# Patient Record
Sex: FEMALE | Race: WHITE | NOT HISPANIC OR LATINO | Employment: OTHER | URBAN - METROPOLITAN AREA
[De-identification: names, ages, dates, MRNs, and addresses within clinical notes are randomized per-mention and may not be internally consistent; named-entity substitution may affect disease eponyms.]

---

## 2017-01-03 ENCOUNTER — HOSPITAL ENCOUNTER (OUTPATIENT)
Dept: PHYSICAL THERAPY | Facility: HOSPITAL | Age: 77
Setting detail: THERAPIES SERIES
Discharge: HOME | End: 2017-01-03

## 2017-01-03 DIAGNOSIS — M54.50 BILATERAL LOW BACK PAIN WITHOUT SCIATICA, UNSPECIFIED CHRONICITY: Primary | ICD-10-CM

## 2017-01-03 DIAGNOSIS — G72.41 INCLUSION BODY MYOSITIS: ICD-10-CM

## 2017-01-03 DIAGNOSIS — R26.89 BALANCE PROBLEM: ICD-10-CM

## 2017-01-03 PROCEDURE — 97112 NEUROMUSCULAR REEDUCATION: CPT | Performed by: PHYSICAL THERAPIST

## 2017-01-03 PROCEDURE — 97110 THERAPEUTIC EXERCISES: CPT | Performed by: PHYSICAL THERAPIST

## 2017-01-03 NOTE — PROGRESS NOTES
Outpatient Physical Therapy Ortho Treatment Note  [facility]     Patient Name: Lissa Ortiz  : 1940  MRN: [id number]  Today's Date: 1/3/2017      Visit Date: 2017    Visit Dx:    ICD-10-CM ICD-9-CM   1. Bilateral low back pain without sciatica, unspecified chronicity M54.5 724.2   2. Balance problem R26.89 781.99   3. 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       Patient Active Problem List   Diagnosis   • Benign essential hypertension   • Mixed anxiety depressive disorder   • Diabetes mellitus   • Glaucoma   • Hyperlipidemia   • Insomnia   • Restless legs syndrome   • Rheumatoid arthritis   • Vitamin D deficiency   • Routine health maintenance   • Midline low back pain without sciatica   • Encounter for immunization        Past Medical History   Diagnosis Date   • Allergic rhinitis         Past Surgical History   Procedure Laterality Date   • Hysterectomy     • Tonsillectomy                               PT Assessment/Plan       17 1350 16 1410       PT Assessment    Assessment Comments Progressed to 6 inch step ups. Worked on sit to stand from 20 inch surface where patient does not require use of hand support, (chair height is 17.5 in.).  Good reaction time with ball toss/catch and kicking ball. Work on progression to 8 inch step up and lateral step ups.  -OPHELIA doing better with balance work but noted extreme challenge with squat over 90 degrees and sit to stand can't be done unless using her hands or propped up. spent some time looking on [employer] website and gave pt advice regarding other progressive symptoms that can affect swallowing (thickener) and wrist flexor muscles (putty)  -LESLYE       User Key  (r) = Recorded By, (t) = Taken By, (c) = Cosigned By    Initials Name Provider Type    OPHELIA David, PT Physical Therapist    ZK Zorre Zeno Kimura, PT Physical Therapist                    Exercises       17 1300          Subjective Pain    Able to rate subjective pain? yes  -OPHELIA       Pre-Treatment Pain Level 0  -OPHELIA      Post-Treatment Pain Level 0  -OPHELIA      Subjective Pain Comment Sore after last treatment.  -OPHELIA        User Key  (r) = Recorded By, (t) = Taken By, (c) = Cosigned By    Initials Name Provider Type    OPHELIA David, PT Physical Therapist                                       Time Calculation:   Start Time: 1311  Stop Time: 1349  Time Calculation (min): 38 min    Therapy Charges for Today     Code Description Service Date Service Provider Modifiers Qty    01254287726  PT THER PROC EA 15 MIN 1/3/2017 Chandan David, PT GP 3                    Chandan David, PT  1/3/2017

## 2017-01-03 NOTE — PROGRESS NOTES
Outpatient Physical Therapy Ortho Treatment Note  [facility]     Patient Name: Lissa Ortiz  : 1940  MRN: [id number]  Today's Date: 1/3/2017      Visit Date: 2017    Visit Dx:    ICD-10-CM ICD-9-CM   1. Bilateral low back pain without sciatica, unspecified chronicity M54.5 724.2   2. Balance problem R26.89 781.99   3. 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       Patient Active Problem List   Diagnosis   • Benign essential hypertension   • Mixed anxiety depressive disorder   • Diabetes mellitus   • Glaucoma   • Hyperlipidemia   • Insomnia   • Restless legs syndrome   • Rheumatoid arthritis   • Vitamin D deficiency   • Routine health maintenance   • Midline low back pain without sciatica   • Encounter for immunization        Past Medical History   Diagnosis Date   • Allergic rhinitis         Past Surgical History   Procedure Laterality Date   • Hysterectomy     • Tonsillectomy                               PT Assessment/Plan       17 1350 16 1410       PT Assessment    Assessment Comments Progressed to 6 inch step ups. Worked on sit to stand from 20 inch surface where patient does not require use of hand support, (chair height is 17.5 in.).  Good reaction time with ball toss/catch and kicking ball. Work on progression to 8 inch step up and lateral step ups.  -OPHELIA doing better with balance work but noted extreme challenge with squat over 90 degrees and sit to stand can't be done unless using her hands or propped up. spent some time looking on [employer] website and gave pt advice regarding other progressive symptoms that can affect swallowing (thickener) and wrist flexor muscles (putty)  -LESLYE       User Key  (r) = Recorded By, (t) = Taken By, (c) = Cosigned By    Initials Name Provider Type    OPHELIA David, PT Physical Therapist    ZK Zorre Zeno Kimura, PT Physical Therapist                    Exercises       17 1300          Subjective Pain    Able to rate subjective pain? yes  -OPHELIA       Pre-Treatment Pain Level 0  -OPHELIA      Post-Treatment Pain Level 0  -OPHELIA      Subjective Pain Comment Sore after last treatment.  -OPHELIA        User Key  (r) = Recorded By, (t) = Taken By, (c) = Cosigned By    Initials Name Provider Type    OPHELIA David, PT Physical Therapist                                       Time Calculation:   Start Time: 1311  Stop Time: 1349  Time Calculation (min): 38 min    Therapy Charges for Today     Code Description Service Date Service Provider Modifiers Qty    81822500518  PT THER PROC EA 15 MIN 1/3/2017 Chandan David, PT GP 2    [id number]  PT NEUROMUSC RE EDUCATION EA 15 MIN 1/3/2017 Chandan David, PT GP 1                    Chandan David, PT  1/3/2017

## 2017-01-04 DIAGNOSIS — E11.9 DIABETES TYPE 2, CONTROLLED (HCC): ICD-10-CM

## 2017-01-04 RX ORDER — GLIMEPIRIDE 4 MG/1
TABLET ORAL
Qty: 180 TABLET | Refills: 0 | Status: SHIPPED | OUTPATIENT
Start: 2017-01-04 | End: 2017-04-06 | Stop reason: SDUPTHER

## 2017-01-06 ENCOUNTER — HOSPITAL ENCOUNTER (OUTPATIENT)
Dept: PHYSICAL THERAPY | Facility: HOSPITAL | Age: 77
Setting detail: THERAPIES SERIES
Discharge: HOME | End: 2017-01-06

## 2017-01-06 DIAGNOSIS — G72.41 INCLUSION BODY MYOSITIS: ICD-10-CM

## 2017-01-06 DIAGNOSIS — R26.89 BALANCE PROBLEM: ICD-10-CM

## 2017-01-06 DIAGNOSIS — M54.50 BILATERAL LOW BACK PAIN WITHOUT SCIATICA, UNSPECIFIED CHRONICITY: Primary | ICD-10-CM

## 2017-01-06 PROCEDURE — 97110 THERAPEUTIC EXERCISES: CPT | Performed by: PHYSICAL THERAPIST

## 2017-01-06 PROCEDURE — 97112 NEUROMUSCULAR REEDUCATION: CPT | Performed by: PHYSICAL THERAPIST

## 2017-01-06 NOTE — PROGRESS NOTES
Outpatient Physical Therapy Ortho Treatment Note  [facility]     Patient Name: Lissa Ortiz  : 1940  MRN: [id number]  Today's Date: 2017      Visit Date: 2017    Visit Dx:    ICD-10-CM ICD-9-CM   1. Bilateral low back pain without sciatica, unspecified chronicity M54.5 724.2   2. Balance problem R26.89 781.99   3. 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       Patient Active Problem List   Diagnosis   • Benign essential hypertension   • Mixed anxiety depressive disorder   • Diabetes mellitus   • Glaucoma   • Hyperlipidemia   • Insomnia   • Restless legs syndrome   • Rheumatoid arthritis   • Vitamin D deficiency   • Routine health maintenance   • Midline low back pain without sciatica   • Encounter for immunization        Past Medical History   Diagnosis Date   • Allergic rhinitis         Past Surgical History   Procedure Laterality Date   • Hysterectomy     • Tonsillectomy                               PT Assessment/Plan       17 1650 17 1350       PT Assessment    Assessment Comments need to add on lateral step up. overall tandem walking, back stepping, mell marches front and side, and ball catch and kick all improving. Still can lose her balance but catching herself, or making sure we are close to a wall. Will wrap up rx next week and right now pt doing her HEP daily. instructed to go to every other day if LE fatigue increases.   -ZK Progressed to 6 inch step ups. Worked on sit to stand from 20 inch surface where patient does not require use of hand support, (chair height is 17.5 in.).  Good reaction time with ball toss/catch and kicking ball. Work on progression to 8 inch step up and lateral step ups.  -OPHELIA       User Key  (r) = Recorded By, (t) = Taken By, (c) = Cosigned By    Initials Name Provider Type    OPHELIA David, PT Physical Therapist    ZK Zorre Zeno Kimura, PT Physical Therapist                    Exercises       17 1400          Subjective Comments     "Subjective Comments sore after last session. doing her HEP from her phone. will walk more after dc, keep up with her HEP and do more of her elliptical machine.   -ZK      Subjective Pain    Able to rate subjective pain? yes  -ZK      Pre-Treatment Pain Level 0  -ZK      Post-Treatment Pain Level 0  -ZK      Exercise 1    Exercise Name 1 see flow sheet: lunge walks, MIP walks, side stepping lunges, ball catch and kick with back close to wall in case of falling. hamstring and calf stretching and added quad stretching since this has been her biggest soreness issue. better with sit to stands from just one 2\" foam vs. two of them  -ZK        User Key  (r) = Recorded By, (t) = Taken By, (c) = Cosigned By    Initials Name Provider Type    ZK Zorre Zeno Kimura, PT Physical Therapist                   Balance Exercises (last 24 hours)      Balance Exercises       01/06/17 1400          Computerized Posturography    Activity LOS;Midline orientation;Weight shifting   foam and firm surfaces  -ZK      Intensity Moderate   70-80% LOS and quick 5\" changes.  -ZK        User Key  (r) = Recorded By, (t) = Taken By, (c) = Cosigned By    Initials Name Provider Type    ZK Zorre Zeno Kimura, PT Physical Therapist                             Time Calculation:   Start Time: 1400  Stop Time: 1445  Time Calculation (min): 45 min    Therapy Charges for Today     Code Description Service Date Service Provider Modifiers Qty    05810731255  PT THER PROC EA 15 MIN 1/6/2017 Zorre Zeno Kimura, PT GP 2    [id number]  PT NEUROMUSC RE EDUCATION EA 15 MIN 1/6/2017 Zorre Zeno Kimura, PT GP 1                    Zorre Zeno Kimura, PT  1/6/2017     "

## 2017-01-10 ENCOUNTER — APPOINTMENT (OUTPATIENT)
Dept: PHYSICAL THERAPY | Facility: HOSPITAL | Age: 77
End: 2017-01-10

## 2017-01-13 ENCOUNTER — OFFICE VISIT (OUTPATIENT)
Dept: INTERNAL MEDICINE | Age: 77
End: 2017-01-13

## 2017-01-13 VITALS
SYSTOLIC BLOOD PRESSURE: 126 MMHG | HEIGHT: 66 IN | DIASTOLIC BLOOD PRESSURE: 56 MMHG | TEMPERATURE: 97 F | BODY MASS INDEX: 23 KG/M2 | WEIGHT: 143.1 LBS | HEART RATE: 78 BPM | OXYGEN SATURATION: 99 %

## 2017-01-13 DIAGNOSIS — H61.21 IMPACTED CERUMEN OF RIGHT EAR: Primary | ICD-10-CM

## 2017-01-13 PROCEDURE — 99212 OFFICE O/P EST SF 10 MIN: CPT | Performed by: INTERNAL MEDICINE

## 2017-01-13 NOTE — PROGRESS NOTES
Subjective   Lissa Ortiz is a 76 y.o. female.     History of Present Illness patient has impacted cerumen noted within she purchased her hearing aid.  She would like to have this removed.    The following portions of the patient's history were reviewed and updated as appropriate: allergies, current medications and problem list.    Review of Systems   HENT: Positive for ear pain.         Right side       Objective   Physical Exam   Constitutional: She is oriented to person, place, and time. She appears well-developed and well-nourished. No distress.   HENT:   Right ear, impacted cerumen, this was removed with irrigation, following removal TM and canal were normal.   Neurological: She is alert and oriented to person, place, and time.   Skin: Skin is warm and dry.   Psychiatric: She has a normal mood and affect. Her behavior is normal. Judgment and thought content normal.   Nursing note and vitals reviewed.    Procedures      Assessment/Plan   Lissa was seen today for earache.    Diagnoses and all orders for this visit:    Impacted cerumen of right ear    Other orders  -     Ear Cerumen Removal Instrumentation       Ear wax removed,.

## 2017-01-13 NOTE — MR AVS SNAPSHOT
Lissa Ortiz   1/13/2017 3:20 PM   Office Visit    Dept Phone:  [phone]   Encounter #:  [id number]    Provider:  Marvin Ramey MD   Department:  [facility] PRIMARY CARE                Your Full Care Plan              Your Updated Medication List          This list is accurate as of: 1/13/17  3:33 PM.  Always use your most recent med list.                amLODIPine 5 MG tablet   Commonly known as:  NORVASC   TAKE ONE TABLET BY MOUTH DAILY       aspirin 81 MG tablet       dorzolamide-timolol 22.3-6.8 MG/ML ophthalmic solution   Commonly known as:  COSOPT       folic acid 1 MG tablet   Commonly known as:  FOLVITE       glimepiride 4 MG tablet   Commonly known as:  AMARYL   TAKE ONE TABLET BY MOUTH TWICE A DAY       JANUVIA 100 MG tablet   Generic drug:  SITagliptin   TAKE ONE TABLET BY MOUTH EVERY DAY       metFORMIN  MG 24 hr tablet   Commonly known as:  GLUCOPHAGE XR   Take 3 tablets by mouth Daily With Breakfast.       Methotrexate Sodium syringe       MULTI VITAMIN PO       Omega 3 1000 MG capsule       pravastatin 20 MG tablet   Commonly known as:  PRAVACHOL   TAKE ONE TABLET BY MOUTH DAILY       Probiotic capsule       sertraline 50 MG tablet   Commonly known as:  ZOLOFT   TAKE ONE TABLET BY MOUTH DAILY       TURMERIC CURCUMIN PO       vitamin B-12 100 MCG tablet   Commonly known as:  CYANOCOBALAMIN       Vitamin D-3 1000 UNITS capsule               You Were Diagnosed With        Codes Comments    Impacted cerumen of right ear    -  Primary ICD-10-CM: H61.21  ICD-9-CM: 380.4       Instructions     None    Patient Instructions History      Upcoming Appointments     Visit Type Date Time Department    OFFICE VISIT 1/13/2017  3:20 PM [facility]    OFFICE VISIT 4/12/2017  9:00 AM [facility]      MyChart Signup     [facility] allows you to send messages to your doctor, view your test results, renew your prescriptions, schedule appointments,  "and more. To sign up, go to [employer] and click on the Sign Up Now link in the New User? box. Enter your [employer] Activation Code exactly as it appears below along with the last four digits of your Social Security Number and your Date of Birth () to complete the sign-up process. If you do not sign up before the expiration date, you must request a new code.    [employer] Activation Code: [email or web address]  Expires: 2017  3:32 PM    If you have questions, you can email [email or web address] or call [phone] to talk to our [employer] staff. Remember, [employer] is NOT to be used for urgent needs. For medical emergencies, dial 911.               Other Info from Your Visit           Your Appointments     2017  9:00 AM EDT   Office Visit with Marvin Ramey MD   [facility] PRIMARY CARE (--)    [address] [place] [id number] [phone]           Arrive 15 minutes prior to appointment.              Allergies     No Known Allergies      Reason for Visit     Earache Right      Vital Signs     Blood Pressure Pulse Temperature Height Weight Oxygen Saturation    126/56 78 97 °F (36.1 °C) 66\" (167.6 cm) 143 lb 1.6 oz (64.9 kg) 99%    Body Mass Index Smoking Status                23.1 kg/m2 Never Smoker          Problems and Diagnoses Noted     Excess wax in ear    -  Primary        "

## 2017-02-06 ENCOUNTER — OFFICE VISIT (OUTPATIENT)
Dept: INTERNAL MEDICINE | Age: 77
End: 2017-02-06

## 2017-02-06 VITALS
HEIGHT: 66 IN | SYSTOLIC BLOOD PRESSURE: 122 MMHG | TEMPERATURE: 98.1 F | WEIGHT: 146 LBS | BODY MASS INDEX: 23.46 KG/M2 | DIASTOLIC BLOOD PRESSURE: 70 MMHG | OXYGEN SATURATION: 98 % | HEART RATE: 74 BPM

## 2017-02-06 DIAGNOSIS — E11.9 TYPE 2 DIABETES MELLITUS WITHOUT COMPLICATION, WITHOUT LONG-TERM CURRENT USE OF INSULIN (HCC): Primary | ICD-10-CM

## 2017-02-06 LAB — GLUCOSE BLDC GLUCOMTR-MCNC: 199 MG/DL (ref 70–130)

## 2017-02-06 PROCEDURE — 82962 GLUCOSE BLOOD TEST: CPT | Performed by: NURSE PRACTITIONER

## 2017-02-06 PROCEDURE — 99214 OFFICE O/P EST MOD 30 MIN: CPT | Performed by: NURSE PRACTITIONER

## 2017-02-06 NOTE — PROGRESS NOTES
Lissa Ortiz / 76 y.o. / female  02/06/2017      CC:  Main reason(s) for today's visit: elevated glucose (190 this morning before eating breakfast)      HPI:   Patient states that she has noticed that her morning blood glucose levels have increased recently in the am. States that the most recent medication change was about three months ago. She had increased her metformin from twice a day to three times a day. States that she has has a lot more stress in the recent months due to placing her spouse in a facility for memory care and she is getting a new puppy which makes her nervous. States denies any polyuria, polyphagia or polydipsia. She denies any changes in diet. States she exercises with an elliptical daily and walks daily. She also states that she monitors her blood sugar every morning. States that it fluctuates from 120 to 190's.        The following portions of the patient's history were reviewed and updated as appropriate: past medical history and past surgical history.    ASSESSMENT & PLAN:    Problem List Items Addressed This Visit        Endocrine    Diabetes mellitus - Primary    Relevant Medications    JANUVIA 100 MG tablet    metFORMIN XR (GLUCOPHAGE XR) 500 MG 24 hr tablet    glimepiride (AMARYL) 4 MG tablet    Other Relevant Orders    Hemoglobin A1c        Orders Placed This Encounter   Procedures   • Hemoglobin A1c       · Summary/Discussion:     1. Diabetes mellitus: Patient with C/O increasing blood glucose levels in the morning. States that she has not had any changes in diet and exercise.  She does report that she has had a recent medication change in that her metformin was increased to 3 pills a day instead of 2 pills a day.  She also reports more stress in her life recently secondary to obtaining any appendectomy, and having to place her spouse in a facility for memory care.  I reviewed last hemoglobin A1c and it was elevated on 7.6 on 06/23/2016, then 7.5 on 08/18/2016 and 7.23 on 11/23/2016.   Due to the fluctuation of the blood glucose levels over the past few months and medication changes, I will repeat Hgb A1c today to further evaluate. I also advised her to monitor her blood glucose levels in the morning, noon and night for the next 2 weeks and report the levels.  Depending on the Hgb A1c levels, medication may be adjusted. Patient does not appear to be symptomatic at this time and is not reporting any acute issues.  She was advised to continue with her dietary changes, and exercise regimen and follow-up with Dr. Ramey as scheduled in April 2017.       Follow-up: No Follow-up on file.     Future Appointments  Date Time Provider Department Center   4/12/2017 9:00 AM Marvin Ramey MD [facility] None     ____________________________________________________________________    REVIEW OF SYSTEMS    Review of Systems   Constitutional: Negative for activity change, appetite change, chills, diaphoresis, fatigue and fever.   HENT: Negative for congestion, dental problem, ear discharge, facial swelling, hearing loss, mouth sores, sore throat, trouble swallowing and voice change.    Eyes: Negative.    Respiratory: Negative for apnea, cough, choking, chest tightness, shortness of breath, wheezing and stridor.    Cardiovascular: Negative for chest pain, palpitations and leg swelling.   Gastrointestinal: Negative for abdominal distention, abdominal pain, anal bleeding, blood in stool, constipation, diarrhea, nausea, rectal pain and vomiting.   Endocrine: Negative for cold intolerance, heat intolerance, polydipsia, polyphagia and polyuria.   Genitourinary: Negative for decreased urine volume, difficulty urinating, dysuria, enuresis, flank pain, frequency, genital sores, hematuria and urgency.   Musculoskeletal: Negative for arthralgias, back pain, gait problem, joint swelling, myalgias, neck pain and neck stiffness.   Skin: Negative for color change, pallor, rash and wound.   Allergic/Immunologic: Negative for  "environmental allergies, food allergies and immunocompromised state.   Neurological: Negative for dizziness, tremors, seizures, syncope, facial asymmetry, speech difficulty, weakness, light-headedness, numbness and headaches.   Hematological: Negative for adenopathy. Does not bruise/bleed easily.   Psychiatric/Behavioral: Negative for agitation, confusion, decreased concentration, self-injury, sleep disturbance and suicidal ideas. The patient is not nervous/anxious and is not hyperactive.    All other systems reviewed and are negative.    Other: As noted per HPI      VITALS    Visit Vitals   • /70   • Pulse 74   • Temp 98.1 °F (36.7 °C)   • Ht 66\" (167.6 cm)   • Wt 146 lb (66.2 kg)   • SpO2 98%   • BMI 23.57 kg/m2     BP Readings from Last 3 Encounters:   02/06/17 122/70   01/13/17 126/56   11/30/16 98/52     Wt Readings from Last 3 Encounters:   02/06/17 146 lb (66.2 kg)   01/13/17 143 lb 1.6 oz (64.9 kg)   11/30/16 142 lb (64.4 kg)      Body mass index is 23.57 kg/(m^2).    PHYSICAL EXAMINATION    Physical Exam   Constitutional: She is oriented to person, place, and time. She appears well-developed and well-nourished.   HENT:   Head: Normocephalic.   Eyes: Conjunctivae are normal. Pupils are equal, round, and reactive to light.   Neck: Normal range of motion.   Cardiovascular: Normal rate and regular rhythm.    Pulmonary/Chest: Effort normal and breath sounds normal.   Musculoskeletal: Normal range of motion.   Neurological: She is alert and oriented to person, place, and time.   Skin: Skin is warm and dry.   Psychiatric: She has a normal mood and affect. Her behavior is normal.   Vitals reviewed.        REVIEWED DATA:    Labs:   Lab Results   Component Value Date     11/30/2016    K 4.5 11/30/2016    AST 24 11/30/2016    ALT 19 11/30/2016    BUN 15 11/30/2016    CREATININE 0.60 11/30/2016    CREATININE 0.65 04/07/2015    EGFRIFNONA 89 11/30/2016    EGFRIFAFRI 103 11/30/2016       Lab Results "   Component Value Date     (H) 11/30/2016     (H) 04/07/2015    HGBA1C 7.23 (H) 11/23/2016    HGBA1C 7.5 (H) 08/18/2016    HGBA1C 7.6 (H) 06/23/2016       Lab Results   Component Value Date     (H) 11/30/2016    LDL 90 04/07/2015    HDL 60 11/30/2016    TRIG 77 11/30/2016       No results found for: TSH, FREET4     No results found for: WBC, HGB, PLT     Imaging:        Medical Tests:        Summary of old records / correspondence / consultant report:        Request outside records:          ALLERGIES:    Review of patient's allergies indicates no known allergies.    MEDICATIONS:  Current Outpatient Prescriptions   Medication Sig Dispense Refill   • amLODIPine (NORVASC) 5 MG tablet TAKE ONE TABLET BY MOUTH DAILY 30 tablet 4   • aspirin 81 MG tablet Take 1 tablet by mouth daily.     • Cholecalciferol (VITAMIN D-3) 1000 UNITS capsule Take 1 capsule by mouth.     • dorzolamide-timolol (COSOPT) 22.3-6.8 MG/ML ophthalmic solution      • folic acid (FOLVITE) 1 MG tablet Take 1 tablet by mouth daily.     • glimepiride (AMARYL) 4 MG tablet TAKE ONE TABLET BY MOUTH TWICE A  tablet 0   • JANUVIA 100 MG tablet TAKE ONE TABLET BY MOUTH EVERY DAY 90 tablet 5   • metFORMIN XR (GLUCOPHAGE XR) 500 MG 24 hr tablet Take 3 tablets by mouth Daily With Breakfast. 360 tablet 2   • Methotrexate Sodium 50 MG/2ML injection 1 (one) time per week.     • Multiple Vitamin (MULTI VITAMIN PO) Take 1 tablet by mouth Daily.     • Omega 3 1000 MG capsule Take 1 capsule by mouth.     • pravastatin (PRAVACHOL) 20 MG tablet TAKE ONE TABLET BY MOUTH DAILY 90 tablet 2   • Probiotic capsule Take 1 capsule by mouth.     • sertraline (ZOLOFT) 50 MG tablet TAKE ONE TABLET BY MOUTH DAILY 90 tablet 1   • TURMERIC CURCUMIN PO Take 1 tablet by mouth.     • vitamin B-12 (CYANOCOBALAMIN) 100 MCG tablet Take 1 tablet by mouth.       No current facility-administered medications for this visit.        PFSH    The following portions of the  patient's history were reviewed and updated as appropriate: Allergies / Current Medications / Past Medical History / Surgical History / Social History / Family History    PROBLEM LIST:    Patient Active Problem List   Diagnosis   • Benign essential hypertension   • Mixed anxiety depressive disorder   • Diabetes mellitus   • Glaucoma   • Hyperlipidemia   • Insomnia   • Restless legs syndrome   • Rheumatoid arthritis   • Vitamin D deficiency   • Routine health maintenance   • Midline low back pain without sciatica   • Encounter for immunization       PAST MEDICAL HX:    Past Medical History   Diagnosis Date   • Allergic rhinitis    • Diabetes mellitus      type 2       PAST SURGICAL HX:    Past Surgical History   Procedure Laterality Date   • Hysterectomy     • Tonsillectomy         SOCIAL HX:    Social History     Social History   • Marital status:      Spouse name: N/A   • Number of children: N/A   • Years of education: N/A     Social History Main Topics   • Smoking status: Never Smoker   • Smokeless tobacco: None   • Alcohol use No      Comment: Occasional glass of wine one per month   • Drug use: None   • Sexual activity: Not Asked     Other Topics Concern   • None     Social History Narrative   • None       FAMILY HX:    Family History   Problem Relation Age of Onset   • Diabetes Mother    • Coronary artery disease Father      MI   • Diabetes Father    • Stroke Father

## 2017-02-07 LAB — HBA1C MFR BLD: 7.68 % (ref 4.8–5.6)

## 2017-02-10 ENCOUNTER — DOCUMENTATION (OUTPATIENT)
Dept: PHYSICAL THERAPY | Facility: HOSPITAL | Age: 77
End: 2017-02-10

## 2017-02-10 NOTE — THERAPY DISCHARGE NOTE
Outpatient Physical Therapy Discharge Summary         Patient Name: Lissa Ortiz  : 1940  MRN: [id number]    Today's Date: 2/10/2017    Visit Dx:  No diagnosis found.          PT OP Goals       02/10/17 0856          PT Short Term Goals    STG 1 Patient will demonstrate improved ability to turn over in bed.  -ZK      STG 1 Progress Met  -ZK      STG 2 Patient will report decreased pain and stiffness by 50%.  -ZK      STG 2 Progress Partially Met  -ZK      STG 2 Progress Comments 25-30%  -ZK      STG 3 Patient will improve balance with L SLS from 14 sec. to 30 sec. (equal to R LE).  -ZK      STG 3 Progress Met  -ZK      STG 4 Patient will demonstrate independence with HEP for abdominal strength and trunk ROM and hamstring flexibility .  -ZK      STG 4 Progress Met  -ZK      Long Term Goals    LTG 1 Improve balance in sharpened Rhomberg from 3.5 sec. to 8 sec. or greater.  -ZK      LTG 1 Progress Met  -ZK      LTG 2 Improve proximal strength and balance demonstrated by the 5 X sit to stand test from 16.24 sec. to 12 sec.  -ZK      LTG 2 Progress Not Met  -ZK      LTG 2 Progress Comments struggles without use of hands from lower surfaces due to targeted quad weakness with dx   -ZK      LTG 3 Improve function demonstrated by Oswestry score from 26% to 20% or less.  -ZK      LTG 3 Progress Partially Met  -ZK      LTG 3 Progress Comments 24%  -ZK      LTG 4 Patient will demonstrate independence with HEP for balance.  -ZK      LTG 4 Progress Met  -ZK      LTG 4 Progress Comments limited by memory and compliance with HEP?  -ZK      LTG 5 Improve balance in sharpened Rhomberg to 12 sec.  -ZK      LTG 5 Progress Not Met  -ZK        User Key  (r) = Recorded By, (t) = Taken By, (c) = Cosigned By    Initials Name Provider Type    ZK Zorre Zeno Kimura, PT Physical Therapist          OP PT Discharge Summary  Date of Discharge: 02/10/17  Reason for Discharge: Independent, Maximum functional potential achieved,  Patient/Caregiver request  Outcomes Achieved: Patient able to partially acheive established goals  Discharge Destination: Home with home program  Discharge Instructions: pt seen 11 times for land based balance ex and light strength work due to affect of resistance on her dx of myositis that increases pain and weakness easily. Pt canceled on last appt 1-10-17 thus not able to gather all dc info. Pt with classic proximal muscle weakness jefferson noted with sit to stands from lower surfaces which were severely affected.       Time Calculation:                    Zorre Zeno Kimura, PT  2/10/2017

## 2017-02-28 ENCOUNTER — OFFICE VISIT (OUTPATIENT)
Dept: INTERNAL MEDICINE | Age: 77
End: 2017-02-28

## 2017-02-28 VITALS
HEIGHT: 67 IN | OXYGEN SATURATION: 98 % | SYSTOLIC BLOOD PRESSURE: 120 MMHG | WEIGHT: 142.4 LBS | DIASTOLIC BLOOD PRESSURE: 56 MMHG | TEMPERATURE: 97.8 F | HEART RATE: 65 BPM | BODY MASS INDEX: 22.35 KG/M2

## 2017-02-28 DIAGNOSIS — E11.9 TYPE 2 DIABETES MELLITUS WITHOUT COMPLICATION, WITHOUT LONG-TERM CURRENT USE OF INSULIN (HCC): ICD-10-CM

## 2017-02-28 DIAGNOSIS — R04.0 EPISTAXIS, RECURRENT: Primary | ICD-10-CM

## 2017-02-28 PROCEDURE — 99213 OFFICE O/P EST LOW 20 MIN: CPT | Performed by: INTERNAL MEDICINE

## 2017-02-28 NOTE — PROGRESS NOTES
Subjective   Lissa Ortiz is a 76 y.o. female.     History of Present Illness patient is having issues with nosebleeds which go back approximately one month.  At the onset, the episodes approximately once per week, but over the past 5 days they've been occurring daily.  She notes stable was occurring on the left side, she is using aspirin for primary prophylaxis, she denied any digital manipulation of the nose on the left side.  She does not have a humidifier on the furnace unit at her home at this time.    She has also been monitoring her blood sugar at home, fasting sugar has ranged from a low of 79 to a high of 162.  Postprandial values have ranged from a low of 63 to a high of 185.  There've been no medication side effects.     The following portions of the patient's history were reviewed and updated as appropriate: allergies, current medications, past medical history and problem list.    Review of Systems   Hematological:        Patient has no prior history of bleeding diathesis       Objective   Physical Exam   Constitutional: She is oriented to person, place, and time. She appears well-developed and well-nourished. No distress.   HENT:   Small area of oozing noted nasal septum anterior plexus left side   Cardiovascular: Normal rate, regular rhythm, normal heart sounds and intact distal pulses.  Exam reveals no gallop and no friction rub.    No murmur heard.  Pulmonary/Chest: Effort normal and breath sounds normal. She has no wheezes. She has no rales.   Musculoskeletal: She exhibits no edema.   Neurological: She is alert and oriented to person, place, and time.   Skin: Skin is warm and dry. No rash noted.   Psychiatric: She has a normal mood and affect. Her behavior is normal. Judgment and thought content normal.   Nursing note and vitals reviewed.      Assessment/Plan   Lissa was seen today for nose bleed.    Diagnoses and all orders for this visit:    Epistaxis, recurrent  -     Ambulatory Referral to ENT  (Otolaryngology)    Type 2 diabetes mellitus without complication, without long-term current use of insulin      Number-one epistaxis, patient is aware first to control bleeding acutely with pressure and ice.  We'll schedule ENT referral for cauterization within next 24-48 hours.  Patient was asked to discontinue aspirin at this time.  According to current literature, there is no evidence to use aspirin in a patient over 70 for primary prophylaxis.    #2 diabetes, good control based on home glucose monitoring.  Recommend patient reduce the frequency of testing from 3 times daily to 3-4 times per week.  We will observe these at next 2 weeks or so, if these are adequate, we'll change to once a week monitoring.e will check A1c at her next visit in April

## 2017-03-25 ENCOUNTER — APPOINTMENT (OUTPATIENT)
Dept: GENERAL RADIOLOGY | Facility: HOSPITAL | Age: 77
End: 2017-03-25

## 2017-03-25 PROCEDURE — 73560 X-RAY EXAM OF KNEE 1 OR 2: CPT | Performed by: FAMILY MEDICINE

## 2017-03-27 DIAGNOSIS — E78.5 HYPERLIPIDEMIA: ICD-10-CM

## 2017-03-27 RX ORDER — PRAVASTATIN SODIUM 20 MG
TABLET ORAL
Qty: 90 TABLET | Refills: 1 | Status: SHIPPED | OUTPATIENT
Start: 2017-03-27 | End: 2017-09-28 | Stop reason: SDUPTHER

## 2017-03-28 ENCOUNTER — TELEPHONE (OUTPATIENT)
Dept: INTERNAL MEDICINE | Age: 77
End: 2017-03-28

## 2017-03-28 NOTE — TELEPHONE ENCOUNTER
Keflex alone should be okay unless she starts to develop a fever, or the redness does not improve over the next 48-72 hours.  If either of these situations develop, she will need additional antibiotics at that time.

## 2017-04-06 DIAGNOSIS — E11.9 DIABETES TYPE 2, CONTROLLED (HCC): ICD-10-CM

## 2017-04-06 RX ORDER — GLIMEPIRIDE 4 MG/1
TABLET ORAL
Qty: 180 TABLET | Refills: 0 | Status: SHIPPED | OUTPATIENT
Start: 2017-04-06 | End: 2017-07-05 | Stop reason: SDUPTHER

## 2017-04-12 ENCOUNTER — OFFICE VISIT (OUTPATIENT)
Dept: INTERNAL MEDICINE | Age: 77
End: 2017-04-12

## 2017-04-12 VITALS
BODY MASS INDEX: 22.41 KG/M2 | DIASTOLIC BLOOD PRESSURE: 60 MMHG | HEIGHT: 67 IN | TEMPERATURE: 96.7 F | WEIGHT: 142.8 LBS | SYSTOLIC BLOOD PRESSURE: 130 MMHG | HEART RATE: 76 BPM | OXYGEN SATURATION: 98 %

## 2017-04-12 DIAGNOSIS — E11.9 TYPE 2 DIABETES MELLITUS WITHOUT COMPLICATION, WITHOUT LONG-TERM CURRENT USE OF INSULIN (HCC): ICD-10-CM

## 2017-04-12 DIAGNOSIS — I10 BENIGN ESSENTIAL HYPERTENSION: Primary | ICD-10-CM

## 2017-04-12 LAB — HBA1C MFR BLD: 7.31 % (ref 4.8–5.6)

## 2017-04-12 PROCEDURE — 99213 OFFICE O/P EST LOW 20 MIN: CPT | Performed by: INTERNAL MEDICINE

## 2017-04-12 NOTE — PROGRESS NOTES
"Lissa Ortiz / 76 y.o. / female  04/12/2017    VITALS    /60  Pulse 76  Temp 96.7 °F (35.9 °C)  Ht 67\" (170.2 cm)  Wt 142 lb 12.8 oz (64.8 kg)  SpO2 98%  BMI 22.37 kg/m2  BP Readings from Last 3 Encounters:   04/12/17 130/60   03/25/17 127/63   02/28/17 120/56     Wt Readings from Last 3 Encounters:   04/12/17 142 lb 12.8 oz (64.8 kg)   03/25/17 138 lb (62.6 kg)   02/28/17 142 lb 6.4 oz (64.6 kg)      Body mass index is 22.37 kg/(m^2).    CC:  Main reason(s) for today's visit: Hypertension and Diabetes      HPI:     For four-month follow-up of several medical issues as noted above.    Hypertension she is compliant with medication, dietary salt restriction, is exercising regularly, but she does not monitor blood pressure in the outpatient setting.    Diabetes: She is compliant with medication, and home glucose monitoring fasting fasting blood sugar has ranged from a low of 90 to a high of 175, postprandial values range from .  She denied any hypoglycemic reactions.    ______________________________________________________    ASSESSMENT & PLAN:    1. Benign essential hypertension    2. Type 2 diabetes mellitus without complication, without long-term current use of insulin      Orders Placed This Encounter   Procedures   • Hemoglobin A1c       Summary/Discussion:     · #1 hypertension stable on current medical regimen.  Plan: Same meds, blood pressure check 7 months  ·   ·  diabetes good control based on home glucose monitoring values.  Check A1c today, continue present medication.    #3 routine health maintenance, schedule annual wellness visit 7 months.      Return in about 7 months (around 11/12/2017) for Blood pressure check, Annual wellness visit.    No future appointments.    ______________________________________________________    REVIEW OF SYSTEMS    Review of Systems   Respiratory: Negative for chest tightness and shortness of breath.    Cardiovascular: Negative for chest pain and " palpitations.   Neurological: Negative for dizziness, syncope, speech difficulty, weakness, light-headedness and headaches.       PHYSICAL EXAMINATION    Physical Exam   Constitutional: She is oriented to person, place, and time. She appears well-developed and well-nourished. No distress.   Cardiovascular: Normal rate, regular rhythm, normal heart sounds and intact distal pulses.  Exam reveals no gallop and no friction rub.    No murmur heard.  Pulmonary/Chest: Effort normal and breath sounds normal. She has no wheezes. She has no rales.   Musculoskeletal: She exhibits no edema.   Neurological: She is alert and oriented to person, place, and time.   Skin: Skin is warm and dry. No rash noted.   Psychiatric: She has a normal mood and affect. Her behavior is normal. Judgment and thought content normal.   Nursing note and vitals reviewed.      REVIEWED DATA:    Labs:   Lab Results   Component Value Date     11/30/2016    K 4.5 11/30/2016    AST 24 11/30/2016    ALT 19 11/30/2016    BUN 15 11/30/2016    CREATININE 0.60 11/30/2016    CREATININE 0.65 04/07/2015    EGFRIFNONA 89 11/30/2016    EGFRIFAFRI 103 11/30/2016       Lab Results   Component Value Date     (H) 11/30/2016     (H) 04/07/2015    HGBA1C 7.68 (H) 02/06/2017    HGBA1C 7.23 (H) 11/23/2016    HGBA1C 7.5 (H) 08/18/2016    MICROALBUR 7.6 06/24/2016       Lab Results   Component Value Date     (H) 11/30/2016    LDL 90 04/07/2015    HDL 60 11/30/2016    TRIG 77 11/30/2016       No results found for: TSH, FREET4     No results found for: WBC, HGB, PLT     Imaging:        Medical Tests:        Summary of old records / correspondence / consultant report:        Request outside records:          ALLERGIES:    Bactrim [sulfamethoxazole-trimethoprim]    MEDICATIONS:       Current Outpatient Prescriptions   Medication Sig Dispense Refill   • amLODIPine (NORVASC) 5 MG tablet TAKE ONE TABLET BY MOUTH DAILY 30 tablet 4   • Cholecalciferol  (VITAMIN D-3) 1000 UNITS capsule Take 1 capsule by mouth.     • dorzolamide-timolol (COSOPT) 22.3-6.8 MG/ML ophthalmic solution      • folic acid (FOLVITE) 1 MG tablet Take 1 tablet by mouth daily.     • glimepiride (AMARYL) 4 MG tablet TAKE ONE TABLET BY MOUTH TWICE A  tablet 0   • JANUVIA 100 MG tablet TAKE ONE TABLET BY MOUTH EVERY DAY 90 tablet 5   • metFORMIN XR (GLUCOPHAGE XR) 500 MG 24 hr tablet Take 3 tablets by mouth Daily With Breakfast. 360 tablet 2   • Methotrexate Sodium 50 MG/2ML injection 1 (one) time per week.     • Multiple Vitamin (MULTI VITAMIN PO) Take 1 tablet by mouth Daily.     • Omega 3 1000 MG capsule Take 1 capsule by mouth.     • pravastatin (PRAVACHOL) 20 MG tablet TAKE ONE TABLET BY MOUTH DAILY 90 tablet 1   • Probiotic capsule Take 1 capsule by mouth.     • sertraline (ZOLOFT) 50 MG tablet TAKE ONE TABLET BY MOUTH DAILY 90 tablet 0   • TURMERIC CURCUMIN PO Take 1 tablet by mouth.     • vitamin B-12 (CYANOCOBALAMIN) 100 MCG tablet Take 1 tablet by mouth.       No current facility-administered medications for this visit.        [facility]    The following portions of the patient's history were reviewed and updated as appropriate: Allergies / Current Medications / Past Medical History / Surgical History / Social History / Family History    PROBLEM LIST:    Patient Active Problem List   Diagnosis   • Benign essential hypertension   • Mixed anxiety depressive disorder   • Diabetes mellitus   • Glaucoma   • Hyperlipidemia   • Insomnia   • Restless legs syndrome   • Rheumatoid arthritis   • Vitamin D deficiency   • Routine health maintenance   • Midline low back pain without sciatica   • Encounter for immunization       PAST MEDICAL HX:    Past Medical History:   Diagnosis Date   • Allergic rhinitis    • Diabetes mellitus     type 2       PAST SURGICAL HX:    Past Surgical History:   Procedure Laterality Date   • HYSTERECTOMY     • TONSILLECTOMY         SOCIAL HX:    Social History     Social  History   • Marital status:      Spouse name: N/A   • Number of children: 3   • Years of education: N/A     Social History Main Topics   • Smoking status: Never Smoker   • Smokeless tobacco: None   • Alcohol use No      Comment: Occasional glass of wine one per month   • Drug use: None   • Sexual activity: Not Asked     Other Topics Concern   • None     Social History Narrative       FAMILY HX:    Family History   Problem Relation Age of Onset   • Diabetes Mother    • Coronary artery disease Father      MI   • Diabetes Father    • Stroke Father          **Chris Disclaimer:   Much of this encounter note is an electronic transcription/translation of spoken language to printed text. The electronic translation of spoken language may permit erroneous, or at times, nonsensical words or phrases to be inadvertently transcribed. Although I have reviewed the note for such errors, some may still exist.

## 2017-05-03 ENCOUNTER — OFFICE VISIT (OUTPATIENT)
Dept: INTERNAL MEDICINE | Age: 77
End: 2017-05-03

## 2017-05-03 ENCOUNTER — HOSPITAL ENCOUNTER (OUTPATIENT)
Dept: GENERAL RADIOLOGY | Facility: HOSPITAL | Age: 77
Discharge: HOME | End: 2017-05-03
Admitting: INTERNAL MEDICINE

## 2017-05-03 VITALS
BODY MASS INDEX: 22.07 KG/M2 | TEMPERATURE: 97.1 F | HEART RATE: 74 BPM | WEIGHT: 140.6 LBS | DIASTOLIC BLOOD PRESSURE: 50 MMHG | HEIGHT: 67 IN | SYSTOLIC BLOOD PRESSURE: 118 MMHG | OXYGEN SATURATION: 95 %

## 2017-05-03 DIAGNOSIS — M54.2 NECK PAIN ON RIGHT SIDE: Primary | ICD-10-CM

## 2017-05-03 PROCEDURE — 99214 OFFICE O/P EST MOD 30 MIN: CPT | Performed by: INTERNAL MEDICINE

## 2017-05-03 PROCEDURE — 72050 X-RAY EXAM NECK SPINE 4/5VWS: CPT

## 2017-05-03 RX ORDER — CYCLOBENZAPRINE HCL 5 MG
5 TABLET ORAL 3 TIMES DAILY
Qty: 30 TABLET | Refills: 1 | Status: SHIPPED | OUTPATIENT
Start: 2017-05-03 | End: 2017-11-13

## 2017-05-05 ENCOUNTER — TELEPHONE (OUTPATIENT)
Dept: INTERNAL MEDICINE | Age: 77
End: 2017-05-05

## 2017-05-15 DIAGNOSIS — F41.8 MIXED ANXIETY DEPRESSIVE DISORDER: Primary | ICD-10-CM

## 2017-05-31 DIAGNOSIS — I10 ESSENTIAL HYPERTENSION: ICD-10-CM

## 2017-05-31 RX ORDER — AMLODIPINE BESYLATE 5 MG/1
TABLET ORAL
Qty: 30 TABLET | Refills: 5 | Status: SHIPPED | OUTPATIENT
Start: 2017-05-31 | End: 2017-11-30 | Stop reason: SDUPTHER

## 2017-07-05 DIAGNOSIS — E11.9 DIABETES TYPE 2, CONTROLLED (HCC): ICD-10-CM

## 2017-07-05 RX ORDER — GLIMEPIRIDE 4 MG/1
TABLET ORAL
Qty: 180 TABLET | Refills: 1 | Status: SHIPPED | OUTPATIENT
Start: 2017-07-05 | End: 2018-01-04 | Stop reason: SDUPTHER

## 2017-09-28 DIAGNOSIS — E78.5 HYPERLIPIDEMIA: ICD-10-CM

## 2017-09-28 RX ORDER — SITAGLIPTIN 100 MG/1
TABLET, FILM COATED ORAL
Qty: 90 TABLET | Refills: 4 | OUTPATIENT
Start: 2017-09-28

## 2017-09-28 RX ORDER — PRAVASTATIN SODIUM 20 MG
TABLET ORAL
Qty: 90 TABLET | Refills: 0 | Status: SHIPPED | OUTPATIENT
Start: 2017-09-28 | End: 2017-12-29 | Stop reason: SDUPTHER

## 2017-09-29 DIAGNOSIS — E11.9 TYPE 2 DIABETES MELLITUS WITHOUT COMPLICATION, WITHOUT LONG-TERM CURRENT USE OF INSULIN (HCC): Primary | ICD-10-CM

## 2017-10-05 RX ORDER — METFORMIN HYDROCHLORIDE 500 MG/1
TABLET, EXTENDED RELEASE ORAL
Qty: 270 TABLET | Refills: 1 | Status: SHIPPED | OUTPATIENT
Start: 2017-10-05 | End: 2017-10-19 | Stop reason: SDUPTHER

## 2017-10-19 ENCOUNTER — OFFICE VISIT (OUTPATIENT)
Dept: INTERNAL MEDICINE | Age: 77
End: 2017-10-19

## 2017-10-19 VITALS
HEART RATE: 72 BPM | SYSTOLIC BLOOD PRESSURE: 118 MMHG | OXYGEN SATURATION: 97 % | TEMPERATURE: 97.8 F | HEIGHT: 67 IN | WEIGHT: 136.8 LBS | DIASTOLIC BLOOD PRESSURE: 60 MMHG | BODY MASS INDEX: 21.47 KG/M2

## 2017-10-19 DIAGNOSIS — E11.9 TYPE 2 DIABETES MELLITUS WITHOUT COMPLICATION, WITHOUT LONG-TERM CURRENT USE OF INSULIN (HCC): Primary | ICD-10-CM

## 2017-10-19 PROCEDURE — 99213 OFFICE O/P EST LOW 20 MIN: CPT | Performed by: INTERNAL MEDICINE

## 2017-10-19 RX ORDER — METFORMIN HYDROCHLORIDE 500 MG/1
1000 TABLET, EXTENDED RELEASE ORAL
Qty: 270 TABLET | Refills: 1 | COMMUNITY
Start: 2017-10-19 | End: 2018-02-16

## 2017-10-19 RX ORDER — METFORMIN HYDROCHLORIDE 500 MG/1
1500 TABLET, EXTENDED RELEASE ORAL
Qty: 270 TABLET | Refills: 1 | COMMUNITY
Start: 2017-10-19 | End: 2017-10-19

## 2017-10-19 RX ORDER — ROPINIROLE 0.25 MG/1
TABLET, FILM COATED ORAL
Qty: 60 TABLET | Refills: 1 | Status: SHIPPED | OUTPATIENT
Start: 2017-10-19 | End: 2018-07-10 | Stop reason: SDUPTHER

## 2017-10-19 NOTE — PROGRESS NOTES
Subjective   Lissa Ortiz is a 76 y.o. female.     History of Present Illness  disease presents with the following complaint: Low blood sugar in the morning.  Last several mornings her blood sugars been approximate 60 mg percent.  She notes no symptoms acutely with this reading.  She is on 3 medications for diabetes Januvia, glimepiride, and metformin.  She has no specific side effects and medications.  She does not eat a bedtime snack.  She notes that the remainder of the day sugars are in the fairly normal range.  He is eating 3 regular meals per day, and exercising regularly.    The following portions of the patient's history were reviewed and updated as appropriate: allergies, current medications, past family history, past medical history, past social history, past surgical history and problem list.    Review of Systems   Respiratory: Negative for chest tightness and shortness of breath.    Cardiovascular: Negative for chest pain and palpitations.   Neurological: Negative for dizziness, syncope, speech difficulty, weakness, light-headedness and headaches.       Objective   Physical Exam   Constitutional: She is oriented to person, place, and time. She appears well-developed and well-nourished. No distress.   Cardiovascular: Normal rate, regular rhythm, normal heart sounds and intact distal pulses.  Exam reveals no gallop and no friction rub.    No murmur heard.  Pulmonary/Chest: Effort normal and breath sounds normal. She has no wheezes. She has no rales.   Musculoskeletal: She exhibits no edema.   Neurological: She is alert and oriented to person, place, and time.   Skin: Skin is warm and dry. No rash noted.   Psychiatric: She has a normal mood and affect. Her behavior is normal. Judgment and thought content normal.   Nursing note and vitals reviewed.      Assessment/Plan   Lissa was seen today for hypoglycemia.    Diagnoses and all orders for this visit:    Type 2 diabetes mellitus without complication, without  long-term current use of insulin        1 diabetes with overly aggressive control of blood sugar.  Decrease metformin to 1000 mg per day, continue other medications as they are.  Patient will monitor blood sugar over the next week or so to see if this problem was rectified.  In the meantime, we will check an A1c today.  We will discuss this further when I see her for her annual wellness visit on 13 November.  Recommend patient not lose any more weight since she is at the lower limit of normal for her BMI at this time.

## 2017-10-20 LAB — HBA1C MFR BLD: 7.1 % (ref 4.8–5.6)

## 2017-11-09 DIAGNOSIS — F41.8 MIXED ANXIETY DEPRESSIVE DISORDER: ICD-10-CM

## 2017-11-13 ENCOUNTER — OFFICE VISIT (OUTPATIENT)
Dept: INTERNAL MEDICINE | Age: 77
End: 2017-11-13

## 2017-11-13 VITALS
WEIGHT: 136 LBS | OXYGEN SATURATION: 99 % | DIASTOLIC BLOOD PRESSURE: 58 MMHG | BODY MASS INDEX: 21.35 KG/M2 | HEIGHT: 67 IN | TEMPERATURE: 97.8 F | SYSTOLIC BLOOD PRESSURE: 130 MMHG | HEART RATE: 82 BPM

## 2017-11-13 DIAGNOSIS — E11.9 TYPE 2 DIABETES MELLITUS WITHOUT COMPLICATION, WITHOUT LONG-TERM CURRENT USE OF INSULIN (HCC): ICD-10-CM

## 2017-11-13 DIAGNOSIS — I10 BENIGN ESSENTIAL HYPERTENSION: ICD-10-CM

## 2017-11-13 DIAGNOSIS — Z23 ENCOUNTER FOR IMMUNIZATION: ICD-10-CM

## 2017-11-13 DIAGNOSIS — E55.9 VITAMIN D DEFICIENCY: ICD-10-CM

## 2017-11-13 DIAGNOSIS — E78.5 HYPERLIPIDEMIA, UNSPECIFIED HYPERLIPIDEMIA TYPE: ICD-10-CM

## 2017-11-13 DIAGNOSIS — Z00.00 MEDICARE ANNUAL WELLNESS VISIT, SUBSEQUENT: Primary | ICD-10-CM

## 2017-11-13 PROCEDURE — 90732 PPSV23 VACC 2 YRS+ SUBQ/IM: CPT | Performed by: INTERNAL MEDICINE

## 2017-11-13 PROCEDURE — G0439 PPPS, SUBSEQ VISIT: HCPCS | Performed by: INTERNAL MEDICINE

## 2017-11-13 PROCEDURE — G0009 ADMIN PNEUMOCOCCAL VACCINE: HCPCS | Performed by: INTERNAL MEDICINE

## 2017-11-13 PROCEDURE — 99214 OFFICE O/P EST MOD 30 MIN: CPT | Performed by: INTERNAL MEDICINE

## 2017-11-13 NOTE — PATIENT INSTRUCTIONS
Medicare Wellness  Personal Prevention Plan of Service     Date of Office Visit:  2017  Encounter Provider:  Marvin Ramey MD  Place of Service:  [facility]  Patient Name: Lissa Ortiz  :  1940    As part of the Medicare Wellness portion of your visit today, we are providing you with this personalized preventive plan of services (PPPS). This plan is based upon recommendations of the [place] Preventive Services Task Force (USPSTF) and the Advisory Committee on Immunization Practices (ACIP).    This lists the preventive care services that should be considered, and provides dates of when you are due. Items listed as completed are up-to-date and do not require any further intervention.    Health Maintenance   Topic Date Due   • TDAP/TD VACCINES (1 - Tdap) 2008   • DXA SCAN  2016   • COLONOSCOPY  2017   • PNEUMOCOCCAL VACCINES (65+ LOW/MEDIUM RISK) (2 of 2 - PPSV23) 2017   • MEDICARE ANNUAL WELLNESS  2017   • LIPID PANEL  2017   • DIABETIC EYE EXAM  2018   • HEMOGLOBIN A1C  2018   • DIABETIC FOOT EXAM  2018   • URINE MICROALBUMIN  2018   • MAMMOGRAM  2019   • PAP SMEAR  2019   • INFLUENZA VACCINE  Addressed   • ZOSTER VACCINE  Addressed       Orders Placed This Encounter   Procedures   • Pneumococcal Polysaccharide Vaccine 23-Valent Greater Than or Equal To 1yo Subcutaneous / IM   • Comprehensive Metabolic Panel   • Lipid Panel   • Hemoglobin A1c   • Vitamin D 25 Hydroxy   • Microalbumin / Creatinine Urine Ratio - Urine, Clean Catch   • CBC & Differential     Order Specific Question:   Manual Differential     Answer:   No       No Follow-up on file.

## 2017-11-13 NOTE — PROGRESS NOTES
Subjective   Lissa Ortiz is a 77 y.o. female.     History of Present Illness     Retention: Patient is compliant with medication, or salt restriction, regular physical activity, is not monitor blood pressure the outpatient setting.  There are no medication side effects noted.    Hyperlipidemia: Patient's compliant with medication, dietary fat restriction.  She is not fasting for lab work today.     Disease: Patient's compliant with medication, dietary carbohydrate restriction.  Patient does monitor blood sugar in the outpatient setting, last time it was checked was 110 mg percent.  He denied any hypoglycemic reactions.    Immunization update: Patient will receive her Pneumovax today which will complete her pneumonia series.    The following portions of the patient's history were reviewed and updated as appropriate: allergies, current medications, past family history, past medical history, past social history, past surgical history and problem list.    Review of Systems   Constitutional: Negative for appetite change, chills, diaphoresis, fatigue and fever.   HENT: Negative for hearing loss and trouble swallowing.    Eyes: Negative for visual disturbance.   Respiratory: Negative for cough, chest tightness, shortness of breath and wheezing.    Cardiovascular: Negative for chest pain, palpitations and leg swelling.   Gastrointestinal: Negative for abdominal pain, constipation, diarrhea, nausea and vomiting.   Endocrine: Negative for cold intolerance, heat intolerance, polydipsia, polyphagia and polyuria.   Genitourinary: Negative for dysuria, frequency and hematuria.   Musculoskeletal: Negative for joint swelling and myalgias.   Skin: Negative for color change and rash.   Allergic/Immunologic: Negative for immunocompromised state.   Neurological: Negative for dizziness, syncope, weakness, light-headedness, numbness and headaches.   Hematological: Negative for adenopathy. Does not bruise/bleed easily.    Psychiatric/Behavioral: Negative.        Objective   Physical Exam   Constitutional: She is oriented to person, place, and time. She appears well-developed and well-nourished. No distress.   HENT:   Head: Normocephalic and atraumatic.   Right Ear: Tympanic membrane, external ear and ear canal normal.   Left Ear: Tympanic membrane, external ear and ear canal normal.   Mouth/Throat: Uvula is midline, oropharynx is clear and moist and mucous membranes are normal.   Eyes: Conjunctivae and EOM are normal. Pupils are equal, round, and reactive to light.   Neck: Normal range of motion. Neck supple. No JVD present. Carotid bruit is not present. No thyromegaly present.   Cardiovascular: Normal rate, regular rhythm, normal heart sounds and intact distal pulses.  Exam reveals no gallop and no friction rub.    No murmur heard.  Pulmonary/Chest: Effort normal and breath sounds normal. She has no wheezes. She has no rales.   Abdominal: Soft. Bowel sounds are normal. There is no hepatosplenomegaly. There is no tenderness.   Genitourinary:   Genitourinary Comments: Defer to GYN   Musculoskeletal: Normal range of motion. She exhibits no edema or deformity.   Lymphadenopathy:     She has no cervical adenopathy.   Neurological: She is alert and oriented to person, place, and time. She has normal strength and normal reflexes. No cranial nerve deficit or sensory deficit. Coordination normal.   Skin: Skin is warm and dry. No rash noted.   Psychiatric: She has a normal mood and affect. Her speech is normal and behavior is normal. Judgment and thought content normal. Cognition and memory are normal.   Nursing note and vitals reviewed.      Assessment/Plan   Lissa was seen today for subsequent medicare wellness exam.    Diagnoses and all orders for this visit:    Medicare annual wellness visit, subsequent  -     CBC & Differential  -     Ambulatory Referral For Screening Colonoscopy    Benign essential hypertension  -     Comprehensive  Metabolic Panel    Hyperlipidemia, unspecified hyperlipidemia type  -     Lipid Panel    Type 2 diabetes mellitus without complication, without long-term current use of insulin  -     Hemoglobin A1c  -     Microalbumin / Creatinine Urine Ratio - Urine, Clean Catch    Encounter for immunization  -     Pneumococcal Polysaccharide Vaccine 23-Valent Greater Than or Equal To 1yo Subcutaneous / IM    Vitamin D deficiency  -     Vitamin D 25 Hydroxy      #1 subsequent annual Medicare wellness evaluation, clinically stable, see comments above and below, recommend repeat exam in one year.  Patient will be asked to sign an ABN for CBC since is a noncovered benefit.  Patient knows that she is overdue for scheduling colonoscopy.  She will schedule this at her convenience.    #2 hypertension on current medical regimen.  Plan: Same meds, blood pressure check 4 months, check CMP as above follow-up results by mail.    #3 hyperlipidemia on medication, status to be determined.  Plan: Check fasting lipids, follow-up results as above and adjust dosage of medication if indicated by results.    #4 diabetes status to be determined.  Plan: Check A1c and microalbumin today as above and adjust treatment if indicated.    #5 immunization update, Pneumovax today.    #6 vitamin D deficiency schedule vitamin D level when fasting, adjust dosage of supplement as indicated by results.

## 2017-11-13 NOTE — PROGRESS NOTES
QUICK REFERENCE INFORMATION:  The ABCs of the Annual Wellness Visit    Subsequent Medicare Wellness Visit    HEALTH RISK ASSESSMENT    1940    Recent Hospitalizations:  No hospitalization(s) within the last year..        Current Medical Providers:  Patient Care Team:  Marvin Ramey MD as PCP - General  Marvin Ramey MD as PCP - Family Medicine  Marvin Ramey MD as PCP - Claims Attributed        Smoking Status:  History   Smoking Status   • Never Smoker   Smokeless Tobacco   • Never Used       Alcohol Consumption:  History   Alcohol Use No     Comment: Occasional glass of wine one per month       Depression Screen:   PHQ-2/PHQ-9 Depression Screening 11/13/2017   Little interest or pleasure in doing things 0   Feeling down, depressed, or hopeless 0   Trouble falling or staying asleep, or sleeping too much -   Feeling tired or having little energy -   Poor appetite or overeating -   Feeling bad about yourself - or that you are a failure or have let yourself or your family down -   Trouble concentrating on things, such as reading the newspaper or watching television -   Moving or speaking so slowly that other people could have noticed. Or the opposite - being so fidgety or restless that you have been moving around a lot more than usual -   Thoughts that you would be better off dead, or of hurting yourself in some way -   Total Score 0   If you checked off any problems, how difficult have these problems made it for you to do your work, take care of things at home, or get along with other people? -       Health Habits and Functional and Cognitive Screening:  Functional & Cognitive Status 11/13/2017   Do you have difficulty preparing food and eating? No   Do you have difficulty bathing yourself, getting dressed or grooming yourself? No   Do you have difficulty using the toilet? No   Do you have difficulty moving around from place to place? (No Data)   Do you have trouble with steps or getting out of a bed or a  chair? Yes   In the past year have you fallen or experienced a near fall? Yes   Current Diet Well Balanced Diet   Dental Exam Up to date   Eye Exam Up to date   Exercise (times per week) 10 times per week   Current Exercise Activities Include Walking   Do you need help using the phone?  No   Are you deaf or do you have serious difficulty hearing?  No   Do you need help with transportation? No   Do you need help shopping? No   Do you need help preparing meals?  No   Do you need help with housework?  No   Do you need help with laundry? No   Do you need help taking your medications? No   Do you need help managing money? No   Have you felt unusual stress, anger or loneliness in the last month? No   Who do you live with? Alone   If you need help, do you have trouble finding someone available to you? No   Have you been bothered in the last four weeks by sexual problems? No   Do you have difficulty concentrating, remembering or making decisions? Yes           Does the patient have evidence of cognitive impairment? No    Aspirin use counseling: Contraindicated from taking ASA      Recent Lab Results:  CMP:  Lab Results   Component Value Date     (H) 11/30/2016    BUN 15 11/30/2016    CREATININE 0.60 11/30/2016    EGFRIFNONA 89 11/30/2016    EGFRIFAFRI 103 11/30/2016    BCR 25 11/30/2016     11/30/2016    K 4.5 11/30/2016    CO2 25 11/30/2016    CALCIUM 9.1 11/30/2016    PROTENTOTREF 6.6 11/30/2016    ALBUMIN 4.1 11/30/2016    LABGLOBREF 2.5 11/30/2016    LABIL2 1.6 11/30/2016    BILITOT 0.4 11/30/2016    ALKPHOS 82 11/30/2016    AST 24 11/30/2016    ALT 19 11/30/2016     Lipid Panel:  Lab Results   Component Value Date    TRIG 77 11/30/2016    HDL 60 11/30/2016    VLDL 15 11/30/2016     HbA1c:  Lab Results   Component Value Date    HGBA1C 7.10 (H) 10/19/2017       Visual Acuity:  No exam data present    Age-appropriate Screening Schedule:  Refer to the list below for future screening recommendations based on  patient's age, sex and/or medical conditions. Orders for these recommended tests are listed in the plan section. The patient has been provided with a written plan.    Health Maintenance   Topic Date Due   • TDAP/TD VACCINES (1 - Tdap) 04/07/2008   • DXA SCAN  11/30/2016   • COLONOSCOPY  05/02/2017   • PNEUMOCOCCAL VACCINES (65+ LOW/MEDIUM RISK) (2 of 2 - PPSV23) 06/23/2017   • LIPID PANEL  11/30/2017   • DIABETIC EYE EXAM  02/01/2018   • HEMOGLOBIN A1C  04/19/2018   • DIABETIC FOOT EXAM  11/13/2018   • URINE MICROALBUMIN  11/13/2018   • MAMMOGRAM  05/23/2019   • PAP SMEAR  05/23/2019   • INFLUENZA VACCINE  Addressed   • ZOSTER VACCINE  Addressed        Subjective   History of Present Illness    Lissa Ortiz is a 77 y.o. female who presents for an Subsequent Wellness Visit.    The following portions of the patient's history were reviewed and updated as appropriate: allergies, current medications, past family history, past medical history, past social history, past surgical history and problem list.    Outpatient Medications Prior to Visit   Medication Sig Dispense Refill   • amLODIPine (NORVASC) 5 MG tablet TAKE ONE TABLET BY MOUTH DAILY 30 tablet 5   • Cholecalciferol (VITAMIN D-3) 1000 UNITS capsule Take 1 capsule by mouth.     • dorzolamide-timolol (COSOPT) 22.3-6.8 MG/ML ophthalmic solution      • folic acid (FOLVITE) 1 MG tablet Take 1 tablet by mouth daily.     • glimepiride (AMARYL) 4 MG tablet TAKE ONE TABLET BY MOUTH TWICE A  tablet 1   • metFORMIN ER (GLUCOPHAGE-XR) 500 MG 24 hr tablet Take 2 tablets by mouth Daily With Breakfast. 270 tablet 1   • Methotrexate Sodium 50 MG/2ML injection 1 (one) time per week.     • Multiple Vitamin (MULTI VITAMIN PO) Take 1 tablet by mouth Daily.     • Omega 3 1000 MG capsule Take 1 capsule by mouth.     • pravastatin (PRAVACHOL) 20 MG tablet TAKE ONE TABLET BY MOUTH DAILY 90 tablet 0   • Probiotic capsule Take 1 capsule by mouth.     • rOPINIRole (REQUIP) 0.25 MG  "tablet TAKE ONE TABLET BY MOUTH EVERY NIGHT AT BEDTIME FOR 7 DAYS, THEN INCREASE TO TAKE TWO TABLETS BY MOUTH EVERY NIGHT AT BEDTIME AS NEEDED 60 tablet 1   • sertraline (ZOLOFT) 50 MG tablet TAKE ONE TABLET BY MOUTH DAILY 90 tablet 0   • SITagliptin (JANUVIA) 100 MG tablet Take 1 tablet by mouth Daily. 90 tablet 1   • TURMERIC CURCUMIN PO Take 1 tablet by mouth.     • vitamin B-12 (CYANOCOBALAMIN) 100 MCG tablet Take 1 tablet by mouth.     • diclofenac (VOLTAREN) 1 % gel gel      • cyclobenzaprine (FLEXERIL) 5 MG tablet Take 1 tablet by mouth 3 (Three) Times a Day. 30 tablet 1     No facility-administered medications prior to visit.        Patient Active Problem List   Diagnosis   • Benign essential hypertension   • Mixed anxiety depressive disorder   • Diabetes mellitus   • Glaucoma   • Hyperlipidemia   • Insomnia   • Restless legs syndrome   • Rheumatoid arthritis   • Vitamin D deficiency   • Routine health maintenance   • Midline low back pain without sciatica   • Encounter for immunization   • Neck pain on right side       Advance Care Planning:  has an advance directive - a copy has been provided and is in file    Identification of Risk Factors:  Risk factors include: None noted.    Review of Systems    Compared to one year ago, the patient feels her physical health is the same.  Compared to one year ago, the patient feels her mental health is the same.    Objective     Physical Exam    Vitals:    11/13/17 1306   BP: 130/58   BP Location: Left arm   Patient Position: Sitting   Cuff Size: Adult   Pulse: 82   Temp: 97.8 °F (36.6 °C)   TempSrc: Oral   SpO2: 99%   Weight: 136 lb (61.7 kg)   Height: 67\" (170.2 cm)       Body mass index is 21.3 kg/(m^2).  Discussed the patient's BMI with her. The BMI is in the acceptable range.    Assessment/Plan   Patient Self-Management and Personalized Health Advice  The patient has been provided with information about: None noted and preventive services including:   · None " needed.    Visit Diagnoses:    ICD-10-CM ICD-9-CM   1. Medicare annual wellness visit, subsequent Z00.00 V70.0   2. Benign essential hypertension I10 401.1   3. Hyperlipidemia, unspecified hyperlipidemia type E78.5 272.4   4. Type 2 diabetes mellitus without complication, without long-term current use of insulin E11.9 250.00   5. Encounter for immunization Z23 V03.89       No orders of the defined types were placed in this encounter.      Outpatient Encounter Prescriptions as of 11/13/2017   Medication Sig Dispense Refill   • amLODIPine (NORVASC) 5 MG tablet TAKE ONE TABLET BY MOUTH DAILY 30 tablet 5   • Cholecalciferol (VITAMIN D-3) 1000 UNITS capsule Take 1 capsule by mouth.     • dorzolamide-timolol (COSOPT) 22.3-6.8 MG/ML ophthalmic solution      • folic acid (FOLVITE) 1 MG tablet Take 1 tablet by mouth daily.     • glimepiride (AMARYL) 4 MG tablet TAKE ONE TABLET BY MOUTH TWICE A  tablet 1   • metFORMIN ER (GLUCOPHAGE-XR) 500 MG 24 hr tablet Take 2 tablets by mouth Daily With Breakfast. 270 tablet 1   • Methotrexate Sodium 50 MG/2ML injection 1 (one) time per week.     • Multiple Vitamin (MULTI VITAMIN PO) Take 1 tablet by mouth Daily.     • Omega 3 1000 MG capsule Take 1 capsule by mouth.     • pravastatin (PRAVACHOL) 20 MG tablet TAKE ONE TABLET BY MOUTH DAILY 90 tablet 0   • Probiotic capsule Take 1 capsule by mouth.     • rOPINIRole (REQUIP) 0.25 MG tablet TAKE ONE TABLET BY MOUTH EVERY NIGHT AT BEDTIME FOR 7 DAYS, THEN INCREASE TO TAKE TWO TABLETS BY MOUTH EVERY NIGHT AT BEDTIME AS NEEDED 60 tablet 1   • sertraline (ZOLOFT) 50 MG tablet TAKE ONE TABLET BY MOUTH DAILY 90 tablet 0   • SITagliptin (JANUVIA) 100 MG tablet Take 1 tablet by mouth Daily. 90 tablet 1   • TURMERIC CURCUMIN PO Take 1 tablet by mouth.     • vitamin B-12 (CYANOCOBALAMIN) 100 MCG tablet Take 1 tablet by mouth.     • diclofenac (VOLTAREN) 1 % gel gel      • [DISCONTINUED] cyclobenzaprine (FLEXERIL) 5 MG tablet Take 1 tablet by  mouth 3 (Three) Times a Day. 30 tablet 1     No facility-administered encounter medications on file as of 11/13/2017.        Reviewed use of high risk medication in the elderly: yes  Reviewed for potential of harmful drug interactions in the elderly: yes    Follow Up:  1 year    An After Visit Summary and PPPS with all of these plans were given to the patient.

## 2017-11-17 PROBLEM — R80.9 MICROALBUMINURIA: Status: ACTIVE | Noted: 2017-11-17

## 2017-11-17 LAB
25(OH)D3+25(OH)D2 SERPL-MCNC: 74.8 NG/ML (ref 30–100)
ALBUMIN SERPL-MCNC: 4.3 G/DL (ref 3.5–5.2)
ALBUMIN/CREAT UR: 52.3 MG/G CREAT (ref 0–30)
ALBUMIN/GLOB SERPL: 1.7 G/DL
ALP SERPL-CCNC: 83 U/L (ref 39–117)
ALT SERPL-CCNC: 14 U/L (ref 1–33)
AST SERPL-CCNC: 21 U/L (ref 1–32)
BASOPHILS # BLD AUTO: 0.05 10*3/MM3 (ref 0–0.2)
BASOPHILS NFR BLD AUTO: 1 % (ref 0–1.5)
BILIRUB SERPL-MCNC: 0.4 MG/DL (ref 0.1–1.2)
BUN SERPL-MCNC: 18 MG/DL (ref 8–23)
BUN/CREAT SERPL: 29 (ref 7–25)
CALCIUM SERPL-MCNC: 9 MG/DL (ref 8.6–10.5)
CHLORIDE SERPL-SCNC: 103 MMOL/L (ref 98–107)
CHOLEST SERPL-MCNC: 171 MG/DL (ref 0–200)
CO2 SERPL-SCNC: 26.6 MMOL/L (ref 22–29)
CREAT SERPL-MCNC: 0.62 MG/DL (ref 0.57–1)
CREAT UR-MCNC: 73.1 MG/DL
EOSINOPHIL # BLD AUTO: 0.5 10*3/MM3 (ref 0–0.7)
EOSINOPHIL NFR BLD AUTO: 10 % (ref 0.3–6.2)
ERYTHROCYTE [DISTWIDTH] IN BLOOD BY AUTOMATED COUNT: 14.8 % (ref 11.7–13)
GFR SERPLBLD CREATININE-BSD FMLA CKD-EPI: 113 ML/MIN/1.73
GFR SERPLBLD CREATININE-BSD FMLA CKD-EPI: 94 ML/MIN/1.73
GLOBULIN SER CALC-MCNC: 2.6 GM/DL
GLUCOSE SERPL-MCNC: 163 MG/DL (ref 65–99)
HBA1C MFR BLD: 7.1 % (ref 4.8–5.6)
HCT VFR BLD AUTO: 38.3 % (ref 35.6–45.5)
HDLC SERPL-MCNC: 64 MG/DL (ref 40–60)
HGB BLD-MCNC: 12.3 G/DL (ref 11.9–15.5)
IMM GRANULOCYTES # BLD: 0 10*3/MM3 (ref 0–0.03)
IMM GRANULOCYTES NFR BLD: 0 % (ref 0–0.5)
LDLC SERPL CALC-MCNC: 96 MG/DL (ref 0–100)
LYMPHOCYTES # BLD AUTO: 0.98 10*3/MM3 (ref 0.9–4.8)
LYMPHOCYTES NFR BLD AUTO: 19.6 % (ref 19.6–45.3)
MCH RBC QN AUTO: 31 PG (ref 26.9–32)
MCHC RBC AUTO-ENTMCNC: 32.1 G/DL (ref 32.4–36.3)
MCV RBC AUTO: 96.5 FL (ref 80.5–98.2)
MICROALBUMIN UR-MCNC: 38.2 UG/ML
MONOCYTES # BLD AUTO: 0.49 10*3/MM3 (ref 0.2–1.2)
MONOCYTES NFR BLD AUTO: 9.8 % (ref 5–12)
NEUTROPHILS # BLD AUTO: 2.97 10*3/MM3 (ref 1.9–8.1)
NEUTROPHILS NFR BLD AUTO: 59.6 % (ref 42.7–76)
PLATELET # BLD AUTO: 305 10*3/MM3 (ref 140–500)
POTASSIUM SERPL-SCNC: 4.5 MMOL/L (ref 3.5–5.2)
PROT SERPL-MCNC: 6.9 G/DL (ref 6–8.5)
RBC # BLD AUTO: 3.97 10*6/MM3 (ref 3.9–5.2)
SODIUM SERPL-SCNC: 141 MMOL/L (ref 136–145)
TRIGL SERPL-MCNC: 56 MG/DL (ref 0–150)
VLDLC SERPL CALC-MCNC: 11.2 MG/DL (ref 5–40)
WBC # BLD AUTO: 4.99 10*3/MM3 (ref 4.5–10.7)

## 2017-11-30 ENCOUNTER — TRANSCRIBE ORDERS (OUTPATIENT)
Dept: PHYSICAL THERAPY | Facility: HOSPITAL | Age: 77
End: 2017-11-30

## 2017-11-30 DIAGNOSIS — R26.89 BALANCE PROBLEM: Primary | ICD-10-CM

## 2017-11-30 DIAGNOSIS — I10 ESSENTIAL HYPERTENSION: ICD-10-CM

## 2017-12-01 RX ORDER — AMLODIPINE BESYLATE 5 MG/1
TABLET ORAL
Qty: 30 TABLET | Refills: 4 | Status: SHIPPED | OUTPATIENT
Start: 2017-12-01 | End: 2018-04-26 | Stop reason: SDUPTHER

## 2017-12-13 ENCOUNTER — PREP FOR SURGERY (OUTPATIENT)
Dept: OTHER | Facility: HOSPITAL | Age: 77
End: 2017-12-13

## 2017-12-13 DIAGNOSIS — Z12.11 ENCOUNTER FOR SCREENING COLONOSCOPY: Primary | ICD-10-CM

## 2017-12-15 ENCOUNTER — APPOINTMENT (OUTPATIENT)
Dept: PHYSICAL THERAPY | Facility: HOSPITAL | Age: 77
End: 2017-12-15
Attending: INTERNAL MEDICINE

## 2017-12-19 ENCOUNTER — HOSPITAL ENCOUNTER (OUTPATIENT)
Dept: PHYSICAL THERAPY | Facility: HOSPITAL | Age: 77
Setting detail: THERAPIES SERIES
Discharge: HOME | End: 2017-12-19

## 2017-12-19 DIAGNOSIS — R26.89 BALANCE PROBLEM: ICD-10-CM

## 2017-12-19 DIAGNOSIS — M54.50 BILATERAL LOW BACK PAIN WITHOUT SCIATICA, UNSPECIFIED CHRONICITY: Primary | ICD-10-CM

## 2017-12-19 DIAGNOSIS — G72.41 INCLUSION BODY MYOSITIS: ICD-10-CM

## 2017-12-19 PROCEDURE — 97161 PT EVAL LOW COMPLEX 20 MIN: CPT | Performed by: PHYSICAL THERAPIST

## 2017-12-19 PROCEDURE — 97110 THERAPEUTIC EXERCISES: CPT | Performed by: PHYSICAL THERAPIST

## 2017-12-19 PROCEDURE — G8979 MOBILITY GOAL STATUS: HCPCS | Performed by: PHYSICAL THERAPIST

## 2017-12-19 PROCEDURE — G8978 MOBILITY CURRENT STATUS: HCPCS | Performed by: PHYSICAL THERAPIST

## 2017-12-19 NOTE — THERAPY EVALUATION
Outpatient Physical Therapy Ortho Initial Evaluation  [facility]     Patient Name: Lissa Ortiz  : 1940  MRN: [id number]  Today's Date: 2017      Visit Date: 2017    Patient Active Problem List   Diagnosis   • Benign essential hypertension   • Mixed anxiety depressive disorder   • Diabetes mellitus   • Glaucoma   • Hyperlipidemia   • Insomnia   • Restless legs syndrome   • Rheumatoid arthritis   • Vitamin D deficiency   • Routine health maintenance   • Midline low back pain without sciatica   • Encounter for immunization   • Neck pain on right side   • Microalbuminuria        Past Medical History:   Diagnosis Date   • Allergic rhinitis    • Diabetes mellitus     type 2        Past Surgical History:   Procedure Laterality Date   • HYSTERECTOMY     • TONSILLECTOMY         Visit Dx:     ICD-10-CM ICD-9-CM   1. Bilateral low back pain without sciatica, unspecified chronicity M54.5 724.2   2. Balance problem R26.89 781.99   3. 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 Patient reports at least 4 falls this past year, mostly due to tripping over her feet while walking the dog and negotiating uneven terrain. She complains of pain and weakness with getting in/out of the car and getting on/off the floor. 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 She states the biggest side effect of her myositis is fatigue and lack of energy. She also complains of feeling slow when walking with family/friends.   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 Used to enjoy riding her bike but hasn't in awhile  -KH      Patient seeing anyone else for problem(s)? Yes  -KH      How has patient tried to help current problem? therapy  -KH      Pain     Pain Location Back  -KH      Pain at Present 4  -KH      Pain at Best 3  -KH      Pain at Worst 6  -KH      Pain Frequency Intermittent  -KH      Pain Description Aching  -KH      What Performance Factors Make the Current Problem(s) WORSE? uneven terrain  -KH      Is your sleep disturbed? Yes  -KH      Is medication used to assist with sleep? Yes  -KH      Total hours of sleep per night 6   -KH      What position do you sleep in? Right sidelying  -KH      Difficulties at work? retired  -KH      Difficulties with ADL's? trouble getting dressed- unable to stand on one foot to pull up pants  -KH      Difficulties with recreational activities? trips while walking the dog.   -      Fall Risk Assessment    Any falls in the past year: Yes  -KH      Number of falls reported in the last 12 months 4  -KH      Factors that contributed to the fall: Low light;Uneven surface;Tripped  -KH      Does patient have a fear of falling Yes (comment)  -        User Key  (r) = Recorded By, (t) = Taken By, (c) = Cosigned By    Initials Name Provider Type    GRAEME Whitney, PT Physical Therapist                PT Ortho       12/19/17 1400    Subjective Comments    Subjective Comments I just feel slow and unsteady  -    Subjective Pain    Able to rate subjective pain? yes  -KH    Pre-Treatment Pain Level 4  -KH    Post-Treatment Pain Level 4  -KH    Sensory Screen for Light Touch- Lower Quarter Clearing    L1 (inguinal area) Bilateral:;Intact  -KH    L2 (anterior mid thigh)  Bilateral:;Intact  -KH    L3 (distal anterior thigh) Bilateral:;Intact  -KH    L4 (medial lower leg/foot) Bilateral:;Intact  -KH    L5 (lateral lower leg/great toe) Bilateral:;Intact  -KH    S1 (bottom of foot) Bilateral:;Intact  -KH    Myotomal Screen- Lower Quarter Clearing    Hip flexion (L2) Bilateral:;4- (Good -)  -KH    Knee extension (L3) Bilateral:;5 (Normal)  -KH    Ankle DF (L4) Bilateral:;5 (Normal)  -KH    Ankle PF (S1) Bilateral:;4 (Good)  -KH    Knee flexion (S2) Bilateral:;5 (Normal)  -KH    Lumbar ROM Screen- Lower Quarter Clearing    Lumbar Flexion Impaired   75% of WNL  -KH    Lumbar Extension Impaired   25% of WNL  -KH    Lumbar Lateral Flexion Impaired   50% of WNL  -KH    Lumbar Rotation Impaired   75% of WNL  -KH    Balance Skills Training    SLS 6 sec on R, 10 sec on L  -KH    Rhomberg no balance concerns  -KH    Transfers    Transfer, Comment difficulty standing without use of hands  -KH    Gait Assessment/Treatment    Gait, Comment slight decreased foot clearance bilaterally  -KH      User Key  (r) = Recorded By, (t) = Taken By, (c) = Cosigned By    Initials Name Provider Type    GRAEME hWitney, PT Physical Therapist              PT OP Goals       12/19/17 1400       PT Short Term Goals    STG 1 Patient will demonstrate compliance and independence with initial HEP  -     STG 1 Progress New  -     STG 2 Patient will improved 5xSTS from 23.8 seconds to </= 20 seconds in order to reduce risk of falls and demonstrate improved functional strength  -     STG 2 Progress New  -     STG 3 Patient will improve B SLS from 5-10 seconds to 15 seconds or greater to improve balance/stability and reduce risk of falls  -     STG 3 Progress New  -     STG 4 --  -     STG 4 Progress --  -KH     Long Term Goals    LTG 1 Patient will improve score on DELAROSA from 48/56 to 50/56 in order to reduce risk of falls  -     LTG 1 Progress New  -     LTG 2 Patient will improve proximal strength  demonstrated by minimal difficulty with sit to stand transfer (no hands) and getting in/out of the car   -     LTG 2 Progress New  -     LTG 3 Patient will report a 50% reduction in frequency of her knees buckling to reduce risk of falls and improve LE stability  -     LTG 3 Progress New  -     LTG 4 Patient will demonstrate independence with HEP for balance.  -     LTG 4 Progress New  -     LTG 5 --  -     LTG 5 Progress --  -       User Key  (r) = Recorded By, (t) = Taken By, (c) = Cosigned By    Initials Name Provider Type    GRAEME Whitney, EMMANUELLE Physical Therapist                PT Assessment/Plan       12/19/17 5697       PT Assessment    Functional Limitations Decreased safety during functional activities;Performance in sport activities;Limitations in functional capacity and performance;Limitation in home management;Impaired gait;Performance in leisure activities  -     Impairments Balance;Pain;Muscle strength;Gait;Joint mobility;Joint integrity  -     Assessment Comments Patient is a 76 y/o female presenting with impaired balance that has progressively worsened over the years. Patient reports at least 4 falls this past year, mostly due to tripping over her feet while walking the dog and negotiating uneven terrain. She complains of pain and weakness with getting in/out of the car and getting on/off the floor. 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 She states the biggest side effect of her myositis is fatigue and lack of energy. She also complains of feeling slow when walking with family/friends. Patient presents with B hip weakness, decreased lumbar mobility, and impaired balance. Her balance deficits are most pronounced with tandem stance, SLS, toe taps, and weight shifting. Recommend skilled PT to address these deficits and decrease risk of falls.   -     Please refer to paper survey for additional  self-reported information Yes  -KH     Rehab Potential Good  -KH     Patient/caregiver participated in establishment of treatment plan and goals Yes  -KH     Patient would benefit from skilled therapy intervention Yes  -KH     PT Plan    PT Frequency 2x/week  -     Predicted Duration of Therapy Intervention (days/wks) 8 weeks  -     Planned CPT's? PT EVAL LOW COMPLEXITY: 45833;PT MANUAL THERAPY EA 15 MIN: 79022;PT RE-EVAL: 79812;PT THER PROC EA 15 MIN: 61583;PT EVAL AQUA: 76624;PT ELECTRICAL STIM ATTD EA 15 MIN: 04382;PT ELECTRICAL STIM UNATTEND: ;PT GAIT TRAINING EA 15 MIN: 16761;PT TRACTION LUMBAR: 26928;PT TRACTION CERVICAL: 97146;PT ULTRASOUND EA 15 MIN: 29193;PT SELF CARE/HOME MGMT/TRAIN EA 15: 76152;PT THER ACT EA 15 MIN: 37746;PT NEUROMUSC RE-EDUCATION EA 15 MIN: 47833;PT HOT OR COLD PACK TREAT MCARE  -     Physical Therapy Interventions (Optional Details) stair training;strengthening;stretching;patient/family education;postural re-education;ROM (Range of Motion);lumbar stabilization;home exercise program;transfer training;balance training;gait training;gross motor skills  -     PT Plan Comments LE Strengthening, balance training, gait training  -       User Key  (r) = Recorded By, (t) = Taken By, (c) = Cosigned By    Initials Name Provider Type    GRAEME Whitney PT Physical Therapist                Exercises       12/19/17 1400          Subjective Comments    Subjective Comments I just feel slow and unsteady  -      Subjective Pain    Able to rate subjective pain? yes  -      Pre-Treatment Pain Level 4  -      Post-Treatment Pain Level 4  -KH      Exercise 1    Exercise Name 1 Reviewed exercises in HEP- see copy in chart for details  -        User Key  (r) = Recorded By, (t) = Taken By, (c) = Cosigned By    Initials Name Provider Type    GRAEME Whitney PT Physical Therapist                Outcome Measure Options: Holder Balance  5 Times Sit to Stand  5 Times Sit to Stand (seconds):  23.8 seconds  5 Times Sit to Stand Comments: no hands  Holder Balance Scale  Sitting to Standing: able to stand without using hands and stabilize independently  Standing Unsupported: able to stand safely for 2 minutes  Sitting with Back Unsupported but Feet Supported on Floor or on Stool: able to sit safely and securely for 2 minutes  Standing to Sitting: sits safely with minimal use of hands  Transfers: able to transfer safely with minor use of hands  Standing Unsupported with Eyes Closed: able to stand 10 seconds safely  Standing Unsupported with Feet Together: able to place feet together independently and stand 1 minute safely  Reaching Forward with Outstretched Arm While Standing: can reach forward 12 cm (5 inches)   Object From the Floor From a Standing Position: able to  object safely and easily  Turning to Look Behind Over Left and Right Shoulders While Standing: looks behind from both sides and weight shifts well  Turn 360 Degrees: able to turn 360 degrees safely in 4 seconds or less  Place Alternate Foot on Step or Stool While Standing Unsupported: able to complete > 2 steps needs minimal assist  Standing Unsupported with One Foot in Front: needs help to step but can hold 15 seconds  Standing on One Leg: able to lift leg independently and hold 5-10 seconds  Holder Total Score: 48  Holder Comments: 48/56-14% impaired         Time Calculation:   Start Time: 1430  Stop Time: 1510  Time Calculation (min): 40 min     Therapy Charges for Today     Code Description Service Date Service Provider Modifiers Qty    72938762785 HC PT MOBILITY CURRENT 12/19/2017 Renee Whitney, PT GP, CI 1    69403694388 HC PT MOBILITY PROJECTED 12/19/2017 Renee Whitney, PT GP, CI 1    77432434370 HC PT EVAL LOW COMPLEXITY 2 12/19/2017 Renee Whitney, PT GP 1    51466120442 HC PT THER PROC EA 15 MIN 12/19/2017 Renee Whitney, PT GP 1          PT G-Codes  PT Professional Judgement Used?: Yes  Outcome Measure Options: Holder Balance  Score:  48/56-14% impaired  Functional Limitation: Mobility: Walking and moving around  Mobility: Walking and Moving Around Current Status (): At least 1 percent but less than 20 percent impaired, limited or restricted  Mobility: Walking and Moving Around Goal Status (): At least 1 percent but less than 20 percent impaired, limited or restricted         Renee Whitney, PT  12/19/2017

## 2017-12-20 PROBLEM — Z12.11 ENCOUNTER FOR SCREENING COLONOSCOPY: Status: ACTIVE | Noted: 2017-12-20

## 2017-12-21 ENCOUNTER — HOSPITAL ENCOUNTER (OUTPATIENT)
Dept: PHYSICAL THERAPY | Facility: HOSPITAL | Age: 77
Setting detail: THERAPIES SERIES
Discharge: HOME | End: 2017-12-21

## 2017-12-21 DIAGNOSIS — M54.50 BILATERAL LOW BACK PAIN WITHOUT SCIATICA, UNSPECIFIED CHRONICITY: Primary | ICD-10-CM

## 2017-12-21 DIAGNOSIS — R26.89 BALANCE PROBLEM: ICD-10-CM

## 2017-12-21 DIAGNOSIS — G72.41 INCLUSION BODY MYOSITIS: ICD-10-CM

## 2017-12-21 PROCEDURE — 97110 THERAPEUTIC EXERCISES: CPT | Performed by: PHYSICAL THERAPIST

## 2017-12-21 NOTE — THERAPY TREATMENT NOTE
Outpatient Physical Therapy Ortho Treatment Note  [facility]     Patient Name: Lissa Ortiz  : 1940  MRN: [id number]  Today's Date: 2017      Visit Date: 2017    Visit Dx:    ICD-10-CM ICD-9-CM   1. Bilateral low back pain without sciatica, unspecified chronicity M54.5 724.2   2. Balance problem R26.89 781.99   3. 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       Patient Active Problem List   Diagnosis   • Benign essential hypertension   • Mixed anxiety depressive disorder   • Diabetes mellitus   • Glaucoma   • Hyperlipidemia   • Insomnia   • Restless legs syndrome   • Rheumatoid arthritis   • Vitamin D deficiency   • Routine health maintenance   • Midline low back pain without sciatica   • Encounter for immunization   • Neck pain on right side   • Microalbuminuria   • Encounter for screening colonoscopy        Past Medical History:   Diagnosis Date   • Allergic rhinitis    • Diabetes mellitus     type 2        Past Surgical History:   Procedure Laterality Date   • HYSTERECTOMY     • TONSILLECTOMY               PT Ortho       17 1400    Subjective Comments    Subjective Comments I just feel slow and unsteady  -KH    Subjective Pain    Able to rate subjective pain? yes  -KH    Pre-Treatment Pain Level 4  -KH    Post-Treatment Pain Level 4  -KH    Sensory Screen for Light Touch- Lower Quarter Clearing    L1 (inguinal area) Bilateral:;Intact  -KH    L2 (anterior mid thigh) Bilateral:;Intact  -KH    L3 (distal anterior thigh) Bilateral:;Intact  -KH    L4 (medial lower leg/foot) Bilateral:;Intact  -KH    L5 (lateral lower leg/great toe) Bilateral:;Intact  -KH    S1 (bottom of foot) Bilateral:;Intact  -KH    Myotomal Screen- Lower Quarter Clearing    Hip flexion (L2) Bilateral:;4- (Good -)  -KH    Knee extension (L3) Bilateral:;5 (Normal)  -KH    Ankle DF (L4) Bilateral:;5 (Normal)  -KH    Ankle PF (S1) Bilateral:;4 (Good)  -KH    Knee flexion (S2) Bilateral:;5 (Normal)  -KH    Lumbar ROM Screen-  Lower Quarter Clearing    Lumbar Flexion Impaired   75% of WNL  -KH    Lumbar Extension Impaired   25% of WNL  -KH    Lumbar Lateral Flexion Impaired   50% of WNL  -KH    Lumbar Rotation Impaired   75% of WNL  -KH    Balance Skills Training    SLS 6 sec on R, 10 sec on L  -KH    Rhomberg no balance concerns  -    Transfers    Transfer, Comment difficulty standing without use of hands  -    Gait Assessment/Treatment    Gait, Comment slight decreased foot clearance bilaterally  -      User Key  (r) = Recorded By, (t) = Taken By, (c) = Cosigned By    Initials Name Provider Type    GRAEME Whitney, PT Physical Therapist                PT Assessment/Plan       12/21/17 1642       PT Assessment    Assessment Comments Introduced patient to exercises for LE strengthening, core stability, and balance. Patient did well stabilizing her balance on the balance board and balance discs, however had difficulty adding in mini squats. She also required intermittent UE support during the tandem walk but did better with the second attempt.   -     PT Plan    PT Plan Comments LE strengthening, balance training, and gait training.   -GRAEME       User Key  (r) = Recorded By, (t) = Taken By, (c) = Cosigned By    Initials Name Provider Type    GRAEME Whitney, EMMANUELLE Physical Therapist                    Exercises       12/21/17 1600          Subjective Comments    Subjective Comments Those step ups from my home program are challenging  -GRAEME      Subjective Pain    Able to rate subjective pain? yes  -GRAEME      Pre-Treatment Pain Level 0  -GRAEME      Post-Treatment Pain Level 0  -GRAEME      Exercise 1    Exercise Name 1 see exercise flowsheet  -GRAEME        User Key  (r) = Recorded By, (t) = Taken By, (c) = Cosigned By    Initials Name Provider Type    GRAEME Whitney PT Physical Therapist                                            Time Calculation:   Start Time: 1600  Stop Time: 1643  Time Calculation (min): 43 min    Therapy Charges for Today     Code  Description Service Date Service Provider Modifiers Qty    77708005374  PT THER PROC EA 15 MIN 12/21/2017 Renee Whitney, PT GP 3                    Renee Whitney, PT  12/21/2017

## 2017-12-29 DIAGNOSIS — E78.5 HYPERLIPIDEMIA: ICD-10-CM

## 2017-12-29 RX ORDER — PRAVASTATIN SODIUM 20 MG
TABLET ORAL
Qty: 90 TABLET | Refills: 0 | Status: SHIPPED | OUTPATIENT
Start: 2017-12-29 | End: 2018-04-05 | Stop reason: SDUPTHER

## 2018-01-03 ENCOUNTER — HOSPITAL ENCOUNTER (OUTPATIENT)
Dept: PHYSICAL THERAPY | Facility: HOSPITAL | Age: 78
Setting detail: THERAPIES SERIES
Discharge: HOME | End: 2018-01-03

## 2018-01-03 DIAGNOSIS — G72.41 INCLUSION BODY MYOSITIS: ICD-10-CM

## 2018-01-03 DIAGNOSIS — M54.50 BILATERAL LOW BACK PAIN WITHOUT SCIATICA, UNSPECIFIED CHRONICITY: Primary | ICD-10-CM

## 2018-01-03 DIAGNOSIS — R26.89 BALANCE PROBLEM: ICD-10-CM

## 2018-01-03 PROCEDURE — 97110 THERAPEUTIC EXERCISES: CPT

## 2018-01-03 PROCEDURE — 97112 NEUROMUSCULAR REEDUCATION: CPT

## 2018-01-03 NOTE — THERAPY TREATMENT NOTE
Outpatient Physical Therapy Ortho Treatment Note  [facility]     Patient Name: Lissa Ortiz  : 1940  MRN: [id number]  Today's Date: 1/3/2018      Visit Date: 2018    Visit Dx:    ICD-10-CM ICD-9-CM   1. Bilateral low back pain without sciatica, unspecified chronicity M54.5 724.2   2. Balance problem R26.89 781.99   3. 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       Patient Active Problem List   Diagnosis   • Benign essential hypertension   • Mixed anxiety depressive disorder   • Diabetes mellitus   • Glaucoma   • Hyperlipidemia   • Insomnia   • Restless legs syndrome   • Rheumatoid arthritis   • Vitamin D deficiency   • Routine health maintenance   • Midline low back pain without sciatica   • Encounter for immunization   • Neck pain on right side   • Microalbuminuria   • Encounter for screening colonoscopy        Past Medical History:   Diagnosis Date   • Allergic rhinitis    • Diabetes mellitus     type 2        Past Surgical History:   Procedure Laterality Date   • HYSTERECTOMY     • TONSILLECTOMY                               PT Assessment/Plan       18 1800       PT Assessment    Assessment Comments Pt has good gait pattern and tends to fatigue with gait and did well with high blance strategies.  Used the neuro com for ex to faciliate limits of stability awareness and incorporated into her ex. neurocom ant/post 70% limits of stability from 58% to 100 % with 3 training sessions.   -SP     PT Plan    PT Plan Comments f/u on any soreness/response and establish best balance ex to use at home for neurocom impairments.    -SP       User Key  (r) = Recorded By, (t) = Taken By, (c) = Cosigned By    Initials Name Provider Type    SP Deepa Cortez, PT Physical Therapist                    Exercises       18 1100          Subjective Comments    Subjective Comments Doctor said not to use a lot of weights.  Overall feels a little easier to do the step up ex. but no pain right now. got a little  sore trying to walk straight line.   -SP      Subjective Pain    Able to rate subjective pain? yes  -SP      Pre-Treatment Pain Level 0  -SP      Post-Treatment Pain Level 0  -SP      Exercise 1    Exercise Name 1 see exercise flowsheet  -SP        User Key  (r) = Recorded By, (t) = Taken By, (c) = Cosigned By    Initials Name Provider Type    SP Deepa Cortez, PT Physical Therapist                                            Time Calculation:   Start Time: 1115  Stop Time: 1200  Time Calculation (min): 45 min    Therapy Charges for Today     Code Description Service Date Service Provider Modifiers Qty    14240367756  PT THER PROC EA 15 MIN 1/3/2018 Deepa Cortez, PT GP 2    88660642174 HC PT NEUROMUSC RE EDUCATION EA 15 MIN 1/3/2018 Deepa Cortez, PT GP 1                    Deepa Cortez, PT  1/3/2018

## 2018-01-04 DIAGNOSIS — E11.9 DIABETES TYPE 2, CONTROLLED (HCC): ICD-10-CM

## 2018-01-05 RX ORDER — GLIMEPIRIDE 4 MG/1
TABLET ORAL
Qty: 180 TABLET | Refills: 0 | Status: SHIPPED | OUTPATIENT
Start: 2018-01-05 | End: 2018-04-05 | Stop reason: SDUPTHER

## 2018-01-08 ENCOUNTER — HOSPITAL ENCOUNTER (OUTPATIENT)
Dept: PHYSICAL THERAPY | Facility: HOSPITAL | Age: 78
Setting detail: THERAPIES SERIES
Discharge: HOME | End: 2018-01-08

## 2018-01-08 DIAGNOSIS — G72.41 INCLUSION BODY MYOSITIS: ICD-10-CM

## 2018-01-08 DIAGNOSIS — R26.89 BALANCE PROBLEM: ICD-10-CM

## 2018-01-08 DIAGNOSIS — M54.50 BILATERAL LOW BACK PAIN WITHOUT SCIATICA, UNSPECIFIED CHRONICITY: Primary | ICD-10-CM

## 2018-01-08 PROCEDURE — 97110 THERAPEUTIC EXERCISES: CPT

## 2018-01-08 PROCEDURE — 97112 NEUROMUSCULAR REEDUCATION: CPT

## 2018-01-10 ENCOUNTER — HOSPITAL ENCOUNTER (OUTPATIENT)
Dept: PHYSICAL THERAPY | Facility: HOSPITAL | Age: 78
Setting detail: THERAPIES SERIES
Discharge: HOME | End: 2018-01-10

## 2018-01-10 DIAGNOSIS — G72.41 INCLUSION BODY MYOSITIS: ICD-10-CM

## 2018-01-10 DIAGNOSIS — M54.50 BILATERAL LOW BACK PAIN WITHOUT SCIATICA, UNSPECIFIED CHRONICITY: Primary | ICD-10-CM

## 2018-01-10 DIAGNOSIS — R26.89 BALANCE PROBLEM: ICD-10-CM

## 2018-01-10 PROCEDURE — 97110 THERAPEUTIC EXERCISES: CPT

## 2018-01-10 NOTE — THERAPY TREATMENT NOTE
Outpatient Physical Therapy Ortho Treatment Note  [facility]     Patient Name: Lissa Ortiz  : 1940  MRN: [id number]  Today's Date: 1/10/2018      Visit Date: 01/10/2018    Visit Dx:    ICD-10-CM ICD-9-CM   1. Bilateral low back pain without sciatica, unspecified chronicity M54.5 724.2   2. Balance problem R26.89 781.99   3. 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       Patient Active Problem List   Diagnosis   • Benign essential hypertension   • Mixed anxiety depressive disorder   • Diabetes mellitus   • Glaucoma   • Hyperlipidemia   • Insomnia   • Restless legs syndrome   • Rheumatoid arthritis   • Vitamin D deficiency   • Routine health maintenance   • Midline low back pain without sciatica   • Encounter for immunization   • Neck pain on right side   • Microalbuminuria   • Encounter for screening colonoscopy        Past Medical History:   Diagnosis Date   • Allergic rhinitis    • Diabetes mellitus     type 2        Past Surgical History:   Procedure Laterality Date   • HYSTERECTOMY     • TONSILLECTOMY                               PT Assessment/Plan       01/10/18 1800       PT Assessment    Assessment Comments Pt identified that focusing on item when walking helps. She also noted she had stopped using elipticle at home, now only walking dog. This could have caused pt to decline. Also observed pt on plintth and she cannot hopkins sit up. Strength is 1/5, cervical only. lift, no shoulders. She rolls to get up. Pt had improved balance after each balance strategy including nu step.   -SP     PT Plan    PT Plan Comments f/u any soreness from cervical sit up. progress/reinforce and provide pictures.   -SP       User Key  (r) = Recorded By, (t) = Taken By, (c) = Cosigned By    Initials Name Provider Type    SP Deepa Cortez, PT Physical Therapist                    Exercises       01/10/18 1800          Subjective Comments    Subjective Comments I feel more steading walking when I focus on something, tandam  and step ups do not make pt sore now.   -SP      Subjective Pain    Able to rate subjective pain? yes  -SP      Pre-Treatment Pain Level 0  -SP      Post-Treatment Pain Level 0  -SP      Subjective Pain Comment knee still healing on r (from fall)   -SP      Exercise 1    Exercise Name 1 see exercise flowsheet  -SP        User Key  (r) = Recorded By, (t) = Taken By, (c) = Cosigned By    Initials Name Provider Type    SP Deepa Cortez, PT Physical Therapist                                            Time Calculation:   Start Time: 1120  Stop Time: 1205  Time Calculation (min): 45 min    Therapy Charges for Today     Code Description Service Date Service Provider Modifiers Qty    77558900316 HC PT THER PROC EA 15 MIN 1/10/2018 Deepa Cortez, PT GP 3                    Deepa Cortez, PT  1/10/2018

## 2018-01-10 NOTE — THERAPY TREATMENT NOTE
Outpatient Physical Therapy Ortho Treatment Note  [facility]     Patient Name: Lissa Ortiz  : 1940  MRN: [id number]  Today's Date: 2018      Visit Date: 2018    Visit Dx:    ICD-10-CM ICD-9-CM   1. Bilateral low back pain without sciatica, unspecified chronicity M54.5 724.2   2. Balance problem R26.89 781.99   3. 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       Patient Active Problem List   Diagnosis   • Benign essential hypertension   • Mixed anxiety depressive disorder   • Diabetes mellitus   • Glaucoma   • Hyperlipidemia   • Insomnia   • Restless legs syndrome   • Rheumatoid arthritis   • Vitamin D deficiency   • Routine health maintenance   • Midline low back pain without sciatica   • Encounter for immunization   • Neck pain on right side   • Microalbuminuria   • Encounter for screening colonoscopy        Past Medical History:   Diagnosis Date   • Allergic rhinitis    • Diabetes mellitus     type 2        Past Surgical History:   Procedure Laterality Date   • HYSTERECTOMY     • TONSILLECTOMY                               PT Assessment/Plan       18 2200       PT Assessment    Assessment Comments Fall on unlevel surface, and scraped R knee. Practiced incline walking and rocker board and neuro com. Pt still appears to have progressed, but still has episodes of lack of ankle strategy. Pt appreciates how neurocom helps her self awareness.  -SP     PT Plan    PT Plan Comments f/u on sorness from fall. Progress   -SP       User Key  (r) = Recorded By, (t) = Taken By, (c) = Cosigned By    Initials Name Provider Type    MARYANN Cortez, PT Physical Therapist                    Exercises       18 2200          Subjective Comments    Subjective Comments I had a fall. I tripped in the driveway, scraped R knee, and left knee bruised.   -SP      Subjective Pain    Able to rate subjective pain? yes  -SP      Pre-Treatment Pain Level 3  -SP      Post-Treatment Pain Level 3  -SP      Subjective  Pain Comment knees sore from fall, but other soreness is better, not as hard to do the one foot in front of the other, in out of car is still painful.   -SP      Exercise 1    Exercise Name 1 see exercise flowsheet  -SP        User Key  (r) = Recorded By, (t) = Taken By, (c) = Cosigned By    Initials Name Provider Type    SP Deepa Cortez, PT Physical Therapist                                            Time Calculation:   Start Time: 1123  Stop Time: 1205  Time Calculation (min): 42 min    Therapy Charges for Today     Code Description Service Date Service Provider Modifiers Qty    69520085015  PT THER PROC EA 15 MIN 1/8/2018 Deepa Cortez, PT GP 2    [id number]  PT NEUROMUSC RE EDUCATION EA 15 MIN 1/8/2018 Deepa Cortez, PT GP 1                    Deepa Cortez, PT  1/9/2018

## 2018-01-16 ENCOUNTER — APPOINTMENT (OUTPATIENT)
Dept: PHYSICAL THERAPY | Facility: HOSPITAL | Age: 78
End: 2018-01-16
Attending: INTERNAL MEDICINE

## 2018-01-18 ENCOUNTER — HOSPITAL ENCOUNTER (OUTPATIENT)
Dept: PHYSICAL THERAPY | Facility: HOSPITAL | Age: 78
Setting detail: THERAPIES SERIES
Discharge: HOME | End: 2018-01-18
Attending: INTERNAL MEDICINE

## 2018-01-18 DIAGNOSIS — G72.41 INCLUSION BODY MYOSITIS: ICD-10-CM

## 2018-01-18 DIAGNOSIS — M54.50 BILATERAL LOW BACK PAIN WITHOUT SCIATICA, UNSPECIFIED CHRONICITY: ICD-10-CM

## 2018-01-18 DIAGNOSIS — R26.89 BALANCE PROBLEM: Primary | ICD-10-CM

## 2018-01-18 PROCEDURE — 97110 THERAPEUTIC EXERCISES: CPT | Performed by: PHYSICAL THERAPIST

## 2018-01-18 PROCEDURE — G8979 MOBILITY GOAL STATUS: HCPCS | Performed by: PHYSICAL THERAPIST

## 2018-01-18 PROCEDURE — G8978 MOBILITY CURRENT STATUS: HCPCS | Performed by: PHYSICAL THERAPIST

## 2018-01-18 NOTE — THERAPY TREATMENT NOTE
Outpatient Physical Therapy Ortho Progress Note  [facility]     Patient Name: Lissa Ortiz  : 1940  MRN: [id number]  Today's Date: 2018      Visit Date: 2018    Visit Dx:    ICD-10-CM ICD-9-CM   1. Balance problem R26.89 781.99   2. Bilateral low back pain without sciatica, unspecified chronicity M54.5 724.2   3. 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       Patient Active Problem List   Diagnosis   • Benign essential hypertension   • Mixed anxiety depressive disorder   • Diabetes mellitus   • Glaucoma   • Hyperlipidemia   • Insomnia   • Restless legs syndrome   • Rheumatoid arthritis   • Vitamin D deficiency   • Routine health maintenance   • Midline low back pain without sciatica   • Encounter for immunization   • Neck pain on right side   • Microalbuminuria   • Encounter for screening colonoscopy        Past Medical History:   Diagnosis Date   • Allergic rhinitis    • Diabetes mellitus     type 2        Past Surgical History:   Procedure Laterality Date   • HYSTERECTOMY     • TONSILLECTOMY               PT Ortho       18 1500    Myotomal Screen- Lower Quarter Clearing    Hip flexion (L2) Bilateral:;4- (Good -)  -KH    Knee extension (L3) Bilateral:;5 (Normal)  -KH    Ankle DF (L4) Bilateral:;5 (Normal)  -KH    Ankle PF (S1) Bilateral:;4 (Good)  -KH    Knee flexion (S2) Bilateral:;5 (Normal)  -KH      User Key  (r) = Recorded By, (t) = Taken By, (c) = Cosigned By    Initials Name Provider Type    GRAEME Whitney, PT Physical Therapist                    PT Assessment/Plan       18 1527       PT Assessment    Functional Limitations Decreased safety during functional activities;Performance in sport activities;Limitations in functional capacity and performance;Limitation in home management;Impaired gait;Performance in leisure activities  -     Impairments Balance;Pain;Muscle strength;Gait;Joint mobility;Joint integrity  -GRAEME     Assessment Comments Patient has now received 5 sessions  of skilled PT for impaired balance. She reports a 20% improvement in ease with getting in/out of the car and on/off the floor, however, she continues to complain of difficulty negotiating up the stairs. She also is unable to perform a partial sit up indicating significant core muscle weakness. Her knee buckling frequency has been reduced by 75% and she demonstrates a significant improvement in her functional strength on the 5xSTS test. She also has improved her ability to perform toe taps, SLS, and tandem stance which is reflected by her balance score on the DELAROSA. Patient would continue to benefit from skilled PT to further increase confidence with balance and LE stability/strength.   -GRAEME     Please refer to paper survey for additional self-reported information Yes  -KH     Rehab Potential Good  -KH     Patient/caregiver participated in establishment of treatment plan and goals Yes  -KH     Patient would benefit from skilled therapy intervention Yes  -KH     PT Plan    PT Frequency 2x/week  -GRAEME     Predicted Duration of Therapy Intervention (days/wks) 4 weeks  -GRAEME     Planned CPT's? PT ULTRASOUND EA 15 MIN: 73829;PT AQUATIC THERAPY EA 15 MIN: 59692;PT THER ACT EA 15 MIN: 84033;PT MANUAL THERAPY EA 15 MIN: 86907;PT SELF CARE/HOME MGMT/TRAIN EA 15: 16771;PT GAIT TRAINING EA 15 MIN: 22355;PT NEUROMUSC RE-EDUCATION EA 15 MIN: 65199;PT HOT OR COLD PACK TREAT MCARE;PT ELECTRICAL STIM UNATTEND: ;PT IONTOPHORESIS EA 15 MIN: 55939;PT ELECTRICAL STIM ATTD EA 15 MIN: 93256;PT THER PROC EA 15 MIN: 97180;PT RE-EVAL: 84032  -     Physical Therapy Interventions (Optional Details) stair training;strengthening;stretching;balance training;gait training;home exercise program;ROM (Range of Motion);patient/family education  -     PT Plan Comments Progress core strengthening and balance training  -       User Key  (r) = Recorded By, (t) = Taken By, (c) = Cosigned By    Initials Name Provider Type    GRAEME Whitney, PT Physical  Therapist                    Exercises       01/18/18 1500          Subjective Comments    Subjective Comments I definitely think my balance has improved but I have a lot of trouble with stairs still  -      Subjective Pain    Able to rate subjective pain? yes  -KH      Pre-Treatment Pain Level 0  -KH      Post-Treatment Pain Level 0  -KH      Exercise 1    Exercise Name 1 see exercise flowsheet  -        User Key  (r) = Recorded By, (t) = Taken By, (c) = Cosigned By    Initials Name Provider Type    GRAEME Whitney, PT Physical Therapist                  PT OP Goals       01/18/18 1500       PT Short Term Goals    STG 1 Patient will demonstrate compliance and independence with initial HEP  -     STG 1 Progress Met  -     STG 2 Patient will improved 5xSTS from 23.8 seconds to </= 20 seconds in order to reduce risk of falls and demonstrate improved functional strength  -     STG 2 Progress Met  -     STG 2 Progress Comments 17.3  -     STG 3 Patient will improve B SLS from 5-10 seconds to 15 seconds or greater to improve balance/stability and reduce risk of falls  -     STG 3 Progress Met  -     Long Term Goals    LTG 1 Patient will improve score on DELAROSA from 48/56 to 50/56 in order to reduce risk of falls  -     LTG 1 Progress Met  -     LTG 1 Progress Comments 54/56  -     LTG 2 Patient will improve proximal strength demonstrated by minimal difficulty with sit to stand transfer (no hands) and getting in/out of the car   -     LTG 2 Progress Met  -     LTG 2 Progress Comments getting in/out of the car is 20% better  -     LTG 3 Patient will report a 50% reduction in frequency of her knees buckling to reduce risk of falls and improve LE stability  -     LTG 3 Progress Met  -     LTG 3 Progress Comments 75% better  -     LTG 4 Patient will demonstrate independence with HEP for balance.  -     LTG 4 Progress Ongoing;Progressing  -       User Key  (r) = Recorded By, (t) = Taken By,  (c) = Cosigned By    Initials Name Provider Type    GRAEME Whitney PT Physical Therapist               Outcome Measure Options: Holder Balance  5 Times Sit to Stand  5 Times Sit to Stand (seconds): 17.3 seconds  5 Times Sit to Stand Comments: no hands  Holder Balance Scale  Sitting to Standing: able to stand without using hands and stabilize independently  Standing Unsupported: able to stand safely for 2 minutes  Sitting with Back Unsupported but Feet Supported on Floor or on Stool: able to sit safely and securely for 2 minutes  Standing to Sitting: sits safely with minimal use of hands  Transfers: able to transfer safely with minor use of hands  Standing Unsupported with Eyes Closed: able to stand 10 seconds safely  Standing Unsupported with Feet Together: able to place feet together independently and stand 1 minute safely  Reaching Forward with Outstretched Arm While Standing: can reach forward 12 cm (5 inches)   Object From the Floor From a Standing Position: able to  object safely and easily  Turning to Look Behind Over Left and Right Shoulders While Standing: looks behind from both sides and weight shifts well  Turn 360 Degrees: able to turn 360 degrees safely in 4 seconds or less  Place Alternate Foot on Step or Stool While Standing Unsupported: able to stand independently and safely and complete 8 steps in 20 seconds  Standing Unsupported with One Foot in Front: able to place foot ahead independently and hold 30 seconds  Standing on One Leg: able to lift leg independently and hold > 10 seconds  Holder Total Score: 54  Holder Comments: 54/56-4% disability         Time Calculation:   Start Time: 1515  Stop Time: 1600  Time Calculation (min): 45 min    Therapy Charges for Today     Code Description Service Date Service Provider Modifiers Qty    55394376208 HC PT MOBILITY CURRENT 1/18/2018 Renee Whitney, PT GP, CI 1    24158227314 HC PT MOBILITY PROJECTED 1/18/2018 Renee Whitney, PT GP, CI 1    06892460841 HC  PT THER PROC EA 15 MIN 1/18/2018 Renee Whitney, PT GP 3          PT G-Codes  PT Professional Judgement Used?: Yes  Outcome Measure Options: Holder Balance  Score: 54/56-4% impaired  Functional Limitation: Mobility: Walking and moving around  Mobility: Walking and Moving Around Current Status (): At least 1 percent but less than 20 percent impaired, limited or restricted  Mobility: Walking and Moving Around Goal Status (): At least 1 percent but less than 20 percent impaired, limited or restricted         Renee Whitney, PT  1/18/2018

## 2018-01-23 ENCOUNTER — HOSPITAL ENCOUNTER (OUTPATIENT)
Dept: PHYSICAL THERAPY | Facility: HOSPITAL | Age: 78
Setting detail: THERAPIES SERIES
Discharge: HOME | End: 2018-01-23
Attending: INTERNAL MEDICINE

## 2018-01-23 DIAGNOSIS — M54.50 BILATERAL LOW BACK PAIN WITHOUT SCIATICA, UNSPECIFIED CHRONICITY: ICD-10-CM

## 2018-01-23 DIAGNOSIS — R26.89 BALANCE PROBLEM: Primary | ICD-10-CM

## 2018-01-23 DIAGNOSIS — G72.41 INCLUSION BODY MYOSITIS: ICD-10-CM

## 2018-01-23 PROCEDURE — 97110 THERAPEUTIC EXERCISES: CPT | Performed by: PHYSICAL THERAPIST

## 2018-01-23 NOTE — THERAPY TREATMENT NOTE
Outpatient Physical Therapy Ortho Treatment Note  [facility]     Patient Name: Lissa Ortiz  : 1940  MRN: [id number]  Today's Date: 2018      Visit Date: 2018    Visit Dx:    ICD-10-CM ICD-9-CM   1. Balance problem R26.89 781.99   2. Bilateral low back pain without sciatica, unspecified chronicity M54.5 724.2   3. 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       Patient Active Problem List   Diagnosis   • Benign essential hypertension   • Mixed anxiety depressive disorder   • Diabetes mellitus   • Glaucoma   • Hyperlipidemia   • Insomnia   • Restless legs syndrome   • Rheumatoid arthritis   • Vitamin D deficiency   • Routine health maintenance   • Midline low back pain without sciatica   • Encounter for immunization   • Neck pain on right side   • Microalbuminuria   • Encounter for screening colonoscopy        Past Medical History:   Diagnosis Date   • Allergic rhinitis    • Diabetes mellitus     type 2        Past Surgical History:   Procedure Laterality Date   • HYSTERECTOMY     • TONSILLECTOMY                               PT Assessment/Plan       18 0941       PT Assessment    Assessment Comments Added some exercises today to increase core stability including reverse sit ups and quadruped hip extension. Patient had difficulty maintaining an even pelvis during the quadruped exercise and required intermittent UE support on the wall during the tandem walking today. Overall her single leg stability seems to improving but during the step ups with marches she had some trouble pausing at the top while on one leg.   -GRAEME     PT Plan    PT Plan Comments Progress core strength and balance training  -GRAEME       User Key  (r) = Recorded By, (t) = Taken By, (c) = Cosigned By    Initials Name Provider Type    GRAEME Whitney, PT Physical Therapist                    Exercises       18 0900          Subjective Comments    Subjective Comments I'm getting better but still unsteady at times  -GRAEME       Subjective Pain    Able to rate subjective pain? yes  -GRAEME      Pre-Treatment Pain Level 0  -KH      Post-Treatment Pain Level 0  -KH      Exercise 1    Exercise Name 1 see exercise flowsheet  -GRAEME        User Key  (r) = Recorded By, (t) = Taken By, (c) = Cosigned By    Initials Name Provider Type    GRAEME Whitney PT Physical Therapist                           Time Calculation:   Start Time: 0900  Stop Time: 0944  Time Calculation (min): 44 min    Therapy Charges for Today     Code Description Service Date Service Provider Modifiers Qty    69861978609  PT THER PROC EA 15 MIN 1/23/2018 Renee Whitney, PT GP 3                    Renee Whitney, PT  1/23/2018

## 2018-01-25 ENCOUNTER — HOSPITAL ENCOUNTER (OUTPATIENT)
Dept: PHYSICAL THERAPY | Facility: HOSPITAL | Age: 78
Setting detail: THERAPIES SERIES
Discharge: HOME | End: 2018-01-25
Attending: INTERNAL MEDICINE

## 2018-01-25 DIAGNOSIS — R26.89 BALANCE PROBLEM: Primary | ICD-10-CM

## 2018-01-25 DIAGNOSIS — M54.50 BILATERAL LOW BACK PAIN WITHOUT SCIATICA, UNSPECIFIED CHRONICITY: ICD-10-CM

## 2018-01-25 DIAGNOSIS — G72.41 INCLUSION BODY MYOSITIS: ICD-10-CM

## 2018-01-25 PROCEDURE — 97110 THERAPEUTIC EXERCISES: CPT | Performed by: PHYSICAL THERAPIST

## 2018-01-25 NOTE — THERAPY TREATMENT NOTE
Outpatient Physical Therapy Ortho Treatment Note  [facility]     Patient Name: Lissa Ortiz  : 1940  MRN: [id number]  Today's Date: 2018      Visit Date: 2018    Visit Dx:    ICD-10-CM ICD-9-CM   1. Balance problem R26.89 781.99   2. Bilateral low back pain without sciatica, unspecified chronicity M54.5 724.2   3. 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       Patient Active Problem List   Diagnosis   • Benign essential hypertension   • Mixed anxiety depressive disorder   • Diabetes mellitus   • Glaucoma   • Hyperlipidemia   • Insomnia   • Restless legs syndrome   • Rheumatoid arthritis   • Vitamin D deficiency   • Routine health maintenance   • Midline low back pain without sciatica   • Encounter for immunization   • Neck pain on right side   • Microalbuminuria   • Encounter for screening colonoscopy        Past Medical History:   Diagnosis Date   • Allergic rhinitis    • Diabetes mellitus     type 2        Past Surgical History:   Procedure Laterality Date   • HYSTERECTOMY     • TONSILLECTOMY                               PT Assessment/Plan       18 0944       PT Assessment    Assessment Comments Better stability during planks today but patient continues to require vc's to keep her hips down in order to maintain optimal form. She also was a little more steady with the cable walks today but has some difficulty controlling backwards walks.   -     PT Plan    PT Plan Comments Progress core strength and balance training  -GRAEME       User Key  (r) = Recorded By, (t) = Taken By, (c) = Cosigned By    Initials Name Provider Type    GRAEME Whitney, PT Physical Therapist                    Exercises       18 0900          Subjective Comments    Subjective Comments I actually felt pretty good from the exercises we added last session  -GRAEME      Subjective Pain    Able to rate subjective pain? yes  -GRAEME      Pre-Treatment Pain Level 5  -      Post-Treatment Pain Level 5  -KH      Exercise 1     Exercise Name 1 see exercise flowsheet  -        User Key  (r) = Recorded By, (t) = Taken By, (c) = Cosigned By    Initials Name Provider Type    GRAEME Whitney PT Physical Therapist                                            Time Calculation:   Start Time: 0912  Stop Time: 0944  Time Calculation (min): 32 min    Therapy Charges for Today     Code Description Service Date Service Provider Modifiers Qty    [id number]  PT THER PROC EA 15 MIN 1/25/2018 Renee Whitney, PT GP 2                    Renee Whitney PT  1/25/2018

## 2018-01-30 ENCOUNTER — HOSPITAL ENCOUNTER (OUTPATIENT)
Dept: PHYSICAL THERAPY | Facility: HOSPITAL | Age: 78
Setting detail: THERAPIES SERIES
Discharge: HOME | End: 2018-01-30
Attending: INTERNAL MEDICINE

## 2018-01-30 DIAGNOSIS — G72.41 INCLUSION BODY MYOSITIS: ICD-10-CM

## 2018-01-30 DIAGNOSIS — M54.50 BILATERAL LOW BACK PAIN WITHOUT SCIATICA, UNSPECIFIED CHRONICITY: ICD-10-CM

## 2018-01-30 DIAGNOSIS — R26.89 BALANCE PROBLEM: Primary | ICD-10-CM

## 2018-01-30 PROCEDURE — 97110 THERAPEUTIC EXERCISES: CPT | Performed by: PHYSICAL THERAPIST

## 2018-01-30 NOTE — THERAPY TREATMENT NOTE
Outpatient Physical Therapy Ortho Treatment Note  [facility]     Patient Name: Lissa Ortiz  : 1940  MRN: [id number]  Today's Date: 2018      Visit Date: 2018    Visit Dx:    ICD-10-CM ICD-9-CM   1. Balance problem R26.89 781.99   2. Bilateral low back pain without sciatica, unspecified chronicity M54.5 724.2   3. 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       Patient Active Problem List   Diagnosis   • Benign essential hypertension   • Mixed anxiety depressive disorder   • Diabetes mellitus   • Glaucoma   • Hyperlipidemia   • Insomnia   • Restless legs syndrome   • Rheumatoid arthritis   • Vitamin D deficiency   • Routine health maintenance   • Midline low back pain without sciatica   • Encounter for immunization   • Neck pain on right side   • Microalbuminuria   • Encounter for screening colonoscopy        Past Medical History:   Diagnosis Date   • Allergic rhinitis    • Diabetes mellitus     type 2        Past Surgical History:   Procedure Laterality Date   • HYSTERECTOMY     • TONSILLECTOMY                   PT Assessment/Plan       18 0944       PT Assessment    Assessment Comments Provided patient with written HEP and reviewed with her to ensure understanding. Also issued her green theraband to take home so she can work on her side steps and monster walks at home. Placed half foam roller on patient's back during quadruped hip extension exercise to encourage her to keep her pelvis more level.   -     PT Plan    PT Plan Comments Progress core strength and balance training  -       User Key  (r) = Recorded By, (t) = Taken By, (c) = Cosigned By    Initials Name Provider Type    GRAEME Whitney, PT Physical Therapist                    Exercises       18 0900          Subjective Comments    Subjective Comments I wanted to know if you could write out my exercises so I can do some of them at home  -GRAEME      Subjective Pain    Able to rate subjective pain? yes  -GRAEME      Pre-Treatment  Pain Level 4  -      Post-Treatment Pain Level 4  -      Exercise 1    Exercise Name 1 see exercise flowsheet  -        User Key  (r) = Recorded By, (t) = Taken By, (c) = Cosigned By    Initials Name Provider Type    GRAEME Whitney PT Physical Therapist                  PT OP Goals       01/30/18 0900       PT Short Term Goals    STG 1 Patient will demonstrate compliance and independence with initial HEP  -     STG 1 Progress Met  -     STG 2 Patient will improved 5xSTS from 23.8 seconds to </= 20 seconds in order to reduce risk of falls and demonstrate improved functional strength  -     STG 2 Progress Met  -     STG 3 Patient will improve B SLS from 5-10 seconds to 15 seconds or greater to improve balance/stability and reduce risk of falls  -     STG 3 Progress Met  -     STG 4 Patient will demonstrate independence with HEP for abdominal strength and trunk ROM and hamstring flexibility .  -     STG 4 Progress Met  -     Long Term Goals    LTG 1 Patient will improve score on DELAROSA from 48/56 to 50/56 in order to reduce risk of falls  -     LTG 1 Progress Met  -     LTG 2 Patient will improve proximal strength demonstrated by minimal difficulty with sit to stand transfer (no hands) and getting in/out of the car   -     LTG 2 Progress Met  -     LTG 3 Patient will report a 50% reduction in frequency of her knees buckling to reduce risk of falls and improve LE stability  -     LTG 3 Progress Met  -     LTG 4 Patient will demonstrate independence with HEP for balance.  -     LTG 4 Progress Ongoing;Progressing  -     LTG 4 Progress Comments issued HEP today, will continue to add to and progress  -       User Key  (r) = Recorded By, (t) = Taken By, (c) = Cosigned By    Initials Name Provider Type    GRAEME Whitney PT Physical Therapist          Time Calculation:   Start Time: 0901  Stop Time: 0945  Time Calculation (min): 44 min    Therapy Charges for Today     Code Description  Service Date Service Provider Modifiers Qty    16356142195  PT THER PROC EA 15 MIN 1/30/2018 Renee Whitney, PT GP 3                    Renee Whitney, PT  1/30/2018

## 2018-02-01 ENCOUNTER — HOSPITAL ENCOUNTER (OUTPATIENT)
Dept: GENERAL RADIOLOGY | Facility: HOSPITAL | Age: 78
Discharge: HOME | End: 2018-02-01
Admitting: INTERNAL MEDICINE

## 2018-02-01 ENCOUNTER — HOSPITAL ENCOUNTER (OUTPATIENT)
Dept: PHYSICAL THERAPY | Facility: HOSPITAL | Age: 78
Setting detail: THERAPIES SERIES
Discharge: HOME | End: 2018-02-01
Attending: INTERNAL MEDICINE

## 2018-02-01 ENCOUNTER — OFFICE VISIT (OUTPATIENT)
Dept: INTERNAL MEDICINE | Age: 78
End: 2018-02-01

## 2018-02-01 VITALS
WEIGHT: 141 LBS | OXYGEN SATURATION: 99 % | SYSTOLIC BLOOD PRESSURE: 130 MMHG | BODY MASS INDEX: 22.13 KG/M2 | HEART RATE: 70 BPM | HEIGHT: 67 IN | DIASTOLIC BLOOD PRESSURE: 64 MMHG | TEMPERATURE: 98 F

## 2018-02-01 DIAGNOSIS — G72.41 INCLUSION BODY MYOSITIS: ICD-10-CM

## 2018-02-01 DIAGNOSIS — R26.89 BALANCE PROBLEM: ICD-10-CM

## 2018-02-01 DIAGNOSIS — M54.50 BILATERAL LOW BACK PAIN WITHOUT SCIATICA, UNSPECIFIED CHRONICITY: Primary | ICD-10-CM

## 2018-02-01 DIAGNOSIS — R05.3 CHRONIC COUGH: Primary | ICD-10-CM

## 2018-02-01 PROCEDURE — 99213 OFFICE O/P EST LOW 20 MIN: CPT | Performed by: INTERNAL MEDICINE

## 2018-02-01 PROCEDURE — 97110 THERAPEUTIC EXERCISES: CPT | Performed by: PHYSICAL THERAPIST

## 2018-02-01 PROCEDURE — 71046 X-RAY EXAM CHEST 2 VIEWS: CPT

## 2018-02-01 RX ORDER — AZELASTINE 1 MG/ML
2 SPRAY, METERED NASAL 2 TIMES DAILY
Qty: 1 EACH | Refills: 1 | Status: SHIPPED | OUTPATIENT
Start: 2018-02-01 | End: 2018-08-21

## 2018-02-01 RX ORDER — MELATONIN
1000 EVERY OTHER DAY
COMMUNITY
End: 2021-06-23

## 2018-02-01 RX ORDER — FLUTICASONE PROPIONATE 50 MCG
2 SPRAY, SUSPENSION (ML) NASAL DAILY
Qty: 1 BOTTLE | Refills: 1 | Status: SHIPPED | OUTPATIENT
Start: 2018-02-01 | End: 2018-08-21

## 2018-02-01 NOTE — THERAPY TREATMENT NOTE
Outpatient Physical Therapy Ortho Treatment Note  [facility]     Patient Name: Lissa Ortiz  : 1940  MRN: [id number]  Today's Date: 2018      Visit Date: 2018    Visit Dx:    ICD-10-CM ICD-9-CM   1. 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 Balance problem R26.89 781.99       Patient Active Problem List   Diagnosis   • Benign essential hypertension   • Mixed anxiety depressive disorder   • Diabetes mellitus   • Glaucoma   • Hyperlipidemia   • Insomnia   • Restless legs syndrome   • Rheumatoid arthritis   • Vitamin D deficiency   • Routine health maintenance   • Midline low back pain without sciatica   • Encounter for immunization   • Neck pain on right side   • Microalbuminuria   • Encounter for screening colonoscopy        Past Medical History:   Diagnosis Date   • Allergic rhinitis    • Diabetes mellitus     type 2        Past Surgical History:   Procedure Laterality Date   • HYSTERECTOMY     • TONSILLECTOMY                               PT Assessment/Plan       18 1029       PT Assessment    Assessment Comments Patient had more trouble with single leg stability while performing step ups with her L LE today however required less UE support during her squats on the balance discs after she was provided verbal cues to bring her shoulders forward.   -     PT Plan    PT Plan Comments Progress core strength and balance training  -       User Key  (r) = Recorded By, (t) = Taken By, (c) = Cosigned By    Initials Name Provider Type    GRAEME Whitney, PT Physical Therapist                    Exercises       18 1000          Subjective Comments    Subjective Comments I feel a little off balance today  -GRAEME      Subjective Pain    Able to rate subjective pain? yes  -GRAEME      Pre-Treatment Pain Level 4  -GRAEME      Post-Treatment Pain Level 4  -GRAEME      Exercise 1    Exercise Name 1 see exercise flowsheet  -GRAEME         User Key  (r) = Recorded By, (t) = Taken By, (c) = Cosigned By    Initials Name Provider Type     Renee Whitney, EMMANUELLE Physical Therapist                                            Time Calculation:   Start Time: 0940  Stop Time: 1025  Time Calculation (min): 45 min    Therapy Charges for Today     Code Description Service Date Service Provider Modifiers Qty    09015684992  PT THER PROC EA 15 MIN 2/1/2018 Renee Whitney, PT GP 3                    Renee Wihtney PT  2/1/2018

## 2018-02-01 NOTE — PROGRESS NOTES
"  Lissa Ortiz / 77 y.o. / female  02/01/2018  VITALS    Visit Vitals   • /64   • Pulse 70   • Temp 98 °F (36.7 °C)   • Ht 170.2 cm (67.01\")   • Wt 64 kg (141 lb)   • SpO2 99%   • BMI 22.08 kg/m2       BP Readings from Last 3 Encounters:   02/01/18 130/64   11/13/17 130/58   10/19/17 118/60     Wt Readings from Last 3 Encounters:   02/01/18 64 kg (141 lb)   11/13/17 61.7 kg (136 lb)   10/19/17 62.1 kg (136 lb 12.8 oz)      Body mass index is 22.08 kg/(m^2).    CC:  Main reason(s) for today's visit: Cough      HPI:     She presents today with a cough over the past 12 months.  Over the past month or so it is been getting worse.  She notes postnasal drainage and describes a tickle in the back of her throat which precipitates the cough.  Cough is dry.  She is a nonsmoker.  Last chest x-ray January 2016 no acute disease noted.    Patient Care Team:  Marvin Ramey MD as PCP - General  Marvin Ramey MD as PCP - Family Medicine  Marvin Ramey MD as PCP - Claims Attributed  ____________________________________________________________________    ASSESSMENT & PLAN:    Problem List Items Addressed This Visit     None        No orders of the defined types were placed in this encounter.      Summary/Discussion:  · Number-one chronic cough, most likely due to postnasal drainage by history.  Patient has not tried any anti-secretion type treatment up to this point.  Recommend we start with intranasal antihistamine and intranasal steroids.  We'll check chest x-ray today since the last one was over 2 years ago, and have her come back in about 6 weeks with an update on symptoms.    No Follow-up on file.    Future Appointments  Date Time Provider Department Center   2/6/2018 9:00 AM Renee Whitney, PT  ANOOP [facility]   2/8/2018 9:00 AM Renee Whitney, PT  [facility]   2/15/2018 9:00 AM Renee Whitney PT  [facility]   3/13/2018 9:40 AM MD EMILY CalderonK YAIR PEREZ None "       ______________________________________________________    REVIEW OF SYSTEMS    Review of Systems   Constitutional: Negative for chills, diaphoresis and fever.   Respiratory: Negative for wheezing.    Gastrointestinal:        She denied heartburn.         PHYSICAL EXAMINATION    Physical Exam   Constitutional: She is oriented to person, place, and time. She appears well-developed and well-nourished. No distress.   HENT:   Nose: Right sinus exhibits no maxillary sinus tenderness and no frontal sinus tenderness. Left sinus exhibits no maxillary sinus tenderness and no frontal sinus tenderness.   Mouth/Throat: No posterior oropharyngeal edema or posterior oropharyngeal erythema.   Neck: Normal range of motion. Neck supple.   Pulmonary/Chest: Breath sounds normal. No respiratory distress. She has no wheezes.   No egophony noted   Lymphadenopathy:     She has no cervical adenopathy.   Neurological: She is alert and oriented to person, place, and time.   Skin: Skin is warm and dry.   Psychiatric: She has a normal mood and affect. Her behavior is normal. Judgment and thought content normal.   Nursing note and vitals reviewed.    REVIEWED DATA:    Labs:     Lab Results   Component Value Date     11/16/2017    K 4.5 11/16/2017    AST 21 11/16/2017    ALT 14 11/16/2017    BUN 18 11/16/2017    CREATININE 0.62 11/16/2017    CREATININE 0.60 11/30/2016    CREATININE 0.65 04/07/2015    EGFRIFNONA 94 11/16/2017    EGFRIFAFRI 113 11/16/2017       Lab Results   Component Value Date    HGBA1C 7.10 (H) 11/16/2017    HGBA1C 7.10 (H) 10/19/2017    HGBA1C 7.31 (H) 04/12/2017     (H) 11/16/2017     (H) 11/30/2016     (H) 04/07/2015    MICROALBUR 38.2 11/16/2017       Lab Results   Component Value Date    LDL 96 11/16/2017     (H) 11/30/2016    LDL 90 04/07/2015    HDL 64 (H) 11/16/2017    TRIG 56 11/16/2017       No results found for: TSH, FREET4    Lab Results   Component Value Date    WBC 4.99  11/16/2017    HGB 12.3 11/16/2017     11/16/2017       Imaging:         Medical Tests:         Summary of old records / correspondence / consultant report:         Request outside records:         ALLERGIES  Allergies   Allergen Reactions   • Bactrim [Sulfamethoxazole-Trimethoprim] Nausea And Vomiting        MEDICATIONS  Current Outpatient Prescriptions   Medication Sig Dispense Refill   • cholecalciferol (VITAMIN D3) 1000 units tablet Take 1,000 Units by mouth Every Other Day.     • amLODIPine (NORVASC) 5 MG tablet TAKE ONE TABLET BY MOUTH DAILY 30 tablet 4   • diclofenac (VOLTAREN) 1 % gel gel      • dorzolamide-timolol (COSOPT) 22.3-6.8 MG/ML ophthalmic solution      • folic acid (FOLVITE) 1 MG tablet Take 1 tablet by mouth daily.     • glimepiride (AMARYL) 4 MG tablet TAKE ONE TABLET BY MOUTH TWICE A  tablet 0   • metFORMIN ER (GLUCOPHAGE-XR) 500 MG 24 hr tablet Take 2 tablets by mouth Daily With Breakfast. 270 tablet 1   • Methotrexate Sodium 50 MG/2ML injection 1 (one) time per week.     • Multiple Vitamin (MULTI VITAMIN PO) Take 1 tablet by mouth Daily.     • Omega 3 1000 MG capsule Take 1 capsule by mouth.     • pravastatin (PRAVACHOL) 20 MG tablet TAKE ONE TABLET BY MOUTH DAILY 90 tablet 0   • Probiotic capsule Take 1 capsule by mouth.     • rOPINIRole (REQUIP) 0.25 MG tablet TAKE ONE TABLET BY MOUTH EVERY NIGHT AT BEDTIME FOR 7 DAYS, THEN INCREASE TO TAKE TWO TABLETS BY MOUTH EVERY NIGHT AT BEDTIME AS NEEDED 60 tablet 1   • sertraline (ZOLOFT) 50 MG tablet TAKE ONE TABLET BY MOUTH DAILY 90 tablet 0   • SITagliptin (JANUVIA) 100 MG tablet Take 1 tablet by mouth Daily. 90 tablet 1   • TURMERIC CURCUMIN PO Take 1 tablet by mouth.     • vitamin B-12 (CYANOCOBALAMIN) 100 MCG tablet Take 1 tablet by mouth.       No current facility-administered medications for this visit.        PFSH:     The following portions of the patient's history were reviewed and updated as appropriate: Allergies / Current  Medications / Past Medical History / Surgical History / Social History / Family History    PROBLEM LIST   Patient Active Problem List   Diagnosis   • Benign essential hypertension   • Mixed anxiety depressive disorder   • Diabetes mellitus   • Glaucoma   • Hyperlipidemia   • Insomnia   • Restless legs syndrome   • Rheumatoid arthritis   • Vitamin D deficiency   • Routine health maintenance   • Midline low back pain without sciatica   • Encounter for immunization   • Neck pain on right side   • Microalbuminuria   • Encounter for screening colonoscopy       PAST MEDICAL HISTORY  Past Medical History:   Diagnosis Date   • Allergic rhinitis    • Diabetes mellitus     type 2       SURGICAL HISTORY  Past Surgical History:   Procedure Laterality Date   • HYSTERECTOMY     • TONSILLECTOMY         SOCIAL HISTORY  Social History     Social History   • Marital status:      Spouse name: N/A   • Number of children: 3   • Years of education: N/A     Social History Main Topics   • Smoking status: Never Smoker   • Smokeless tobacco: Never Used   • Alcohol use No      Comment: Occasional glass of wine one per month   • Drug use: None   • Sexual activity: Not Asked     Other Topics Concern   • None     Social History Narrative       FAMILY HISTORY  Family History   Problem Relation Age of Onset   • Diabetes Mother    • Coronary artery disease Father      MI   • Diabetes Father    • Stroke Father          **Chris Disclaimer:   Much of this encounter note is an electronic transcription/translation of spoken language to printed text. The electronic translation of spoken language may permit erroneous, or at times, nonsensical words or phrases to be inadvertently transcribed. Although I have reviewed the note for such errors, some may still exist.

## 2018-02-02 ENCOUNTER — TELEPHONE (OUTPATIENT)
Dept: INTERNAL MEDICINE | Age: 78
End: 2018-02-02

## 2018-02-02 NOTE — TELEPHONE ENCOUNTER
----- Message from Ambar Manley sent at 2/2/2018  1:14 PM EST -----  Pt returned your call   Pt's # [phone]  Thanks SP

## 2018-02-06 ENCOUNTER — HOSPITAL ENCOUNTER (OUTPATIENT)
Dept: PHYSICAL THERAPY | Facility: HOSPITAL | Age: 78
Setting detail: THERAPIES SERIES
Discharge: HOME | End: 2018-02-06
Attending: INTERNAL MEDICINE

## 2018-02-06 DIAGNOSIS — G72.41 INCLUSION BODY MYOSITIS: ICD-10-CM

## 2018-02-06 DIAGNOSIS — R26.89 BALANCE PROBLEM: ICD-10-CM

## 2018-02-06 DIAGNOSIS — M54.50 BILATERAL LOW BACK PAIN WITHOUT SCIATICA, UNSPECIFIED CHRONICITY: Primary | ICD-10-CM

## 2018-02-06 PROCEDURE — 97110 THERAPEUTIC EXERCISES: CPT | Performed by: PHYSICAL THERAPIST

## 2018-02-06 NOTE — THERAPY TREATMENT NOTE
Outpatient Physical Therapy Ortho Treatment Note  [facility]     Patient Name: Lissa Ortiz  : 1940  MRN: [id number]  Today's Date: 2018      Visit Date: 2018    Visit Dx:    ICD-10-CM ICD-9-CM   1. 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 Balance problem R26.89 781.99       Patient Active Problem List   Diagnosis   • Benign essential hypertension   • Mixed anxiety depressive disorder   • Diabetes mellitus   • Glaucoma   • Hyperlipidemia   • Insomnia   • Restless legs syndrome   • Rheumatoid arthritis   • Vitamin D deficiency   • Routine health maintenance   • Midline low back pain without sciatica   • Encounter for immunization   • Neck pain on right side   • Microalbuminuria   • Encounter for screening colonoscopy        Past Medical History:   Diagnosis Date   • Allergic rhinitis    • Diabetes mellitus     type 2        Past Surgical History:   Procedure Laterality Date   • HYSTERECTOMY     • TONSILLECTOMY                               PT Assessment/Plan       18 0941       PT Assessment    Assessment Comments Added rows to increase postural strength. Patient's stability with cable walks has vastly improved however she continues to demonstrate poor L LE stability during step ups and the quadruped exercise. She also continues to require vc's to keep her hips down during the planks.  -     PT Plan    PT Plan Comments Progress core strength and balance training  -       User Key  (r) = Recorded By, (t) = Taken By, (c) = Cosigned By    Initials Name Provider Type    GRAEME Whitney, PT Physical Therapist                    Exercises       18 0900          Subjective Comments    Subjective Comments I feel stiff today  -GRAEME      Subjective Pain    Able to rate subjective pain? yes  -GRAEME      Pre-Treatment Pain Level 4  -      Post-Treatment Pain Level 4  -      Exercise 1    Exercise Name 1 see  exercise flowsheet  -        User Key  (r) = Recorded By, (t) = Taken By, (c) = Cosigned By    Initials Name Provider Type    GRAEME Whitney PT Physical Therapist                               PT OP Goals       02/06/18 0900       PT Short Term Goals    STG 1 Patient will demonstrate compliance and independence with initial HEP  -     STG 1 Progress Met  -     STG 2 Patient will improved 5xSTS from 23.8 seconds to </= 20 seconds in order to reduce risk of falls and demonstrate improved functional strength  -     STG 2 Progress Met  -     STG 3 Patient will improve B SLS from 5-10 seconds to 15 seconds or greater to improve balance/stability and reduce risk of falls  -     STG 3 Progress Met  -     STG 4 Patient will demonstrate independence with HEP for abdominal strength and trunk ROM and hamstring flexibility .  -     STG 4 Progress Met  -     Long Term Goals    LTG 1 Patient will improve score on DELAROSA from 48/56 to 50/56 in order to reduce risk of falls  -     LTG 1 Progress Met  -     LTG 2 Patient will improve proximal strength demonstrated by minimal difficulty with sit to stand transfer (no hands) and getting in/out of the car   -     LTG 2 Progress Met  -     LTG 3 Patient will report a 50% reduction in frequency of her knees buckling to reduce risk of falls and improve LE stability  -     LTG 3 Progress Met  -     LTG 4 Patient will demonstrate independence with HEP for balance.  -     LTG 4 Progress Ongoing;Progressing  -     LTG 4 Progress Comments issued HEP today, will continue to add to and progress  -       User Key  (r) = Recorded By, (t) = Taken By, (c) = Cosigned By    Initials Name Provider Type    GRAEME Whitney PT Physical Therapist                         Time Calculation:   Start Time: 0900  Stop Time: 0942  Time Calculation (min): 42 min    Therapy Charges for Today     Code Description Service Date Service Provider Modifiers Qty    81342613339  PT THER  PROC EA 15 MIN 2/6/2018 Renee Whitney, PT GP 3                    Renee Whitney, PT  2/6/2018

## 2018-02-07 DIAGNOSIS — F41.8 MIXED ANXIETY DEPRESSIVE DISORDER: ICD-10-CM

## 2018-02-08 ENCOUNTER — HOSPITAL ENCOUNTER (OUTPATIENT)
Dept: PHYSICAL THERAPY | Facility: HOSPITAL | Age: 78
Setting detail: THERAPIES SERIES
Discharge: HOME | End: 2018-02-08
Attending: INTERNAL MEDICINE

## 2018-02-08 DIAGNOSIS — G72.41 INCLUSION BODY MYOSITIS: ICD-10-CM

## 2018-02-08 DIAGNOSIS — M54.50 BILATERAL LOW BACK PAIN WITHOUT SCIATICA, UNSPECIFIED CHRONICITY: Primary | ICD-10-CM

## 2018-02-08 DIAGNOSIS — R26.89 BALANCE PROBLEM: ICD-10-CM

## 2018-02-08 PROCEDURE — 97110 THERAPEUTIC EXERCISES: CPT | Performed by: PHYSICAL THERAPIST

## 2018-02-08 NOTE — THERAPY TREATMENT NOTE
Outpatient Physical Therapy Ortho Treatment Note  [facility]     Patient Name: Lissa Ortiz  : 1940  MRN: [id number]  Today's Date: 2018      Visit Date: 2018    Visit Dx:    ICD-10-CM ICD-9-CM   1. 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 Balance problem R26.89 781.99       Patient Active Problem List   Diagnosis   • Benign essential hypertension   • Mixed anxiety depressive disorder   • Diabetes mellitus   • Glaucoma   • Hyperlipidemia   • Insomnia   • Restless legs syndrome   • Rheumatoid arthritis   • Vitamin D deficiency   • Routine health maintenance   • Midline low back pain without sciatica   • Encounter for immunization   • Neck pain on right side   • Microalbuminuria   • Encounter for screening colonoscopy        Past Medical History:   Diagnosis Date   • Allergic rhinitis    • Diabetes mellitus     type 2        Past Surgical History:   Procedure Laterality Date   • HYSTERECTOMY     • TONSILLECTOMY                               PT Assessment/Plan       18 0949       PT Assessment    Assessment Comments Patient had some difficulty keeping her pelvis level during the quadruped exercise today however was able to stabilize with tactile cues. She also required vc's during rows to avoid leaning backwards and to try and engage her scapular muscles  -     PT Plan    PT Plan Comments Progress core strength and balance training  -       User Key  (r) = Recorded By, (t) = Taken By, (c) = Cosigned By    Initials Name Provider Type    GRAEME Whitney, PT Physical Therapist                    Exercises       18 0900          Subjective Comments    Subjective Comments No pain today, I just feel tired  -GRAEME      Subjective Pain    Able to rate subjective pain? yes  -GRAEME      Pre-Treatment Pain Level 0  -KH      Post-Treatment Pain Level 0  -KH      Exercise 1    Exercise Name 1 see exercise flowsheet   -GRAEME        User Key  (r) = Recorded By, (t) = Taken By, (c) = Cosigned By    Initials Name Provider Type    GRAEME Whitney PT Physical Therapist                                            Time Calculation:   Start Time: 0900  Stop Time: 0942  Time Calculation (min): 42 min    Therapy Charges for Today     Code Description Service Date Service Provider Modifiers Qty    [id number]  PT THER PROC EA 15 MIN 2/8/2018 Renee Whitney, PT GP 3                    Renee Whitney PT  2/8/2018

## 2018-02-13 ENCOUNTER — APPOINTMENT (OUTPATIENT)
Dept: PHYSICAL THERAPY | Facility: HOSPITAL | Age: 78
End: 2018-02-13
Attending: INTERNAL MEDICINE

## 2018-02-13 ENCOUNTER — ANESTHESIA (OUTPATIENT)
Dept: GASTROENTEROLOGY | Facility: HOSPITAL | Age: 78
End: 2018-02-13

## 2018-02-13 ENCOUNTER — ANESTHESIA EVENT (OUTPATIENT)
Dept: GASTROENTEROLOGY | Facility: HOSPITAL | Age: 78
End: 2018-02-13

## 2018-02-13 ENCOUNTER — HOSPITAL ENCOUNTER (OUTPATIENT)
Facility: HOSPITAL | Age: 78
Setting detail: HOSPITAL OUTPATIENT SURGERY
Discharge: HOME | End: 2018-02-13
Attending: INTERNAL MEDICINE | Admitting: INTERNAL MEDICINE

## 2018-02-13 VITALS
SYSTOLIC BLOOD PRESSURE: 119 MMHG | DIASTOLIC BLOOD PRESSURE: 68 MMHG | RESPIRATION RATE: 16 BRPM | BODY MASS INDEX: 21.5 KG/M2 | OXYGEN SATURATION: 99 % | HEART RATE: 61 BPM | TEMPERATURE: 97.6 F | WEIGHT: 137 LBS | HEIGHT: 67 IN

## 2018-02-13 DIAGNOSIS — Z12.11 ENCOUNTER FOR SCREENING COLONOSCOPY: ICD-10-CM

## 2018-02-13 LAB
GLUCOSE BLDC GLUCOMTR-MCNC: 97 MG/DL (ref 70–130)
GLUCOSE BLDC GLUCOMTR-MCNC: 97 MG/DL (ref 70–130)

## 2018-02-13 PROCEDURE — 45385 COLONOSCOPY W/LESION REMOVAL: CPT | Performed by: INTERNAL MEDICINE

## 2018-02-13 PROCEDURE — 82962 GLUCOSE BLOOD TEST: CPT

## 2018-02-13 PROCEDURE — 25010000002 PROPOFOL 10 MG/ML EMULSION: Performed by: ANESTHESIOLOGY

## 2018-02-13 PROCEDURE — 88305 TISSUE EXAM BY PATHOLOGIST: CPT | Performed by: INTERNAL MEDICINE

## 2018-02-13 PROCEDURE — S0260 H&P FOR SURGERY: HCPCS | Performed by: INTERNAL MEDICINE

## 2018-02-13 RX ORDER — PROPOFOL 10 MG/ML
VIAL (ML) INTRAVENOUS CONTINUOUS PRN
Status: DISCONTINUED | OUTPATIENT
Start: 2018-02-13 | End: 2018-02-13 | Stop reason: SURG

## 2018-02-13 RX ORDER — SODIUM CHLORIDE 0.9 % (FLUSH) 0.9 %
1-10 SYRINGE (ML) INJECTION AS NEEDED
Status: DISCONTINUED | OUTPATIENT
Start: 2018-02-13 | End: 2018-02-13 | Stop reason: HOSPADM

## 2018-02-13 RX ORDER — LIDOCAINE HYDROCHLORIDE 20 MG/ML
INJECTION, SOLUTION INFILTRATION; PERINEURAL AS NEEDED
Status: DISCONTINUED | OUTPATIENT
Start: 2018-02-13 | End: 2018-02-13 | Stop reason: SURG

## 2018-02-13 RX ORDER — PROPOFOL 10 MG/ML
VIAL (ML) INTRAVENOUS AS NEEDED
Status: DISCONTINUED | OUTPATIENT
Start: 2018-02-13 | End: 2018-02-13 | Stop reason: SURG

## 2018-02-13 RX ORDER — SODIUM CHLORIDE, SODIUM LACTATE, POTASSIUM CHLORIDE, CALCIUM CHLORIDE 600; 310; 30; 20 MG/100ML; MG/100ML; MG/100ML; MG/100ML
30 INJECTION, SOLUTION INTRAVENOUS CONTINUOUS PRN
Status: DISCONTINUED | OUTPATIENT
Start: 2018-02-13 | End: 2018-02-13 | Stop reason: HOSPADM

## 2018-02-13 RX ADMIN — LIDOCAINE HYDROCHLORIDE 100 MG: 20 INJECTION, SOLUTION INFILTRATION; PERINEURAL at 08:49

## 2018-02-13 RX ADMIN — PROPOFOL 100 MG: 10 INJECTION, EMULSION INTRAVENOUS at 08:50

## 2018-02-13 RX ADMIN — PROPOFOL 100 MCG/KG/MIN: 10 INJECTION, EMULSION INTRAVENOUS at 08:49

## 2018-02-13 RX ADMIN — SODIUM CHLORIDE, POTASSIUM CHLORIDE, SODIUM LACTATE AND CALCIUM CHLORIDE: 600; 310; 30; 20 INJECTION, SOLUTION INTRAVENOUS at 08:37

## 2018-02-13 RX ADMIN — SODIUM CHLORIDE, POTASSIUM CHLORIDE, SODIUM LACTATE AND CALCIUM CHLORIDE 30 ML/HR: 600; 310; 30; 20 INJECTION, SOLUTION INTRAVENOUS at 08:39

## 2018-02-13 NOTE — PLAN OF CARE
Problem: Patient Care Overview (Adult)  Goal: Plan of Care Review  Outcome: Ongoing (interventions implemented as appropriate)   02/13/18 0809   Coping/Psychosocial Response Interventions   Plan Of Care Reviewed With patient   Patient Care Overview   Progress no change     Goal: Adult Individualization and Mutuality  Outcome: Ongoing (interventions implemented as appropriate)   02/13/18 0809   Individualization   Patient Specific Preferences none     Goal: Discharge Needs Assessment  Outcome: Ongoing (interventions implemented as appropriate)   02/13/18 0809   Discharge Needs Assessment   Concerns To Be Addressed no discharge needs identified   Living Environment   Transportation Available family or friend will provide       Problem: GI Endoscopy (Adult)  Goal: Signs and Symptoms of Listed Potential Problems Will be Absent or Manageable (GI Endoscopy)  Outcome: Ongoing (interventions implemented as appropriate)   02/13/18 0809   GI Endoscopy   Problems Present (GI Endoscopy) none

## 2018-02-13 NOTE — ANESTHESIA PREPROCEDURE EVALUATION
Anesthesia Evaluation                  Airway   Mallampati: II  Neck ROM: full  no difficulty expected  Dental - normal exam     Pulmonary     breath sounds clear to auscultation  Cardiovascular     Rhythm: regular    (+) hypertension, hyperlipidemia      Neuro/Psych  (+) psychiatric history Anxiety and Depression,     GI/Hepatic/Renal/Endo    (+)  diabetes mellitus,     Musculoskeletal     (+) neck pain,   Abdominal    Substance History      OB/GYN          Other   (+) arthritis   history of cancer                    Anesthesia Plan    ASA 2     MAC     intravenous induction   Anesthetic plan and risks discussed with patient.

## 2018-02-13 NOTE — H&P
[facility]  Pre Procedure History & Physical    Chief Complaint:   screening    Subjective     HPI:   76 yo wf for screening c/s.  She has had one prior evaluation 8-10 years ago in [facility] which she reports was normal.  She denies any active GI symptoms.  No FH CRC/polyps.    Past Medical History:   Past Medical History:   Diagnosis Date   • Allergic rhinitis    • Arthritis    • Cancer     skin leg   • Cataract    • Diabetes mellitus     type 2   • Hypertension    • Long sleeper     post anesthesia       Past Surgical History:  Past Surgical History:   Procedure Laterality Date   • EYE SURGERY      tearduct sgy   • EYE SURGERY      MARY KAY CATARACTS   • HYSTERECTOMY     • TONSILLECTOMY         Family History:  Family History   Problem Relation Age of Onset   • Diabetes Mother    • Coronary artery disease Father      MI   • Diabetes Father    • Stroke Father        Social History:   reports that she has never smoked. She has never used smokeless tobacco. She reports that she does not drink alcohol or use illicit drugs.    Medications:   Prescriptions Prior to Admission   Medication Sig Dispense Refill Last Dose   • amLODIPine (NORVASC) 5 MG tablet TAKE ONE TABLET BY MOUTH DAILY 30 tablet 4 Past Week at Unknown time   • azelastine (ASTELIN) 0.1 % nasal spray 2 sprays into each nostril 2 (Two) Times a Day. Use in each nostril as directed 1 each 1 Past Week at Unknown time   • cholecalciferol (VITAMIN D3) 1000 units tablet Take 1,000 Units by mouth Every Other Day.   Past Week at Unknown time   • diclofenac (VOLTAREN) 1 % gel gel    Past Week at Unknown time   • dorzolamide-timolol (COSOPT) 22.3-6.8 MG/ML ophthalmic solution    2/13/2018 at Unknown time   • fluticasone (FLONASE) 50 MCG/ACT nasal spray 2 sprays into each nostril Daily. 1 bottle 1 Past Week at Unknown time   • folic acid (FOLVITE) 1 MG tablet Take 1 tablet by mouth daily.   Past Week at Unknown time   • glimepiride (AMARYL) 4 MG  "tablet TAKE ONE TABLET BY MOUTH TWICE A  tablet 0 Past Week at Unknown time   • metFORMIN ER (GLUCOPHAGE-XR) 500 MG 24 hr tablet Take 2 tablets by mouth Daily With Breakfast. 270 tablet 1 Past Week at Unknown time   • Methotrexate Sodium 50 MG/2ML injection 1 (one) time per week.   Past Week at Unknown time   • Multiple Vitamin (MULTI VITAMIN PO) Take 1 tablet by mouth Daily.   Past Week at Unknown time   • Omega 3 1000 MG capsule Take 1 capsule by mouth.   Past Week at Unknown time   • pravastatin (PRAVACHOL) 20 MG tablet TAKE ONE TABLET BY MOUTH DAILY 90 tablet 0 Past Week at Unknown time   • Probiotic capsule Take 1 capsule by mouth.   Past Week at Unknown time   • rOPINIRole (REQUIP) 0.25 MG tablet TAKE ONE TABLET BY MOUTH EVERY NIGHT AT BEDTIME FOR 7 DAYS, THEN INCREASE TO TAKE TWO TABLETS BY MOUTH EVERY NIGHT AT BEDTIME AS NEEDED 60 tablet 1 Past Week at Unknown time   • sertraline (ZOLOFT) 50 MG tablet TAKE ONE TABLET BY MOUTH DAILY 90 tablet 0 Past Week at Unknown time   • SITagliptin (JANUVIA) 100 MG tablet Take 1 tablet by mouth Daily. 90 tablet 1 Past Week at Unknown time   • TURMERIC CURCUMIN PO Take 1 tablet by mouth.   Past Week at Unknown time   • vitamin B-12 (CYANOCOBALAMIN) 100 MCG tablet Take 1 tablet by mouth.   Past Week at Unknown time       Allergies:  Bactrim [sulfamethoxazole-trimethoprim]    ROS:    Pertinent items are noted in HPI, all other systems reviewed and negative     Objective     Blood pressure 130/65, pulse 63, temperature 97.5 °F (36.4 °C), temperature source Oral, resp. rate 18, height 170.2 cm (67\"), weight 62.1 kg (137 lb), SpO2 99 %.    Physical Exam   Constitutional: Pt is oriented to person, place, and time and well-developed, well-nourished, and in no distress.   Mouth/Throat: Oropharynx is clear and moist.   Neck: Normal range of motion.   Cardiovascular: Normal rate, regular rhythm    Pulmonary/Chest: Effort normal   Abdominal: Soft. Nontender  Skin: Skin is warm " and dry.   Psychiatric: Mood, memory, affect and judgment normal.     Assessment/Plan     Diagnosis:  screening    Anticipated Surgical Procedure:  colonoscopy    The risks, benefits, and alternatives of this procedure have been discussed with the patient or the responsible party- the patient understands and agrees to proceed.

## 2018-02-14 ENCOUNTER — TELEPHONE (OUTPATIENT)
Dept: GASTROENTEROLOGY | Facility: CLINIC | Age: 78
End: 2018-02-14

## 2018-02-14 PROBLEM — D12.6 TUBULAR ADENOMA OF COLON: Status: ACTIVE | Noted: 2018-02-14

## 2018-02-14 LAB
CYTO UR: NORMAL
LAB AP CASE REPORT: NORMAL
Lab: NORMAL
PATH REPORT.FINAL DX SPEC: NORMAL
PATH REPORT.GROSS SPEC: NORMAL

## 2018-02-14 NOTE — TELEPHONE ENCOUNTER
The polyp(s) biopsies showed adenomatous change. This is not cancerous but is considered potentially precancerous. Consider Follow-up colonoscopy in 5 yrs

## 2018-02-15 ENCOUNTER — HOSPITAL ENCOUNTER (OUTPATIENT)
Dept: PHYSICAL THERAPY | Facility: HOSPITAL | Age: 78
Setting detail: THERAPIES SERIES
Discharge: HOME | End: 2018-02-15
Attending: INTERNAL MEDICINE

## 2018-02-15 DIAGNOSIS — G72.41 INCLUSION BODY MYOSITIS: ICD-10-CM

## 2018-02-15 DIAGNOSIS — R26.89 BALANCE PROBLEM: ICD-10-CM

## 2018-02-15 DIAGNOSIS — M54.50 BILATERAL LOW BACK PAIN WITHOUT SCIATICA, UNSPECIFIED CHRONICITY: Primary | ICD-10-CM

## 2018-02-15 PROCEDURE — 97110 THERAPEUTIC EXERCISES: CPT | Performed by: PHYSICAL THERAPIST

## 2018-02-15 PROCEDURE — G8980 MOBILITY D/C STATUS: HCPCS | Performed by: PHYSICAL THERAPIST

## 2018-02-15 PROCEDURE — G8979 MOBILITY GOAL STATUS: HCPCS | Performed by: PHYSICAL THERAPIST

## 2018-02-15 NOTE — THERAPY DISCHARGE NOTE
Outpatient Physical Therapy Ortho Treatment Note/Discharge Summary   [place]     Patient Name: Lissa Ortiz  : 1940  MRN: [id number]  Today's Date: 2/15/2018      Visit Date: 02/15/2018    Visit Dx:    ICD-10-CM ICD-9-CM   1. 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 Balance problem R26.89 781.99       Patient Active Problem List   Diagnosis   • Benign essential hypertension   • Mixed anxiety depressive disorder   • Diabetes mellitus   • Glaucoma   • Hyperlipidemia   • Insomnia   • Restless legs syndrome   • Rheumatoid arthritis   • Vitamin D deficiency   • Routine health maintenance   • Midline low back pain without sciatica   • Encounter for immunization   • Neck pain on right side   • Microalbuminuria   • Encounter for screening colonoscopy   • Tubular adenoma of colon        Past Medical History:   Diagnosis Date   • Allergic rhinitis    • Arthritis    • Cancer     skin leg   • Cataract    • Diabetes mellitus     type 2   • Hypertension    • Long sleeper     post anesthesia        Past Surgical History:   Procedure Laterality Date   • COLONOSCOPY N/A 2018    Procedure: COLONOSCOPY to cecum and TI with hot snare polypectomy;  Surgeon: Rowan Smith MD;  Location: [facility] ENDOSCOPY;  Service:    • EYE SURGERY      tearduct sgy   • EYE SURGERY      MARY KAY CATARACTS   • HYSTERECTOMY     • TONSILLECTOMY                               PT Assessment/Plan       02/15/18 0946       PT Assessment    Assessment Comments Patient has been issued HEP and feels comfortable continuing her routine independently. Her single leg stability and overal balance has significantly improved since initiating therapy and she reports no back pain at this time.   -GRAEME     PT Plan    PT Plan Comments D/C PT  -       User Key  (r) = Recorded By, (t) = Taken By, (c) = Cosigned By    Initials Name Provider Type    GRAEME Whitney, PT Physical  Therapist                    Exercises       02/15/18 0900          Subjective Comments    Subjective Comments I know I need to keep up with my exercises at home  -      Subjective Pain    Able to rate subjective pain? yes  -      Pre-Treatment Pain Level 0  -KH      Post-Treatment Pain Level 0  -KH      Exercise 1    Exercise Name 1 see exercise flowsheet  -        User Key  (r) = Recorded By, (t) = Taken By, (c) = Cosigned By    Initials Name Provider Type    GRAEME Whitney, PT Physical Therapist                               PT OP Goals       02/15/18 0900       PT Short Term Goals    STG 1 Patient will demonstrate compliance and independence with initial HEP  -     STG 1 Progress Met  -     STG 2 Patient will improved 5xSTS from 23.8 seconds to </= 20 seconds in order to reduce risk of falls and demonstrate improved functional strength  -     STG 2 Progress Met  -     STG 3 Patient will improve B SLS from 5-10 seconds to 15 seconds or greater to improve balance/stability and reduce risk of falls  -     STG 3 Progress Met  -     STG 4 Patient will demonstrate independence with HEP for abdominal strength and trunk ROM and hamstring flexibility .  -     STG 4 Progress Met  -     Long Term Goals    LTG 1 Patient will improve score on DELAROSA from 48/56 to 50/56 in order to reduce risk of falls  -     LTG 1 Progress Met  -     LTG 2 Patient will improve proximal strength demonstrated by minimal difficulty with sit to stand transfer (no hands) and getting in/out of the car   -     LTG 2 Progress Met  -     LTG 3 Patient will report a 50% reduction in frequency of her knees buckling to reduce risk of falls and improve LE stability  -     LTG 3 Progress Met  -     LTG 4 Patient will demonstrate independence with HEP for balance.  -     LTG 4 Progress Met  -     LTG 5 Improve balance in sharpened Rhomberg to 12 sec.  -     LTG 5 Progress Not Met  -       User Key  (r) = Recorded By,  (t) = Taken By, (c) = Cosigned By    Initials Name Provider Type    GRAEME Whitney PT Physical Therapist               Outcome Measure Options: Holder Balance  Holder Balance Scale  Sitting to Standing: able to stand without using hands and stabilize independently  Standing Unsupported: able to stand safely for 2 minutes  Sitting with Back Unsupported but Feet Supported on Floor or on Stool: able to sit safely and securely for 2 minutes  Standing to Sitting: sits safely with minimal use of hands  Transfers: able to transfer safely with minor use of hands  Standing Unsupported with Eyes Closed: able to stand 10 seconds safely  Standing Unsupported with Feet Together: able to place feet together independently and stand 1 minute safely  Reaching Forward with Outstretched Arm While Standing: can reach forward 12 cm (5 inches)   Object From the Floor From a Standing Position: able to  object safely and easily  Turning to Look Behind Over Left and Right Shoulders While Standing: looks behind from both sides and weight shifts well  Turn 360 Degrees: able to turn 360 degrees safely in 4 seconds or less  Place Alternate Foot on Step or Stool While Standing Unsupported: able to stand independently and safely and complete 8 steps in 20 seconds  Standing Unsupported with One Foot in Front: able to place foot ahead independently and hold 30 seconds  Standing on One Leg: able to lift leg independently and hold > 10 seconds  Holder Total Score: 54  Holder Comments: 4% disability         Time Calculation:   Start Time: 0905  Stop Time: 0945  Time Calculation (min): 40 min    Therapy Charges for Today     Code Description Service Date Service Provider Modifiers Qty    93765493222 HC PT MOBILITY PROJECTED 2/15/2018 Renee Whitney, PT GP, CI 1    22008346192 HC PT MOBILITY DISCHARGE 2/15/2018 Renee Whitney, PT GP, CI 1    94686963664 HC PT THER PROC EA 15 MIN 2/15/2018 Renee Whitney, PT GP 3          PT G-Codes  PT Professional  Judgement Used?: Yes  Outcome Measure Options: Holder Balance  Score: 4% impaired  Functional Limitation: Mobility: Walking and moving around  Mobility: Walking and Moving Around Goal Status (): At least 1 percent but less than 20 percent impaired, limited or restricted  Mobility: Walking and Moving Around Discharge Status (): At least 1 percent but less than 20 percent impaired, limited or restricted     OP PT Discharge Summary  Date of Discharge: 02/15/18  Reason for Discharge: All goals achieved  Outcomes Achieved: Able to achieve all goals within established timeline  Discharge Destination: Home with home program      Renee Whitney, PT  2/15/2018

## 2018-02-15 NOTE — TELEPHONE ENCOUNTER
Returned pt's call and advised per Dr Smith that the polyp bx showed adenomatous change.   This is not cancerous but is considered potentially precancerous and she recommends a repeat c/s in 5 yrs. Pt verb understanding.

## 2018-02-16 ENCOUNTER — TELEPHONE (OUTPATIENT)
Dept: INTERNAL MEDICINE | Age: 78
End: 2018-02-16

## 2018-02-16 DIAGNOSIS — E11.9 TYPE 2 DIABETES MELLITUS WITHOUT COMPLICATION, WITHOUT LONG-TERM CURRENT USE OF INSULIN (HCC): Primary | ICD-10-CM

## 2018-02-16 RX ORDER — METFORMIN HYDROCHLORIDE 500 MG/1
1000 TABLET, EXTENDED RELEASE ORAL 2 TIMES DAILY
Qty: 270 TABLET | Refills: 1 | COMMUNITY
Start: 2018-02-16 | End: 2019-02-07 | Stop reason: SDUPTHER

## 2018-02-16 NOTE — TELEPHONE ENCOUNTER
Pt blood sugar is up again this morning (180) and she wants to know if it would be ok to take 3 Metformin instead of 2.

## 2018-02-16 NOTE — TELEPHONE ENCOUNTER
I suggest that she use two in the morning with breakfast, and one tablet in the evening with dinner..  Also suggest repeat A1c here in the office on Monday, no fasting will be needed.

## 2018-02-19 LAB — HBA1C MFR BLD: 7.2 % (ref 4.8–5.6)

## 2018-03-13 ENCOUNTER — OFFICE VISIT (OUTPATIENT)
Dept: INTERNAL MEDICINE | Age: 78
End: 2018-03-13

## 2018-03-13 VITALS
OXYGEN SATURATION: 99 % | TEMPERATURE: 96.7 F | WEIGHT: 139.4 LBS | BODY MASS INDEX: 21.88 KG/M2 | DIASTOLIC BLOOD PRESSURE: 60 MMHG | SYSTOLIC BLOOD PRESSURE: 130 MMHG | HEART RATE: 64 BPM | HEIGHT: 67 IN

## 2018-03-13 DIAGNOSIS — Z23 ENCOUNTER FOR IMMUNIZATION: ICD-10-CM

## 2018-03-13 DIAGNOSIS — E55.9 VITAMIN D DEFICIENCY: ICD-10-CM

## 2018-03-13 DIAGNOSIS — E11.9 TYPE 2 DIABETES MELLITUS WITHOUT COMPLICATION, WITHOUT LONG-TERM CURRENT USE OF INSULIN (HCC): ICD-10-CM

## 2018-03-13 DIAGNOSIS — I10 BENIGN ESSENTIAL HYPERTENSION: Primary | ICD-10-CM

## 2018-03-13 LAB — 25(OH)D3+25(OH)D2 SERPL-MCNC: 62.1 NG/ML (ref 30–100)

## 2018-03-13 PROCEDURE — 90471 IMMUNIZATION ADMIN: CPT | Performed by: INTERNAL MEDICINE

## 2018-03-13 PROCEDURE — 99213 OFFICE O/P EST LOW 20 MIN: CPT | Performed by: INTERNAL MEDICINE

## 2018-03-13 PROCEDURE — 90714 TD VACC NO PRESV 7 YRS+ IM: CPT | Performed by: INTERNAL MEDICINE

## 2018-03-13 NOTE — PROGRESS NOTES
"    Lissa Ortiz / 77 y.o. / female  03/13/2018  VITALS    Visit Vitals  /60   Pulse 64   Temp 96.7 °F (35.9 °C)   Ht 170.2 cm (67.01\")   Wt 63.2 kg (139 lb 6.4 oz)   SpO2 99%   BMI 21.83 kg/m²       BP Readings from Last 3 Encounters:   03/13/18 130/60   02/13/18 119/68   02/01/18 130/64     Wt Readings from Last 3 Encounters:   03/13/18 63.2 kg (139 lb 6.4 oz)   02/13/18 62.1 kg (137 lb)   02/01/18 64 kg (141 lb)      Body mass index is 21.83 kg/m².    CC:  Main reason(s) for today's visit: Hypertension      HPI: Patient presents today for follow-up of hypertension.  She was last seen by me in February because of chronic cough.  Patient notes that with use of the Flonase the cough has essentially resolved.    Hypertension: She is compliant with medication, dietary salt restriction, regular physical activity, but does not monitor blood pressure the outpatient setting and there are no medication side effects noted.    Diabetes: Last A1c 7.2 done in  Feb of this year.    Vitamin D level was 74.8 earlier this year, patient reduce the dosage to every other day and we will recheck level today.        Patient Care Team:  Marvin Ramey MD as PCP - General  Marvin Ramey MD as PCP - Family Medicine  Marvin Ramey MD as PCP - Claims Attributed  ____________________________________________________________________    ASSESSMENT & PLAN:    Problem List Items Addressed This Visit        Unprioritized    Benign essential hypertension - Primary    Relevant Medications    amLODIPine (NORVASC) 5 MG tablet    Diabetes mellitus    Relevant Medications    SITagliptin (JANUVIA) 100 MG tablet    glimepiride (AMARYL) 4 MG tablet    metFORMIN ER (GLUCOPHAGE-XR) 500 MG 24 hr tablet    Vitamin D deficiency    Relevant Orders    Vitamin D 25 Hydroxy    Encounter for immunization      Other Visit Diagnoses    None.       Orders Placed This Encounter   Procedures   • Td Vaccine Greater Than or Equal To 6yo With Preservative IM   • " Vitamin D 25 Hydroxy       Summary/Discussion:  · Number-one hypertension stable on current medication, continue same, blood pressure check 4 months.  ·   · #2 diabetes adequate control on medication at this time.  Plan: Continue same, we'll check A1c at next visit.  ·   · #3 immunization update, we'll provide tetanus toxoid today.  #4 vitamin D deficiency with elevated level when last checked last year, we'll recheck today after reducing dosage by 50%.        Return in about 4 months (around 7/13/2018) for Blood pressure check.    No future appointments.    ______________________________________________________    REVIEW OF SYSTEMS    Review of Systems   Respiratory: Negative for chest tightness and shortness of breath.    Cardiovascular: Negative for chest pain and palpitations.   Neurological: Negative for dizziness, syncope, speech difficulty, weakness, light-headedness and headaches.           PHYSICAL EXAMINATION    Physical Exam   Constitutional: She is oriented to person, place, and time. She appears well-developed and well-nourished. No distress.   Cardiovascular: Normal rate, regular rhythm, normal heart sounds and intact distal pulses.  Exam reveals no gallop and no friction rub.    No murmur heard.  Pulmonary/Chest: Effort normal and breath sounds normal. She has no wheezes. She has no rales.   Musculoskeletal: She exhibits no edema.   Neurological: She is alert and oriented to person, place, and time.   Skin: Skin is warm and dry. No rash noted.   Psychiatric: She has a normal mood and affect. Her behavior is normal. Judgment and thought content normal.   Nursing note and vitals reviewed.      REVIEWED DATA:    Labs:     Lab Results   Component Value Date     11/16/2017    K 4.5 11/16/2017    AST 21 11/16/2017    ALT 14 11/16/2017    BUN 18 11/16/2017    CREATININE 0.62 11/16/2017    CREATININE 0.60 11/30/2016    CREATININE 0.65 04/07/2015    EGFRIFNONA 94 11/16/2017    EGFRIFAFRI 113 11/16/2017        Lab Results   Component Value Date    HGBA1C 7.20 (H) 02/19/2018    HGBA1C 7.10 (H) 11/16/2017    HGBA1C 7.10 (H) 10/19/2017    MICROALBUR 38.2 11/16/2017       Lab Results   Component Value Date    LDL 96 11/16/2017     (H) 11/30/2016    LDL 90 04/07/2015    HDL 64 (H) 11/16/2017    TRIG 56 11/16/2017       No results found for: TSH, FREET4    Lab Results   Component Value Date    WBC 4.99 11/16/2017    HGB 12.3 11/16/2017     11/16/2017       Imaging:         Medical Tests:         Summary of old records / correspondence / consultant report:         Request outside records:         ALLERGIES  Allergies   Allergen Reactions   • Bactrim [Sulfamethoxazole-Trimethoprim] Nausea And Vomiting        MEDICATIONS  Current Outpatient Prescriptions   Medication Sig Dispense Refill   • amLODIPine (NORVASC) 5 MG tablet TAKE ONE TABLET BY MOUTH DAILY 30 tablet 4   • azelastine (ASTELIN) 0.1 % nasal spray 2 sprays into each nostril 2 (Two) Times a Day. Use in each nostril as directed 1 each 1   • cholecalciferol (VITAMIN D3) 1000 units tablet Take 1,000 Units by mouth Every Other Day.     • diclofenac (VOLTAREN) 1 % gel gel      • dorzolamide-timolol (COSOPT) 22.3-6.8 MG/ML ophthalmic solution      • fluticasone (FLONASE) 50 MCG/ACT nasal spray 2 sprays into each nostril Daily. 1 bottle 1   • folic acid (FOLVITE) 1 MG tablet Take 1 tablet by mouth daily.     • glimepiride (AMARYL) 4 MG tablet TAKE ONE TABLET BY MOUTH TWICE A  tablet 0   • metFORMIN ER (GLUCOPHAGE-XR) 500 MG 24 hr tablet Take 2 tablets by mouth Daily With Breakfast. 1000 mg in the morning, 500 mg in the evening with dinner. 270 tablet 1   • Methotrexate Sodium 50 MG/2ML injection 1 (one) time per week.     • Multiple Vitamin (MULTI VITAMIN PO) Take 1 tablet by mouth Daily.     • Omega 3 1000 MG capsule Take 1 capsule by mouth.     • pravastatin (PRAVACHOL) 20 MG tablet TAKE ONE TABLET BY MOUTH DAILY 90 tablet 0   • Probiotic capsule  Take 1 capsule by mouth.     • rOPINIRole (REQUIP) 0.25 MG tablet TAKE ONE TABLET BY MOUTH EVERY NIGHT AT BEDTIME FOR 7 DAYS, THEN INCREASE TO TAKE TWO TABLETS BY MOUTH EVERY NIGHT AT BEDTIME AS NEEDED 60 tablet 1   • sertraline (ZOLOFT) 50 MG tablet TAKE ONE TABLET BY MOUTH DAILY 90 tablet 0   • SITagliptin (JANUVIA) 100 MG tablet Take 1 tablet by mouth Daily. 90 tablet 1   • TURMERIC CURCUMIN PO Take 1 tablet by mouth.     • vitamin B-12 (CYANOCOBALAMIN) 100 MCG tablet Take 1 tablet by mouth.       No current facility-administered medications for this visit.        PFSH:     The following portions of the patient's history were reviewed and updated as appropriate: Allergies / Current Medications / Past Medical History / Surgical History / Social History / Family History    PROBLEM LIST   Patient Active Problem List   Diagnosis   • Benign essential hypertension   • Mixed anxiety depressive disorder   • Diabetes mellitus   • Glaucoma   • Hyperlipidemia   • Insomnia   • Restless legs syndrome   • Rheumatoid arthritis   • Vitamin D deficiency   • Routine health maintenance   • Midline low back pain without sciatica   • Encounter for immunization   • Neck pain on right side   • Microalbuminuria   • Encounter for screening colonoscopy   • Tubular adenoma of colon       PAST MEDICAL HISTORY  Past Medical History:   Diagnosis Date   • Allergic rhinitis    • Arthritis    • Cancer     skin leg   • Cataract    • Diabetes mellitus     type 2   • Hypertension    • Long sleeper     post anesthesia       SURGICAL HISTORY  Past Surgical History:   Procedure Laterality Date   • COLONOSCOPY N/A 2/13/2018    Procedure: COLONOSCOPY to cecum and TI with hot snare polypectomy;  Surgeon: Rowan Smith MD;  Location: [facility] ENDOSCOPY;  Service:    • EYE SURGERY      tearduct sgy   • EYE SURGERY      MARY KAY CATARACTS   • HYSTERECTOMY     • TONSILLECTOMY         SOCIAL HISTORY  Social History     Social History   • Marital status:     • Number of children: 3     Social History Main Topics   • Smoking status: Never Smoker   • Smokeless tobacco: Never Used   • Alcohol use No      Comment: Occasional glass of wine one per month   • Drug use: No     Other Topics Concern   • Not on file       FAMILY HISTORY  Family History   Problem Relation Age of Onset   • Diabetes Mother    • Coronary artery disease Father      MI   • Diabetes Father    • Stroke Father          **[place] Disclaimer:   Much of this encounter note is an electronic transcription/translation of spoken language to printed text. The electronic translation of spoken language may permit erroneous, or at times, nonsensical words or phrases to be inadvertently transcribed. Although I have reviewed the note for such errors, some may still exist.

## 2018-04-05 DIAGNOSIS — E11.9 DIABETES TYPE 2, CONTROLLED (HCC): ICD-10-CM

## 2018-04-05 DIAGNOSIS — E78.5 HYPERLIPIDEMIA: ICD-10-CM

## 2018-04-05 DIAGNOSIS — E11.9 TYPE 2 DIABETES MELLITUS WITHOUT COMPLICATION, WITHOUT LONG-TERM CURRENT USE OF INSULIN (HCC): ICD-10-CM

## 2018-04-05 RX ORDER — GLIMEPIRIDE 4 MG/1
TABLET ORAL
Qty: 180 TABLET | Refills: 0 | Status: SHIPPED | OUTPATIENT
Start: 2018-04-05 | End: 2018-07-11 | Stop reason: SDUPTHER

## 2018-04-05 RX ORDER — PRAVASTATIN SODIUM 20 MG
TABLET ORAL
Qty: 90 TABLET | Refills: 0 | Status: SHIPPED | OUTPATIENT
Start: 2018-04-05 | End: 2018-07-05 | Stop reason: SDUPTHER

## 2018-04-05 RX ORDER — SITAGLIPTIN 100 MG/1
TABLET, FILM COATED ORAL
Qty: 90 TABLET | Refills: 0 | Status: SHIPPED | OUTPATIENT
Start: 2018-04-05 | End: 2018-07-11 | Stop reason: SDUPTHER

## 2018-04-10 ENCOUNTER — TRANSCRIBE ORDERS (OUTPATIENT)
Dept: ADMINISTRATIVE | Facility: HOSPITAL | Age: 78
End: 2018-04-10

## 2018-04-10 DIAGNOSIS — R06.09 DYSPNEA ON EXERTION: Primary | ICD-10-CM

## 2018-04-19 ENCOUNTER — HOSPITAL ENCOUNTER (OUTPATIENT)
Dept: RESPIRATORY THERAPY | Facility: HOSPITAL | Age: 78
Discharge: HOME | End: 2018-04-19

## 2018-04-19 ENCOUNTER — HOSPITAL ENCOUNTER (OUTPATIENT)
Dept: CARDIOLOGY | Facility: HOSPITAL | Age: 78
Discharge: HOME | End: 2018-04-19
Admitting: NURSE PRACTITIONER

## 2018-04-19 VITALS
BODY MASS INDEX: 21.82 KG/M2 | WEIGHT: 139 LBS | HEART RATE: 81 BPM | DIASTOLIC BLOOD PRESSURE: 76 MMHG | SYSTOLIC BLOOD PRESSURE: 138 MMHG | HEIGHT: 67 IN

## 2018-04-19 DIAGNOSIS — R06.09 DYSPNEA ON EXERTION: ICD-10-CM

## 2018-04-19 LAB
ASCENDING AORTA: 2.8 CM
BDY SITE: ABNORMAL
BH CV ECHO MEAS - ACS: 2 CM
BH CV ECHO MEAS - AO MAX PG (FULL): 0.52 MMHG
BH CV ECHO MEAS - AO MAX PG: 6.5 MMHG
BH CV ECHO MEAS - AO MEAN PG (FULL): 0.56 MMHG
BH CV ECHO MEAS - AO MEAN PG: 3.7 MMHG
BH CV ECHO MEAS - AO ROOT AREA (BSA CORRECTED): 2
BH CV ECHO MEAS - AO ROOT AREA: 9.6 CM^2
BH CV ECHO MEAS - AO ROOT DIAM: 3.5 CM
BH CV ECHO MEAS - AO V2 MAX: 127 CM/SEC
BH CV ECHO MEAS - AO V2 MEAN: 90.7 CM/SEC
BH CV ECHO MEAS - AO V2 VTI: 30.6 CM
BH CV ECHO MEAS - AVA(I,A): 1.8 CM^2
BH CV ECHO MEAS - AVA(I,D): 1.8 CM^2
BH CV ECHO MEAS - AVA(V,A): 2 CM^2
BH CV ECHO MEAS - AVA(V,D): 2 CM^2
BH CV ECHO MEAS - BSA(HAYCOCK): 1.7 M^2
BH CV ECHO MEAS - BSA: 1.7 M^2
BH CV ECHO MEAS - BZI_BMI: 21.8 KILOGRAMS/M^2
BH CV ECHO MEAS - BZI_METRIC_HEIGHT: 170.2 CM
BH CV ECHO MEAS - BZI_METRIC_WEIGHT: 63.1 KG
BH CV ECHO MEAS - CONTRAST EF (2CH): 60.7 ML/M^2
BH CV ECHO MEAS - CONTRAST EF 4CH: 67.6 ML/M^2
BH CV ECHO MEAS - EDV(MOD-SP2): 28 ML
BH CV ECHO MEAS - EDV(MOD-SP4): 37 ML
BH CV ECHO MEAS - EDV(TEICH): 68.5 ML
BH CV ECHO MEAS - EF(CUBED): 68.3 %
BH CV ECHO MEAS - EF(MOD-BP): 65 %
BH CV ECHO MEAS - EF(MOD-SP2): 60.7 %
BH CV ECHO MEAS - EF(MOD-SP4): 67.6 %
BH CV ECHO MEAS - EF(TEICH): 60.4 %
BH CV ECHO MEAS - ESV(MOD-SP2): 11 ML
BH CV ECHO MEAS - ESV(MOD-SP4): 12 ML
BH CV ECHO MEAS - ESV(TEICH): 27.1 ML
BH CV ECHO MEAS - FS: 31.8 %
BH CV ECHO MEAS - IVS/LVPW: 1
BH CV ECHO MEAS - IVSD: 0.8 CM
BH CV ECHO MEAS - LAT PEAK E' VEL: 11 CM/SEC
BH CV ECHO MEAS - LV DIASTOLIC VOL/BSA (35-75): 21.4 ML/M^2
BH CV ECHO MEAS - LV MASS(C)D: 89.6 GRAMS
BH CV ECHO MEAS - LV MASS(C)DI: 51.7 GRAMS/M^2
BH CV ECHO MEAS - LV MAX PG: 5.9 MMHG
BH CV ECHO MEAS - LV MEAN PG: 3.1 MMHG
BH CV ECHO MEAS - LV SYSTOLIC VOL/BSA (12-30): 6.9 ML/M^2
BH CV ECHO MEAS - LV V1 MAX: 121.7 CM/SEC
BH CV ECHO MEAS - LV V1 MEAN: 82.2 CM/SEC
BH CV ECHO MEAS - LV V1 VTI: 25.8 CM
BH CV ECHO MEAS - LVIDD: 4 CM
BH CV ECHO MEAS - LVIDS: 2.7 CM
BH CV ECHO MEAS - LVLD AP2: 5.6 CM
BH CV ECHO MEAS - LVLD AP4: 6.5 CM
BH CV ECHO MEAS - LVLS AP2: 5 CM
BH CV ECHO MEAS - LVLS AP4: 5.5 CM
BH CV ECHO MEAS - LVOT AREA (M): 2 CM^2
BH CV ECHO MEAS - LVOT AREA: 2.1 CM^2
BH CV ECHO MEAS - LVOT DIAM: 1.6 CM
BH CV ECHO MEAS - LVPWD: 0.77 CM
BH CV ECHO MEAS - MED PEAK E' VEL: 10 CM/SEC
BH CV ECHO MEAS - MV A DUR: 0.11 SEC
BH CV ECHO MEAS - MV A MAX VEL: 75.7 CM/SEC
BH CV ECHO MEAS - MV DEC SLOPE: 482.2 CM/SEC^2
BH CV ECHO MEAS - MV DEC TIME: 0.18 SEC
BH CV ECHO MEAS - MV E MAX VEL: 88.3 CM/SEC
BH CV ECHO MEAS - MV E/A: 1.2
BH CV ECHO MEAS - MV MAX PG: 3.1 MMHG
BH CV ECHO MEAS - MV MEAN PG: 1.8 MMHG
BH CV ECHO MEAS - MV P1/2T MAX VEL: 89.7 CM/SEC
BH CV ECHO MEAS - MV P1/2T: 54.5 MSEC
BH CV ECHO MEAS - MV V2 MAX: 88.2 CM/SEC
BH CV ECHO MEAS - MV V2 MEAN: 64.1 CM/SEC
BH CV ECHO MEAS - MV V2 VTI: 20.5 CM
BH CV ECHO MEAS - MVA P1/2T LCG: 2.5 CM^2
BH CV ECHO MEAS - MVA(P1/2T): 4 CM^2
BH CV ECHO MEAS - MVA(VTI): 2.7 CM^2
BH CV ECHO MEAS - PA MAX PG (FULL): 1.2 MMHG
BH CV ECHO MEAS - PA MAX PG: 2.6 MMHG
BH CV ECHO MEAS - PA V2 MAX: 80.7 CM/SEC
BH CV ECHO MEAS - PULM A REVS DUR: 0.1 SEC
BH CV ECHO MEAS - PULM A REVS VEL: 30.6 CM/SEC
BH CV ECHO MEAS - PULM DIAS VEL: 36.4 CM/SEC
BH CV ECHO MEAS - PULM S/D: 1.6
BH CV ECHO MEAS - PULM SYS VEL: 57.2 CM/SEC
BH CV ECHO MEAS - PVA(V,A): 1.3 CM^2
BH CV ECHO MEAS - PVA(V,D): 1.3 CM^2
BH CV ECHO MEAS - QP/QS: 0.44
BH CV ECHO MEAS - RAP SYSTOLE: 3 MMHG
BH CV ECHO MEAS - RV MAX PG: 1.4 MMHG
BH CV ECHO MEAS - RV MEAN PG: 0.89 MMHG
BH CV ECHO MEAS - RV V1 MAX: 59.7 CM/SEC
BH CV ECHO MEAS - RV V1 MEAN: 44.6 CM/SEC
BH CV ECHO MEAS - RV V1 VTI: 13.5 CM
BH CV ECHO MEAS - RVOT AREA: 1.8 CM^2
BH CV ECHO MEAS - RVOT DIAM: 1.5 CM
BH CV ECHO MEAS - RVSP: 15 MMHG
BH CV ECHO MEAS - SI(AO): 169.3 ML/M^2
BH CV ECHO MEAS - SI(CUBED): 24.5 ML/M^2
BH CV ECHO MEAS - SI(LVOT): 31.7 ML/M^2
BH CV ECHO MEAS - SI(MOD-SP2): 9.8 ML/M^2
BH CV ECHO MEAS - SI(MOD-SP4): 14.4 ML/M^2
BH CV ECHO MEAS - SI(TEICH): 23.9 ML/M^2
BH CV ECHO MEAS - SUP REN AO DIAM: 1.6 CM
BH CV ECHO MEAS - SV(AO): 293.3 ML
BH CV ECHO MEAS - SV(CUBED): 42.5 ML
BH CV ECHO MEAS - SV(LVOT): 54.8 ML
BH CV ECHO MEAS - SV(MOD-SP2): 17 ML
BH CV ECHO MEAS - SV(MOD-SP4): 25 ML
BH CV ECHO MEAS - SV(RVOT): 24.4 ML
BH CV ECHO MEAS - SV(TEICH): 41.4 ML
BH CV ECHO MEAS - TAPSE (>1.6): 2.4 CM2
BH CV ECHO MEAS - TR MAX VEL: 173.6 CM/SEC
BH CV ECHO MEASUREMENTS AVERAGE E/E' RATIO: 9
BH CV XLRA - RV BASE: 3.4 CM
BH CV XLRA - TDI S': 15 CM/SEC
HGB BLDA-MCNC: 14.6 G/DL
LEFT ATRIUM VOLUME INDEX: 9 ML/M2
SAO2 % BLDCOA: 89.4 % (ref 90–98.5)
SINUS: 3.3 CM
STJ: 2.7 CM

## 2018-04-19 PROCEDURE — 94729 DIFFUSING CAPACITY: CPT

## 2018-04-19 PROCEDURE — 94060 EVALUATION OF WHEEZING: CPT

## 2018-04-19 PROCEDURE — 94726 PLETHYSMOGRAPHY LUNG VOLUMES: CPT

## 2018-04-19 PROCEDURE — 93306 TTE W/DOPPLER COMPLETE: CPT | Performed by: INTERNAL MEDICINE

## 2018-04-19 PROCEDURE — 93306 TTE W/DOPPLER COMPLETE: CPT

## 2018-04-19 PROCEDURE — 82820 HEMOGLOBIN-OXYGEN AFFINITY: CPT | Performed by: NURSE PRACTITIONER

## 2018-04-19 RX ORDER — ALBUTEROL SULFATE 2.5 MG/3ML
2.5 SOLUTION RESPIRATORY (INHALATION) ONCE
Status: COMPLETED | OUTPATIENT
Start: 2018-04-19 | End: 2018-04-19

## 2018-04-19 RX ADMIN — ALBUTEROL SULFATE 2.5 MG: 2.5 SOLUTION RESPIRATORY (INHALATION) at 12:11

## 2018-04-26 DIAGNOSIS — I10 ESSENTIAL HYPERTENSION: ICD-10-CM

## 2018-04-27 RX ORDER — AMLODIPINE BESYLATE 5 MG/1
TABLET ORAL
Qty: 30 TABLET | Refills: 3 | Status: SHIPPED | OUTPATIENT
Start: 2018-04-27 | End: 2018-08-29 | Stop reason: SDUPTHER

## 2018-05-12 DIAGNOSIS — F41.8 MIXED ANXIETY DEPRESSIVE DISORDER: ICD-10-CM

## 2018-05-29 RX ORDER — METFORMIN HYDROCHLORIDE 500 MG/1
TABLET, EXTENDED RELEASE ORAL
Qty: 270 TABLET | Refills: 0 | Status: SHIPPED | OUTPATIENT
Start: 2018-05-29 | End: 2018-07-10 | Stop reason: SDUPTHER

## 2018-07-05 DIAGNOSIS — E78.5 HYPERLIPIDEMIA: ICD-10-CM

## 2018-07-05 RX ORDER — PRAVASTATIN SODIUM 20 MG
TABLET ORAL
Qty: 90 TABLET | Refills: 0 | Status: SHIPPED | OUTPATIENT
Start: 2018-07-05 | End: 2018-10-09 | Stop reason: SDUPTHER

## 2018-07-10 ENCOUNTER — OFFICE VISIT (OUTPATIENT)
Dept: INTERNAL MEDICINE | Age: 78
End: 2018-07-10

## 2018-07-10 VITALS
WEIGHT: 141.4 LBS | BODY MASS INDEX: 22.19 KG/M2 | OXYGEN SATURATION: 98 % | TEMPERATURE: 98.5 F | SYSTOLIC BLOOD PRESSURE: 124 MMHG | HEIGHT: 67 IN | DIASTOLIC BLOOD PRESSURE: 68 MMHG | HEART RATE: 62 BPM

## 2018-07-10 DIAGNOSIS — E78.5 HYPERLIPIDEMIA, UNSPECIFIED HYPERLIPIDEMIA TYPE: ICD-10-CM

## 2018-07-10 DIAGNOSIS — I10 BENIGN ESSENTIAL HYPERTENSION: Primary | ICD-10-CM

## 2018-07-10 DIAGNOSIS — E11.9 TYPE 2 DIABETES MELLITUS WITHOUT COMPLICATION, WITHOUT LONG-TERM CURRENT USE OF INSULIN (HCC): ICD-10-CM

## 2018-07-10 DIAGNOSIS — R53.83 FATIGUE, UNSPECIFIED TYPE: ICD-10-CM

## 2018-07-10 PROCEDURE — 99214 OFFICE O/P EST MOD 30 MIN: CPT | Performed by: INTERNAL MEDICINE

## 2018-07-10 RX ORDER — ROPINIROLE 0.25 MG/1
0.5 TABLET, FILM COATED ORAL NIGHTLY
Qty: 60 TABLET | Refills: 1
Start: 2018-07-10 | End: 2019-06-19 | Stop reason: ALTCHOICE

## 2018-07-10 RX ORDER — PAROXETINE HYDROCHLORIDE 20 MG/1
20 TABLET, FILM COATED ORAL EVERY MORNING
Qty: 30 TABLET | Refills: 2 | Status: SHIPPED | OUTPATIENT
Start: 2018-07-10 | End: 2018-08-21 | Stop reason: SDUPTHER

## 2018-07-10 NOTE — PROGRESS NOTES
[facility] – [place] INTERNAL MEDICINE  MD Lissa GRIMALDO / 77 y.o. / female  07/10/2018      ASSESSMENT & PLAN:    Problem List Items Addressed This Visit        Unprioritized    Benign essential hypertension - Primary    Relevant Medications    amLODIPine (NORVASC) 5 MG tablet    Other Relevant Orders    Comprehensive Metabolic Panel    Diabetes mellitus (CMS/HCC)    Relevant Medications    metFORMIN ER (GLUCOPHAGE-XR) 500 MG 24 hr tablet    JANUVIA 100 MG tablet    glimepiride (AMARYL) 4 MG tablet    Other Relevant Orders    Hemoglobin A1c    Hyperlipidemia    Relevant Medications    pravastatin (PRAVACHOL) 20 MG tablet    Other Relevant Orders    Lipid Panel      Other Visit Diagnoses     Fatigue, unspecified type        Relevant Medications    PARoxetine (PAXIL) 20 MG tablet    Other Relevant Orders    CBC & Differential    TSH    T4, free        Orders Placed This Encounter   Procedures   • Comprehensive Metabolic Panel   • Lipid Panel   • Hemoglobin A1c   • TSH   • T4, free   • CBC & Differential       Summary/Discussion:    Number-one hypertension stable on current medication.  Plan: Continue same, check CMP and lipid today, follow-up results by mail and adjust treatment if indicated.    #2 diabetes status to be determined.  Plan: Continue same treatment, check A1c today and adjust medications if indicated.    #3 hyperlipidemia on medication, status to be determined.  Plan: Continue same, check lipids today and adjust treatment if indicated.    Number for fatigue patient is not really exercising, as no evidence of cardiovascular disease on history or physical, will check CBC and thyroid functions to rule out anemia and hypothyroidism other metabolic culprit.  Will follow-up after results of laboratory available.  Begin Paxil 20 mg 1 by mouth at bedtime.  Patient is aware that the antidepressant effect may not taken for 10-14 days.  The sedating effect should take place rather quickly within the first night or  "2 of using the medication.  I suggest she follow-up with me in 6 weeks.      Return in about 6 weeks (around 8/21/2018) for Recheck.    ____________________________________________________________________    VITALS    Visit Vitals  /68   Pulse 62   Temp 98.5 °F (36.9 °C)   Ht 170.2 cm (67.01\")   Wt 64.1 kg (141 lb 6.4 oz)   SpO2 98%   BMI 22.14 kg/m²       BP Readings from Last 3 Encounters:   07/10/18 124/68   04/19/18 138/76   03/13/18 130/60     Wt Readings from Last 3 Encounters:   07/10/18 64.1 kg (141 lb 6.4 oz)   04/19/18 63 kg (139 lb)   03/13/18 63.2 kg (139 lb 6.4 oz)      Body mass index is 22.14 kg/m².    CC:  Main reason(s) for today's visit: Blood Pressure Check; Diabetes; and Hyperlipidemia      HPI:     Pt presents this afternoon for follow-up of hypertension.  When last seen by us, blood pressure was well-controlled at 130/60.    Hypertension: She is compliant with medication, dietary salt restriction, regular physical activity, does not monitor blood pressure in the outpatient setting.  There are no medication side effects noted.    Diabetes: Patient is compliant with medication, and glucose monitoring, and she denied any hypoglycemic reactions.  Currently she is not an ACE inhibitor, and does not have a podiatrist.  She sees her ophthalmologist every 6 months.  Microalbumin was less than November 16, 2017 and there is minimal elevation of 52.  A1c was normal this February at 7.2.    Hyperlipidemia patient is compliant with vacation, and dietary fat restriction.  She is fasting for lab work today    Patient notes increasing amounts of fatigue.  She has a puppy that is awakening her during the evening.  She does not awaken herself with snoring nor does she wake up with a morning headache.  There is no significant daytime somnolence noted.  Exercise walking 1 hour day, and elliptical machine every other day for 60 minutes.  This has been noted for the past year.  His been just over a year since " her  passed away.  She notes that she spends quite a time inside, and notes that she needs to meet more people.  She is willing to try empiric course of SSRI agent to improve any underlying depression which may be contributing to fatigue.          Patient Care Team:  Marvin Ramey MD as PCP - General  Marvin Ramey MD as PCP - Family Medicine  Marvin Ramey MD as PCP - Claims Attributed  ____________________________________________________________________    REVIEW OF SYSTEMS    Review of Systems   Respiratory: Negative for chest tightness and shortness of breath.    Cardiovascular: Negative for chest pain and palpitations.        No PND nor orthopnea noted.   Neurological: Negative for dizziness, syncope, speech difficulty, weakness, light-headedness and headaches.         PHYSICAL EXAMINATION    Physical Exam   Constitutional: She is oriented to person, place, and time. She appears well-developed and well-nourished. No distress.   Cardiovascular: Normal rate, regular rhythm, normal heart sounds and intact distal pulses.  Exam reveals no gallop and no friction rub.    No murmur heard.  Pulmonary/Chest: Effort normal and breath sounds normal. She has no wheezes. She has no rales.   Musculoskeletal: She exhibits no edema.   Neurological: She is alert and oriented to person, place, and time.   Skin: Skin is warm and dry. No rash noted.   Psychiatric: She has a normal mood and affect. Her behavior is normal. Judgment and thought content normal.   Nursing note and vitals reviewed.        REVIEWED DATA:    Labs:     Lab Results   Component Value Date     11/16/2017    K 4.5 11/16/2017    AST 21 11/16/2017    ALT 14 11/16/2017    BUN 18 11/16/2017    CREATININE 0.62 11/16/2017    CREATININE 0.60 11/30/2016    CREATININE 0.65 04/07/2015    EGFRIFNONA 94 11/16/2017    EGFRIFAFRI 113 11/16/2017       Lab Results   Component Value Date    HGBA1C 7.20 (H) 02/19/2018    HGBA1C 7.10 (H) 11/16/2017    HGBA1C 7.10  (H) 10/19/2017    MICROALBUR 38.2 11/16/2017       Lab Results   Component Value Date    LDL 96 11/16/2017     (H) 11/30/2016    LDL 90 04/07/2015    HDL 64 (H) 11/16/2017    TRIG 56 11/16/2017       No results found for: TSH, FREET4    Lab Results   Component Value Date    WBC 4.99 11/16/2017    HGB 12.3 11/16/2017     11/16/2017       No results found for: PSA      Imaging:         Medical Tests:         Summary of old records / correspondence / consultant report:         Request outside records:         ALLERGIES  Allergies   Allergen Reactions   • Bactrim [Sulfamethoxazole-Trimethoprim] Nausea And Vomiting        MEDICATIONS  Current Outpatient Prescriptions   Medication Sig Dispense Refill   • Fluticasone Furoate-Vilanterol (BREO ELLIPTA IN) Inhale 1 inhaler Daily.     • amLODIPine (NORVASC) 5 MG tablet TAKE ONE TABLET BY MOUTH DAILY 30 tablet 3   • azelastine (ASTELIN) 0.1 % nasal spray 2 sprays into each nostril 2 (Two) Times a Day. Use in each nostril as directed 1 each 1   • cholecalciferol (VITAMIN D3) 1000 units tablet Take 1,000 Units by mouth Every Other Day.     • dorzolamide-timolol (COSOPT) 22.3-6.8 MG/ML ophthalmic solution      • fluticasone (FLONASE) 50 MCG/ACT nasal spray 2 sprays into each nostril Daily. 1 bottle 1   • folic acid (FOLVITE) 1 MG tablet Take 1 tablet by mouth daily.     • glimepiride (AMARYL) 4 MG tablet TAKE ONE TABLET BY MOUTH TWICE A  tablet 0   • JANUVIA 100 MG tablet TAKE ONE TABLET BY MOUTH DAILY 90 tablet 0   • metFORMIN ER (GLUCOPHAGE-XR) 500 MG 24 hr tablet Take 2 tablets by mouth Daily With Breakfast. 1000 mg in the morning, 500 mg in the evening with dinner. 270 tablet 1   • Methotrexate Sodium 50 MG/2ML injection 1 (one) time per week.     • Multiple Vitamin (MULTI VITAMIN PO) Take 1 tablet by mouth Daily.     • Omega 3 1000 MG capsule Take 1 capsule by mouth.     • PARoxetine (PAXIL) 20 MG tablet Take 1 tablet by mouth Every Morning. 30 tablet 2    • pravastatin (PRAVACHOL) 20 MG tablet TAKE ONE TABLET BY MOUTH DAILY 90 tablet 0   • Probiotic capsule Take 1 capsule by mouth.     • rOPINIRole (REQUIP) 0.25 MG tablet Take 2 tablets by mouth Every Night. Take 1 hour before bedtime. 60 tablet 1   • sertraline (ZOLOFT) 50 MG tablet TAKE ONE TABLET BY MOUTH DAILY 90 tablet 0   • TURMERIC CURCUMIN PO Take 1 tablet by mouth.     • vitamin B-12 (CYANOCOBALAMIN) 100 MCG tablet Take 1 tablet by mouth Every Other Day.       No current facility-administered medications for this visit.        PFSH:     The following portions of the patient's history were reviewed and updated as appropriate: Allergies / Current Medications / Past Medical History / Surgical History / Social History / Family History    PROBLEM LIST   Patient Active Problem List   Diagnosis   • Benign essential hypertension   • Mixed anxiety depressive disorder   • Diabetes mellitus (CMS/HCC)   • Glaucoma   • Hyperlipidemia   • Insomnia   • Restless legs syndrome   • Rheumatoid arthritis (CMS/HCC)   • Vitamin D deficiency   • Routine health maintenance   • Midline low back pain without sciatica   • Encounter for immunization   • Neck pain on right side   • Microalbuminuria   • Encounter for screening colonoscopy   • Tubular adenoma of colon       PAST MEDICAL HISTORY  Past Medical History:   Diagnosis Date   • Allergic rhinitis    • Arthritis    • Cancer (CMS/HCC)     skin leg   • Cataract    • Diabetes mellitus (CMS/HCC)     type 2   • Hypertension    • Long sleeper     post anesthesia       SURGICAL HISTORY  Past Surgical History:   Procedure Laterality Date   • COLONOSCOPY N/A 2/13/2018    Procedure: COLONOSCOPY to cecum and TI with hot snare polypectomy;  Surgeon: Rowan Smith MD;  Location: [facility] ENDOSCOPY;  Service:    • EYE SURGERY      tearduct sgy   • EYE SURGERY      MARY KAY CATARACTS   • HYSTERECTOMY     • TONSILLECTOMY         SOCIAL HISTORY  Social History     Social History   • Marital status:     • Number of children: 3     Social History Main Topics   • Smoking status: Never Smoker   • Smokeless tobacco: Never Used   • Alcohol use No      Comment: Occasional glass of wine one per month   • Drug use: No     Other Topics Concern   • Not on file       FAMILY HISTORY  Family History   Problem Relation Age of Onset   • Diabetes Mother    • Coronary artery disease Father         MI   • Diabetes Father    • Stroke Father        Health Maintenance Due   Topic   • ZOSTER VACCINE (2 of 2)   • TDAP/TD VACCINES (1 - Tdap)       Overdue health maintenance interventions  reviewed with patient.    Dictated utilizing Dragon voice recognition software

## 2018-07-11 DIAGNOSIS — E11.9 DIABETES TYPE 2, CONTROLLED (HCC): ICD-10-CM

## 2018-07-11 DIAGNOSIS — E11.9 TYPE 2 DIABETES MELLITUS WITHOUT COMPLICATION, WITHOUT LONG-TERM CURRENT USE OF INSULIN (HCC): ICD-10-CM

## 2018-07-11 LAB
ALBUMIN SERPL-MCNC: 4.8 G/DL (ref 3.5–5.2)
ALBUMIN/GLOB SERPL: 1.8 G/DL
ALP SERPL-CCNC: 85 U/L (ref 39–117)
ALT SERPL-CCNC: 19 U/L (ref 1–33)
AST SERPL-CCNC: 23 U/L (ref 1–32)
BASOPHILS # BLD AUTO: 0.05 10*3/MM3 (ref 0–0.2)
BASOPHILS NFR BLD AUTO: 0.7 % (ref 0–1.5)
BILIRUB SERPL-MCNC: 0.3 MG/DL (ref 0.1–1.2)
BUN SERPL-MCNC: 15 MG/DL (ref 8–23)
BUN/CREAT SERPL: 22.4 (ref 7–25)
CALCIUM SERPL-MCNC: 9.2 MG/DL (ref 8.6–10.5)
CHLORIDE SERPL-SCNC: 102 MMOL/L (ref 98–107)
CHOLEST SERPL-MCNC: 186 MG/DL (ref 0–200)
CO2 SERPL-SCNC: 26.7 MMOL/L (ref 22–29)
CREAT SERPL-MCNC: 0.67 MG/DL (ref 0.57–1)
EOSINOPHIL # BLD AUTO: 0.29 10*3/MM3 (ref 0–0.7)
EOSINOPHIL NFR BLD AUTO: 4.2 % (ref 0.3–6.2)
ERYTHROCYTE [DISTWIDTH] IN BLOOD BY AUTOMATED COUNT: 14.6 % (ref 11.7–13)
GLOBULIN SER CALC-MCNC: 2.6 GM/DL
GLUCOSE SERPL-MCNC: 54 MG/DL (ref 65–99)
HBA1C MFR BLD: 7 % (ref 4.8–5.6)
HCT VFR BLD AUTO: 40.6 % (ref 35.6–45.5)
HDLC SERPL-MCNC: 65 MG/DL (ref 40–60)
HGB BLD-MCNC: 12.9 G/DL (ref 11.9–15.5)
IMM GRANULOCYTES # BLD: 0.01 10*3/MM3 (ref 0–0.03)
IMM GRANULOCYTES NFR BLD: 0.1 % (ref 0–0.5)
LDLC SERPL CALC-MCNC: 99 MG/DL (ref 0–100)
LYMPHOCYTES # BLD AUTO: 1.49 10*3/MM3 (ref 0.9–4.8)
LYMPHOCYTES NFR BLD AUTO: 21.7 % (ref 19.6–45.3)
MCH RBC QN AUTO: 30.8 PG (ref 26.9–32)
MCHC RBC AUTO-ENTMCNC: 31.8 G/DL (ref 32.4–36.3)
MCV RBC AUTO: 96.9 FL (ref 80.5–98.2)
MONOCYTES # BLD AUTO: 0.75 10*3/MM3 (ref 0.2–1.2)
MONOCYTES NFR BLD AUTO: 10.9 % (ref 5–12)
NEUTROPHILS # BLD AUTO: 4.29 10*3/MM3 (ref 1.9–8.1)
NEUTROPHILS NFR BLD AUTO: 62.5 % (ref 42.7–76)
PLATELET # BLD AUTO: 330 10*3/MM3 (ref 140–500)
POTASSIUM SERPL-SCNC: 4.3 MMOL/L (ref 3.5–5.2)
PROT SERPL-MCNC: 7.4 G/DL (ref 6–8.5)
RBC # BLD AUTO: 4.19 10*6/MM3 (ref 3.9–5.2)
SODIUM SERPL-SCNC: 141 MMOL/L (ref 136–145)
T4 FREE SERPL-MCNC: 1.04 NG/DL (ref 0.93–1.7)
TRIGL SERPL-MCNC: 108 MG/DL (ref 0–150)
TSH SERPL DL<=0.005 MIU/L-ACNC: 3.39 MIU/ML (ref 0.27–4.2)
VLDLC SERPL CALC-MCNC: 21.6 MG/DL (ref 5–40)
WBC # BLD AUTO: 6.87 10*3/MM3 (ref 4.5–10.7)

## 2018-07-11 RX ORDER — SITAGLIPTIN 100 MG/1
TABLET, FILM COATED ORAL
Qty: 90 TABLET | Refills: 0 | Status: SHIPPED | OUTPATIENT
Start: 2018-07-11 | End: 2018-10-15 | Stop reason: SDUPTHER

## 2018-07-11 RX ORDER — GLIMEPIRIDE 4 MG/1
TABLET ORAL
Qty: 180 TABLET | Refills: 0 | Status: SHIPPED | OUTPATIENT
Start: 2018-07-11 | End: 2018-10-15 | Stop reason: SDUPTHER

## 2018-07-12 ENCOUNTER — TELEPHONE (OUTPATIENT)
Dept: INTERNAL MEDICINE | Age: 78
End: 2018-07-12

## 2018-07-12 NOTE — TELEPHONE ENCOUNTER
Pt stated [facility] pharmacy called to let her know she had a medication of Paroxetine 20 mg to be picked up. Pt asking if her Zoloft was replaced by the Paroxetine?  Pt's # [phone]  Thanks SP

## 2018-08-10 ENCOUNTER — TRANSCRIBE ORDERS (OUTPATIENT)
Dept: ADMINISTRATIVE | Facility: HOSPITAL | Age: 78
End: 2018-08-10

## 2018-08-10 DIAGNOSIS — J98.4 RESTRICTIVE LUNG DISEASE: Primary | ICD-10-CM

## 2018-08-16 ENCOUNTER — HOSPITAL ENCOUNTER (OUTPATIENT)
Dept: CT IMAGING | Facility: HOSPITAL | Age: 78
Discharge: HOME | End: 2018-08-16
Attending: INTERNAL MEDICINE | Admitting: INTERNAL MEDICINE

## 2018-08-16 DIAGNOSIS — J98.4 RESTRICTIVE LUNG DISEASE: ICD-10-CM

## 2018-08-16 PROCEDURE — 71250 CT THORAX DX C-: CPT

## 2018-08-21 ENCOUNTER — OFFICE VISIT (OUTPATIENT)
Dept: INTERNAL MEDICINE | Age: 78
End: 2018-08-21

## 2018-08-21 VITALS
HEART RATE: 73 BPM | SYSTOLIC BLOOD PRESSURE: 130 MMHG | TEMPERATURE: 97.9 F | BODY MASS INDEX: 22.22 KG/M2 | OXYGEN SATURATION: 98 % | DIASTOLIC BLOOD PRESSURE: 64 MMHG | HEIGHT: 67 IN | WEIGHT: 141.6 LBS

## 2018-08-21 DIAGNOSIS — R53.83 FATIGUE, UNSPECIFIED TYPE: ICD-10-CM

## 2018-08-21 DIAGNOSIS — E11.9 TYPE 2 DIABETES MELLITUS WITHOUT COMPLICATION, WITHOUT LONG-TERM CURRENT USE OF INSULIN (HCC): ICD-10-CM

## 2018-08-21 DIAGNOSIS — G47.09 OTHER INSOMNIA: ICD-10-CM

## 2018-08-21 DIAGNOSIS — I10 BENIGN ESSENTIAL HYPERTENSION: Primary | ICD-10-CM

## 2018-08-21 PROCEDURE — 99214 OFFICE O/P EST MOD 30 MIN: CPT | Performed by: INTERNAL MEDICINE

## 2018-08-21 RX ORDER — PAROXETINE HYDROCHLORIDE 20 MG/1
10 TABLET, FILM COATED ORAL NIGHTLY
Qty: 30 TABLET | Refills: 2 | COMMUNITY
Start: 2018-08-21 | End: 2019-03-18 | Stop reason: SDUPTHER

## 2018-08-21 NOTE — PROGRESS NOTES
"[facility] – [place], [place] INTERNAL MEDICINE  MD Lissa GRIMALDO / 77 y.o. / female  08/21/2018      ASSESSMENT & PLAN:    Problem List Items Addressed This Visit        Unprioritized    Benign essential hypertension - Primary    Relevant Medications    amLODIPine (NORVASC) 5 MG tablet    Diabetes mellitus (CMS/HCC)    Relevant Medications    metFORMIN ER (GLUCOPHAGE-XR) 500 MG 24 hr tablet    glimepiride (AMARYL) 4 MG tablet    JANUVIA 100 MG tablet    Other Relevant Orders    Hemoglobin A1c    Microalbumin / Creatinine Urine Ratio - Urine, Clean Catch    Insomnia      Other Visit Diagnoses     Fatigue, unspecified type        Relevant Medications    PARoxetine (PAXIL) 20 MG tablet        Orders Placed This Encounter   Procedures   • Hemoglobin A1c   • Microalbumin / Creatinine Urine Ratio - Urine, Clean Catch       Summary/Discussion: Number-one hypertension stable on current medication.  Plan: Continue same, blood pressure check 4 months.    #2 diabetes good control based upon home glucose monitoring, we'll check A1c and microalbumin today.  If microalbumin remains elevated, we will switch the amlodipine to an ACE inhibitor to improve that situation.    #3 insomnia reduced dosage of Paxil from 20 mg to 10 mg to see if that helps with the excessive daytime sedation.  She'll contact me by phone with an update on her symptoms in about 2-4 weeks.      Return in about 4 months (around 12/21/2018) for Blood pressure check.    ____________________________________________________________________    VITALS    Visit Vitals  /64   Pulse 73   Temp 97.9 °F (36.6 °C)   Ht 170.2 cm (67.01\")   Wt 64.2 kg (141 lb 9.6 oz)   SpO2 98%   Breastfeeding? No   BMI 22.17 kg/m²       BP Readings from Last 3 Encounters:   08/21/18 130/64   07/10/18 124/68   04/19/18 138/76     Wt Readings from Last 3 Encounters:   08/21/18 64.2 kg (141 lb 9.6 oz)   07/10/18 64.1 kg (141 lb 6.4 oz)   04/19/18 63 kg (139 lb)      Body mass index is 22.17 " kg/m².    CC:  Main reason(s) for today's visit: Hypertension and Diabetes      HPI:     Patient presents today for follow-up of several medical issues.    Hypertension: Last seen by us, blood pressure is well-controlled at 138/76, today she is compliant with medication, exercise, dietary salt restriction, does not monitor blood pressure the outpatient setting.  There are no medication side effects noted.    Diabetes: She is compliant with medication, fasting blood sugar this morning was 120 mg percent.  He denied any hypoglycemic reactions.  She was seen by urology last week.  He does not have a podiatrist.  We did discuss the importance of regular podiatric care.  She does not use an ACE inhibitor at this time.  A1c 7.0 July 10 of this year, last microalbumin to creatinine ratio was mildly elevated at 52.3 in November of last year, and I will be repeated today.    Insomnia: Patient is using paroxetine at 20 mg per day.  She notes that this provides adequate sleep at night, and the fatigue.  Unfortunately if she sits down during the day she is apt to fall asleep.  We have discussed the possibility of reducing the dosage to 10 mg per day.    Laboratory July 10, 2018 CMP normal A1c 7 thyroid functions normal CMP normal, CBC normal    Patient Care Team:  Marvin Ramey MD as PCP - General  Marvin Ramey MD as PCP - Family Medicine  Marvin Ramey MD as PCP - Claims Attributed  ____________________________________________________________________    REVIEW OF SYSTEMS    Review of Systems   Respiratory: Negative for chest tightness and shortness of breath.    Cardiovascular: Negative for chest pain and palpitations.   Neurological: Negative for dizziness, syncope, speech difficulty, weakness, light-headedness and headaches.         PHYSICAL EXAMINATION    Physical Exam   Constitutional: She is oriented to person, place, and time. She appears well-developed and well-nourished. No distress.   Cardiovascular: Normal  rate, regular rhythm, normal heart sounds and intact distal pulses.  Exam reveals no gallop and no friction rub.    No murmur heard.  Pulmonary/Chest: Effort normal and breath sounds normal. She has no wheezes. She has no rales.   Musculoskeletal: She exhibits no edema.   Neurological: She is alert and oriented to person, place, and time.   Skin: Skin is warm and dry. No rash noted.   Psychiatric: She has a normal mood and affect. Her behavior is normal. Judgment and thought content normal.   Nursing note and vitals reviewed.        REVIEWED DATA:    Labs:     Lab Results   Component Value Date     07/10/2018    K 4.3 07/10/2018    AST 23 07/10/2018    ALT 19 07/10/2018    BUN 15 07/10/2018    CREATININE 0.67 07/10/2018    CREATININE 0.62 11/16/2017    CREATININE 0.60 11/30/2016    EGFRIFNONA 86 07/10/2018    EGFRIFAFRI 104 07/10/2018       Lab Results   Component Value Date    HGBA1C 7.00 (H) 07/10/2018    HGBA1C 7.20 (H) 02/19/2018    HGBA1C 7.10 (H) 11/16/2017    MICROALBUR 38.2 11/16/2017       Lab Results   Component Value Date    LDL 99 07/10/2018    LDL 96 11/16/2017     (H) 11/30/2016    HDL 65 (H) 07/10/2018    TRIG 108 07/10/2018       Lab Results   Component Value Date    TSH 3.390 07/10/2018    FREET4 1.04 07/10/2018       Lab Results   Component Value Date    WBC 4.99 11/16/2017    HGB 12.9 07/10/2018    HGB 12.3 11/16/2017     07/10/2018       No results found for: PSA      Imaging:         Medical Tests:         Summary of old records / correspondence / consultant report:         Request outside records:         ALLERGIES  Allergies   Allergen Reactions   • Bactrim [Sulfamethoxazole-Trimethoprim] Nausea And Vomiting        MEDICATIONS  Current Outpatient Prescriptions   Medication Sig Dispense Refill   • amLODIPine (NORVASC) 5 MG tablet TAKE ONE TABLET BY MOUTH DAILY 30 tablet 3   • cholecalciferol (VITAMIN D3) 1000 units tablet Take 1,000 Units by mouth Every Other Day.     •  dorzolamide-timolol (COSOPT) 22.3-6.8 MG/ML ophthalmic solution      • Fluticasone Furoate-Vilanterol (BREO ELLIPTA IN) Inhale 1 inhaler Daily.     • folic acid (FOLVITE) 1 MG tablet Take 1 tablet by mouth daily.     • glimepiride (AMARYL) 4 MG tablet TAKE ONE TABLET BY MOUTH TWICE A  tablet 0   • JANUVIA 100 MG tablet TAKE ONE TABLET BY MOUTH DAILY 90 tablet 0   • metFORMIN ER (GLUCOPHAGE-XR) 500 MG 24 hr tablet Take 2 tablets by mouth Daily With Breakfast. 1000 mg in the morning, 500 mg in the evening with dinner. 270 tablet 1   • Methotrexate Sodium 50 MG/2ML injection 1 (one) time per week.     • Multiple Vitamin (MULTI VITAMIN PO) Take 1 tablet by mouth Daily.     • Omega 3 1000 MG capsule Take 1 capsule by mouth.     • PARoxetine (PAXIL) 20 MG tablet Take 0.5 tablets by mouth Every Night. 30 tablet 2   • pravastatin (PRAVACHOL) 20 MG tablet TAKE ONE TABLET BY MOUTH DAILY 90 tablet 0   • Probiotic capsule Take 1 capsule by mouth.     • rOPINIRole (REQUIP) 0.25 MG tablet Take 2 tablets by mouth Every Night. Take 1 hour before bedtime. 60 tablet 1   • TURMERIC CURCUMIN PO Take 1 tablet by mouth.     • vitamin B-12 (CYANOCOBALAMIN) 100 MCG tablet Take 1 tablet by mouth Every Other Day.       No current facility-administered medications for this visit.        PFSH:     The following portions of the patient's history were reviewed and updated as appropriate: Allergies / Current Medications / Past Medical History / Surgical History / Social History / Family History    PROBLEM LIST   Patient Active Problem List   Diagnosis   • Benign essential hypertension   • Mixed anxiety depressive disorder   • Diabetes mellitus (CMS/HCC)   • Glaucoma   • Hyperlipidemia   • Insomnia   • Restless legs syndrome   • Rheumatoid arthritis (CMS/HCC)   • Vitamin D deficiency   • Routine health maintenance   • Midline low back pain without sciatica   • Encounter for immunization   • Neck pain on right side   • Microalbuminuria   •  Encounter for screening colonoscopy   • Tubular adenoma of colon       PAST MEDICAL HISTORY  Past Medical History:   Diagnosis Date   • Allergic rhinitis    • Arthritis    • Cancer (CMS/HCC)     skin leg   • Cataract    • Diabetes mellitus (CMS/HCC)     type 2   • Hypertension    • Long sleeper     post anesthesia       SURGICAL HISTORY  Past Surgical History:   Procedure Laterality Date   • COLONOSCOPY N/A 2/13/2018    Procedure: COLONOSCOPY to cecum and TI with hot snare polypectomy;  Surgeon: Rowan Smith MD;  Location: [facility] ENDOSCOPY;  Service:    • EYE SURGERY      tearduct sgy   • EYE SURGERY      MARY KAY CATARACTS   • HYSTERECTOMY     • TONSILLECTOMY         SOCIAL HISTORY  Social History     Social History   • Marital status:    • Number of children: 3     Social History Main Topics   • Smoking status: Never Smoker   • Smokeless tobacco: Never Used   • Alcohol use No      Comment: Occasional glass of wine one per month   • Drug use: No     Other Topics Concern   • Not on file       FAMILY HISTORY  Family History   Problem Relation Age of Onset   • Diabetes Mother    • Coronary artery disease Father         MI   • Diabetes Father    • Stroke Father        Health Maintenance Due   Topic   • ZOSTER VACCINE (2 of 2)   • TDAP/TD VACCINES (1 - Tdap)   • INFLUENZA VACCINE        Overdue health maintenance interventions  reviewed with patient.    Dictated utilizing Dragon voice recognition software

## 2018-08-22 LAB
ALBUMIN/CREAT UR: 20.2 MG/G CREAT (ref 0–30)
CREAT UR-MCNC: 90.6 MG/DL
HBA1C MFR BLD: 6.9 % (ref 4.8–5.6)
MICROALBUMIN UR-MCNC: 18.3 UG/ML

## 2018-08-22 RX ORDER — METFORMIN HYDROCHLORIDE 500 MG/1
TABLET, EXTENDED RELEASE ORAL
Qty: 270 TABLET | Refills: 0 | Status: SHIPPED | OUTPATIENT
Start: 2018-08-22 | End: 2018-09-12 | Stop reason: SDUPTHER

## 2018-08-29 DIAGNOSIS — I10 ESSENTIAL HYPERTENSION: ICD-10-CM

## 2018-08-29 RX ORDER — AMLODIPINE BESYLATE 5 MG/1
TABLET ORAL
Qty: 30 TABLET | Refills: 2 | Status: SHIPPED | OUTPATIENT
Start: 2018-08-29 | End: 2018-11-19 | Stop reason: SDUPTHER

## 2018-09-12 RX ORDER — METFORMIN HYDROCHLORIDE 500 MG/1
TABLET, EXTENDED RELEASE ORAL
Qty: 270 TABLET | Refills: 0 | Status: SHIPPED | OUTPATIENT
Start: 2018-09-12 | End: 2018-12-20

## 2018-10-09 DIAGNOSIS — E78.5 HYPERLIPIDEMIA: ICD-10-CM

## 2018-10-10 RX ORDER — PRAVASTATIN SODIUM 20 MG
TABLET ORAL
Qty: 90 TABLET | Refills: 0 | Status: SHIPPED | OUTPATIENT
Start: 2018-10-10 | End: 2019-01-08 | Stop reason: SDUPTHER

## 2018-10-15 DIAGNOSIS — E11.9 TYPE 2 DIABETES MELLITUS WITHOUT COMPLICATION, WITHOUT LONG-TERM CURRENT USE OF INSULIN (HCC): ICD-10-CM

## 2018-10-15 DIAGNOSIS — E11.9 DIABETES TYPE 2, CONTROLLED (HCC): ICD-10-CM

## 2018-10-16 RX ORDER — SITAGLIPTIN 100 MG/1
TABLET, FILM COATED ORAL
Qty: 90 TABLET | Refills: 1 | Status: SHIPPED | OUTPATIENT
Start: 2018-10-16 | End: 2019-04-15 | Stop reason: SDUPTHER

## 2018-10-16 RX ORDER — GLIMEPIRIDE 4 MG/1
TABLET ORAL
Qty: 180 TABLET | Refills: 1 | Status: SHIPPED | OUTPATIENT
Start: 2018-10-16 | End: 2019-04-19 | Stop reason: SDUPTHER

## 2018-10-23 ENCOUNTER — TRANSCRIBE ORDERS (OUTPATIENT)
Dept: ADMINISTRATIVE | Facility: HOSPITAL | Age: 78
End: 2018-10-23

## 2018-10-23 DIAGNOSIS — M54.41 ACUTE RIGHT-SIDED BACK PAIN WITH SCIATICA: Primary | ICD-10-CM

## 2018-10-31 ENCOUNTER — HOSPITAL ENCOUNTER (OUTPATIENT)
Dept: MRI IMAGING | Facility: HOSPITAL | Age: 78
Discharge: HOME | End: 2018-10-31
Attending: INTERNAL MEDICINE | Admitting: INTERNAL MEDICINE

## 2018-10-31 ENCOUNTER — HOSPITAL ENCOUNTER (OUTPATIENT)
Dept: GENERAL RADIOLOGY | Facility: HOSPITAL | Age: 78
Discharge: HOME | End: 2018-10-31
Attending: INTERNAL MEDICINE

## 2018-10-31 DIAGNOSIS — M54.41 ACUTE RIGHT-SIDED BACK PAIN WITH SCIATICA: ICD-10-CM

## 2018-10-31 PROCEDURE — 72220 X-RAY EXAM SACRUM TAILBONE: CPT

## 2018-10-31 PROCEDURE — 72110 X-RAY EXAM L-2 SPINE 4/>VWS: CPT

## 2018-10-31 PROCEDURE — 72148 MRI LUMBAR SPINE W/O DYE: CPT

## 2018-11-19 DIAGNOSIS — R53.83 FATIGUE, UNSPECIFIED TYPE: ICD-10-CM

## 2018-11-19 DIAGNOSIS — I10 ESSENTIAL HYPERTENSION: ICD-10-CM

## 2018-11-20 RX ORDER — AMLODIPINE BESYLATE 5 MG/1
TABLET ORAL
Qty: 30 TABLET | Refills: 1 | Status: SHIPPED | OUTPATIENT
Start: 2018-11-20 | End: 2019-01-22 | Stop reason: SDUPTHER

## 2018-11-20 RX ORDER — PAROXETINE HYDROCHLORIDE 20 MG/1
TABLET, FILM COATED ORAL
Qty: 30 TABLET | Refills: 1 | Status: SHIPPED | OUTPATIENT
Start: 2018-11-20 | End: 2018-12-20 | Stop reason: SDUPTHER

## 2018-12-20 ENCOUNTER — OFFICE VISIT (OUTPATIENT)
Dept: INTERNAL MEDICINE | Age: 78
End: 2018-12-20

## 2018-12-20 VITALS
WEIGHT: 144.2 LBS | HEART RATE: 74 BPM | HEIGHT: 67 IN | DIASTOLIC BLOOD PRESSURE: 60 MMHG | TEMPERATURE: 97.4 F | BODY MASS INDEX: 22.63 KG/M2 | OXYGEN SATURATION: 98 % | SYSTOLIC BLOOD PRESSURE: 110 MMHG

## 2018-12-20 DIAGNOSIS — E11.9 TYPE 2 DIABETES MELLITUS WITHOUT COMPLICATION, WITHOUT LONG-TERM CURRENT USE OF INSULIN (HCC): ICD-10-CM

## 2018-12-20 DIAGNOSIS — E11.65 TYPE 2 DIABETES MELLITUS WITH HYPERGLYCEMIA, WITHOUT LONG-TERM CURRENT USE OF INSULIN (HCC): Primary | ICD-10-CM

## 2018-12-20 DIAGNOSIS — I10 BENIGN ESSENTIAL HYPERTENSION: Primary | ICD-10-CM

## 2018-12-20 LAB — HBA1C MFR BLD: 7.69 % (ref 4.8–5.6)

## 2018-12-20 PROCEDURE — 99213 OFFICE O/P EST LOW 20 MIN: CPT | Performed by: INTERNAL MEDICINE

## 2018-12-20 NOTE — PROGRESS NOTES
"[facility] [place] INTERNAL MEDICINE  MD Lissa GRIMALDO / 78 y.o. / female  12/20/2018      ASSESSMENT & PLAN:    Problem List Items Addressed This Visit        Unprioritized    Benign essential hypertension - Primary    Relevant Medications    amLODIPine (NORVASC) 5 MG tablet    Diabetes mellitus (CMS/HCC)    Relevant Medications    metFORMIN ER (GLUCOPHAGE-XR) 500 MG 24 hr tablet    JANUVIA 100 MG tablet    glimepiride (AMARYL) 4 MG tablet    Other Relevant Orders    Hemoglobin A1c        Orders Placed This Encounter   Procedures   • Hemoglobin A1c       Summary/Discussion:    Number-one hypertension stable on meds, continue same, blood pressure check 4 months.    #2 diabetes stable based on prior laboratory, we'll recheck A1c today.    Patient will be seen by  after I leave the area.      Return in about 4 months (around 4/20/2019), or Dr Berger, for Blood pressure check.    ____________________________________________________________________    VITALS    Visit Vitals  /60   Pulse 74   Temp 97.4 °F (36.3 °C)   Ht 170.2 cm (67.01\")   Wt 65.4 kg (144 lb 3.2 oz)   SpO2 98%   BMI 22.58 kg/m²       BP Readings from Last 3 Encounters:   12/20/18 110/60   08/21/18 130/64   07/10/18 124/68     Wt Readings from Last 3 Encounters:   12/20/18 65.4 kg (144 lb 3.2 oz)   10/31/18 63.5 kg (140 lb)   08/21/18 64.2 kg (141 lb 9.6 oz)      Body mass index is 22.58 kg/m².    CC:  Main reason(s) for today's visit: Hypertension      HPI:     Presents this morning for follow-up of hypertension.  When last seen by me, blood pressure is well-controlled 130/64.  Today she is compliant with medication, exercise, dietary salt restriction, but does not monitor blood pressure in the outpatient setting.  There are no medication side effects noted.    Diabetes: Patient is compliant with medication, and home glucose monitoring fasting blood sugar this morning was 151 mg percent.  She denied any significant hypoglycemic reactions.  " Patient is seen by her ophthalmologist every 6 months.  Does not have a podiatrist but is aware of the need for that .  Laboratory August 2018 A1c 6.9, microalbumin to creatinine ratio normal at 20.2.    Laboratory review July 2018 CMP normal thyroid function normal, lipids normal.    Patient Care Team:  Marvin Ramey MD as PCP - General  Marvin Ramey MD as PCP - Family Medicine  Marvin Ramey MD as PCP - Claims Attributed  ____________________________________________________________________    REVIEW OF SYSTEMS    Review of Systems   Respiratory: Negative for chest tightness and shortness of breath.    Cardiovascular: Negative for chest pain and palpitations.   Neurological: Negative for dizziness, syncope, speech difficulty, weakness, light-headedness and headaches.         PHYSICAL EXAMINATION    Physical Exam   Constitutional: She is oriented to person, place, and time. She appears well-developed and well-nourished. No distress.   Cardiovascular: Normal rate, regular rhythm, normal heart sounds and intact distal pulses. Exam reveals no gallop and no friction rub.   No murmur heard.  Pulmonary/Chest: Effort normal and breath sounds normal. She has no wheezes. She has no rales.   Musculoskeletal: She exhibits no edema.   Neurological: She is alert and oriented to person, place, and time.   Skin: Skin is warm and dry. No rash noted.   Psychiatric: She has a normal mood and affect. Her behavior is normal. Judgment and thought content normal.   Nursing note and vitals reviewed.        REVIEWED DATA:    Labs:     Lab Results   Component Value Date     07/10/2018    K 4.3 07/10/2018    AST 23 07/10/2018    ALT 19 07/10/2018    BUN 15 07/10/2018    CREATININE 0.67 07/10/2018    CREATININE 0.62 11/16/2017    CREATININE 0.60 11/30/2016    EGFRIFNONA 86 07/10/2018    EGFRIFAFRI 104 07/10/2018       Lab Results   Component Value Date    HGBA1C 6.9 (H) 08/21/2018    HGBA1C 7.00 (H) 07/10/2018    HGBA1C 7.20 (H)  02/19/2018    MICROALBUR 18.3 08/21/2018       Lab Results   Component Value Date    LDL 99 07/10/2018    LDL 96 11/16/2017     (H) 11/30/2016    HDL 65 (H) 07/10/2018    TRIG 108 07/10/2018       Lab Results   Component Value Date    TSH 3.390 07/10/2018    FREET4 1.04 07/10/2018       Lab Results   Component Value Date    WBC 6.87 07/10/2018    HGB 12.9 07/10/2018    HGB 12.3 11/16/2017     07/10/2018       No results found for: PSA      Imaging:         Medical Tests:         Summary of old records / correspondence / consultant report:         Request outside records:         ALLERGIES  Allergies   Allergen Reactions   • Bactrim [Sulfamethoxazole-Trimethoprim] Nausea And Vomiting        MEDICATIONS  Current Outpatient Medications   Medication Sig Dispense Refill   • amLODIPine (NORVASC) 5 MG tablet TAKE ONE TABLET BY MOUTH DAILY 30 tablet 1   • cholecalciferol (VITAMIN D3) 1000 units tablet Take 1,000 Units by mouth Every Other Day.     • dorzolamide-timolol (COSOPT) 22.3-6.8 MG/ML ophthalmic solution      • Fluticasone Furoate-Vilanterol (BREO ELLIPTA IN) Inhale 1 inhaler Daily.     • folic acid (FOLVITE) 1 MG tablet Take 1 tablet by mouth daily.     • glimepiride (AMARYL) 4 MG tablet TAKE ONE TABLET BY MOUTH TWICE A  tablet 1   • JANUVIA 100 MG tablet TAKE ONE TABLET BY MOUTH DAILY 90 tablet 1   • metFORMIN ER (GLUCOPHAGE-XR) 500 MG 24 hr tablet Take 2 tablets by mouth Daily With Breakfast. 1000 mg in the morning, 500 mg in the evening with dinner. 270 tablet 1   • Methotrexate Sodium 50 MG/2ML injection 1 (one) time per week.     • Multiple Vitamin (MULTI VITAMIN PO) Take 1 tablet by mouth Daily.     • Omega 3 1000 MG capsule Take 1 capsule by mouth.     • PARoxetine (PAXIL) 20 MG tablet Take 0.5 tablets by mouth Every Night. 30 tablet 2   • pravastatin (PRAVACHOL) 20 MG tablet TAKE ONE TABLET BY MOUTH DAILY 90 tablet 0   • Probiotic capsule Take 1 capsule by mouth.     • rOPINIRole  (REQUIP) 0.25 MG tablet Take 2 tablets by mouth Every Night. Take 1 hour before bedtime. 60 tablet 1   • TURMERIC CURCUMIN PO Take 1 tablet by mouth.     • vitamin B-12 (CYANOCOBALAMIN) 100 MCG tablet Take 1 tablet by mouth Every Other Day.       No current facility-administered medications for this visit.        PFSH:     The following portions of the patient's history were reviewed and updated as appropriate: Allergies / Current Medications / Past Medical History / Surgical History / Social History / Family History    PROBLEM LIST   Patient Active Problem List   Diagnosis   • Benign essential hypertension   • Mixed anxiety depressive disorder   • Diabetes mellitus (CMS/HCC)   • Glaucoma   • Hyperlipidemia   • Insomnia   • Restless legs syndrome   • Rheumatoid arthritis (CMS/HCC)   • Vitamin D deficiency   • Routine health maintenance   • Midline low back pain without sciatica   • Encounter for immunization   • Neck pain on right side   • Microalbuminuria   • Encounter for screening colonoscopy   • Tubular adenoma of colon       PAST MEDICAL HISTORY  Past Medical History:   Diagnosis Date   • Allergic rhinitis    • Arthritis    • Cancer (CMS/HCC)     skin leg   • Cataract    • Diabetes mellitus (CMS/HCC)     type 2   • Hypertension    • Long sleeper     post anesthesia       SURGICAL HISTORY  Past Surgical History:   Procedure Laterality Date   • COLONOSCOPY N/A 2/13/2018    Procedure: COLONOSCOPY to cecum and TI with hot snare polypectomy;  Surgeon: Rowan Smith MD;  Location: [facility] ENDOSCOPY;  Service:    • EYE SURGERY      tearduct sgy   • EYE SURGERY      MARY KAY CATARACTS   • HYSTERECTOMY     • TONSILLECTOMY         SOCIAL HISTORY  Social History     Socioeconomic History   • Marital status:      Spouse name: Not on file   • Number of children: 3   • Years of education: Not on file   • Highest education level: Not on file   Tobacco Use   • Smoking status: Never Smoker   • Smokeless tobacco: Never Used    Substance and Sexual Activity   • Alcohol use: No     Comment: Occasional glass of wine one per month   • Drug use: No       FAMILY HISTORY  Family History   Problem Relation Age of Onset   • Diabetes Mother    • Coronary artery disease Father         MI   • Diabetes Father    • Stroke Father        Health Maintenance Due   Topic   • TDAP/TD VACCINES (1 - Tdap)   • HEPATITIS A VACCINE ADULT (2 of 2)   • MEDICARE ANNUAL WELLNESS        Overdue health maintenance interventions  reviewed with patient.    Dictated utilizing Dragon voice recognition software

## 2018-12-26 DIAGNOSIS — R05.3 CHRONIC COUGH: ICD-10-CM

## 2018-12-26 RX ORDER — AZELASTINE 1 MG/ML
SPRAY, METERED NASAL
Qty: 2 EACH | Refills: 5 | Status: SHIPPED | OUTPATIENT
Start: 2018-12-26 | End: 2020-05-15 | Stop reason: SDUPTHER

## 2019-01-08 DIAGNOSIS — E78.5 HYPERLIPIDEMIA: ICD-10-CM

## 2019-01-08 RX ORDER — PRAVASTATIN SODIUM 20 MG
TABLET ORAL
Qty: 90 TABLET | Refills: 0 | Status: SHIPPED | OUTPATIENT
Start: 2019-01-08 | End: 2019-04-10 | Stop reason: SDUPTHER

## 2019-01-22 DIAGNOSIS — I10 ESSENTIAL HYPERTENSION: ICD-10-CM

## 2019-01-22 RX ORDER — AMLODIPINE BESYLATE 5 MG/1
TABLET ORAL
Qty: 30 TABLET | Refills: 2 | Status: SHIPPED | OUTPATIENT
Start: 2019-01-22 | End: 2019-04-23 | Stop reason: SDUPTHER

## 2019-02-07 ENCOUNTER — TELEPHONE (OUTPATIENT)
Dept: INTERNAL MEDICINE | Age: 79
End: 2019-02-07

## 2019-02-07 DIAGNOSIS — E11.9 DIABETES TYPE 2, CONTROLLED (HCC): ICD-10-CM

## 2019-02-07 RX ORDER — METFORMIN HYDROCHLORIDE 500 MG/1
1000 TABLET, EXTENDED RELEASE ORAL 2 TIMES DAILY
Qty: 360 TABLET | Refills: 1 | Status: SHIPPED | OUTPATIENT
Start: 2019-02-07 | End: 2019-02-08 | Stop reason: SDUPTHER

## 2019-02-07 RX ORDER — METFORMIN HYDROCHLORIDE 500 MG/1
1000 TABLET, EXTENDED RELEASE ORAL 2 TIMES DAILY
Qty: 360 TABLET | Refills: 1 | Status: SHIPPED | OUTPATIENT
Start: 2019-02-07 | End: 2019-02-07 | Stop reason: SDUPTHER

## 2019-02-07 NOTE — TELEPHONE ENCOUNTER
Patient's Metformin ER was set to print and did not get sent to the [facility] in her chart. Please send to [facility] in chart.

## 2019-02-08 RX ORDER — METFORMIN HYDROCHLORIDE 500 MG/1
1000 TABLET, EXTENDED RELEASE ORAL 2 TIMES DAILY
Qty: 360 TABLET | Refills: 1 | Status: SHIPPED | OUTPATIENT
Start: 2019-02-08 | End: 2019-07-18

## 2019-02-08 RX ORDER — METFORMIN HYDROCHLORIDE 500 MG/1
1000 TABLET, EXTENDED RELEASE ORAL 2 TIMES DAILY
Qty: 360 TABLET | Refills: 1 | Status: SHIPPED | OUTPATIENT
Start: 2019-02-08 | End: 2019-02-08 | Stop reason: SDUPTHER

## 2019-02-08 NOTE — TELEPHONE ENCOUNTER
Me neither.   Carline- we aren't sure of the message below.   Can you please review and see why a message was sent on this patient.

## 2019-02-14 ENCOUNTER — TELEPHONE (OUTPATIENT)
Dept: INTERNAL MEDICINE | Age: 79
End: 2019-02-14

## 2019-02-14 ENCOUNTER — OFFICE VISIT (OUTPATIENT)
Dept: INTERNAL MEDICINE | Age: 79
End: 2019-02-14

## 2019-02-14 VITALS
HEIGHT: 67 IN | HEART RATE: 59 BPM | OXYGEN SATURATION: 100 % | RESPIRATION RATE: 12 BRPM | BODY MASS INDEX: 22.47 KG/M2 | WEIGHT: 143.2 LBS | TEMPERATURE: 97.4 F | SYSTOLIC BLOOD PRESSURE: 126 MMHG | DIASTOLIC BLOOD PRESSURE: 62 MMHG

## 2019-02-14 DIAGNOSIS — Z76.89 ESTABLISHING CARE WITH NEW DOCTOR, ENCOUNTER FOR: Primary | ICD-10-CM

## 2019-02-14 DIAGNOSIS — I10 BENIGN ESSENTIAL HYPERTENSION: ICD-10-CM

## 2019-02-14 DIAGNOSIS — E11.65 TYPE 2 DIABETES MELLITUS WITH HYPERGLYCEMIA, WITHOUT LONG-TERM CURRENT USE OF INSULIN (HCC): ICD-10-CM

## 2019-02-14 PROCEDURE — 99214 OFFICE O/P EST MOD 30 MIN: CPT | Performed by: INTERNAL MEDICINE

## 2019-02-14 NOTE — PROGRESS NOTES
"  Lissa Ortiz is a 78 y.o. female who presents with   Chief Complaint   Patient presents with   • Encounter for establishing new physician     Former patient of Dr. Ramey   • Diabetes     Is seeing Dr. Ramey every 4 months for a sugar/A1c check and general checkup   • Hypertension     Check blood pressure   .    78-year-old female.  Former patient of Dr. Ramey.  Presents to get established for future medical care.  Has a history of diabetes and hypertension in addition to the rest of her problems as listed in her \"problem list\".  She says that she has been seeing Dr. Ramey every 4 months for a checkup and an A1c and tries to see him every year for a wellness visit but  the records show that her last wellness visit was November 13, 2017.      Diabetes   She presents for her follow-up diabetic visit. She has type 2 diabetes mellitus. Her disease course has been stable. There are no diabetic associated symptoms. There are no hypoglycemic complications. Symptoms are stable. There are no diabetic complications. Current diabetic treatment includes oral agent (triple therapy). She is compliant with treatment most of the time.   Hypertension   This is a chronic problem. The current episode started more than 1 year ago. The problem is controlled. Past treatments include calcium channel blockers. The current treatment provides moderate improvement. There are no compliance problems.         The following portions of the patient's history were reviewed and updated as appropriate: allergies, current medications, past medical history and problem list.    Review of Systems   Constitutional: Negative.    HENT: Negative.    Eyes: Negative.    Respiratory: Negative.    Cardiovascular: Negative.    Genitourinary: Negative.    Musculoskeletal: Negative.    Skin: Negative.    Neurological: Negative.    Psychiatric/Behavioral: Negative.        Objective   Physical Exam   Constitutional: She is oriented to person, place, and time. She " appears well-developed and well-nourished. No distress.   HENT:   Head: Normocephalic and atraumatic.   Eyes: Conjunctivae and EOM are normal. Pupils are equal, round, and reactive to light.   Neck: Normal range of motion. Neck supple. No thyromegaly present.   Neck exam negative.  Carotid auscultation normal-no bruits heard.   Cardiovascular: Normal rate, regular rhythm, normal heart sounds and intact distal pulses. Exam reveals no gallop and no friction rub.   No murmur heard.  Pulmonary/Chest: Effort normal and breath sounds normal. No respiratory distress. She has no wheezes. She has no rales. She exhibits no tenderness.   Neurological: She is alert and oriented to person, place, and time.   Psychiatric: She has a normal mood and affect. Her behavior is normal. Judgment and thought content normal.   Nursing note and vitals reviewed.      Assessment/Plan   Lissa was seen today for encounter for establishing new physician, diabetes and hypertension.    Diagnoses and all orders for this visit:    Establishing care with new doctor, encounter for    Type 2 diabetes mellitus with hyperglycemia, without long-term current use of insulin (CMS/[facility])  -     Glucose, Random  -     Hemoglobin A1c    Benign essential hypertension      Plan: We will get her scheduled for a wellness visit with lab updates sometime in June.  Continue all current treatment as prescribed.    Today we will do a fasting sugar with A1c since she said this would be her day that Dr. Ramey would ordinarily do them based on her every 4-month visitation with him from the past.    In between the patient's wellness visits we will reestablish her every 4-month checkup with a fasting sugar and A1c update.

## 2019-02-14 NOTE — TELEPHONE ENCOUNTER
VS coming in x2 in the same day, pt appt was moved up to 0845. Pt was happy with this and verbalized understanding. MM

## 2019-02-14 NOTE — TELEPHONE ENCOUNTER
Patient wants to know since her appt is later in the day on 6/19/19 if she can come in before to do her A1C since she will have to be fasting?   She stated Dr. Berger wanted his done at her next appt.

## 2019-02-15 ENCOUNTER — TELEPHONE (OUTPATIENT)
Dept: INTERNAL MEDICINE | Age: 79
End: 2019-02-15

## 2019-02-15 LAB
GLUCOSE SERPL-MCNC: 149 MG/DL (ref 65–99)
HBA1C MFR BLD: 7.8 % (ref 4.8–5.6)

## 2019-02-15 NOTE — TELEPHONE ENCOUNTER
----- Message from MAINE Sandoval sent at 2/15/2019  9:27 AM EST -----  Please call patient with results and send result letter to home address.    Lissa:     I am covering lab results for Dr. Berger as he is out of the office today.    Your HgbA1c has shown some mild increase since last check, but still within acceptable range of <8% for you. Please continue to work on reduction of carbohydrates and sweets in diet. Recommend continue current medications as prescribed and follow up with Dr. Berger as scheduled.     [facility]

## 2019-02-20 ENCOUNTER — RESULTS ENCOUNTER (OUTPATIENT)
Dept: INTERNAL MEDICINE | Age: 79
End: 2019-02-20

## 2019-02-20 DIAGNOSIS — E11.65 TYPE 2 DIABETES MELLITUS WITH HYPERGLYCEMIA, WITHOUT LONG-TERM CURRENT USE OF INSULIN (HCC): ICD-10-CM

## 2019-03-04 ENCOUNTER — TELEPHONE (OUTPATIENT)
Dept: INTERNAL MEDICINE | Age: 79
End: 2019-03-04

## 2019-03-04 NOTE — TELEPHONE ENCOUNTER
Her Paxil might do it but unfortunately most all other medications of a similar nature that are being taken for similar reasons would do the same thing.

## 2019-03-04 NOTE — TELEPHONE ENCOUNTER
Patient is having trouble with dry mouth, she wants to know if she is taking anything that would cause her to have dry mouth this bad.  Please advise.  Patient can be reached at [phone]

## 2019-03-18 ENCOUNTER — TELEPHONE (OUTPATIENT)
Dept: INTERNAL MEDICINE | Age: 79
End: 2019-03-18

## 2019-03-18 DIAGNOSIS — R53.83 FATIGUE, UNSPECIFIED TYPE: ICD-10-CM

## 2019-03-18 RX ORDER — PAROXETINE 10 MG/1
10 TABLET, FILM COATED ORAL NIGHTLY
Qty: 90 TABLET | Refills: 1 | Status: SHIPPED | OUTPATIENT
Start: 2019-03-18 | End: 2019-06-19 | Stop reason: ALTCHOICE

## 2019-03-18 NOTE — TELEPHONE ENCOUNTER
Patient needs her Paroxetine refilled.  Patient says pharmacy has been trying to send it to us with no reply.  Please refill with [facility] in [place].  Please advise. Patient can be reached at [phone]

## 2019-04-08 ENCOUNTER — OFFICE VISIT (OUTPATIENT)
Dept: INTERNAL MEDICINE | Age: 79
End: 2019-04-08

## 2019-04-08 VITALS
OXYGEN SATURATION: 99 % | SYSTOLIC BLOOD PRESSURE: 114 MMHG | HEIGHT: 67 IN | HEART RATE: 70 BPM | WEIGHT: 149.2 LBS | BODY MASS INDEX: 23.42 KG/M2 | TEMPERATURE: 97.5 F | RESPIRATION RATE: 13 BRPM | DIASTOLIC BLOOD PRESSURE: 58 MMHG

## 2019-04-08 DIAGNOSIS — F41.8 MIXED ANXIETY DEPRESSIVE DISORDER: Primary | ICD-10-CM

## 2019-04-08 PROCEDURE — 99213 OFFICE O/P EST LOW 20 MIN: CPT | Performed by: INTERNAL MEDICINE

## 2019-04-08 RX ORDER — TRAVOPROST 0.004 %
1 DROPS OPHTHALMIC (EYE) NIGHTLY
COMMUNITY
Start: 2019-02-22 | End: 2021-05-19

## 2019-04-08 RX ORDER — METHOTREXATE 25 MG/ML
1 INJECTION, SOLUTION INTRA-ARTERIAL; INTRAMUSCULAR; INTRAVENOUS WEEKLY
COMMUNITY
Start: 2019-04-01 | End: 2019-04-08 | Stop reason: SDUPTHER

## 2019-04-08 NOTE — PROGRESS NOTES
"  Lissa Ortiz is a 78 y.o. female who presents with   Chief Complaint   Patient presents with   • Wants to discontinue Paxil     \"Makes me too drowsy\".   .    78-year-old female presents with a request that she would like to discontinue her Paxil.  She says that Dr. Ramey had her on it recently because of the death of her  which happened about 2 years ago.  She says that she does not feel she has been taking Paxil any longer than about 6 months but even though she cut the dose to 10 mg nightly it is still making her groggy and sleepy.  She says she sleeps 12 hours a night and when she gets up in the morning she still feels like she could sleep more.  She also says at this point she does not feel as if she needs any more antidepressants/antianxiety treatment and she feels like she is \"doing okay\".         The following portions of the patient's history were reviewed and updated as appropriate: allergies, current medications and problem list.    Review of Systems   Constitutional: Negative.    Respiratory: Negative.    Cardiovascular: Negative.    Neurological: Negative.    Psychiatric/Behavioral: Negative.        Objective   Physical Exam   Constitutional: She is oriented to person, place, and time. She appears well-developed and well-nourished.   HENT:   Head: Normocephalic and atraumatic.   Eyes: EOM are normal. Pupils are equal, round, and reactive to light.   Cardiovascular: Normal rate.   Pulmonary/Chest: Effort normal.   Neurological: She is alert and oriented to person, place, and time.   Psychiatric: She has a normal mood and affect. Her behavior is normal. Judgment and thought content normal.   Nursing note and vitals reviewed.      Assessment/Plan   Lissa was seen today for wants to discontinue paxil.    Diagnoses and all orders for this visit:    Mixed anxiety depressive disorder      Plan: We will taper her off of Paxil 10 mg nightly by starting 1 every other night for a week and then the second week " have her take 1 every third night for that week and then after that discontinue the medication.    We will continue to plan on seeing her in June for a checkup/lab update visit as planned

## 2019-04-10 DIAGNOSIS — E78.5 HYPERLIPIDEMIA: ICD-10-CM

## 2019-04-10 RX ORDER — PRAVASTATIN SODIUM 20 MG
20 TABLET ORAL DAILY
Qty: 90 TABLET | Refills: 0 | Status: SHIPPED | OUTPATIENT
Start: 2019-04-10 | End: 2019-04-10 | Stop reason: SDUPTHER

## 2019-04-10 RX ORDER — PRAVASTATIN SODIUM 20 MG
20 TABLET ORAL DAILY
Qty: 90 TABLET | Refills: 0 | Status: SHIPPED | OUTPATIENT
Start: 2019-04-10 | End: 2019-10-07 | Stop reason: SDUPTHER

## 2019-04-15 DIAGNOSIS — E11.9 TYPE 2 DIABETES MELLITUS WITHOUT COMPLICATION, WITHOUT LONG-TERM CURRENT USE OF INSULIN (HCC): ICD-10-CM

## 2019-04-19 DIAGNOSIS — E11.9 DIABETES TYPE 2, CONTROLLED (HCC): ICD-10-CM

## 2019-04-19 RX ORDER — GLIMEPIRIDE 4 MG/1
4 TABLET ORAL 2 TIMES DAILY
Qty: 180 TABLET | Refills: 1 | Status: SHIPPED | OUTPATIENT
Start: 2019-04-19 | End: 2019-07-18

## 2019-04-23 DIAGNOSIS — I10 ESSENTIAL HYPERTENSION: ICD-10-CM

## 2019-04-23 RX ORDER — AMLODIPINE BESYLATE 5 MG/1
5 TABLET ORAL DAILY
Qty: 90 TABLET | Refills: 0 | Status: SHIPPED | OUTPATIENT
Start: 2019-04-23 | End: 2019-07-22 | Stop reason: SDUPTHER

## 2019-06-19 ENCOUNTER — OFFICE VISIT (OUTPATIENT)
Dept: INTERNAL MEDICINE | Age: 79
End: 2019-06-19

## 2019-06-19 VITALS
DIASTOLIC BLOOD PRESSURE: 68 MMHG | WEIGHT: 142.4 LBS | OXYGEN SATURATION: 99 % | HEART RATE: 72 BPM | RESPIRATION RATE: 13 BRPM | HEIGHT: 67 IN | BODY MASS INDEX: 22.35 KG/M2 | TEMPERATURE: 97.5 F | SYSTOLIC BLOOD PRESSURE: 128 MMHG

## 2019-06-19 DIAGNOSIS — E78.5 HYPERLIPIDEMIA, UNSPECIFIED HYPERLIPIDEMIA TYPE: ICD-10-CM

## 2019-06-19 DIAGNOSIS — Z00.00 MEDICARE ANNUAL WELLNESS VISIT, SUBSEQUENT: Primary | ICD-10-CM

## 2019-06-19 DIAGNOSIS — E11.65 TYPE 2 DIABETES MELLITUS WITH HYPERGLYCEMIA, WITHOUT LONG-TERM CURRENT USE OF INSULIN (HCC): ICD-10-CM

## 2019-06-19 DIAGNOSIS — I10 BENIGN ESSENTIAL HYPERTENSION: ICD-10-CM

## 2019-06-19 DIAGNOSIS — E55.9 VITAMIN D DEFICIENCY: ICD-10-CM

## 2019-06-19 DIAGNOSIS — R53.83 OTHER FATIGUE: ICD-10-CM

## 2019-06-19 DIAGNOSIS — Z23 NEED FOR HEPATITIS A IMMUNIZATION: ICD-10-CM

## 2019-06-19 PROBLEM — M33.20 POLYMYOSITIS: Status: ACTIVE | Noted: 2019-06-19

## 2019-06-19 LAB
25(OH)D3+25(OH)D2 SERPL-MCNC: 56.2 NG/ML (ref 30–100)
ALBUMIN SERPL-MCNC: 4.7 G/DL (ref 3.5–5.2)
ALBUMIN/GLOB SERPL: 2.4 G/DL
ALP SERPL-CCNC: 85 U/L (ref 39–117)
ALT SERPL-CCNC: 25 U/L (ref 1–33)
AST SERPL-CCNC: 25 U/L (ref 1–32)
BASOPHILS # BLD AUTO: 0.05 10*3/MM3 (ref 0–0.2)
BASOPHILS NFR BLD AUTO: 0.9 % (ref 0–1.5)
BILIRUB SERPL-MCNC: 0.5 MG/DL (ref 0.2–1.2)
BUN SERPL-MCNC: 18 MG/DL (ref 8–23)
BUN/CREAT SERPL: 31 (ref 7–25)
CALCIUM SERPL-MCNC: 9.1 MG/DL (ref 8.6–10.5)
CHLORIDE SERPL-SCNC: 103 MMOL/L (ref 98–107)
CHOLEST SERPL-MCNC: 167 MG/DL (ref 0–200)
CO2 SERPL-SCNC: 28.3 MMOL/L (ref 22–29)
CREAT SERPL-MCNC: 0.58 MG/DL (ref 0.57–1)
EOSINOPHIL # BLD AUTO: 0.23 10*3/MM3 (ref 0–0.4)
EOSINOPHIL NFR BLD AUTO: 4.1 % (ref 0.3–6.2)
ERYTHROCYTE [DISTWIDTH] IN BLOOD BY AUTOMATED COUNT: 14.4 % (ref 12.3–15.4)
GLOBULIN SER CALC-MCNC: 2 GM/DL
GLUCOSE SERPL-MCNC: 162 MG/DL (ref 65–99)
HBA1C MFR BLD: 7.3 % (ref 4.8–5.6)
HCT VFR BLD AUTO: 40.1 % (ref 34–46.6)
HDLC SERPL-MCNC: 65 MG/DL (ref 40–60)
HGB BLD-MCNC: 12.6 G/DL (ref 12–15.9)
IMM GRANULOCYTES # BLD AUTO: 0.01 10*3/MM3 (ref 0–0.05)
IMM GRANULOCYTES NFR BLD AUTO: 0.2 % (ref 0–0.5)
LDLC SERPL CALC-MCNC: 89 MG/DL (ref 0–100)
LYMPHOCYTES # BLD AUTO: 1.38 10*3/MM3 (ref 0.7–3.1)
LYMPHOCYTES NFR BLD AUTO: 24.8 % (ref 19.6–45.3)
MCH RBC QN AUTO: 30.7 PG (ref 26.6–33)
MCHC RBC AUTO-ENTMCNC: 31.4 G/DL (ref 31.5–35.7)
MCV RBC AUTO: 97.6 FL (ref 79–97)
MONOCYTES # BLD AUTO: 0.57 10*3/MM3 (ref 0.1–0.9)
MONOCYTES NFR BLD AUTO: 10.3 % (ref 5–12)
NEUTROPHILS # BLD AUTO: 3.32 10*3/MM3 (ref 1.7–7)
NEUTROPHILS NFR BLD AUTO: 59.7 % (ref 42.7–76)
NRBC BLD AUTO-RTO: 0 /100 WBC (ref 0–0.2)
PLATELET # BLD AUTO: 334 10*3/MM3 (ref 140–450)
POTASSIUM SERPL-SCNC: 5.1 MMOL/L (ref 3.5–5.2)
PROT SERPL-MCNC: 6.7 G/DL (ref 6–8.5)
RBC # BLD AUTO: 4.11 10*6/MM3 (ref 3.77–5.28)
SODIUM SERPL-SCNC: 141 MMOL/L (ref 136–145)
T4 FREE SERPL-MCNC: 1.23 NG/DL (ref 0.93–1.7)
TRIGL SERPL-MCNC: 65 MG/DL (ref 0–150)
TSH SERPL DL<=0.005 MIU/L-ACNC: 2.88 MIU/ML (ref 0.27–4.2)
VIT B12 SERPL-MCNC: 1830 PG/ML (ref 211–946)
VLDLC SERPL CALC-MCNC: 13 MG/DL
WBC # BLD AUTO: 5.56 10*3/MM3 (ref 3.4–10.8)

## 2019-06-19 PROCEDURE — 99214 OFFICE O/P EST MOD 30 MIN: CPT | Performed by: INTERNAL MEDICINE

## 2019-06-19 PROCEDURE — G0439 PPPS, SUBSEQ VISIT: HCPCS | Performed by: INTERNAL MEDICINE

## 2019-06-19 PROCEDURE — 90632 HEPA VACCINE ADULT IM: CPT | Performed by: INTERNAL MEDICINE

## 2019-06-19 PROCEDURE — 90471 IMMUNIZATION ADMIN: CPT | Performed by: INTERNAL MEDICINE

## 2019-06-19 RX ORDER — METHOTREXATE 25 MG/ML
7 INJECTION, SOLUTION INTRA-ARTERIAL; INTRAMUSCULAR; INTRAVENOUS WEEKLY
COMMUNITY
Start: 2019-06-13 | End: 2021-11-23

## 2019-06-19 NOTE — PROGRESS NOTES
"  Lissa Ortiz is a 78 y.o. female who presents with   Chief Complaint   Patient presents with   • Medicare Wellness-subsequent     Annual visit   • Hypertension     Amlodipine 5 mg daily   • Hyperlipidemia     Pravastatin 20 mg daily   • Diabetes     Morning blood sugars around 150; evening blood sugars around 100 on triple oral therapy as noted.  Patient concerned about her blood sugars and is wanting her blood sugar in the range between 60 and 100 consistently.   • Fatigue     Chronic.  History of \"inclusion body myositis\".  Treatment and care for this issue by rheumatology-Dr. Chow.   • Vitamin D Deficiency     Vitamin D 3 supplement-1000 units every other day   .    78-year-old female presents for her subsequent wellness visit.  In addition her physical issues of hypertension, hyperlipidemia, diabetes, chronic fatigue, inclusion body myositis and vitamin D deficiency were all dealt with individually and separately.  She also takes B12 and feels that it was prescribed for her by Dr. [place] who she was seeing when she lived out of [facility].  She says she thinks she started taking it mainly because of fatigue but in retrospect her fatigue could have been the inclusion body myositis that was starting up then.  She also is concerned about her diabetes and seems to feel that her blood sugar should be between 60 and 100 consistently.  She does not seem to grasp the concept of blood sugar variability from morning to evening, relationships of food and food types to blood sugar levels etc.  She seemed in her discussions that she may want to see an endocrinologist.  Total amount of time spent with this patient dealing with her physical issues as noted over and above the amount of time spent on the wellness visit- 30 minutes.  Better than 50% of the office visit time was spent in direct face-to-face consultation with the patient regarding these issues.      Hypertension   This is a chronic problem. The current episode " started more than 1 year ago. The problem is unchanged. The problem is controlled. Current antihypertension treatment includes calcium channel blockers. The current treatment provides moderate improvement. There are no compliance problems.    Hyperlipidemia   This is a chronic problem. The current episode started more than 1 year ago. The problem is controlled. Recent lipid tests were reviewed and are normal. Current antihyperlipidemic treatment includes statins. The current treatment provides moderate improvement of lipids. There are no compliance problems.    Diabetes   She presents for her follow-up diabetic visit. She has type 2 diabetes mellitus. Her disease course has been stable. There are no hypoglycemic associated symptoms. Associated symptoms include fatigue. There are no hypoglycemic complications. Symptoms are stable. There are no diabetic complications. Current diabetic treatment includes oral agent (triple therapy). She is compliant with treatment most of the time. Her weight is stable. There is no change in her home blood glucose trend. Her dinner blood glucose range is generally  mg/dl. (Consistently around 150)   Fatigue   The current episode started more than 1 year ago. Associated symptoms include fatigue. Treatments tried: Currently on no treatment for fatigue specifically but is taking methotrexate for inclusion body myositis which could be a contributing factor to her chronic fatigue.        The following portions of the patient's history were reviewed and updated as appropriate: allergies, current medications, past medical history and problem list.    Review of Systems   Constitutional: Positive for fatigue.   HENT: Negative.    Eyes: Negative.    Respiratory: Negative.    Cardiovascular: Negative.    Genitourinary: Negative.    Musculoskeletal: Negative.    Skin: Negative.    Neurological: Negative.    Psychiatric/Behavioral: Negative.        Objective   Physical Exam   Constitutional:  She is oriented to person, place, and time. She appears well-developed and well-nourished. No distress.   HENT:   Head: Normocephalic and atraumatic.   Eyes: Conjunctivae and EOM are normal. Pupils are equal, round, and reactive to light.   Neck: Normal range of motion. Neck supple. No thyromegaly present.   Neck exam negative.  Carotid auscultation normal-no bruits heard.   Cardiovascular: Normal rate, regular rhythm, normal heart sounds and intact distal pulses. Exam reveals no gallop and no friction rub.   No murmur heard.  Pulmonary/Chest: Effort normal and breath sounds normal. No respiratory distress. She has no wheezes. She has no rales. She exhibits no tenderness.   Neurological: She is alert and oriented to person, place, and time.   Psychiatric: She has a normal mood and affect. Her behavior is normal. Judgment and thought content normal.   Nursing note and vitals reviewed.      Assessment/Plan   Lissa was seen today for medicare wellness-subsequent, hypertension, hyperlipidemia, diabetes, fatigue and vitamin d deficiency.    Diagnoses and all orders for this visit:    Medicare annual wellness visit, subsequent    Benign essential hypertension  -     CBC & Differential  -     Comprehensive Metabolic Panel  -     TSH+Free T4    Hyperlipidemia, unspecified hyperlipidemia type  -     Comprehensive Metabolic Panel  -     Lipid Panel  -     TSH+Free T4    Type 2 diabetes mellitus with hyperglycemia, without long-term current use of insulin (CMS/[facility])  -     Comprehensive Metabolic Panel  -     Hemoglobin A1c  -     TSH+Free T4    Vitamin D deficiency  -     Vitamin D 25 Hydroxy    Other fatigue  -     Vitamin B12      Plan: Labs as above for the physical issues as outlined.  Tentatively we will continue all of her treatment as prescribed.  I tried to explain to her that a blood sugar range between 100-150 over a 24-hour span is not really terribly bad but she seemed to want her blood sugar to be better and  consistently between 60 and 100.  She may want to see an endocrinologist.    We will continue all other treatment as prescribed and we will see her at 6-month intervals for a checkup and an alternating a checkup with a wellness/lab update visit.

## 2019-06-19 NOTE — PROGRESS NOTES
"QUICK REFERENCE INFORMATION:  The ABCs of the Annual Wellness Visit    Subsequent Medicare Wellness Visit     HEALTH RISK ASSESSMENT    : 1940    Recent Hospitalizations:  No hospitalization(s) within the last year..  ccc      Current Medical Providers:  Patient Care Team:  Figueroa Berger MD as PCP - General (Internal Medicine)  Marvin Ramey MD (Inactive) as PCP - Family Medicine  Marvin Ramey MD (Inactive) as PCP - Claims Attributed    Gastroenterology: Dr. Smith--colonoscopy-2018  Rheumatology: Dr Chow  Pulmonary: Dr. Mac  Dermatology: Dr. Espinal  Ophthalmology: Dr. Wei  Gynecology-\"total woman\"- Pap smears/mammography    Smoking Status:  Social History     Tobacco Use   Smoking Status Never Smoker   Smokeless Tobacco Never Used       Alcohol Consumption:  Social History     Substance and Sexual Activity   Alcohol Use No    Comment: Occasional glass of wine one per month       Depression Screen:   PHQ-2/PHQ-9 Depression Screening 2019   Little interest or pleasure in doing things 0   Feeling down, depressed, or hopeless 0   Trouble falling or staying asleep, or sleeping too much -   Feeling tired or having little energy -   Poor appetite or overeating -   Feeling bad about yourself - or that you are a failure or have let yourself or your family down -   Trouble concentrating on things, such as reading the newspaper or watching television -   Moving or speaking so slowly that other people could have noticed. Or the opposite - being so fidgety or restless that you have been moving around a lot more than usual -   Thoughts that you would be better off dead, or of hurting yourself in some way -   Total Score 0   If you checked off any problems, how difficult have these problems made it for you to do your work, take care of things at home, or get along with other people? -       Health Habits and Functional and Cognitive Screening:  Functional & Cognitive Status 2019   Do you have " difficulty preparing food and eating? No   Do you have difficulty bathing yourself, getting dressed or grooming yourself? Yes   Do you have difficulty using the toilet? No   Do you have difficulty moving around from place to place? No   Do you have trouble with steps or getting out of a bed or a chair? Yes   In the past year have you fallen or experienced a near fall? Yes   Current Diet -   Dental Exam -   Eye Exam -   Exercise (times per week) -   Current Exercise Activities Include -   Do you need help using the phone?  No   Are you deaf or do you have serious difficulty hearing?  No   Do you need help with transportation? No   Do you need help shopping? No   Do you need help preparing meals?  No   Do you need help with housework?  No   Do you need help with laundry? No   Do you need help taking your medications? No   Do you need help managing money? No   Do you ever drive or ride in a car without wearing a seat belt? No   Have you felt unusual stress, anger or loneliness in the last month? -   Who do you live with? -   If you need help, do you have trouble finding someone available to you? -   Have you been bothered in the last four weeks by sexual problems? -   Do you have difficulty concentrating, remembering or making decisions? -           Does the patient have evidence of cognitive impairment? No    Asiprin use counseling: Does not need ASA (and currently is not on it)      Recent Lab Results:    Lab Results   Component Value Date     (H) 02/14/2019     Lab Results   Component Value Date    HGBA1C 7.8 (H) 02/14/2019     Lab Results   Component Value Date    TRIG 108 07/10/2018    HDL 65 (H) 07/10/2018    VLDL 21.6 07/10/2018           Age-appropriate Screening Schedule:  Refer to the list below for future screening recommendations based on patient's age, sex and/or medical conditions. Orders for these recommended tests are listed in the plan section. The patient has been provided with a written  plan.    Health Maintenance   Topic Date Due   • TDAP/TD VACCINES (1 - Tdap) 03/14/2018   • LIPID PANEL  07/10/2019   • INFLUENZA VACCINE  08/01/2019   • HEMOGLOBIN A1C  08/14/2019   • URINE MICROALBUMIN  08/21/2019   • PAP SMEAR  05/30/2020   • DXA SCAN  05/30/2020   • MAMMOGRAM  06/13/2020   • COLONOSCOPY  02/13/2023   • PNEUMOCOCCAL VACCINES (65+ LOW/MEDIUM RISK)  Completed   • ZOSTER VACCINE  Addressed        Subjective   History of Present Illness    Lissa Ortiz is a 78 y.o. female who presents for an Annual Wellness Visit.    The following portions of the patient's history were reviewed and updated as appropriate: allergies, current medications, past family history, past medical history, past social history, past surgical history and problem list.    Outpatient Medications Prior to Visit   Medication Sig Dispense Refill   • amLODIPine (NORVASC) 5 MG tablet Take 1 tablet by mouth Daily. 90 tablet 0   • azelastine (ASTELIN) 0.1 % nasal spray SPRAY TWO SPRAYS IN EACH NOSTRIL TWICE DAILY AS DIRECTED 2 each 5   • cholecalciferol (VITAMIN D3) 1000 units tablet Take 1,000 Units by mouth Every Other Day.     • dorzolamide-timolol (COSOPT) 22.3-6.8 MG/ML ophthalmic solution      • folic acid (FOLVITE) 1 MG tablet Take 1 tablet by mouth daily.     • glimepiride (AMARYL) 4 MG tablet Take 1 tablet by mouth 2 (Two) Times a Day. 180 tablet 1   • metFORMIN ER (GLUCOPHAGE-XR) 500 MG 24 hr tablet Take 2 tablets by mouth 2 (Two) Times a Day. 1000 mg in the morning, 1000  mg in the evening with dinner. 360 tablet 1   • Methotrexate Sodium 50 MG/2ML injection Inject 7 mg into the appropriate muscle as directed by prescriber 1 (One) Time Per Week.     • Multiple Vitamin (MULTI VITAMIN PO) Take 1 tablet by mouth Daily.     • Omega 3 1000 MG capsule Take 1 capsule by mouth.     • pravastatin (PRAVACHOL) 20 MG tablet Take 1 tablet by mouth Daily. 90 tablet 0   • Probiotic capsule Take 1 capsule by mouth.     • SITagliptin (JANUVIA)  100 MG tablet Take 1 tablet by mouth Daily. 90 tablet 1   • TRAVATAN Z 0.004 % solution ophthalmic solution Administer 1 drop to both eyes Every Night.     • TURMERIC CURCUMIN PO Take 1 tablet by mouth.     • vitamin B-12 (CYANOCOBALAMIN) 100 MCG tablet Take 1 tablet by mouth Every Other Day.     • PARoxetine (PAXIL) 10 MG tablet Take 1 tablet by mouth Every Night. 90 tablet 1   • rOPINIRole (REQUIP) 0.25 MG tablet Take 2 tablets by mouth Every Night. Take 1 hour before bedtime. 60 tablet 1     No facility-administered medications prior to visit.        Patient Active Problem List   Diagnosis   • Benign essential hypertension   • Mixed anxiety depressive disorder   • Type 2 diabetes mellitus with hyperglycemia, without long-term current use of insulin (CMS/[facility])   • Glaucoma   • Hyperlipidemia   • Insomnia   • Restless legs syndrome   • Rheumatoid arthritis (CMS/[facility])   • Vitamin D deficiency   • Routine health maintenance   • Midline low back pain without sciatica   • Encounter for immunization   • Neck pain on right side   • Microalbuminuria   • Encounter for screening colonoscopy   • Tubular adenoma of colon   • Polymyositis (CMS/[facility])       Advance Care Planning:  Patient has an advance directive - a copy has been provided and is visible in patient header    Identification of Risk Factors:  Risk factors include: weight , unhealthy diet and inactivity.    Review of Systems  HEENT negative  Cardiovascular: Negative  Pulmonary: Negative  Gastroenterology: Negative  Musculoskeletal: Chronic fatigue.  History of inclusion body myositis as noted    Compared to one year ago, the patient feels her physical health is the same.  Compared to one year ago, the patient feels her mental health is the same.    Objective     Physical Exam   HEENT grossly negative  Neck: No bruits heard  Lungs: Clear to auscultation  Heart: No murmurs or arrhythmias heard  Abdomen no palpable masses    Vitals:    06/19/19 0837 06/19/19 0926   BP:   "128/68   Pulse: 64 72   Resp:  13   Temp: 97.5 °F (36.4 °C)    SpO2: 99%    Weight: 64.6 kg (142 lb 6.4 oz)    Height: 170.2 cm (67.01\")    PainSc: 0-No pain        Patient's Body mass index is 22.3 kg/m². BMI is within normal parameters. No follow-up required..      Assessment/Plan   Patient Self-Management and Personalized Health Advice  The patient has been provided with information about: diet, exercise, weight management and prevention of cardiac or vascular disease and preventive services including:   · Bone densitometry screening, Influenza vaccine.    Visit Diagnoses:    ICD-10-CM ICD-9-CM   1. Medicare annual wellness visit, subsequent Z00.00 V70.0   2. Benign essential hypertension I10 401.1   3. Hyperlipidemia, unspecified hyperlipidemia type E78.5 272.4   4. Type 2 diabetes mellitus with hyperglycemia, without long-term current use of insulin (CMS/[facility]) E11.65 250.00     790.29   5. Vitamin D deficiency E55.9 268.9   6. Other fatigue R53.83 780.79       Orders Placed This Encounter   Procedures   • Comprehensive Metabolic Panel   • Hemoglobin A1c   • Lipid Panel   • TSH+Free T4   • Vitamin B12   • Vitamin D 25 Hydroxy   • CBC & Differential     Order Specific Question:   Manual Differential     Answer:   No       Outpatient Encounter Medications as of 6/19/2019   Medication Sig Dispense Refill   • amLODIPine (NORVASC) 5 MG tablet Take 1 tablet by mouth Daily. 90 tablet 0   • azelastine (ASTELIN) 0.1 % nasal spray SPRAY TWO SPRAYS IN EACH NOSTRIL TWICE DAILY AS DIRECTED 2 each 5   • cholecalciferol (VITAMIN D3) 1000 units tablet Take 1,000 Units by mouth Every Other Day.     • dorzolamide-timolol (COSOPT) 22.3-6.8 MG/ML ophthalmic solution      • folic acid (FOLVITE) 1 MG tablet Take 1 tablet by mouth daily.     • glimepiride (AMARYL) 4 MG tablet Take 1 tablet by mouth 2 (Two) Times a Day. 180 tablet 1   • metFORMIN ER (GLUCOPHAGE-XR) 500 MG 24 hr tablet Take 2 tablets by mouth 2 (Two) Times a Day. 1000 mg " in the morning, 1000  mg in the evening with dinner. 360 tablet 1   • Methotrexate Sodium 50 MG/2ML injection Inject 7 mg into the appropriate muscle as directed by prescriber 1 (One) Time Per Week.     • Multiple Vitamin (MULTI VITAMIN PO) Take 1 tablet by mouth Daily.     • Omega 3 1000 MG capsule Take 1 capsule by mouth.     • pravastatin (PRAVACHOL) 20 MG tablet Take 1 tablet by mouth Daily. 90 tablet 0   • Probiotic capsule Take 1 capsule by mouth.     • SITagliptin (JANUVIA) 100 MG tablet Take 1 tablet by mouth Daily. 90 tablet 1   • TRAVATAN Z 0.004 % solution ophthalmic solution Administer 1 drop to both eyes Every Night.     • TURMERIC CURCUMIN PO Take 1 tablet by mouth.     • vitamin B-12 (CYANOCOBALAMIN) 100 MCG tablet Take 1 tablet by mouth Every Other Day.     • [DISCONTINUED] PARoxetine (PAXIL) 10 MG tablet Take 1 tablet by mouth Every Night. 90 tablet 1   • [DISCONTINUED] rOPINIRole (REQUIP) 0.25 MG tablet Take 2 tablets by mouth Every Night. Take 1 hour before bedtime. 60 tablet 1     No facility-administered encounter medications on file as of 6/19/2019.        Reviewed use of high risk medication in the elderly: yes  Reviewed for potential of harmful drug interactions in the elderly: yes    Follow Up:  No Follow-up on file.   6-month checkup.  Labs update as needed    Annual wellness visit every June with lab updates.  An After Visit Summary and PPPS with all of these plans were given to the patient.

## 2019-06-20 DIAGNOSIS — E11.8 TYPE 2 DIABETES MELLITUS WITH COMPLICATION, WITHOUT LONG-TERM CURRENT USE OF INSULIN (HCC): Primary | ICD-10-CM

## 2019-07-18 ENCOUNTER — OFFICE VISIT (OUTPATIENT)
Dept: ENDOCRINOLOGY | Age: 79
End: 2019-07-18

## 2019-07-18 VITALS
WEIGHT: 145.6 LBS | RESPIRATION RATE: 16 BRPM | SYSTOLIC BLOOD PRESSURE: 128 MMHG | BODY MASS INDEX: 22.85 KG/M2 | DIASTOLIC BLOOD PRESSURE: 72 MMHG | HEIGHT: 67 IN

## 2019-07-18 DIAGNOSIS — E11.65 TYPE 2 DIABETES MELLITUS WITH HYPERGLYCEMIA, WITHOUT LONG-TERM CURRENT USE OF INSULIN (HCC): Primary | ICD-10-CM

## 2019-07-18 DIAGNOSIS — E78.2 MIXED HYPERLIPIDEMIA: ICD-10-CM

## 2019-07-18 DIAGNOSIS — E55.9 VITAMIN D DEFICIENCY: ICD-10-CM

## 2019-07-18 DIAGNOSIS — I10 BENIGN ESSENTIAL HYPERTENSION: ICD-10-CM

## 2019-07-18 PROCEDURE — 99204 OFFICE O/P NEW MOD 45 MIN: CPT | Performed by: INTERNAL MEDICINE

## 2019-07-18 RX ORDER — SITAGLIPTIN AND METFORMIN HYDROCHLORIDE 1000; 50 MG/1; MG/1
1 TABLET, FILM COATED ORAL 2 TIMES DAILY WITH MEALS
Qty: 60 TABLET | Refills: 11 | Status: SHIPPED | OUTPATIENT
Start: 2019-07-18 | End: 2020-06-03

## 2019-07-18 RX ORDER — EMPAGLIFLOZIN 25 MG/1
1 TABLET, FILM COATED ORAL
Qty: 30 TABLET | Refills: 11 | Status: SHIPPED | OUTPATIENT
Start: 2019-07-18 | End: 2020-11-19

## 2019-07-18 NOTE — PROGRESS NOTES
"Subjective   Lissa Ortiz is a 78 y.o. female seen as a new patient for DM2. He is checking BG once a day in the AM. She states that this AM her BG was 110.     History of Present Illness this is a 78-year-old female who has been referred for further evaluation and treatment of type 2 diabetes.  She says she developed type 2 diabetes at age 41 and therefore she has been suffering from it for 27 years.  For the most part of this 37 years she has been on combination of metformin and sulfonylurea was and recently around 10 years ago Januvia was added to her regimen.  She is also a known patient with hypertension and dyslipidemia as well as vitamin D deficiency.  She said she also gets a follow-up on her bone density at her gynecologist office.  Her family history is positive for type 2 diabetes and heart disease.    /72   Resp 16   Ht 170.2 cm (67.01\")   Wt 66 kg (145 lb 9.6 oz)   BMI 22.80 kg/m²      Allergies   Allergen Reactions   • Trimethoprim Nausea Only   • Bactrim [Sulfamethoxazole-Trimethoprim] Nausea And Vomiting   • Fluticasone Furoate Unknown (See Comments)   • Vilanterol Unknown (See Comments)       Current Outpatient Medications:   •  amLODIPine (NORVASC) 5 MG tablet, Take 1 tablet by mouth Daily., Disp: 90 tablet, Rfl: 0  •  azelastine (ASTELIN) 0.1 % nasal spray, SPRAY TWO SPRAYS IN EACH NOSTRIL TWICE DAILY AS DIRECTED, Disp: 2 each, Rfl: 5  •  cholecalciferol (VITAMIN D3) 1000 units tablet, Take 1,000 Units by mouth Every Other Day., Disp: , Rfl:   •  dorzolamide-timolol (COSOPT) 22.3-6.8 MG/ML ophthalmic solution, , Disp: , Rfl:   •  folic acid (FOLVITE) 1 MG tablet, Take 1 tablet by mouth daily., Disp: , Rfl:   •  glimepiride (AMARYL) 4 MG tablet, Take 1 tablet by mouth 2 (Two) Times a Day., Disp: 180 tablet, Rfl: 1  •  metFORMIN ER (GLUCOPHAGE-XR) 500 MG 24 hr tablet, Take 2 tablets by mouth 2 (Two) Times a Day. 1000 mg in the morning, 1000  mg in the evening with dinner., Disp: 360 tablet, " Rfl: 1  •  Methotrexate Sodium 50 MG/2ML injection, Inject 7 mg into the appropriate muscle as directed by prescriber 1 (One) Time Per Week., Disp: , Rfl:   •  Multiple Vitamin (MULTI VITAMIN PO), Take 1 tablet by mouth Daily., Disp: , Rfl:   •  Omega 3 1000 MG capsule, Take 1 capsule by mouth., Disp: , Rfl:   •  pravastatin (PRAVACHOL) 20 MG tablet, Take 1 tablet by mouth Daily., Disp: 90 tablet, Rfl: 0  •  Probiotic capsule, Take 1 capsule by mouth., Disp: , Rfl:   •  SITagliptin (JANUVIA) 100 MG tablet, Take 1 tablet by mouth Daily., Disp: 90 tablet, Rfl: 1  •  TRAVATAN Z 0.004 % solution ophthalmic solution, Administer 1 drop to both eyes Every Night., Disp: , Rfl:   •  TURMERIC CURCUMIN PO, Take 1 tablet by mouth., Disp: , Rfl:   •  vitamin B-12 (CYANOCOBALAMIN) 100 MCG tablet, Take 1 tablet by mouth Every Other Day., Disp: , Rfl:       The following portions of the patient's history were reviewed and updated as appropriate: allergies, current medications, past family history, past medical history, past social history, past surgical history and problem list.    Review of Systems   Constitutional: Negative.    HENT: Negative.    Eyes: Negative.    Respiratory: Negative.    Cardiovascular: Negative.    Gastrointestinal: Negative.    Endocrine: Negative.    Genitourinary: Negative.    Musculoskeletal: Negative.    Skin: Negative.    Allergic/Immunologic: Negative.    Neurological: Negative.    Hematological: Negative.    Psychiatric/Behavioral: Negative.        Objective   Physical Exam   Constitutional: She is oriented to person, place, and time. She appears well-developed and well-nourished. No distress.   HENT:   Head: Normocephalic and atraumatic.   Right Ear: External ear normal.   Left Ear: External ear normal.   Nose: Nose normal.   Mouth/Throat: Oropharynx is clear and moist. No oropharyngeal exudate.   Eyes: Conjunctivae and EOM are normal. Pupils are equal, round, and reactive to light. Right eye  exhibits no discharge. Left eye exhibits no discharge. No scleral icterus.   Neck: Normal range of motion. Neck supple. No JVD present. No tracheal deviation present. No thyromegaly present.   Cardiovascular: Normal rate, regular rhythm, normal heart sounds and intact distal pulses. Exam reveals no gallop and no friction rub.   No murmur heard.  Pulmonary/Chest: Effort normal and breath sounds normal. No stridor. No respiratory distress. She has no wheezes. She has no rales. She exhibits no tenderness.   Abdominal: Soft. Bowel sounds are normal. She exhibits no distension and no mass. There is no tenderness. There is no rebound and no guarding. No hernia.   Musculoskeletal: Normal range of motion. She exhibits no edema, tenderness or deformity.   Lymphadenopathy:     She has no cervical adenopathy.   Neurological: She is alert and oriented to person, place, and time. She has normal reflexes. She displays normal reflexes. No cranial nerve deficit or sensory deficit. She exhibits normal muscle tone. Coordination normal.   Skin: Skin is warm and dry. No rash noted. She is not diaphoretic. No erythema. No pallor.   Psychiatric: She has a normal mood and affect. Her behavior is normal. Judgment and thought content normal.   Nursing note and vitals reviewed.        Assessment/Plan   Diagnoses and all orders for this visit:    Type 2 diabetes mellitus with hyperglycemia, without long-term current use of insulin (CMS/[facility])  -     JANUMET  MG per tablet; Take 1 tablet by mouth 2 (Two) Times a Day With Meals.  -     JARDIANCE 25 MG tablet; Take 25 mg by mouth Daily With Breakfast.  -     T4 & TSH ([facility]); Future  -     T3, Free; Future  -     T4, Free; Future  -     Uric Acid; Future  -     Vitamin D 25 Hydroxy; Future  -     Comprehensive Metabolic Panel; Future  -     C-Peptide; Future  -     Hemoglobin A1c; Future  -     Lipid Panel; Future  -     MicroAlbumin, Urine, Random - Urine, Clean Catch; Future  -      ACTH; Future  -     Cortisol; Future    Benign essential hypertension  -     JANUMET  MG per tablet; Take 1 tablet by mouth 2 (Two) Times a Day With Meals.  -     JARDIANCE 25 MG tablet; Take 25 mg by mouth Daily With Breakfast.  -     T4 & TSH ([facility]); Future  -     T3, Free; Future  -     T4, Free; Future  -     Uric Acid; Future  -     Vitamin D 25 Hydroxy; Future  -     Comprehensive Metabolic Panel; Future  -     C-Peptide; Future  -     Hemoglobin A1c; Future  -     Lipid Panel; Future  -     MicroAlbumin, Urine, Random - Urine, Clean Catch; Future  -     ACTH; Future  -     Cortisol; Future    Mixed hyperlipidemia  -     JANUMET  MG per tablet; Take 1 tablet by mouth 2 (Two) Times a Day With Meals.  -     JARDIANCE 25 MG tablet; Take 25 mg by mouth Daily With Breakfast.  -     T4 & TSH ([facility]); Future  -     T3, Free; Future  -     T4, Free; Future  -     Uric Acid; Future  -     Vitamin D 25 Hydroxy; Future  -     Comprehensive Metabolic Panel; Future  -     C-Peptide; Future  -     Hemoglobin A1c; Future  -     Lipid Panel; Future  -     MicroAlbumin, Urine, Random - Urine, Clean Catch; Future  -     ACTH; Future  -     Cortisol; Future    Vitamin D deficiency  -     JANUMET  MG per tablet; Take 1 tablet by mouth 2 (Two) Times a Day With Meals.  -     JARDIANCE 25 MG tablet; Take 25 mg by mouth Daily With Breakfast.  -     T4 & TSH ([facility]); Future  -     T3, Free; Future  -     T4, Free; Future  -     Uric Acid; Future  -     Vitamin D 25 Hydroxy; Future  -     Comprehensive Metabolic Panel; Future  -     C-Peptide; Future  -     Hemoglobin A1c; Future  -     Lipid Panel; Future  -     MicroAlbumin, Urine, Random - Urine, Clean Catch; Future  -     ACTH; Future  -     Cortisol; Future      In summary I saw and examined laboratory evaluations of August 21, 2018 as well as 12/20/2018, 2/14/2019 and 6/19/2019 and at this time we will go ahead and stop metformin and Januvia as well as  glimepiride and start her on Janumet 50/1000 mg 1 tablets twice daily and Jardiance 10 mg every morning for 4 weeks and if no problem move up to 25 mg every morning.  I warned her about the importance of maintaining a healthy hydration and also compulsive care of her genital area after the use of toilet or sexual intercourse.  She will see Ms. Emma Moore in 4 months or sooner if needed with laboratory evaluation prior to each office visit.

## 2019-07-22 DIAGNOSIS — I10 ESSENTIAL HYPERTENSION: ICD-10-CM

## 2019-07-22 RX ORDER — AMLODIPINE BESYLATE 5 MG/1
TABLET ORAL
Qty: 90 TABLET | Refills: 0 | Status: SHIPPED | OUTPATIENT
Start: 2019-07-22 | End: 2019-10-21 | Stop reason: SDUPTHER

## 2019-08-21 ENCOUNTER — OFFICE VISIT (OUTPATIENT)
Dept: INTERNAL MEDICINE | Age: 79
End: 2019-08-21

## 2019-08-21 VITALS
HEART RATE: 71 BPM | HEIGHT: 67 IN | WEIGHT: 137.4 LBS | BODY MASS INDEX: 21.56 KG/M2 | RESPIRATION RATE: 12 BRPM | SYSTOLIC BLOOD PRESSURE: 120 MMHG | OXYGEN SATURATION: 99 % | DIASTOLIC BLOOD PRESSURE: 70 MMHG | TEMPERATURE: 98.4 F

## 2019-08-21 DIAGNOSIS — K58.1 IRRITABLE BOWEL SYNDROME WITH CONSTIPATION: Primary | ICD-10-CM

## 2019-08-21 PROCEDURE — 99214 OFFICE O/P EST MOD 30 MIN: CPT | Performed by: INTERNAL MEDICINE

## 2019-08-21 NOTE — PROGRESS NOTES
"  Lissa Ortiz is a 78 y.o. female who presents with   Chief Complaint   Patient presents with   • Constipation/diarrhea     1 year; predominantly constipation.  Colonoscopy 1 year ago by Dr. Smith she says-normal   .    78-year-old female presents with a history that for the past year she has had \"problems with my bowels\".  Apparently she says she has been having irregular bowel movements and has been having alternations between diarrhea and constipation but primarily she has constipation.  She also has a lot of gas she said.  A colonoscopy about a year ago was normal she says.  She denies any fever, chills, food intolerance or blood in the stools.         The following portions of the patient's history were reviewed and updated as appropriate: allergies, current medications, past medical history and problem list.    Review of Systems   Constitutional: Negative.    HENT: Negative.    Eyes: Negative.    Respiratory: Negative.    Cardiovascular: Negative.    Gastrointestinal: Positive for abdominal distention, constipation and diarrhea. Negative for nausea.   Genitourinary: Negative.    Musculoskeletal: Negative.    Skin: Negative.    Neurological: Negative.    Psychiatric/Behavioral: Negative.        Objective   Physical Exam   Constitutional: She is oriented to person, place, and time. She appears well-developed and well-nourished. No distress.   HENT:   Head: Normocephalic and atraumatic.   Eyes: Conjunctivae and EOM are normal. Pupils are equal, round, and reactive to light.   Neck: Normal range of motion. Neck supple. No thyromegaly present.   Neck exam negative.  Carotid auscultation normal-no bruits heard.   Cardiovascular: Normal rate, regular rhythm, normal heart sounds and intact distal pulses. Exam reveals no gallop and no friction rub.   No murmur heard.  Pulmonary/Chest: Effort normal and breath sounds normal. No respiratory distress. She has no wheezes. She has no rales. She exhibits no tenderness. "   Abdominal: Soft. Bowel sounds are normal. She exhibits no distension and no mass. There is no tenderness. There is no rebound and no guarding. No hernia.   Neurological: She is alert and oriented to person, place, and time.   Psychiatric: She has a normal mood and affect. Her behavior is normal. Judgment and thought content normal.   Nursing note and vitals reviewed.      Assessment/Plan   Lissa was seen today for constipation/diarrhea.    Diagnoses and all orders for this visit:    Irritable bowel syndrome with constipation      Plan: It would appear from the history and the  recent colonoscopy with the finding of a colon polyp she says that she may be dealing with irritable bowel syndrome with predominant constipation variety.  We gave her samples of Linzess 290 mcg-number 28 tablets to take 1 daily every morning about 30 minutes before breakfast.    GI evaluation advised if problems persist and medicine is ineffective.

## 2019-10-07 DIAGNOSIS — E78.5 HYPERLIPIDEMIA: ICD-10-CM

## 2019-10-08 RX ORDER — PRAVASTATIN SODIUM 20 MG
TABLET ORAL
Qty: 90 TABLET | Refills: 0 | Status: SHIPPED | OUTPATIENT
Start: 2019-10-08 | End: 2020-01-07

## 2019-10-21 DIAGNOSIS — I10 ESSENTIAL HYPERTENSION: ICD-10-CM

## 2019-10-21 RX ORDER — AMLODIPINE BESYLATE 5 MG/1
TABLET ORAL
Qty: 90 TABLET | Refills: 0 | Status: SHIPPED | OUTPATIENT
Start: 2019-10-21 | End: 2019-10-23 | Stop reason: SDUPTHER

## 2019-10-21 NOTE — TELEPHONE ENCOUNTER
Pt called and wanted to know if she could get something for restless leg?    Pls advise,    PT#[phone]

## 2019-10-23 DIAGNOSIS — I10 ESSENTIAL HYPERTENSION: ICD-10-CM

## 2019-10-23 RX ORDER — AMLODIPINE BESYLATE 5 MG/1
5 TABLET ORAL DAILY
Qty: 90 TABLET | Refills: 0 | Status: SHIPPED | OUTPATIENT
Start: 2019-10-23 | End: 2020-04-21

## 2019-10-25 DIAGNOSIS — E11.65 TYPE 2 DIABETES MELLITUS WITH HYPERGLYCEMIA, WITHOUT LONG-TERM CURRENT USE OF INSULIN (HCC): Primary | ICD-10-CM

## 2019-10-25 DIAGNOSIS — E78.2 MIXED HYPERLIPIDEMIA: ICD-10-CM

## 2019-10-25 DIAGNOSIS — E55.9 VITAMIN D DEFICIENCY: ICD-10-CM

## 2019-10-25 NOTE — TELEPHONE ENCOUNTER
Pt is requesting a new rx for Requip 0.25mg for restless leg syndrome.    Last prescribed by  on 7/10/18.    [facility] #[phone].    Pls advise.    Pt can be reached #[phone].

## 2019-11-04 ENCOUNTER — RESULTS ENCOUNTER (OUTPATIENT)
Dept: ENDOCRINOLOGY | Age: 79
End: 2019-11-04

## 2019-11-04 DIAGNOSIS — E55.9 VITAMIN D DEFICIENCY: ICD-10-CM

## 2019-11-04 DIAGNOSIS — E11.65 TYPE 2 DIABETES MELLITUS WITH HYPERGLYCEMIA, WITHOUT LONG-TERM CURRENT USE OF INSULIN (HCC): ICD-10-CM

## 2019-11-04 DIAGNOSIS — I10 BENIGN ESSENTIAL HYPERTENSION: ICD-10-CM

## 2019-11-04 DIAGNOSIS — E78.2 MIXED HYPERLIPIDEMIA: ICD-10-CM

## 2019-11-05 ENCOUNTER — LAB (OUTPATIENT)
Dept: ENDOCRINOLOGY | Age: 79
End: 2019-11-05

## 2019-11-05 ENCOUNTER — TELEPHONE (OUTPATIENT)
Dept: ENDOCRINOLOGY | Age: 79
End: 2019-11-05

## 2019-11-05 DIAGNOSIS — E11.65 TYPE 2 DIABETES MELLITUS WITH HYPERGLYCEMIA, WITHOUT LONG-TERM CURRENT USE OF INSULIN (HCC): ICD-10-CM

## 2019-11-05 DIAGNOSIS — E55.9 VITAMIN D DEFICIENCY: ICD-10-CM

## 2019-11-05 DIAGNOSIS — E78.2 MIXED HYPERLIPIDEMIA: ICD-10-CM

## 2019-11-05 NOTE — TELEPHONE ENCOUNTER
I see that she has an appointment with Emma on 11/19/2019 and it would be better to discuss this face-to-face in a couple of weeks.  Based on my calculation she has been on these medications for almost 4 months and therefore I am not sure if either of the medications have caused her to have diarrhea.  She can either wait to see Emma or needs to see her primary care provider for diagnosis and management of diarrhea.  In the meantime she is welcome to have samples of these 2 products until she is seen on 11/19/2019.

## 2019-11-05 NOTE — TELEPHONE ENCOUNTER
Patient came in for labs today and said she is having diarrhea off and on for 2 months and its getting bad. She has lost 16 lbs.  She needs to refill her Jardiance, 25mg, 1xday with breakfast  in a couple of days  and said it cost $200 for 30 day supply out of pocket and wants to check with doctor first.   She said she also takes Janumet, 50/1000 mg, 1 tab, 2xday with meals  Pt - [phone]

## 2019-11-06 LAB
25(OH)D3+25(OH)D2 SERPL-MCNC: 58.1 NG/ML (ref 30–100)
ALBUMIN SERPL-MCNC: 4.5 G/DL (ref 3.5–5.2)
ALBUMIN/GLOB SERPL: 1.8 G/DL
ALP SERPL-CCNC: 84 U/L (ref 39–117)
ALT SERPL-CCNC: 21 U/L (ref 1–33)
AST SERPL-CCNC: 29 U/L (ref 1–32)
BILIRUB SERPL-MCNC: 0.4 MG/DL (ref 0.2–1.2)
BUN SERPL-MCNC: 11 MG/DL (ref 8–23)
BUN/CREAT SERPL: 17.2 (ref 7–25)
C PEPTIDE SERPL-MCNC: 0.8 NG/ML (ref 1.1–4.4)
CALCIUM SERPL-MCNC: 9.2 MG/DL (ref 8.6–10.5)
CHLORIDE SERPL-SCNC: 102 MMOL/L (ref 98–107)
CHOLEST SERPL-MCNC: 190 MG/DL (ref 0–200)
CO2 SERPL-SCNC: 24.6 MMOL/L (ref 22–29)
CREAT SERPL-MCNC: 0.64 MG/DL (ref 0.57–1)
GLOBULIN SER CALC-MCNC: 2.5 GM/DL
GLUCOSE SERPL-MCNC: 152 MG/DL (ref 65–99)
HBA1C MFR BLD: 8.7 % (ref 4.8–5.6)
HDLC SERPL-MCNC: 63 MG/DL (ref 40–60)
INTERPRETATION: NORMAL
LDLC SERPL CALC-MCNC: 109 MG/DL (ref 0–100)
Lab: NORMAL
MICROALBUMIN UR-MCNC: 15.8 UG/ML
POTASSIUM SERPL-SCNC: 4.5 MMOL/L (ref 3.5–5.2)
PROT SERPL-MCNC: 7 G/DL (ref 6–8.5)
SODIUM SERPL-SCNC: 141 MMOL/L (ref 136–145)
TRIGL SERPL-MCNC: 91 MG/DL (ref 0–150)
VLDLC SERPL CALC-MCNC: 18.2 MG/DL

## 2019-11-19 ENCOUNTER — OFFICE VISIT (OUTPATIENT)
Dept: ENDOCRINOLOGY | Age: 79
End: 2019-11-19

## 2019-11-19 VITALS
SYSTOLIC BLOOD PRESSURE: 122 MMHG | HEIGHT: 67 IN | WEIGHT: 136.4 LBS | BODY MASS INDEX: 21.41 KG/M2 | DIASTOLIC BLOOD PRESSURE: 62 MMHG

## 2019-11-19 DIAGNOSIS — I10 BENIGN ESSENTIAL HYPERTENSION: Primary | ICD-10-CM

## 2019-11-19 DIAGNOSIS — E55.9 VITAMIN D DEFICIENCY: ICD-10-CM

## 2019-11-19 DIAGNOSIS — E11.65 TYPE 2 DIABETES MELLITUS WITH HYPERGLYCEMIA, WITHOUT LONG-TERM CURRENT USE OF INSULIN (HCC): ICD-10-CM

## 2019-11-19 DIAGNOSIS — E78.2 MIXED HYPERLIPIDEMIA: ICD-10-CM

## 2019-11-19 PROCEDURE — 99214 OFFICE O/P EST MOD 30 MIN: CPT | Performed by: NURSE PRACTITIONER

## 2019-11-19 RX ORDER — PIOGLITAZONEHYDROCHLORIDE 30 MG/1
30 TABLET ORAL DAILY
Qty: 30 TABLET | Refills: 5 | Status: SHIPPED | OUTPATIENT
Start: 2019-11-19 | End: 2020-04-07

## 2019-11-19 NOTE — PATIENT INSTRUCTIONS
Call office with bs's if staying greater than 150 mg/dl  Bring meter each visit  actos 30 mg once daily  conintue all other meds the same

## 2019-11-19 NOTE — PROGRESS NOTES
"Subjective   Lissa Ortiz is a 79 y.o. female is here today for follow-up.  Chief Complaint   Patient presents with   • Diabetes     followup, labs, BG 1x daily no readings    • Hyperlipidemia   • Hypertension   • Vitamin D Deficiency     /62   Ht 170.2 cm (67\")   Wt 61.9 kg (136 lb 6.4 oz)   BMI 21.36 kg/m²   Current Outpatient Medications on File Prior to Visit   Medication Sig   • amLODIPine (NORVASC) 5 MG tablet Take 1 tablet by mouth Daily.   • azelastine (ASTELIN) 0.1 % nasal spray SPRAY TWO SPRAYS IN EACH NOSTRIL TWICE DAILY AS DIRECTED   • cholecalciferol (VITAMIN D3) 1000 units tablet Take 1,000 Units by mouth Every Other Day.   • dorzolamide-timolol (COSOPT) 22.3-6.8 MG/ML ophthalmic solution    • folic acid (FOLVITE) 1 MG tablet Take 1 tablet by mouth daily.   • JANUMET  MG per tablet Take 1 tablet by mouth 2 (Two) Times a Day With Meals.   • JARDIANCE 25 MG tablet Take 25 mg by mouth Daily With Breakfast.   • Methotrexate Sodium 50 MG/2ML injection Inject 7 mg into the appropriate muscle as directed by prescriber 1 (One) Time Per Week.   • Multiple Vitamin (MULTI VITAMIN PO) Take 1 tablet by mouth Daily.   • Omega 3 1000 MG capsule Take 1 capsule by mouth.   • pravastatin (PRAVACHOL) 20 MG tablet TAKE ONE TABLET BY MOUTH DAILY   • Probiotic capsule Take 1 capsule by mouth.   • TRAVATAN Z 0.004 % solution ophthalmic solution Administer 1 drop to both eyes Every Night.   • TURMERIC CURCUMIN PO Take 1 tablet by mouth.   • vitamin B-12 (CYANOCOBALAMIN) 100 MCG tablet Take 1 tablet by mouth Every Other Day.     No current facility-administered medications on file prior to visit.      Social History     Tobacco Use   • Smoking status: Never Smoker   • Smokeless tobacco: Never Used   Substance Use Topics   • Alcohol use: No     Comment: Occasional glass of wine one per month   • Drug use: No     Allergies   Allergen Reactions   • Trimethoprim Nausea Only   • Bactrim [Sulfamethoxazole-Trimethoprim] " Nausea And Vomiting   • Fluticasone Furoate Unknown (See Comments)   • Vilanterol Unknown (See Comments)         History of Present Illness   Encounter Diagnoses   Name Primary?   • Benign essential hypertension Yes   • Mixed hyperlipidemia    • Type 2 diabetes mellitus with hyperglycemia, without long-term current use of insulin (CMS/[facility])    • Vitamin D deficiency      79-year-old female patient here today for routine follow-up visit.  She has been seen for the above-mentioned problems.  She had recent labs which were reviewed and she was provided a copy.  She is taking her medications as prescribed.  She is checking her blood sugars 1 time daily in the mornings and states typically they are in the 128 range.  She has been craving sweets and eating more chocolate.  She has had no significant weight changes.  We discussed checking her blood sugars more at times during the afternoons or evenings.  Her recent hemoglobin A1c reflects uncontrolled type 2 diabetes.  Her A1c has gone up since her previous visit.  Patient seems surprised that her A1c is higher.  She states she has been eating more sweets because she thought her blood sugar was okay to do so.  She is also not producing much insulin on her own based on her C-peptide at 0.8.  We discussed insulin being an option to bring her blood sugars under better control.  She is hesitant to start insulin at this time and wants to instead tried Pioglitizone.  She states her sister was put on Pioglitizone and is doing well.  She has been advised to not wait 6 months of her blood sugars fail to improve to let me know if we need to start insulin sooner rather than later.  She does have symptoms of increased thirst and increased urination.   she states she is up-to-date on her eye exam within the past year but no eye exam is present in her medical record.    The following portions of the patient's history were reviewed and updated as appropriate: allergies, current  medications, past family history, past medical history, past social history, past surgical history and problem list.    Review of Systems   Constitutional: Negative.    HENT: Negative.    Eyes: Negative.    Respiratory: Negative.    Cardiovascular: Negative.    Gastrointestinal: Negative.    Endocrine: Negative.    Genitourinary: Negative.    Musculoskeletal: Negative.    Skin: Negative.    Allergic/Immunologic: Negative.    Neurological: Negative.    Hematological: Negative.    Psychiatric/Behavioral: Negative.        Objective   Physical Exam   Constitutional: She is oriented to person, place, and time. She appears well-developed and well-nourished. No distress.   HENT:   Head: Normocephalic and atraumatic.   Right Ear: External ear normal.   Left Ear: External ear normal.   Nose: Nose normal.   Mouth/Throat: Oropharynx is clear and moist. No oropharyngeal exudate.   Eyes: EOM are normal. Pupils are equal, round, and reactive to light. Right eye exhibits no discharge. Left eye exhibits no discharge.   Neck: Trachea normal, normal range of motion and full passive range of motion without pain. Neck supple. No tracheal tenderness present. Carotid bruit is not present. No tracheal deviation, no edema and no erythema present. No thyroid mass and no thyromegaly present.   Cardiovascular: Normal rate, regular rhythm, normal heart sounds and intact distal pulses. Exam reveals no gallop and no friction rub.   No murmur heard.  Pulmonary/Chest: Effort normal and breath sounds normal. No stridor. No respiratory distress. She has no wheezes. She has no rales.   Abdominal: Soft. Bowel sounds are normal. She exhibits no distension.   Musculoskeletal: Normal range of motion. She exhibits no edema or deformity.   Lymphadenopathy:     She has no cervical adenopathy.   Neurological: She is alert and oriented to person, place, and time.   Skin: Skin is warm and dry. No rash noted. She is not diaphoretic. No erythema. No pallor.    Psychiatric: She has a normal mood and affect. Her behavior is normal. Judgment and thought content normal.   Nursing note and vitals reviewed.    Results for orders placed or performed in visit on 11/05/19   C-Peptide   Result Value Ref Range    C-Peptide 0.8 (L) 1.1 - 4.4 ng/mL   Hemoglobin A1c   Result Value Ref Range    Hemoglobin A1C 8.70 (H) 4.80 - 5.60 %   Vitamin D 25 Hydroxy   Result Value Ref Range    25 Hydroxy, Vitamin D 58.1 30.0 - 100.0 ng/ml   MicroAlbumin, Urine, Random - Urine, Clean Catch   Result Value Ref Range    Microalbumin, Urine 15.8 Not Estab. ug/mL   Lipid Panel   Result Value Ref Range    Total Cholesterol 190 0 - 200 mg/dL    Triglycerides 91 0 - 150 mg/dL    HDL Cholesterol 63 (H) 40 - 60 mg/dL    VLDL Cholesterol 18.2 mg/dL    LDL Cholesterol  109 (H) 0 - 100 mg/dL   Comprehensive Metabolic Panel   Result Value Ref Range    Glucose 152 (H) 65 - 99 mg/dL    BUN 11 8 - 23 mg/dL    Creatinine 0.64 0.57 - 1.00 mg/dL    eGFR Non African Am 90 >60 mL/min/1.73    eGFR African Am 109 >60 mL/min/1.73    BUN/Creatinine Ratio 17.2 7.0 - 25.0    Sodium 141 136 - 145 mmol/L    Potassium 4.5 3.5 - 5.2 mmol/L    Chloride 102 98 - 107 mmol/L    Total CO2 24.6 22.0 - 29.0 mmol/L    Calcium 9.2 8.6 - 10.5 mg/dL    Total Protein 7.0 6.0 - 8.5 g/dL    Albumin 4.50 3.50 - 5.20 g/dL    Globulin 2.5 gm/dL    A/G Ratio 1.8 g/dL    Total Bilirubin 0.4 0.2 - 1.2 mg/dL    Alkaline Phosphatase 84 39 - 117 U/L    AST (SGOT) 29 1 - 32 U/L    ALT (SGPT) 21 1 - 33 U/L   Cardiovascular Risk Assessment   Result Value Ref Range    Interpretation Note    Diabetes Patient Education   Result Value Ref Range    PDF Image Not applicable          Assessment/Plan   Problems Addressed this Visit        Cardiovascular and Mediastinum    Benign essential hypertension - Primary    Hyperlipidemia       Digestive    Vitamin D deficiency       Endocrine    Type 2 diabetes mellitus with hyperglycemia, without long-term current use  of insulin (CMS/[facility])    Relevant Medications    pioglitazone (ACTOS) 30 MG tablet          Patient was seen and examined.  Clinically and metabolically she is stable.  Her labs were reviewed and reflect uncontrolled type 2 diabetes.  Pioglitizone 30 mg will be added daily to her current regimen of Jardiance and Janumet.  She was educated today regarding mechanism of action of her current medications.  Her blood pressure is in satisfactory range.  She is been advised to check her blood sugars at random and if her blood sugars fail to improve the she should let the office know what her blood sugars are so that we can make changes to possibly include insulin.  Her C-peptide level was noted to be low.  Patient was educated on pathophysiology of type 2 diabetes and beta cell failure.  She will follow-up in 6 months with dr hurst or myself with labs prior.

## 2019-11-21 ENCOUNTER — TELEPHONE (OUTPATIENT)
Dept: ENDOCRINOLOGY | Age: 79
End: 2019-11-21

## 2019-11-21 DIAGNOSIS — E11.65 TYPE 2 DIABETES MELLITUS WITH HYPERGLYCEMIA, WITHOUT LONG-TERM CURRENT USE OF INSULIN (HCC): Primary | ICD-10-CM

## 2019-11-21 RX ORDER — BLOOD-GLUCOSE METER
1 EACH MISCELLANEOUS DAILY PRN
Qty: 1 DEVICE | Refills: 0 | Status: SHIPPED | OUTPATIENT
Start: 2019-11-21

## 2019-11-21 NOTE — TELEPHONE ENCOUNTER
Returned call was made to patient regarding her complaint of her visit 2 days ago.  She states she feels like she was not given enough chance to change her diet to improve her overall glycemic control.  Her A1c was 8.7 at her visit.  Her C-peptide was low.  Patient was reminded that we discussed the pathophysiology of type 2 diabetes and beta cell failure.  At the time she does not want to start insulin.  She states her sister has been on Pioglitizone has done well for her so prescription has been sent.  She does not want to do injections and stable prefer to take a pill.  On today's phone call she feels like she was prescribed another medication without really understanding why.  Patient was educated at visit of mechanism of action of Pioglitizone increase in insulin sensitivity.  She was also advised that this medication may or may not work to improve her overall glycemic control.  A new prescription for her glucometer was sent to her pharmacy.  She is possibly using low control solution to verify the accuracy of her glucometer however without it being here I cannot verify that she was actually using a low control solution.  She states the control solution was given her a reading of 44.  She will be referred to a dietitian per her request.  Again she was reminded that pathophysiology of type II can result in beta cell failure.  She was advised that insulin is not a bad option to treat uncontrolled diabetes.  She is to check her blood sugars and repeat her labs in 3 months per her request.  I will place orders and she is to call and schedule a lab appointment

## 2019-11-21 NOTE — TELEPHONE ENCOUNTER
Pt called stated she was upset about her visit 2 days ago.  And requesting a rx for a new glucometer  TRUE-METRIX GLUCOMETER  Sent to [facility] pharmacy  Also requested to be referred to a dietician

## 2019-12-23 ENCOUNTER — OFFICE VISIT (OUTPATIENT)
Dept: INTERNAL MEDICINE | Age: 79
End: 2019-12-23

## 2019-12-23 VITALS
HEIGHT: 67 IN | WEIGHT: 131.2 LBS | SYSTOLIC BLOOD PRESSURE: 110 MMHG | HEART RATE: 73 BPM | RESPIRATION RATE: 13 BRPM | OXYGEN SATURATION: 97 % | BODY MASS INDEX: 20.59 KG/M2 | DIASTOLIC BLOOD PRESSURE: 60 MMHG | TEMPERATURE: 97.6 F

## 2019-12-23 DIAGNOSIS — E11.65 TYPE 2 DIABETES MELLITUS WITH HYPERGLYCEMIA, WITHOUT LONG-TERM CURRENT USE OF INSULIN (HCC): Primary | ICD-10-CM

## 2019-12-23 DIAGNOSIS — I10 BENIGN ESSENTIAL HYPERTENSION: ICD-10-CM

## 2019-12-23 DIAGNOSIS — E55.9 VITAMIN D DEFICIENCY: ICD-10-CM

## 2019-12-23 DIAGNOSIS — M79.10 MUSCLE PAIN: ICD-10-CM

## 2019-12-23 DIAGNOSIS — E78.2 MIXED HYPERLIPIDEMIA: ICD-10-CM

## 2019-12-23 PROCEDURE — 99215 OFFICE O/P EST HI 40 MIN: CPT | Performed by: INTERNAL MEDICINE

## 2019-12-23 RX ORDER — BIOTIN 10000 MCG
CAPSULE ORAL
COMMUNITY
End: 2021-05-19

## 2019-12-23 RX ORDER — AMPICILLIN TRIHYDRATE 250 MG
500 CAPSULE ORAL 2 TIMES DAILY
COMMUNITY
End: 2021-05-19

## 2019-12-23 NOTE — PROGRESS NOTES
"  Lissa Ortiz is a 79 y.o. female who presents with   Chief Complaint   Patient presents with   • Diabetes     Has been seeing [facility] but had a bad recent encounter with their nurse practitioner.  The patient does not wish to go back to that office anymore she says.   • Hypertension     Amlodipine 5 mg   • Hyperlipidemia     Pravastatin 20 mg   • Vitamin D Deficiency     No prescription or over-the-counter treatment   • Muscle Pain     Leg cramps at night   .    79-year-old female presents for a usual scheduled 15-minute office visit but had multiple issues including her expressed dissatisfaction with a recent visit to see Dr. [facility] nurse practitioner.  She says she was told \"your pancreas is shot and you need to take insulin\".  She said she \"left the office practically in tears\" and said that she does not wish to go back to see that nurse practitioner anymore.  Also she had multiple other issues including constipation, runny nose, nighttime leg and feet cramps and weight loss.  Apparently she has lost from 145 pounds in July down to 131 pounds now but she says she \"feels great\".  At this point in time she does not wish to get into any type of work-up regarding her weight loss.  She is wondering about possibly getting referred to another endocrinologist office.  She says she has a bowel movement about every third day but does not feel bad with this and is wondering if this is \"normal\".  Her chronic runny nose does not seem to be doing well or responding well with Astelin nasal spray.    Diabetes   She presents for her follow-up diabetic visit. She has type 2 diabetes mellitus. Her disease course has been stable. There are no hypoglycemic associated symptoms. There are no diabetic associated symptoms. There are no hypoglycemic complications. Symptoms are stable. Current diabetic treatments: As per MAR list-Jardiance; Janumet; Actos. She is compliant with treatment most of the time. There is no " "change in her home blood glucose trend.   Hypertension   This is a chronic problem. The current episode started more than 1 year ago. The problem is controlled. Current antihypertension treatment includes calcium channel blockers. The current treatment provides moderate improvement. There are no compliance problems.    Hyperlipidemia   This is a chronic problem. The problem is controlled. Recent lipid tests were reviewed and are normal. Associated symptoms include myalgias. Current antihyperlipidemic treatment includes statins. The current treatment provides moderate improvement of lipids. There are no compliance problems.    Muscle Pain   This is a chronic problem. The current episode started more than 1 month ago. The problem has been waxing and waning since onset. Nothing aggravates the symptoms.        /60   Pulse 73   Temp 97.6 °F (36.4 °C)   Resp 13   Ht 170.2 cm (67.01\")   Wt 59.5 kg (131 lb 3.2 oz)   LMP  (LMP Unknown)   SpO2 97%   Breastfeeding No   BMI 20.54 kg/m²     The following portions of the patient's history were reviewed and updated as appropriate: allergies, current medications, past medical history and problem list.    Review of Systems   Constitutional: Negative.    HENT: Negative.    Eyes: Negative.    Respiratory: Negative.    Cardiovascular: Negative.    Genitourinary: Negative.    Musculoskeletal: Positive for myalgias.   Skin: Negative.    Neurological: Negative.    Psychiatric/Behavioral: Negative.        Objective   Physical Exam   Constitutional: She is oriented to person, place, and time. She appears well-developed and well-nourished. No distress.   HENT:   Head: Normocephalic and atraumatic.   Eyes: Pupils are equal, round, and reactive to light. Conjunctivae and EOM are normal.   Neck: Normal range of motion. Neck supple. No thyromegaly present.   Neck exam negative.  Carotid auscultation normal-no bruits heard.   Cardiovascular: Normal rate, regular rhythm, normal " heart sounds and intact distal pulses. Exam reveals no gallop and no friction rub.   No murmur heard.  Pulmonary/Chest: Effort normal and breath sounds normal. No respiratory distress. She has no wheezes. She has no rales. She exhibits no tenderness.   Neurological: She is alert and oriented to person, place, and time.   Psychiatric: She has a normal mood and affect. Her behavior is normal. Judgment and thought content normal.   Nursing note and vitals reviewed.      Assessment/Plan   Lissa was seen today for diabetes, hypertension, hyperlipidemia, vitamin d deficiency and muscle pain.    Diagnoses and all orders for this visit:    Type 2 diabetes mellitus with hyperglycemia, without long-term current use of insulin (CMS/[facility])  -     Comprehensive Metabolic Panel; Future  -     Hemoglobin A1c; Future  -     TSH+Free T4; Future    Benign essential hypertension  -     Comprehensive Metabolic Panel; Future  -     TSH+Free T4; Future    Mixed hyperlipidemia  -     Comprehensive Metabolic Panel; Future  -     Lipid Panel; Future  -     TSH+Free T4; Future    Vitamin D deficiency  -     Vitamin D 25 Hydroxy; Future    Muscle pain  -     Comprehensive Metabolic Panel; Future  -     CK; Future  -     Magnesium; Future      Plan: Labs as above for the issues as outlined.  For now she will continue all of her current treatment as prescribed.  I have suggested OTC Nasacort for her runny nose.  Her leg cramps could be from the pravastatin which we will reassess when lab reports come in.    As noted, this patient was scheduled for a 15-minute office visit which evolved into a 42-minute visit dealing with her physical issues as outlined above and answering all of her questions that she had with her on today's visit.  Better than 50% of the office visit time was spent in direct face-to-face consultation with the patient regarding her questions and dealing with her problematic endocrinology visit for her diabetes as noted above.    I  have suggested to her that for now she can dispense with the endocrinology visits and we will do what we can do here to try to assess whether oral medication is in her best interest or whether in fact she may need referral to another endocrinologist for insulin management.  She is also interested in possible dietary referrals to see a dietitian but I have suggested she hold off of this for now as well pending her lab reports.

## 2019-12-24 ENCOUNTER — RESULTS ENCOUNTER (OUTPATIENT)
Dept: INTERNAL MEDICINE | Age: 79
End: 2019-12-24

## 2019-12-24 DIAGNOSIS — M79.10 MUSCLE PAIN: ICD-10-CM

## 2019-12-24 DIAGNOSIS — E11.65 TYPE 2 DIABETES MELLITUS WITH HYPERGLYCEMIA, WITHOUT LONG-TERM CURRENT USE OF INSULIN (HCC): ICD-10-CM

## 2019-12-24 DIAGNOSIS — I10 BENIGN ESSENTIAL HYPERTENSION: ICD-10-CM

## 2019-12-24 DIAGNOSIS — E78.2 MIXED HYPERLIPIDEMIA: ICD-10-CM

## 2019-12-24 DIAGNOSIS — E55.9 VITAMIN D DEFICIENCY: ICD-10-CM

## 2019-12-24 LAB
25(OH)D3+25(OH)D2 SERPL-MCNC: 54.9 NG/ML (ref 30–100)
ALBUMIN SERPL-MCNC: 4.4 G/DL (ref 3.5–5.2)
ALBUMIN/GLOB SERPL: 1.8 G/DL
ALP SERPL-CCNC: 69 U/L (ref 39–117)
ALT SERPL-CCNC: 14 U/L (ref 1–33)
AST SERPL-CCNC: 22 U/L (ref 1–32)
BILIRUB SERPL-MCNC: 0.4 MG/DL (ref 0.2–1.2)
BUN SERPL-MCNC: 13 MG/DL (ref 8–23)
BUN/CREAT SERPL: 21 (ref 7–25)
CALCIUM SERPL-MCNC: 9.2 MG/DL (ref 8.6–10.5)
CHLORIDE SERPL-SCNC: 102 MMOL/L (ref 98–107)
CHOLEST SERPL-MCNC: 184 MG/DL (ref 0–200)
CK SERPL-CCNC: 77 U/L (ref 20–180)
CO2 SERPL-SCNC: 25.6 MMOL/L (ref 22–29)
CREAT SERPL-MCNC: 0.62 MG/DL (ref 0.57–1)
GLOBULIN SER CALC-MCNC: 2.4 GM/DL
GLUCOSE SERPL-MCNC: 135 MG/DL (ref 65–99)
HBA1C MFR BLD: 8.7 % (ref 4.8–5.6)
HDLC SERPL-MCNC: 72 MG/DL (ref 40–60)
LDLC SERPL CALC-MCNC: 98 MG/DL (ref 0–100)
MAGNESIUM SERPL-MCNC: 1.8 MG/DL (ref 1.6–2.4)
POTASSIUM SERPL-SCNC: 4.5 MMOL/L (ref 3.5–5.2)
PROT SERPL-MCNC: 6.8 G/DL (ref 6–8.5)
SODIUM SERPL-SCNC: 141 MMOL/L (ref 136–145)
T4 FREE SERPL-MCNC: 1.28 NG/DL (ref 0.93–1.7)
TRIGL SERPL-MCNC: 71 MG/DL (ref 0–150)
TSH SERPL DL<=0.005 MIU/L-ACNC: 3.12 UIU/ML (ref 0.27–4.2)
VLDLC SERPL CALC-MCNC: 14.2 MG/DL

## 2020-01-06 ENCOUNTER — HOSPITAL ENCOUNTER (OUTPATIENT)
Dept: DIABETES SERVICES | Facility: HOSPITAL | Age: 80
Discharge: HOME | End: 2020-01-06
Admitting: NURSE PRACTITIONER

## 2020-01-06 PROCEDURE — 97802 MEDICAL NUTRITION INDIV IN: CPT | Performed by: DIETITIAN, REGISTERED

## 2020-01-07 DIAGNOSIS — E78.5 HYPERLIPIDEMIA: ICD-10-CM

## 2020-01-07 RX ORDER — PRAVASTATIN SODIUM 20 MG
TABLET ORAL
Qty: 90 TABLET | Refills: 3 | Status: SHIPPED | OUTPATIENT
Start: 2020-01-07 | End: 2020-12-29 | Stop reason: SDUPTHER

## 2020-02-13 LAB
ALBUMIN SERPL-MCNC: 4.2 G/DL (ref 3.5–5.2)
ALBUMIN/GLOB SERPL: 1.6 G/DL
ALP SERPL-CCNC: 65 U/L (ref 39–117)
ALT SERPL-CCNC: 16 U/L (ref 1–33)
AST SERPL-CCNC: 21 U/L (ref 1–32)
BILIRUB SERPL-MCNC: 0.3 MG/DL (ref 0.2–1.2)
BUN SERPL-MCNC: 18 MG/DL (ref 8–23)
BUN/CREAT SERPL: 25.7 (ref 7–25)
C PEPTIDE SERPL-MCNC: 0.7 NG/ML (ref 1.1–4.4)
CALCIUM SERPL-MCNC: 8.8 MG/DL (ref 8.6–10.5)
CHLORIDE SERPL-SCNC: 102 MMOL/L (ref 98–107)
CO2 SERPL-SCNC: 26.2 MMOL/L (ref 22–29)
CREAT SERPL-MCNC: 0.7 MG/DL (ref 0.57–1)
GLOBULIN SER CALC-MCNC: 2.6 GM/DL
GLUCOSE SERPL-MCNC: 129 MG/DL (ref 65–99)
HBA1C MFR BLD: 8.2 % (ref 4.8–5.6)
POTASSIUM SERPL-SCNC: 4.2 MMOL/L (ref 3.5–5.2)
PROT SERPL-MCNC: 6.8 G/DL (ref 6–8.5)
SODIUM SERPL-SCNC: 139 MMOL/L (ref 136–145)

## 2020-02-21 ENCOUNTER — RESULTS ENCOUNTER (OUTPATIENT)
Dept: ENDOCRINOLOGY | Age: 80
End: 2020-02-21

## 2020-02-21 DIAGNOSIS — E11.65 TYPE 2 DIABETES MELLITUS WITH HYPERGLYCEMIA, WITHOUT LONG-TERM CURRENT USE OF INSULIN (HCC): ICD-10-CM

## 2020-03-03 ENCOUNTER — OFFICE VISIT (OUTPATIENT)
Dept: INTERNAL MEDICINE | Age: 80
End: 2020-03-03

## 2020-03-03 ENCOUNTER — TELEPHONE (OUTPATIENT)
Dept: INTERNAL MEDICINE | Age: 80
End: 2020-03-03

## 2020-03-03 VITALS
TEMPERATURE: 97.8 F | HEART RATE: 74 BPM | HEIGHT: 67 IN | DIASTOLIC BLOOD PRESSURE: 70 MMHG | SYSTOLIC BLOOD PRESSURE: 110 MMHG | OXYGEN SATURATION: 98 % | BODY MASS INDEX: 19.62 KG/M2 | WEIGHT: 125 LBS

## 2020-03-03 DIAGNOSIS — R35.0 FREQUENCY OF URINATION AND POLYURIA: Primary | ICD-10-CM

## 2020-03-03 DIAGNOSIS — R35.89 FREQUENCY OF URINATION AND POLYURIA: Primary | ICD-10-CM

## 2020-03-03 DIAGNOSIS — I10 BENIGN ESSENTIAL HYPERTENSION: ICD-10-CM

## 2020-03-03 DIAGNOSIS — R63.4 WEIGHT LOSS: ICD-10-CM

## 2020-03-03 LAB
BILIRUB BLD-MCNC: NEGATIVE MG/DL
CLARITY, POC: ABNORMAL
COLOR UR: YELLOW
GLUCOSE UR STRIP-MCNC: ABNORMAL MG/DL
KETONES UR QL: ABNORMAL
LEUKOCYTE EST, POC: ABNORMAL
NITRITE UR-MCNC: NEGATIVE MG/ML
PH UR: 7 [PH] (ref 5–8)
PROT UR STRIP-MCNC: ABNORMAL MG/DL
RBC # UR STRIP: ABNORMAL /UL
SP GR UR: 1.02 (ref 1–1.03)
UROBILINOGEN UR QL: NORMAL

## 2020-03-03 PROCEDURE — 81003 URINALYSIS AUTO W/O SCOPE: CPT | Performed by: INTERNAL MEDICINE

## 2020-03-03 PROCEDURE — 99214 OFFICE O/P EST MOD 30 MIN: CPT | Performed by: INTERNAL MEDICINE

## 2020-03-03 RX ORDER — BRIMONIDINE TARTRATE/TIMOLOL 0.2%-0.5%
DROPS OPHTHALMIC (EYE)
COMMUNITY
Start: 2020-02-26 | End: 2021-06-23

## 2020-03-03 RX ORDER — AMOXICILLIN AND CLAVULANATE POTASSIUM 875; 125 MG/1; MG/1
1 TABLET, FILM COATED ORAL 2 TIMES DAILY
Qty: 20 TABLET | Refills: 0 | Status: SHIPPED | OUTPATIENT
Start: 2020-03-03 | End: 2020-05-19

## 2020-03-03 NOTE — PROGRESS NOTES
"  Lissa Ortiz is a 79 y.o. female who presents with   Chief Complaint   Patient presents with   • Suspected UTI     24 hours-frequency, burning, no visible blood   • Hypertension     Amlodipine 5 mg   • Weight Loss     Started when endocrinology placed her on Janumet she says.  Weight currently 125 pounds down from 137 in August   .    79-year-old female presents with a suspected UTI based on symptoms of frequency, and burning over the last 24 hours.  She has not seen any blood in the urine.  Also she is complaining of persisting weight loss ever since endocrinology placed her on Janumet.  She says she has no appetite and does not get hungry and does not eat much.  Weight decrease as noted above.    Hypertension   This is a chronic problem. The current episode started more than 1 year ago. The problem is controlled. Current antihypertension treatment includes calcium channel blockers. The current treatment provides significant improvement. There are no compliance problems.    Weight Loss   This is a chronic problem. The current episode started more than 1 month ago. The problem occurs constantly. The problem has been gradually worsening. Associated symptoms include anorexia. Exacerbated by: Apparent usage of Janumet as per history above.        /70   Pulse 74   Temp 97.8 °F (36.6 °C) (Temporal)   Ht 170.2 cm (67.01\")   Wt 56.7 kg (125 lb)   LMP  (LMP Unknown)   SpO2 98%   BMI 19.57 kg/m²     The following portions of the patient's history were reviewed and updated as appropriate: allergies, current medications, past medical history and problem list.    Review of Systems   Constitutional: Positive for unexpected weight change and weight loss.   HENT: Negative.    Eyes: Negative.    Respiratory: Negative.    Cardiovascular: Negative.    Gastrointestinal: Positive for anorexia.   Genitourinary: Negative.    Musculoskeletal: Negative.    Skin: Negative.    Neurological: Negative.    Psychiatric/Behavioral: " Negative.        Objective   Physical Exam   Constitutional: She is oriented to person, place, and time. She appears well-developed and well-nourished. No distress.   HENT:   Head: Normocephalic and atraumatic.   Eyes: Pupils are equal, round, and reactive to light. Conjunctivae and EOM are normal.   Neck: Normal range of motion. Neck supple. No thyromegaly present.   Neck exam negative.  Carotid auscultation normal-no bruits heard.   Cardiovascular: Normal rate, regular rhythm, normal heart sounds and intact distal pulses. Exam reveals no gallop and no friction rub.   No murmur heard.  Pulmonary/Chest: Effort normal and breath sounds normal. No respiratory distress. She has no wheezes. She has no rales. She exhibits no tenderness.   Neurological: She is alert and oriented to person, place, and time.   Psychiatric: She has a normal mood and affect. Her behavior is normal. Judgment and thought content normal.   Nursing note and vitals reviewed.      Assessment/Plan   Lissa was seen today for suspected uti, hypertension and weight loss.    Diagnoses and all orders for this visit:    Frequency of urination and polyuria  -     POC Urinalysis Dipstick, Automated  -     Urinalysis With Culture If Indicated -    Benign essential hypertension    Weight loss    Other orders  -     amoxicillin-clavulanate (AUGMENTIN) 875-125 MG per tablet; Take 1 tablet by mouth 2 (Two) Times a Day.        Plan: Urine dipstick shows the presence of glucose at 500 mg/dL, moderate amount of blood and the presence of 30 mg/dL of protein with the presence of small amount of leukocytes.  We will get a urine culture and sensitivity on today's visit and empirically place her on Augmentin 875 mg twice daily pending today's culture report.    Blood pressure stable continue the same.    Weight loss: As noted, she relates it to Janumet.  I have advised her to contact her endocrinologist to discuss whether a different medication would be beneficial for  her.  It appears that she is intolerant likely to the metformin component of Janumet.

## 2020-03-05 LAB
APPEARANCE UR: ABNORMAL
BACTERIA #/AREA URNS HPF: ABNORMAL /HPF
BACTERIA UR CULT: ABNORMAL
BACTERIA UR CULT: ABNORMAL
BILIRUB UR QL STRIP: NEGATIVE
COLOR UR: YELLOW
EPI CELLS #/AREA URNS HPF: ABNORMAL /HPF (ref 0–10)
GLUCOSE UR QL: ABNORMAL
HGB UR QL STRIP: ABNORMAL
KETONES UR QL STRIP: ABNORMAL
LEUKOCYTE ESTERASE UR QL STRIP: ABNORMAL
MICRO URNS: ABNORMAL
NITRITE UR QL STRIP: NEGATIVE
OTHER ANTIBIOTIC SUSC ISLT: ABNORMAL
PH UR STRIP: 6.5 [PH] (ref 5–7.5)
PROT UR QL STRIP: ABNORMAL
RBC #/AREA URNS HPF: ABNORMAL /HPF (ref 0–2)
SP GR UR: >=1.03 (ref 1–1.03)
URINALYSIS REFLEX: ABNORMAL
UROBILINOGEN UR STRIP-MCNC: 0.2 MG/DL (ref 0.2–1)
WBC #/AREA URNS HPF: >30 /HPF (ref 0–5)

## 2020-03-06 NOTE — PROGRESS NOTES
Recent urinalysis with urine culture confirms the presence of a urinary tract infection with E. coli.  The germ is sensitive to the prescribed antibiotic.  Be sure and take the currently prescribed antibiotic until it is all gone.  If problems persist a follow-up office visit with a follow-up urinalysis and urine culture if necessary is advised.

## 2020-04-07 RX ORDER — PIOGLITAZONEHYDROCHLORIDE 30 MG/1
TABLET ORAL
Qty: 90 TABLET | Refills: 4 | Status: SHIPPED | OUTPATIENT
Start: 2020-04-07 | End: 2020-04-07 | Stop reason: SDUPTHER

## 2020-04-07 RX ORDER — PIOGLITAZONEHYDROCHLORIDE 30 MG/1
30 TABLET ORAL DAILY
Qty: 90 TABLET | Refills: 0 | Status: SHIPPED | OUTPATIENT
Start: 2020-04-07 | End: 2020-05-19

## 2020-04-15 ENCOUNTER — TELEPHONE (OUTPATIENT)
Dept: ENDOCRINOLOGY | Age: 80
End: 2020-04-15

## 2020-04-21 DIAGNOSIS — I10 ESSENTIAL HYPERTENSION: ICD-10-CM

## 2020-04-21 RX ORDER — AMLODIPINE BESYLATE 5 MG/1
TABLET ORAL
Qty: 90 TABLET | Refills: 0 | Status: SHIPPED | OUTPATIENT
Start: 2020-04-21 | End: 2020-07-21

## 2020-05-06 LAB
25(OH)D3+25(OH)D2 SERPL-MCNC: 51 NG/ML (ref 30–100)
ACTH PLAS-MCNC: 44.4 PG/ML (ref 7.2–63.3)
ALBUMIN SERPL-MCNC: 4.2 G/DL (ref 3.5–5.2)
ALBUMIN/GLOB SERPL: 1.8 G/DL
ALP SERPL-CCNC: 70 U/L (ref 39–117)
ALT SERPL-CCNC: 17 U/L (ref 1–33)
AST SERPL-CCNC: 19 U/L (ref 1–32)
BILIRUB SERPL-MCNC: 0.4 MG/DL (ref 0.2–1.2)
BUN SERPL-MCNC: 14 MG/DL (ref 8–23)
BUN/CREAT SERPL: 21.2 (ref 7–25)
C PEPTIDE SERPL-MCNC: 0.8 NG/ML (ref 1.1–4.4)
CALCIUM SERPL-MCNC: 9.1 MG/DL (ref 8.6–10.5)
CHLORIDE SERPL-SCNC: 99 MMOL/L (ref 98–107)
CHOLEST SERPL-MCNC: 162 MG/DL (ref 0–200)
CO2 SERPL-SCNC: 28.3 MMOL/L (ref 22–29)
CORTIS SERPL-MCNC: 12.7 UG/DL
CREAT SERPL-MCNC: 0.66 MG/DL (ref 0.57–1)
GLOBULIN SER CALC-MCNC: 2.4 GM/DL
GLUCOSE SERPL-MCNC: 171 MG/DL (ref 65–99)
HBA1C MFR BLD: 8.4 % (ref 4.8–5.6)
HDLC SERPL-MCNC: 70 MG/DL (ref 40–60)
INTERPRETATION: NORMAL
LDLC SERPL CALC-MCNC: 81 MG/DL (ref 0–100)
Lab: NORMAL
MICROALBUMIN UR-MCNC: 13.5 UG/ML
POTASSIUM SERPL-SCNC: 4.3 MMOL/L (ref 3.5–5.2)
PROT SERPL-MCNC: 6.6 G/DL (ref 6–8.5)
SODIUM SERPL-SCNC: 137 MMOL/L (ref 136–145)
T3FREE SERPL-MCNC: 2.4 PG/ML (ref 2–4.4)
T4 FREE SERPL-MCNC: 1.17 NG/DL (ref 0.93–1.7)
T4 SERPL-MCNC: 6.67 MCG/DL (ref 4.5–11.7)
TRIGL SERPL-MCNC: 57 MG/DL (ref 0–150)
TSH SERPL DL<=0.005 MIU/L-ACNC: 4.62 UIU/ML (ref 0.27–4.2)
URATE SERPL-MCNC: 3.1 MG/DL (ref 2.4–5.7)
VLDLC SERPL CALC-MCNC: 11.4 MG/DL (ref 5–40)

## 2020-05-15 ENCOUNTER — HOSPITAL ENCOUNTER (OUTPATIENT)
Dept: CARDIOLOGY | Facility: HOSPITAL | Age: 80
Discharge: HOME | End: 2020-05-15
Admitting: NURSE PRACTITIONER

## 2020-05-15 ENCOUNTER — OFFICE VISIT (OUTPATIENT)
Dept: INTERNAL MEDICINE | Age: 80
End: 2020-05-15

## 2020-05-15 VITALS
HEIGHT: 67 IN | HEART RATE: 74 BPM | BODY MASS INDEX: 20.56 KG/M2 | TEMPERATURE: 96.8 F | OXYGEN SATURATION: 98 % | SYSTOLIC BLOOD PRESSURE: 126 MMHG | DIASTOLIC BLOOD PRESSURE: 70 MMHG | WEIGHT: 131 LBS

## 2020-05-15 DIAGNOSIS — M79.89 RIGHT LEG SWELLING: Primary | ICD-10-CM

## 2020-05-15 DIAGNOSIS — G25.81 RESTLESS LEGS SYNDROME: ICD-10-CM

## 2020-05-15 DIAGNOSIS — S80.11XA HEMATOMA OF RIGHT LOWER EXTREMITY, INITIAL ENCOUNTER: ICD-10-CM

## 2020-05-15 DIAGNOSIS — R05.3 CHRONIC COUGH: ICD-10-CM

## 2020-05-15 LAB
BH CV LOW VAS RIGHT POPLITEAL SPONT: 1
BH CV LOWER VASCULAR LEFT COMMON FEMORAL AUGMENT: NORMAL
BH CV LOWER VASCULAR LEFT COMMON FEMORAL COMPETENT: NORMAL
BH CV LOWER VASCULAR LEFT COMMON FEMORAL COMPRESS: NORMAL
BH CV LOWER VASCULAR LEFT COMMON FEMORAL PHASIC: NORMAL
BH CV LOWER VASCULAR LEFT COMMON FEMORAL SPONT: NORMAL
BH CV LOWER VASCULAR RIGHT COMMON FEMORAL AUGMENT: NORMAL
BH CV LOWER VASCULAR RIGHT COMMON FEMORAL COMPETENT: NORMAL
BH CV LOWER VASCULAR RIGHT COMMON FEMORAL COMPRESS: NORMAL
BH CV LOWER VASCULAR RIGHT COMMON FEMORAL PHASIC: NORMAL
BH CV LOWER VASCULAR RIGHT COMMON FEMORAL SPONT: NORMAL
BH CV LOWER VASCULAR RIGHT DISTAL FEMORAL COMPRESS: NORMAL
BH CV LOWER VASCULAR RIGHT GASTRONEMIUS COMPRESS: NORMAL
BH CV LOWER VASCULAR RIGHT GREATER SAPH AK COMPRESS: NORMAL
BH CV LOWER VASCULAR RIGHT GREATER SAPH BK COMPRESS: NORMAL
BH CV LOWER VASCULAR RIGHT MID FEMORAL AUGMENT: NORMAL
BH CV LOWER VASCULAR RIGHT MID FEMORAL COMPETENT: NORMAL
BH CV LOWER VASCULAR RIGHT MID FEMORAL COMPRESS: NORMAL
BH CV LOWER VASCULAR RIGHT MID FEMORAL PHASIC: NORMAL
BH CV LOWER VASCULAR RIGHT MID FEMORAL SPONT: NORMAL
BH CV LOWER VASCULAR RIGHT PERONEAL COMPRESS: NORMAL
BH CV LOWER VASCULAR RIGHT POPLITEAL AUGMENT: NORMAL
BH CV LOWER VASCULAR RIGHT POPLITEAL COMPETENT: NORMAL
BH CV LOWER VASCULAR RIGHT POPLITEAL COMPRESS: NORMAL
BH CV LOWER VASCULAR RIGHT POPLITEAL PHASIC: NORMAL
BH CV LOWER VASCULAR RIGHT POPLITEAL SPONT: NORMAL
BH CV LOWER VASCULAR RIGHT POSTERIOR TIBIAL COMPRESS: NORMAL
BH CV LOWER VASCULAR RIGHT PROFUNDA FEMORAL COMPRESS: NORMAL
BH CV LOWER VASCULAR RIGHT PROXIMAL FEMORAL COMPRESS: NORMAL
BH CV LOWER VASCULAR RIGHT SAPHENOFEMORAL JUNCTION AUGMENT: NORMAL
BH CV LOWER VASCULAR RIGHT SAPHENOFEMORAL JUNCTION COMPETENT: NORMAL
BH CV LOWER VASCULAR RIGHT SAPHENOFEMORAL JUNCTION COMPRESS: NORMAL
BH CV LOWER VASCULAR RIGHT SAPHENOFEMORAL JUNCTION PHASIC: NORMAL
BH CV LOWER VASCULAR RIGHT SAPHENOFEMORAL JUNCTION SPONT: NORMAL
BH CV VAS POP FLUID COLLECTED: 1

## 2020-05-15 PROCEDURE — 99214 OFFICE O/P EST MOD 30 MIN: CPT | Performed by: NURSE PRACTITIONER

## 2020-05-15 PROCEDURE — 93971 EXTREMITY STUDY: CPT

## 2020-05-15 RX ORDER — ROPINIROLE 0.25 MG/1
0.25 TABLET, FILM COATED ORAL NIGHTLY
Qty: 30 TABLET | Refills: 2 | Status: SHIPPED | OUTPATIENT
Start: 2020-05-15 | End: 2020-11-19

## 2020-05-15 RX ORDER — AZELASTINE 1 MG/ML
2 SPRAY, METERED NASAL 2 TIMES DAILY
Qty: 2 EACH | Refills: 5 | Status: SHIPPED | OUTPATIENT
Start: 2020-05-15

## 2020-05-15 NOTE — PATIENT INSTRUCTIONS
Hematoma    A hematoma is a collection of blood under the skin, in an organ, in a body space, in a joint space, or in other tissue. The blood can thicken (clot) to form a lump that you can see and feel. The lump is often firm and may become sore and tender. Most hematomas get better in a few days to weeks. However, some hematomas may be serious and require medical care. Hematomas can range from very small to very large.  What are the causes?  This condition is caused by:  · A blunt or penetrating injury.  · A leakage from a blood vessel under the skin.  · Some medical procedures, including surgeries, such as oral surgery, face lifts, and surgeries on the joints.  · Some medical conditions that cause bleeding or bruising. There may be multiple hematomas that appear in different areas of the body.  What increases the risk?  You are more likely to develop this condition if:  · You are an older adult.  · You use blood thinners.  What are the signs or symptoms?    Symptoms of this condition depend on where the hematoma is located.   Common symptoms of a hematoma that is under the skin include:  · A firm lump on the body.  · Pain and tenderness in the area.  · Bruising. Blue, dark blue, purple-red, or yellowish skin (discoloration) may appear at the site of the hematoma if the hematoma is close to the surface of the skin.  Common symptoms of a hematoma that is deep in the tissues or body spaces may be less obvious. They include:  · A collection of blood in the stomach (intra-abdominal hematoma). This may cause pain in the abdomen, weakness, fainting, and shortness of breath.  · A collection of blood in the head (intracranial hematoma). This may cause a headache or symptoms such as weakness, trouble speaking or understanding, or a change in consciousness.   How is this diagnosed?  This condition is diagnosed based on:  · Your medical history.  · A physical exam.  · Imaging tests, such as an ultrasound or CT scan. These may  be needed if your health care provider suspects a hematoma in deeper tissues or body spaces.  · Blood tests. These may be needed if your health care provider believes that the hematoma is caused by a medical condition.  How is this treated?  Treatment for this condition depends on the cause, size, and location of the hematoma. Treatment may include:  · Doing nothing. The majority of hematomas do not need treatment as many of them go away on their own over time.  · Surgery or close monitoring. This may be needed for large hematomas or hematomas that affect vital organs.  · Medicines. Medicines may be given if there is an underlying medical cause for the hematoma.  Follow these instructions at home:  Managing pain, stiffness, and swelling    · If directed, put ice on the affected area.  ? Put ice in a plastic bag.  ? Place a towel between your skin and the bag.  ? Leave the ice on for 20 minutes, 2-3 times a day for the first couple of days.  · If directed, apply heat to the affected area after applying ice for a couple of days. Use the heat source that your health care provider recommends, such as a moist heat pack or a heating pad.  ? Place a towel between your skin and the heat source.  ? Leave the heat on for 20-30 minutes.  ? Remove the heat if your skin turns bright red. This is especially important if you are unable to feel pain, heat, or cold. You may have a greater risk of getting burned.  · Raise (elevate) the affected area above the level of your heart while you are sitting or lying down.  · If told, wrap the affected area with an elastic bandage. The bandage applies pressure (compression) to the area, which may help to reduce swelling and promote healing. Do not wrap the bandage too tightly around the affected area.  · If your hematoma is on a leg or foot (lower extremity) and is painful, your health care provider may recommend crutches. Use them as told by your health care provider.  General  instructions  · Take over-the-counter and prescription medicines only as told by your health care provider.  · Keep all follow-up visits as told by your health care provider. This is important.  Contact a health care provider if:  · You have a fever.  · The swelling or discoloration gets worse.  · You develop more hematomas.  Get help right away if:  · Your pain is worse or your pain is not controlled with medicine.  · Your skin over the hematoma breaks or starts bleeding.  · Your hematoma is in your chest or abdomen and you have weakness, shortness of breath, or a change in consciousness.  · You have a hematoma on your scalp that is caused by a fall or injury, and you also have:  ? A headache that gets worse.  ? Trouble speaking or understanding speech.  ? Weakness.  ? Change in alertness or consciousness.  Summary  · A hematoma is a collection of blood under the skin, in an organ, in a body space, in a joint space, or in other tissue.  · This condition usually does not need treatment because many hematomas go away on their own over time.  · Large hematomas, or those that may affect vital organs, may need surgical drainage or monitoring. If the hematoma is caused by a medical condition, medicines may be prescribed.  · Get help right away if your hematoma breaks or starts to bleed, you have shortness of breath, or you have a headache or trouble speaking after a fall.  This information is not intended to replace advice given to you by your health care provider. Make sure you discuss any questions you have with your health care provider.  Document Released: 08/01/2005 Document Revised: 05/23/2019 Document Reviewed: 05/23/2019  [employer] Interactive Patient Education © 2020 [employer]

## 2020-05-15 NOTE — PROGRESS NOTES
[facility] [place] INTERNAL MEDICINE  Sinai Loerahn / 79 y.o. / female  05/15/2020      ASSESSMENT & PLAN:    Problem List Items Addressed This Visit        Other    Restless legs syndrome    Relevant Medications    rOPINIRole (Requip) 0.25 MG tablet      Other Visit Diagnoses     Right leg swelling    -  Primary    Relevant Orders    Duplex Venous Lower Extremity - Right CAR    Hematoma of right lower extremity, initial encounter        Relevant Orders    Duplex Venous Lower Extremity - Right CAR    Chronic cough        Relevant Medications    azelastine (ASTELIN) 0.1 % nasal spray        No orders of the defined types were placed in this encounter.    New Medications Ordered This Visit   Medications   • azelastine (ASTELIN) 0.1 % nasal spray     Si sprays into the nostril(s) as directed by provider 2 (Two) Times a Day. Use in each nostril as directed     Dispense:  2 each     Refill:  5   • rOPINIRole (Requip) 0.25 MG tablet     Sig: Take 1 tablet by mouth Every Night. Take 1 hour before bedtime.     Dispense:  30 tablet     Refill:  2       Summary/Discussion:    1. Right leg swelling  Obtain Doppler ultrasound today for evaluation of right leg swelling.  Suspect likely residual hematoma from recent fall and injury.  If Doppler negative, discussed with patient treatment with warm compresses to area.  Hematoma resolution usually occurs within 4 to 6 weeks.  Further evaluation and treatment pending results of imaging study.     - Duplex Venous Lower Extremity - Right CAR    2. Hematoma of right lower extremity, initial encounter    - Duplex Venous Lower Extremity - Right CAR    3. Chronic cough  Refill of azelastine for nasal congestion/ allergic rhinitis.     - azelastine (ASTELIN) 0.1 % nasal spray; 2 sprays into the nostril(s) as directed by provider 2 (Two) Times a Day. Use in each nostril as directed  Dispense: 2 each; Refill: 5    4. Restless legs syndrome  Refill today.     - rOPINIRole (Requip)  0.25 MG tablet; Take 1 tablet by mouth Every Night. Take 1 hour before bedtime.  Dispense: 30 tablet; Refill: 2        No follow-ups on file.    ____________________________________________________________________    MEDICATIONS  Current Outpatient Medications   Medication Sig Dispense Refill   • amLODIPine (NORVASC) 5 MG tablet TAKE ONE TABLET BY MOUTH DAILY 90 tablet 0   • amoxicillin-clavulanate (AUGMENTIN) 875-125 MG per tablet Take 1 tablet by mouth 2 (Two) Times a Day. 20 tablet 0   • azelastine (ASTELIN) 0.1 % nasal spray 2 sprays into the nostril(s) as directed by provider 2 (Two) Times a Day. Use in each nostril as directed 2 each 5   • Biotin 10 MG capsule Take  by mouth.     • Blood Glucose Monitoring Suppl (TRUE METRIX AIR GLUCOSE METER) device 1 Device Daily As Needed (check bs's twice daily). 1 Device 0   • cholecalciferol (VITAMIN D3) 1000 units tablet Take 1,000 Units by mouth Every Other Day.     • Cinnamon 500 MG capsule 500 mg 2 (Two) Times a Day.     • COMBIGAN 0.2-0.5 % ophthalmic solution      • folic acid (FOLVITE) 1 MG tablet Take 1 tablet by mouth daily.     • JANUMET  MG per tablet Take 1 tablet by mouth 2 (Two) Times a Day With Meals. 60 tablet 11   • JARDIANCE 25 MG tablet Take 25 mg by mouth Daily With Breakfast. 30 tablet 11   • Methotrexate Sodium 50 MG/2ML injection Inject 7 mg into the appropriate muscle as directed by prescriber 1 (One) Time Per Week.     • Multiple Vitamin (MULTI VITAMIN PO) Take 1 tablet by mouth Daily.     • Omega 3 1000 MG capsule Take 1 capsule by mouth.     • pioglitazone (ACTOS) 30 MG tablet Take 1 tablet by mouth Daily. 90 tablet 0   • pravastatin (PRAVACHOL) 20 MG tablet TAKE ONE TABLET BY MOUTH DAILY 90 tablet 3   • Probiotic capsule Take 1 capsule by mouth.     • rOPINIRole (Requip) 0.25 MG tablet Take 1 tablet by mouth Every Night. Take 1 hour before bedtime. 30 tablet 2   • TRAVATAN Z 0.004 % solution ophthalmic solution Administer 1 drop to both  "eyes Every Night.     • vitamin B-12 (CYANOCOBALAMIN) 100 MCG tablet Take 1 tablet by mouth Every Other Day.       No current facility-administered medications for this visit.           VITALS:    Visit Vitals  /70   Pulse 74   Temp 96.8 °F (36 °C) (Temporal)   Ht 170.2 cm (67.01\")   Wt 59.4 kg (131 lb)   LMP  (LMP Unknown)   SpO2 98%   BMI 20.51 kg/m²       BP Readings from Last 3 Encounters:   05/15/20 126/70   03/03/20 110/70   12/23/19 110/60     Wt Readings from Last 3 Encounters:   05/15/20 59.4 kg (131 lb)   03/03/20 56.7 kg (125 lb)   12/23/19 59.5 kg (131 lb 3.2 oz)      Body mass index is 20.51 kg/m².    CC:  Main reason(s) for today's visit: Leg Swelling      HPI:   This is a NEW patient as well as a NEW problem to this examiner.       Edema: Patient complains of swelling. The location of the edema is lower leg(s) right.  The edema has been mild.  Onset of symptoms was 2 weeks ago, unchanged since that time. She reports a fall onto her knees 2 weeks ago. Subsequently, her right lateral lower leg became bruised and swollen after the fall, although she is unaware of hitting it. She still has some noticeable swelling of the right lateral leg, but lower extremity is also more swollen than left.   No previous history DVT. No recent travel. Denies Chest pain or SOA.     She is also requesting refill of Requip which she previously took for RLS from her previous PCP.       Patient Care Team:  Figueroa Berger MD as PCP - General (Internal Medicine)  Figueroa Berger MD as PCP - Claims Attributed    ____________________________________________________________________    REVIEW OF SYSTEMS    Review of Systems   Cardiovascular: Positive for leg swelling.   Musculoskeletal: Positive for arthralgias and joint swelling.         PHYSICAL EXAMINATION    Physical Exam   Constitutional: She is oriented to person, place, and time. Vital signs are normal. She appears well-developed and well-nourished. She is cooperative. " She does not appear ill. No distress.   Musculoskeletal:        Legs:  Palpable swelling to right lateral leg.     Discrepancy noted of size of RLE vs LLE (34 vs 35 cm on measurement). Mild RLE edema present.   Pulses palpable and equal.    Neurological: She is alert and oriented to person, place, and time.   Skin: Skin is warm, dry and intact.   Psychiatric: She has a normal mood and affect. Her speech is normal and behavior is normal. Judgment and thought content normal. Cognition and memory are normal.   Nursing note and vitals reviewed.      REVIEWED DATA:    Labs:     Lab Results   Component Value Date     05/05/2020    K 4.3 05/05/2020    AST 19 05/05/2020    ALT 17 05/05/2020    BUN 14 05/05/2020    CREATININE 0.66 05/05/2020    CREATININE 0.70 02/12/2020    CREATININE 0.62 12/24/2019    EGFRIFNONA 87 05/05/2020    EGFRIFAFRI 105 05/05/2020       Lab Results   Component Value Date    HGBA1C 8.40 (H) 05/05/2020    HGBA1C 8.20 (H) 02/12/2020    HGBA1C 8.70 (H) 12/24/2019    MICROALBUR 13.5 05/05/2020       Lab Results   Component Value Date    LDL 81 05/05/2020    LDL 98 12/24/2019     (H) 11/05/2019    HDL 70 (H) 05/05/2020    HDL 72 (H) 12/24/2019    HDL 63 (H) 11/05/2019    TRIG 57 05/05/2020    TRIG 71 12/24/2019    TRIG 91 11/05/2019       Lab Results   Component Value Date    TSH 4.620 (H) 05/05/2020    FREET4 1.17 05/05/2020       Lab Results   Component Value Date    WBC 5.56 06/19/2019    HGB 12.6 06/19/2019    HGB 12.9 07/10/2018    HGB 12.3 11/16/2017     06/19/2019       Lab Results   Component Value Date    PROTEIN 1+ (A) 03/03/2020    GLUCOSEU 3+ (A) 03/03/2020    BLOODU 2+ (A) 03/03/2020    NITRITEU Negative 03/03/2020    LEUKOCYTESUR Small (1+) (A) 03/03/2020       Imaging:         Medical Tests:         Summary of old records / correspondence / consultant report:         Request outside records:         ALLERGIES  Allergies   Allergen Reactions   • Sulfamethoxazole Nausea  Only   • Trimethoprim Nausea Only   • Bactrim [Sulfamethoxazole-Trimethoprim] Nausea And Vomiting   • Fluticasone Furoate Unknown (See Comments)   • Vilanterol Unknown (See Comments)        PFSH:     The following portions of the patient's history were reviewed and updated as appropriate: Allergies / Current Medications / Past Medical History / Surgical History / Social History / Family History    PROBLEM LIST   Patient Active Problem List   Diagnosis   • Benign essential hypertension   • Mixed anxiety depressive disorder   • Type 2 diabetes mellitus with hyperglycemia, without long-term current use of insulin (CMS/HCC)   • Glaucoma   • Hyperlipidemia   • Insomnia   • Restless legs syndrome   • Rheumatoid arthritis (CMS/HCC)   • Vitamin D deficiency   • Routine health maintenance   • Midline low back pain without sciatica   • Encounter for immunization   • Neck pain on right side   • Microalbuminuria   • Encounter for screening colonoscopy   • Tubular adenoma of colon   • Polymyositis (CMS/HCC)       PAST MEDICAL HISTORY  Past Medical History:   Diagnosis Date   • Allergic rhinitis    • Arthritis    • Cancer (CMS/HCC)     skin leg   • Cataract    • Diabetes mellitus (CMS/HCC)     type 2   • Hypertension    • Long sleeper     post anesthesia       SURGICAL HISTORY  Past Surgical History:   Procedure Laterality Date   • COLONOSCOPY N/A 2/13/2018    Procedure: COLONOSCOPY to cecum and TI with hot snare polypectomy;  Surgeon: Rowan Smith MD;  Location: [facility] ENDOSCOPY;  Service:    • EYE SURGERY      tearduct sgy   • EYE SURGERY      MARY KAY CATARACTS   • HYSTERECTOMY     • TONSILLECTOMY         SOCIAL HISTORY  Social History     Socioeconomic History   • Marital status:      Spouse name: Not on file   • Number of children: 3   • Years of education: Not on file   • Highest education level: Not on file   Tobacco Use   • Smoking status: Never Smoker   • Smokeless tobacco: Never Used   Substance and Sexual  Activity   • Alcohol use: No     Comment: Occasional glass of wine one per month   • Drug use: No   Lifestyle   • Physical activity:     Days per week: 6 days     Minutes per session: 60 min   • Stress: Not on file       FAMILY HISTORY  Family History   Problem Relation Age of Onset   • Diabetes Mother    • Coronary artery disease Father         MI   • Diabetes Father    • Stroke Father          **[place] Disclaimer:   Much of this encounter note is an electronic transcription/translation of spoken language to printed text. The electronic translation of spoken language may permit erroneous, or at times, nonsensical words or phrases to be inadvertently transcribed. Although I have reviewed the note for such errors, some may still exist.

## 2020-05-18 ENCOUNTER — TELEPHONE (OUTPATIENT)
Dept: INTERNAL MEDICINE | Age: 80
End: 2020-05-18

## 2020-05-18 NOTE — TELEPHONE ENCOUNTER
----- Message from MAINE Sandoval sent at 5/18/2020  7:18 AM EDT -----  Results released to [facility]. Please call patient as well with results.     Godwin:     Here are the results of your ultrasound.     Ultrasound was negative for DVT.  Area of swelling consistent with hematoma as we discussed.  Please continue treatment as we discussed and follow-up as needed if symptoms do not improve in the next 2 to 4 weeks or if worsening.    [facility] GROVE

## 2020-05-19 ENCOUNTER — OFFICE VISIT (OUTPATIENT)
Dept: ENDOCRINOLOGY | Age: 80
End: 2020-05-19

## 2020-05-19 VITALS
SYSTOLIC BLOOD PRESSURE: 132 MMHG | HEIGHT: 67 IN | BODY MASS INDEX: 20.69 KG/M2 | WEIGHT: 131.8 LBS | DIASTOLIC BLOOD PRESSURE: 64 MMHG

## 2020-05-19 DIAGNOSIS — I10 BENIGN ESSENTIAL HYPERTENSION: ICD-10-CM

## 2020-05-19 DIAGNOSIS — E55.9 VITAMIN D DEFICIENCY: ICD-10-CM

## 2020-05-19 DIAGNOSIS — E78.2 MIXED HYPERLIPIDEMIA: ICD-10-CM

## 2020-05-19 DIAGNOSIS — R80.9 MICROALBUMINURIA: ICD-10-CM

## 2020-05-19 DIAGNOSIS — E11.65 TYPE 2 DIABETES MELLITUS WITH HYPERGLYCEMIA, WITHOUT LONG-TERM CURRENT USE OF INSULIN (HCC): Primary | ICD-10-CM

## 2020-05-19 DIAGNOSIS — E11.42 DIABETIC PERIPHERAL NEUROPATHY (HCC): ICD-10-CM

## 2020-05-19 PROCEDURE — 99214 OFFICE O/P EST MOD 30 MIN: CPT | Performed by: NURSE PRACTITIONER

## 2020-05-19 RX ORDER — GABAPENTIN 100 MG/1
CAPSULE ORAL
Qty: 90 CAPSULE | Refills: 2 | Status: SHIPPED | OUTPATIENT
Start: 2020-05-19 | End: 2020-11-19

## 2020-05-19 RX ORDER — LEVOTHYROXINE SODIUM 0.03 MG/1
25 TABLET ORAL DAILY
Qty: 30 TABLET | Refills: 5 | Status: SHIPPED | OUTPATIENT
Start: 2020-05-19 | End: 2020-12-01

## 2020-05-19 NOTE — PROGRESS NOTES
"Subjective   Lissa Ortiz is a 79 y.o. female is here today for follow-up.  Chief Complaint   Patient presents with   • Diabetes     type 2  checks blood sugar 2 times daily no meter    • Hypertension   • Hyperlipidemia   • Vitamin D Deficiency     /64   Ht 170.2 cm (67\")   Wt 59.8 kg (131 lb 12.8 oz)   LMP  (LMP Unknown)   BMI 20.64 kg/m²   Current Outpatient Medications on File Prior to Visit   Medication Sig   • amLODIPine (NORVASC) 5 MG tablet TAKE ONE TABLET BY MOUTH DAILY   • azelastine (ASTELIN) 0.1 % nasal spray 2 sprays into the nostril(s) as directed by provider 2 (Two) Times a Day. Use in each nostril as directed   • Biotin 10 MG capsule Take  by mouth.   • Blood Glucose Monitoring Suppl (TRUE METRIX AIR GLUCOSE METER) device 1 Device Daily As Needed (check bs's twice daily).   • cholecalciferol (VITAMIN D3) 1000 units tablet Take 1,000 Units by mouth Every Other Day.   • Cinnamon 500 MG capsule 500 mg 2 (Two) Times a Day.   • COMBIGAN 0.2-0.5 % ophthalmic solution    • folic acid (FOLVITE) 1 MG tablet Take 1 tablet by mouth daily.   • JANUMET  MG per tablet Take 1 tablet by mouth 2 (Two) Times a Day With Meals.   • JARDIANCE 25 MG tablet Take 25 mg by mouth Daily With Breakfast.   • Methotrexate Sodium 50 MG/2ML injection Inject 7 mg into the appropriate muscle as directed by prescriber 1 (One) Time Per Week.   • Multiple Vitamin (MULTI VITAMIN PO) Take 1 tablet by mouth Daily.   • Omega 3 1000 MG capsule Take 1 capsule by mouth.   • pravastatin (PRAVACHOL) 20 MG tablet TAKE ONE TABLET BY MOUTH DAILY   • Probiotic capsule Take 1 capsule by mouth.   • rOPINIRole (Requip) 0.25 MG tablet Take 1 tablet by mouth Every Night. Take 1 hour before bedtime.   • TRAVATAN Z 0.004 % solution ophthalmic solution Administer 1 drop to both eyes Every Night.   • vitamin B-12 (CYANOCOBALAMIN) 100 MCG tablet Take 1 tablet by mouth Every Other Day.   • [DISCONTINUED] pioglitazone (ACTOS) 30 MG tablet Take 1 " tablet by mouth Daily.   • [DISCONTINUED] amoxicillin-clavulanate (AUGMENTIN) 875-125 MG per tablet Take 1 tablet by mouth 2 (Two) Times a Day.     No current facility-administered medications on file prior to visit.      Family History   Problem Relation Age of Onset   • Diabetes Mother    • Coronary artery disease Father         MI   • Diabetes Father    • Stroke Father      Social History     Tobacco Use   • Smoking status: Never Smoker   • Smokeless tobacco: Never Used   Substance Use Topics   • Alcohol use: No     Comment: Occasional glass of wine one per month   • Drug use: No     Allergies   Allergen Reactions   • Sulfamethoxazole Nausea Only   • Trimethoprim Nausea Only   • Bactrim [Sulfamethoxazole-Trimethoprim] Nausea And Vomiting   • Fluticasone Furoate Unknown (See Comments)   • Vilanterol Unknown (See Comments)         History of Present Illness   Encounter Diagnoses   Name Primary?   • Benign essential hypertension    • Mixed hyperlipidemia    • Microalbuminuria    • Type 2 diabetes mellitus with hyperglycemia, without long-term current use of insulin (CMS/HCC) Yes   • Vitamin D deficiency    • Diabetic peripheral neuropathy (CMS/HCC)      78 yo female here for follow up. She is being seen for the above diagnosis. She wants to get off actos. She wants to start insulin which is surprising since she has been resistant to this in the past. She did not bring her meter.  She is currently checking her blood sugars twice daily.  She denies any hypoglycemic events.  Her hemoglobin A1c reflects uncontrolled diabetes at 8.4.  She again on her recent labs shows that she produces very little insulin on her own.  Again considerable education was provided at today's visit regarding pathophysiology of type 2 diabetes and we discussed her treatment options.  Her office notes from her primary care provider have been reviewed.  She has been complaining of weight loss.  It was suggested that maybe she was not tolerating  metformin.  She states she does not have any problems with diarrhea.  She states her weight has been stable at 131 and she does not want to quit taking the Janumet.  She has been advised that if she starts to have problems to contact the office and we can always make changes if needed.  We discussed goal for morning blood sugars of less than 120.  She was shown how to give a self injection today of Toujeo insulin 10 units.  She was given a sample at today's visit which will last her for 45 days.  She has been advised to contact the office with her blood sugar readings so that we can titrate up on the insulin dosage if needed however I am wanting her to be cautious with hypoglycemia.  A prescription for gabapentin was sent to her pharmacy and a Luca has been requested.  She is complained of issues of fatigue and is symptomatic with hypothyroidism.  Her TSH is slightly above normal.  She states she has been on thyroid medication in the past years ago.  We discussed starting a low-dose of thyroid hormone and monitoring her labs.    The following portions of the patient's history were reviewed and updated as appropriate: allergies, current medications, past family history, past medical history, past social history, past surgical history and problem list.    Review of Systems   Constitutional: Negative for appetite change and fatigue.   Eyes: Negative for visual disturbance.   Respiratory: Negative for cough.    Cardiovascular: Negative for leg swelling.   Gastrointestinal: Negative for constipation and diarrhea.   Endocrine: Positive for cold intolerance. Negative for heat intolerance, polydipsia, polyphagia and polyuria.   Genitourinary: Negative for frequency.   Neurological: Positive for numbness (feet).       Objective   Physical Exam   Constitutional: She is oriented to person, place, and time. She appears well-developed and well-nourished. No distress.   Wt stable at 131 lbs   HENT:   Head: Normocephalic and  atraumatic.   Right Ear: External ear normal.   Left Ear: External ear normal.   Nose: Nose normal.   Mouth/Throat: Oropharynx is clear and moist. No oropharyngeal exudate.   Eyes: Pupils are equal, round, and reactive to light. EOM are normal. Right eye exhibits no discharge. Left eye exhibits no discharge.   Neck: Trachea normal, normal range of motion and full passive range of motion without pain. Neck supple. No tracheal tenderness present. Carotid bruit is not present. No tracheal deviation, no edema and no erythema present. No thyroid mass and no thyromegaly present.   Cardiovascular: Normal rate, regular rhythm, normal heart sounds and intact distal pulses. Exam reveals no gallop and no friction rub.   No murmur heard.  Pulmonary/Chest: Effort normal and breath sounds normal. No stridor. No respiratory distress. She has no wheezes. She has no rales.   Abdominal: Soft. Bowel sounds are normal. She exhibits no distension.   Musculoskeletal: Normal range of motion. She exhibits no edema or deformity.   Lymphadenopathy:     She has no cervical adenopathy.   Neurological: She is alert and oriented to person, place, and time.   Neuropathy in lower ext. Hx of RLS   Skin: Skin is warm and dry. No rash noted. She is not diaphoretic. No erythema. No pallor.   Psychiatric: She has a normal mood and affect. Her behavior is normal. Judgment and thought content normal.   Nursing note and vitals reviewed.        Assessment/Plan   Problems Addressed this Visit        Cardiovascular and Mediastinum    Benign essential hypertension    Hyperlipidemia       Digestive    Vitamin D deficiency       Endocrine    Type 2 diabetes mellitus with hyperglycemia, without long-term current use of insulin (CMS/HCC) - Primary    Relevant Medications    Insulin Glargine, 1 Unit Dial, (Toujeo SoloStar) 300 UNIT/ML solution pen-injector injection       Nervous and Auditory    Diabetic peripheral neuropathy (CMS/HCC)       Other     Microalbuminuria        In summary patient was seen and examined.  She inquired about starting insulin based on her A1c.  We have discussed this in the past which upset her.  She has been suggested by her PCP to be taken off of metformin however she states she is tolerating it fine.  She denies any issues with diarrhea and her weight according to her last few visits has been stable at 131 pounds.  She will be started on Toujeo 10 units once daily.  She has been advised of the potential risk of hypoglycemia.  She has been advised of treatment of hypoglycemia.  She will be started on levothyroxine 25 mcg once daily on empty stomach.  She was given samples at today's visit of the Toujeo as well as the levothyroxine.  She will start gabapentin 100 mg up to 3 times daily.  She has been advised to start this with just 1 pill at bedtime to make sure she tolerates it and she can increase if needed.  A Luca has been requested.  She will follow-up with Dr. Bowser or myself in 6 months with labs prior.  She is been encouraged to reach out to me via my chart should she have any questions or concerns.         Start Toujeo 10 units once daily.  Be sure you are monitoring your blood sugars.  Bring your meter each visit.  Goal for your morning blood sugars is less than 120 mg/dL.  If you are waking up with blood sugars higher than that please contact the office and we can increase your insulin if needed.  I consider any blood sugars less than 70 as too low.  This is the only medication you will be taking that can possibly cause low blood sugars so just be aware.  If you need to treat a low blood sugar use 15 g of a simple sugar and example would be 6 or 7 lifesavers, 4 ounces of juice, or 4 ounces of regular soft drink  Start levothyroxine (synthroid) 25 mcg once daily on empty stomach  Since you are tolerating Janumet go ahead and continue to take it however if you develop diarrhea please contact the office  For complaints of  diabetic peripheral neuropathy start with gabapentin 100 mg up to 3 times daily.  I would start with 1 pill a day taken at bedtime to make sure you tolerated okay.  You can dose the other pills throughout the day or take all 3 at bedtime if needed    Start Toujeo 10 units once daily subcutaneous.  Be sure you are monitoring your blood sugars.  Bring your meter each visit.  Goal for your morning blood sugars is less than 120 mg/dL.  If you are waking up with blood sugars higher than that please contact the office and we can increase your insulin if needed.  I consider any blood sugars less than 70 as too low.  This is the only medication you will be taking that can possibly cause low blood sugars so just be aware.  If you need to treat a low blood sugar use 15 g of a simple sugar an example would be 6 or 7 lifesavers, 4 ounces of juice, or 4 ounces of regular soft drink    Stop pioglitozine and continue to monitor blood sugars   Start levothyroxine (synthroid) 25 mcg once daily on empty stomach for hypothyroid    Since you are tolerating Janumet go ahead and continue to take it however if you develop diarrhea please contact the office    For complaints of diabetic peripheral neuropathy start with gabapentin 100 mg up to 3 times daily.  I would start with 1 pill a day taken at bedtime to make sure you tolerated okay.  You can dose the other pills throughout the day or take all 3 at bedtime if needed    Hypothyroidism    Hypothyroidism is when the thyroid gland does not make enough of certain hormones (it is underactive). The thyroid gland is a small gland located in the lower front part of the neck, just in front of the windpipe (trachea). This gland makes hormones that help control how the body uses food for energy (metabolism) as well as how the heart and brain function. These hormones also play a role in keeping your bones strong. When the thyroid is underactive, it produces too little of the hormones thyroxine  (T4) and triiodothyronine (T3).  What are the causes?  This condition may be caused by:  · Hashimoto's disease. This is a disease in which the body's disease-fighting system (immune system) attacks the thyroid gland. This is the most common cause.  · Viral infections.  · Pregnancy.  · Certain medicines.  · Birth defects.  · Past radiation treatments to the head or neck for cancer.  · Past treatment with radioactive iodine.  · Past exposure to radiation in the environment.  · Past surgical removal of part or all of the thyroid.  · Problems with a gland in the center of the brain (pituitary gland).  · Lack of enough iodine in the diet.  What increases the risk?  You are more likely to develop this condition if:  · You are female.  · You have a family history of thyroid conditions.  · You use a medicine called lithium.  · You take medicines that affect the immune system (immunosuppressants).  What are the signs or symptoms?  Symptoms of this condition include:  · Feeling as though you have no energy (lethargy).  · Not being able to tolerate cold.  · Weight gain that is not explained by a change in diet or exercise habits.  · Lack of appetite.  · Dry skin.  · Coarse hair.  · Menstrual irregularity.  · Slowing of thought processes.  · Constipation.  · Sadness or depression.  How is this diagnosed?  This condition may be diagnosed based on:  · Your symptoms, your medical history, and a physical exam.  · Blood tests.  You may also have imaging tests, such as an ultrasound or MRI.  How is this treated?  This condition is treated with medicine that replaces the thyroid hormones that your body does not make. After you begin treatment, it may take several weeks for symptoms to go away.  Follow these instructions at home:  · Take over-the-counter and prescription medicines only as told by your health care provider.  · If you start taking any new medicines, tell your health care provider.  · Keep all follow-up visits as told by  your health care provider. This is important.  ? As your condition improves, your dosage of thyroid hormone medicine may change.  ? You will need to have blood tests regularly so that your health care provider can monitor your condition.  Contact a health care provider if:  · Your symptoms do not get better with treatment.  · You are taking thyroid replacement medicine and you:  ? Sweat a lot.  ? Have tremors.  ? Feel anxious.  ? Lose weight rapidly.  ? Cannot tolerate heat.  ? Have emotional swings.  ? Have diarrhea.  ? Feel weak.  Get help right away if you have:  · Chest pain.  · An irregular heartbeat.  · A rapid heartbeat.  · Difficulty breathing.  Summary  · Hypothyroidism is when the thyroid gland does not make enough of certain hormones (it is underactive).  · When the thyroid is underactive, it produces too little of the hormones thyroxine (T4) and triiodothyronine (T3).  · The most common cause is Hashimoto's disease, a disease in which the body's disease-fighting system (immune system) attacks the thyroid gland. The condition can also be caused by viral infections, medicine, pregnancy, or past radiation treatment to the head or neck.  · Symptoms may include weight gain, dry skin, constipation, feeling as though you do not have energy, and not being able to tolerate cold.  · This condition is treated with medicine to replace the thyroid hormones that your body does not make.  This information is not intended to replace advice given to you by your health care provider. Make sure you discuss any questions you have with your health care provider.  Document Released: 12/18/2006 Document Revised: 11/28/2018 Document Reviewed: 11/28/2018  [employer] Interactive Patient Education © 2020 [employer]    Neuropathic Pain  Neuropathic pain is pain caused by damage to the nerves that are responsible for certain sensations in your body (sensory nerves). The pain can be caused by:  · Damage to the sensory nerves that  send signals to your spinal cord and brain (peripheral nervous system).  · Damage to the sensory nerves in your brain or spinal cord (central nervous system).  Neuropathic pain can make you more sensitive to pain. Even a minor sensation can feel very painful. This is usually a long-term condition that can be difficult to treat. The type of pain differs from person to person. It may:  · Start suddenly (acute), or it may develop slowly and last for a long time (chronic).  · Come and go as damaged nerves heal, or it may stay at the same level for years.  · Cause emotional distress, loss of sleep, and a lower quality of life.  What are the causes?  The most common cause of this condition is diabetes. Many other diseases and conditions can also cause neuropathic pain. Causes of neuropathic pain can be classified as:  · Toxic. This is caused by medicines and chemicals. The most common cause of toxic neuropathic pain is damage from cancer treatments (chemotherapy).  · Metabolic. This can be caused by:  ? Diabetes. This is the most common disease that damages the nerves.  ? Lack of vitamin B from long-term alcohol abuse.  · Traumatic. Any injury that cuts, crushes, or stretches a nerve can cause damage and pain. A common example is feeling pain after losing an arm or leg (phantom limb pain).  · Compression-related. If a sensory nerve gets trapped or compressed for a long period of time, the blood supply to the nerve can be cut off.  · Vascular. Many blood vessel diseases can cause neuropathic pain by decreasing blood supply and oxygen to nerves.  · Autoimmune. This type of pain results from diseases in which the body's defense system (immune system) mistakenly attacks sensory nerves. Examples of autoimmune diseases that can cause neuropathic pain include lupus and multiple sclerosis.  · Infectious. Many types of viral infections can damage sensory nerves and cause pain. Shingles infection is a common cause of this type of  pain.  · Inherited. Neuropathic pain can be a symptom of many diseases that are passed down through families (genetic).  What increases the risk?  You are more likely to develop this condition if:  · You have diabetes.  · You smoke.  · You drink too much alcohol.  · You are taking certain medicines, including medicines that kill cancer cells (chemotherapy) or that treat immune system disorders.  What are the signs or symptoms?  The main symptom is pain. Neuropathic pain is often described as:  · Burning.  · Shock-like.  · Stinging.  · Hot or cold.  · Itching.  How is this diagnosed?  No single test can diagnose neuropathic pain. It is diagnosed based on:  · Physical exam and your symptoms. Your health care provider will ask you about your pain. You may be asked to use a pain scale to describe how bad your pain is.  · Tests. These may be done to see if you have a high sensitivity to pain and to help find the cause and location of any sensory nerve damage. They include:  ? Nerve conduction studies to test how well nerve signals travel through your sensory nerves (electrodiagnostic testing).  ? Stimulating your sensory nerves through electrodes on your skin and measuring the response in your spinal cord and brain (somatosensory evoked potential).  · Imaging studies, such as:  ? X-rays.  ? CT scan.  ? MRI.  How is this treated?  Treatment for neuropathic pain may change over time. You may need to try different treatment options or a combination of treatments. Some options include:  · Treating the underlying cause of the neuropathy, such as diabetes, kidney disease, or vitamin deficiencies.  · Stopping medicines that can cause neuropathy, such as chemotherapy.  · Medicine to relieve pain. Medicines may include:  ? Prescription or over-the-counter pain medicine.  ? Anti-seizure medicine.  ? Antidepressant medicines.  ? Pain-relieving patches that are applied to painful areas of skin.  ? A medicine to numb the area (local  anesthetic), which can be injected as a nerve block.  · Transcutaneous nerve stimulation. This uses electrical currents to block painful nerve signals. The treatment is painless.  · Alternative treatments, such as:  ? Acupuncture.  ? Meditation.  ? Massage.  ? Physical therapy.  ? Pain management programs.  ? Counseling.  Follow these instructions at home:  Medicines    · Take over-the-counter and prescription medicines only as told by your health care provider.  · Do not drive or use heavy machinery while taking prescription pain medicine.  · If you are taking prescription pain medicine, take actions to prevent or treat constipation. Your health care provider may recommend that you:  ? Drink enough fluid to keep your urine pale yellow.  ? Eat foods that are high in fiber, such as fresh fruits and vegetables, whole grains, and beans.  ? Limit foods that are high in fat and processed sugars, such as fried or sweet foods.  ? Take an over-the-counter or prescription medicine for constipation.  Lifestyle    · Have a good support system at home.  · Consider joining a chronic pain support group.  · Do not use any products that contain nicotine or tobacco, such as cigarettes and e-cigarettes. If you need help quitting, ask your health care provider.  · Do not drink alcohol.  General instructions  · Learn as much as you can about your condition.  · Work closely with all your health care providers to find the treatment plan that works best for you.  · Ask your health care provider what activities are safe for you.  · Keep all follow-up visits as told by your health care provider. This is important.  Contact a health care provider if:  · Your pain treatments are not working.  · You are having side effects from your medicines.  · You are struggling with tiredness (fatigue), mood changes, depression, or anxiety.  Summary  · Neuropathic pain is pain caused by damage to the nerves that are responsible for certain sensations in  your body (sensory nerves).  · Neuropathic pain may come and go as damaged nerves heal, or it may stay at the same level for years.  · Neuropathic pain is usually a long-term condition that can be difficult to treat. Consider joining a chronic pain support group.  This information is not intended to replace advice given to you by your health care provider. Make sure you discuss any questions you have with your health care provider.  Document Released: 09/14/2005 Document Revised: 01/04/2019 Document Reviewed: 01/04/2019  [employer] Interactive Patient Education © 2020 [employer]  Gabapentin capsules or tablets  What is this medicine?  GABAPENTIN (GA ba pen tin) is used to control seizures in certain types of epilepsy. It is also used to treat certain types of nerve pain.  This medicine may be used for other purposes; ask your health care provider or pharmacist if you have questions.  COMMON BRAND NAME(S): Active-PAC with Gabapentin, Gabarone, Neurontin  What should I tell my health care provider before I take this medicine?  They need to know if you have any of these conditions:  -history of drug abuse or alcohol abuse problem  -kidney disease  -lung or breathing disease  -suicidal thoughts, plans, or attempt; a previous suicide attempt by you or a family member  -an unusual or allergic reaction to gabapentin, other medicines, foods, dyes, or preservatives  -pregnant or trying to get pregnant  -breast-feeding  How should I use this medicine?  Take this medicine by mouth with a glass of water. Follow the directions on the prescription label. You can take it with or without food. If it upsets your stomach, take it with food. Take your medicine at regular intervals. Do not take it more often than directed. Do not stop taking except on your doctor's advice.  If you are directed to break the 600 or 800 mg tablets in half as part of your dose, the extra half tablet should be used for the next dose. If you have not used  the extra half tablet within 28 days, it should be thrown away.  A special MedGuide will be given to you by the pharmacist with each prescription and refill. Be sure to read this information carefully each time.  Talk to your pediatrician regarding the use of this medicine in children. While this drug may be prescribed for children as young as 3 years for selected conditions, precautions do apply.  Overdosage: If you think you have taken too much of this medicine contact a poison control center or emergency room at once.  NOTE: This medicine is only for you. Do not share this medicine with others.  What if I miss a dose?  If you miss a dose, take it as soon as you can. If it is almost time for your next dose, take only that dose. Do not take double or extra doses.  What may interact with this medicine?  This medicine may interact with the following medications:  -alcohol  -antihistamines for allergy, cough, and cold  -certain medicines for anxiety or sleep  -certain medicines for depression like amitriptyline, fluoxetine, sertraline  -certain medicines for seizures like phenobarbital, primidone  -certain medicines for stomach problems  -general anesthetics like halothane, isoflurane, methoxyflurane, propofol  -local anesthetics like lidocaine, pramoxine, tetracaine  -medicines that relax muscles for surgery  -narcotic medicines for pain  -phenothiazines like chlorpromazine, mesoridazine, prochlorperazine, thioridazine  This list may not describe all possible interactions. Give your health care provider a list of all the medicines, herbs, non-prescription drugs, or dietary supplements you use. Also tell them if you smoke, drink alcohol, or use illegal drugs. Some items may interact with your medicine.  What should I watch for while using this medicine?  Visit your doctor or health care professional for regular checks on your progress. You may want to keep a record at home of how you feel your condition is responding  to treatment. You may want to share this information with your doctor or health care professional at each visit. You should contact your doctor or health care professional if your seizures get worse or if you have any new types of seizures. Do not stop taking this medicine or any of your seizure medicines unless instructed by your doctor or health care professional. Stopping your medicine suddenly can increase your seizures or their severity.  Wear a medical identification bracelet or chain if you are taking this medicine for seizures, and carry a card that lists all your medications.  You may get drowsy, dizzy, or have blurred vision. Do not drive, use machinery, or do anything that needs mental alertness until you know how this medicine affects you. To reduce dizzy or fainting spells, do not sit or stand up quickly, especially if you are an older patient. Alcohol can increase drowsiness and dizziness. Avoid alcoholic drinks.  Your mouth may get dry. Chewing sugarless gum or sucking hard candy, and drinking plenty of water will help.  The use of this medicine may increase the chance of suicidal thoughts or actions. Pay special attention to how you are responding while on this medicine. Any worsening of mood, or thoughts of suicide or dying should be reported to your health care professional right away.  Women who become pregnant while using this medicine may enroll in the [place] Antiepileptic Drug Pregnancy Registry by calling 1-687.818.7434. This registry collects information about the safety of antiepileptic drug use during pregnancy.  What side effects may I notice from receiving this medicine?  Side effects that you should report to your doctor or health care professional as soon as possible:  -allergic reactions like skin rash, itching or hives, swelling of the face, lips, or tongue  -breathing problems  -suicidal thoughts, mood changes  Side effects that usually do not require medical attention  (report to your doctor or health care professional if they continue or are bothersome):  -dizziness  -drowsiness  -headache  -nausea, vomiting  -swelling of ankles, feet, hands  -tiredness  This list may not describe all possible side effects. Call your doctor for medical advice about side effects. You may report side effects to FDA at 9-942-FDA-9718.  Where should I keep my medicine?  Keep out of reach of children.  This medicine may cause accidental overdose and death if it taken by other adults, children, or pets. Mix any unused medicine with a substance like cat litter or coffee grounds. Then throw the medicine away in a sealed container like a sealed bag or a coffee can with a lid. Do not use the medicine after the expiration date.  Store at room temperature between 15 and 30 degrees C (59 and 86 degrees F).  NOTE: This sheet is a summary. It may not cover all possible information. If you have questions about this medicine, talk to your doctor, pharmacist, or health care provider.  © 2020 [employer] (2019-12-20 12:53:09)  Levothyroxine injection  What is this medicine?  LEVOTHYROXINE (rogers voe thye FEROZ een) is a thyroid hormone. This medicine can improve symptoms of thyroid deficiency such as slow speech, lack of energy, weight gain, hair loss, dry skin, and feeling cold. It also helps to treat goiter (an enlarged thyroid gland).  This medicine may be used for other purposes; ask your health care provider or pharmacist if you have questions.  COMMON BRAND NAME(S): Synthroid  What should I tell my health care provider before I take this medicine?  They need to know if you have any of these conditions:  -Capac's disease or other adrenal gland problem  -angina  -bone problems  -diabetes  -dieting or on a weight loss program  -fertility problems  -heart disease  -pituitary gland problem  -take medicines that treat or prevent blood clots  -an unusual or allergic reaction to levothyroxine, thyroid  hormones, other medicines, foods, dyes, or preservatives  -pregnant or trying to get pregnant  -breast-feeding  How should I use this medicine?  This medicine is for injection into a muscle or into a vein. It is given by a health care professional in a hospital or clinic setting.  Contact your pediatrician regarding the use of this medicine in children. While this drug may be prescribed for children and infants as young as a few days of age for selected conditions, precautions do apply.  Overdosage: If you think you have taken too much of this medicine contact a poison control center or emergency room at once.  NOTE: This medicine is only for you. Do not share this medicine with others.  What if I miss a dose?  This does not apply.  What may interact with this medicine?  -amiodarone  -carbamazepine  -certain medicines for depression  -certain medicines to treat cancer  -clofibrate  -digoxin  -female hormones, like estrogens and birth control pills, patches, rings, or injections  -ketamine  -lithium  -medicines for colds and breathing difficulties  -medicines for diabetes  -medicines or dietary supplements for weight loss  -methadone  -oxandrolone  -phenobarbital or other barbiturates  -phenytoin  -rifampin  -soy isoflavones  -steroid medicines like prednisone or cortisone  -testosterone  -theophylline  -warfarin  This list may not describe all possible interactions. Give your health care provider a list of all the medicines, herbs, non-prescription drugs, or dietary supplements you use. Also tell them if you smoke, drink alcohol, or use illegal drugs. Some items may interact with your medicine.  What should I watch for while using this medicine?  You will need regular exams and occasional blood tests to check the response to treatment. If you are receiving this medicine for an underactive thyroid, it may be several weeks before you notice an improvement. Check with your doctor or health care professional if your  symptoms do not improve.  It may be necessary for you to take this medicine for the rest of your life. Do not stop using this medicine unless your doctor or health care professional advises you to.  This medicine can affect blood sugar levels. If you have diabetes, check your blood sugar as directed.  You may lose some of your hair when you first start treatment. With time, this usually corrects itself.  If you are going to have surgery, tell your doctor or health care professional that you are taking this medicine.  What side effects may I notice from receiving this medicine?  Side effects that you should report to your doctor or health care professional as soon as possible:  -allergic reactions like skin rash, itching or hives, swelling of the face, lips, or tongue  -anxious  -breathing problems  -changes in menstrual periods  -chest pain  -diarrhea  -excessive sweating or intolerance to heat  -fast or irregular heartbeat  -leg cramps  -nervousness  -swelling of ankles, feet, or legs  -tremors  -trouble sleeping  -vomiting  Side effects that usually do not require medical attention (report to your doctor or health care professional if they continue or are bothersome):  -changes in appetite  -headache  -irritable  -nausea  -weight loss  This list may not describe all possible side effects. Call your doctor for medical advice about side effects. You may report side effects to FDA at 3-674-FDA-5613.  Where should I keep my medicine?  This does not apply. This medicine will be given to you in a hospital or health clinic setting. You will not store this medicine at home.  NOTE: This sheet is a summary. It may not cover all possible information. If you have questions about this medicine, talk to your doctor, pharmacist, or health care provider.  © 2020 [employer] (2018-01-29 15:37:52)    Hypoglycemia  Hypoglycemia is when the sugar (glucose) level in your blood is too low. Signs of low blood sugar may  include:  · Feeling:  ? Hungry.  ? Worried or nervous (anxious).  ? Sweaty and clammy.  ? Confused.  ? Dizzy.  ? Sleepy.  ? Sick to your stomach (nauseous).  · Having:  ? A fast heartbeat.  ? A headache.  ? A change in your vision.  ? Tingling or no feeling (numbness) around your mouth, lips, or tongue.  ? Jerky movements that you cannot control (seizure).  · Having trouble with:  ? Moving (coordination).  ? Sleeping.  ? Passing out (fainting).  ? Getting upset easily (irritability).  Low blood sugar can happen to people who have diabetes and people who do not have diabetes. Low blood sugar can happen quickly, and it can be an emergency.  Treating low blood sugar  Low blood sugar is often treated by eating or drinking something sugary right away, such as:  · Fruit juice, 4-6 oz (120-150 mL).  · Regular soda (not diet soda), 4-6 oz (120-150 mL).  · Low-fat milk, 4 oz (120 mL).  · Several pieces of hard candy.  · Sugar or honey, 1 Tbsp (15 mL).  Treating low blood sugar if you have diabetes  If you can think clearly and swallow safely, follow the 15:15 rule:  · Take 15 grams of a fast-acting carb (carbohydrate). Talk with your doctor about how much you should take.  · Always keep a source of fast-acting carb with you, such as:  ? Sugar tablets (glucose pills). Take 3-4 pills.  ? 6-8 pieces of hard candy.  ? 4-6 oz (120-150 mL) of fruit juice.  ? 4-6 oz (120-150 mL) of regular (not diet) soda.  ? 1 Tbsp (15 mL) honey or sugar.  · Check your blood sugar 15 minutes after you take the carb.  · If your blood sugar is still at or below 70 mg/dL (3.9 mmol/L), take 15 grams of a carb again.  · If your blood sugar does not go above 70 mg/dL (3.9 mmol/L) after 3 tries, get help right away.  · After your blood sugar goes back to normal, eat a meal or a snack within 1 hour.    Treating very low blood sugar  If your blood sugar is at or below 54 mg/dL (3 mmol/L), you have very low blood sugar (severe hypoglycemia). This may also  cause:  · Passing out.  · Jerky movements you cannot control (seizure).  · Losing consciousness (coma).  This is an emergency. Do not wait to see if the symptoms will go away. Get medical help right away. Call your local emergency services (911 in the [place] Do not drive yourself to the hospital.  If you have very low blood sugar and you cannot eat or drink, you may need a glucagon shot (injection). A family member or friend should learn how to check your blood sugar and how to give you a glucagon shot. Ask your doctor if you need to have a glucagon shot kit at home.  Follow these instructions at home:  General instructions  · Take over-the-counter and prescription medicines only as told by your doctor.  · Stay aware of your blood sugar as told by your doctor.  · Limit alcohol intake to no more than 1 drink a day for nonpregnant women and 2 drinks a day for men. One drink equals 12 oz of beer (355 mL), 5 oz of wine (148 mL), or 1½ oz of hard liquor (44 mL).  · Keep all follow-up visits as told by your doctor. This is important.  If you have diabetes:    · Follow your diabetes care plan as told by your doctor. Make sure you:  ? Know the signs of low blood sugar.  ? Take your medicines as told.  ? Follow your exercise and meal plan.  ? Eat on time. Do not skip meals.  ? Check your blood sugar as often as told by your doctor. Always check it before and after exercise.  ? Follow your sick day plan when you cannot eat or drink normally. Make this plan ahead of time with your doctor.  · Share your diabetes care plan with:  ? Your work or school.  ? People you live with.  · Check your pee (urine) for ketones:  ? When you are sick.  ? As told by your doctor.  · Carry a card or wear jewelry that says you have diabetes.  Contact a doctor if:  · You have trouble keeping your blood sugar in your target range.  · You have low blood sugar often.  Get help right away if:  · You still have symptoms after you eat or drink something  sugary.  · Your blood sugar is at or below 54 mg/dL (3 mmol/L).  · You have jerky movements that you cannot control.  · You pass out.  These symptoms may be an emergency. Do not wait to see if the symptoms will go away. Get medical help right away. Call your local emergency services (911 in the [place] Do not drive yourself to the hospital.  Summary  · Hypoglycemia happens when the level of sugar (glucose) in your blood is too low.  · Low blood sugar can happen to people who have diabetes and people who do not have diabetes. Low blood sugar can happen quickly, and it can be an emergency.  · Make sure you know the signs of low blood sugar and know how to treat it.  · Always keep a source of sugar (fast-acting carb) with you to treat low blood sugar.  This information is not intended to replace advice given to you by your health care provider. Make sure you discuss any questions you have with your health care provider.  Document Released: 03/14/2011 Document Revised: 06/11/2019 Document Reviewed: 01/20/2017  [employer] Patient Education © 2020 [employer]

## 2020-05-19 NOTE — PATIENT INSTRUCTIONS
Start Toujeo 10 units once daily subcutaneous.  Be sure you are monitoring your blood sugars.  Bring your meter each visit.  Goal for your morning blood sugars is less than 120 mg/dL.  If you are waking up with blood sugars higher than that please contact the office and we can increase your insulin if needed.  I consider any blood sugars less than 70 as too low.  This is the only medication you will be taking that can possibly cause low blood sugars so just be aware.  If you need to treat a low blood sugar use 15 g of a simple sugar an example would be 6 or 7 lifesavers, 4 ounces of juice, or 4 ounces of regular soft drink    Stop pioglitozine and continue to monitor blood sugars   Start levothyroxine (synthroid) 25 mcg once daily on empty stomach for hypothyroid    Since you are tolerating Janumet go ahead and continue to take it however if you develop diarrhea please contact the office    For complaints of diabetic peripheral neuropathy start with gabapentin 100 mg up to 3 times daily.  I would start with 1 pill a day taken at bedtime to make sure you tolerated okay.  You can dose the other pills throughout the day or take all 3 at bedtime if needed    Hypothyroidism    Hypothyroidism is when the thyroid gland does not make enough of certain hormones (it is underactive). The thyroid gland is a small gland located in the lower front part of the neck, just in front of the windpipe (trachea). This gland makes hormones that help control how the body uses food for energy (metabolism) as well as how the heart and brain function. These hormones also play a role in keeping your bones strong. When the thyroid is underactive, it produces too little of the hormones thyroxine (T4) and triiodothyronine (T3).  What are the causes?  This condition may be caused by:  · Hashimoto's disease. This is a disease in which the body's disease-fighting system (immune system) attacks the thyroid gland. This is the most common  cause.  · Viral infections.  · Pregnancy.  · Certain medicines.  · Birth defects.  · Past radiation treatments to the head or neck for cancer.  · Past treatment with radioactive iodine.  · Past exposure to radiation in the environment.  · Past surgical removal of part or all of the thyroid.  · Problems with a gland in the center of the brain (pituitary gland).  · Lack of enough iodine in the diet.  What increases the risk?  You are more likely to develop this condition if:  · You are female.  · You have a family history of thyroid conditions.  · You use a medicine called lithium.  · You take medicines that affect the immune system (immunosuppressants).  What are the signs or symptoms?  Symptoms of this condition include:  · Feeling as though you have no energy (lethargy).  · Not being able to tolerate cold.  · Weight gain that is not explained by a change in diet or exercise habits.  · Lack of appetite.  · Dry skin.  · Coarse hair.  · Menstrual irregularity.  · Slowing of thought processes.  · Constipation.  · Sadness or depression.  How is this diagnosed?  This condition may be diagnosed based on:  · Your symptoms, your medical history, and a physical exam.  · Blood tests.  You may also have imaging tests, such as an ultrasound or MRI.  How is this treated?  This condition is treated with medicine that replaces the thyroid hormones that your body does not make. After you begin treatment, it may take several weeks for symptoms to go away.  Follow these instructions at home:  · Take over-the-counter and prescription medicines only as told by your health care provider.  · If you start taking any new medicines, tell your health care provider.  · Keep all follow-up visits as told by your health care provider. This is important.  ? As your condition improves, your dosage of thyroid hormone medicine may change.  ? You will need to have blood tests regularly so that your health care provider can monitor your  condition.  Contact a health care provider if:  · Your symptoms do not get better with treatment.  · You are taking thyroid replacement medicine and you:  ? Sweat a lot.  ? Have tremors.  ? Feel anxious.  ? Lose weight rapidly.  ? Cannot tolerate heat.  ? Have emotional swings.  ? Have diarrhea.  ? Feel weak.  Get help right away if you have:  · Chest pain.  · An irregular heartbeat.  · A rapid heartbeat.  · Difficulty breathing.  Summary  · Hypothyroidism is when the thyroid gland does not make enough of certain hormones (it is underactive).  · When the thyroid is underactive, it produces too little of the hormones thyroxine (T4) and triiodothyronine (T3).  · The most common cause is Hashimoto's disease, a disease in which the body's disease-fighting system (immune system) attacks the thyroid gland. The condition can also be caused by viral infections, medicine, pregnancy, or past radiation treatment to the head or neck.  · Symptoms may include weight gain, dry skin, constipation, feeling as though you do not have energy, and not being able to tolerate cold.  · This condition is treated with medicine to replace the thyroid hormones that your body does not make.  This information is not intended to replace advice given to you by your health care provider. Make sure you discuss any questions you have with your health care provider.  Document Released: 12/18/2006 Document Revised: 11/28/2018 Document Reviewed: 11/28/2018  [email or web address] Interactive Patient Education © 2020 [employer]    Neuropathic Pain  Neuropathic pain is pain caused by damage to the nerves that are responsible for certain sensations in your body (sensory nerves). The pain can be caused by:  · Damage to the sensory nerves that send signals to your spinal cord and brain (peripheral nervous system).  · Damage to the sensory nerves in your brain or spinal cord (central nervous system).  Neuropathic pain can make you more sensitive to pain. Even a minor  sensation can feel very painful. This is usually a long-term condition that can be difficult to treat. The type of pain differs from person to person. It may:  · Start suddenly (acute), or it may develop slowly and last for a long time (chronic).  · Come and go as damaged nerves heal, or it may stay at the same level for years.  · Cause emotional distress, loss of sleep, and a lower quality of life.  What are the causes?  The most common cause of this condition is diabetes. Many other diseases and conditions can also cause neuropathic pain. Causes of neuropathic pain can be classified as:  · Toxic. This is caused by medicines and chemicals. The most common cause of toxic neuropathic pain is damage from cancer treatments (chemotherapy).  · Metabolic. This can be caused by:  ? Diabetes. This is the most common disease that damages the nerves.  ? Lack of vitamin B from long-term alcohol abuse.  · Traumatic. Any injury that cuts, crushes, or stretches a nerve can cause damage and pain. A common example is feeling pain after losing an arm or leg (phantom limb pain).  · Compression-related. If a sensory nerve gets trapped or compressed for a long period of time, the blood supply to the nerve can be cut off.  · Vascular. Many blood vessel diseases can cause neuropathic pain by decreasing blood supply and oxygen to nerves.  · Autoimmune. This type of pain results from diseases in which the body's defense system (immune system) mistakenly attacks sensory nerves. Examples of autoimmune diseases that can cause neuropathic pain include lupus and multiple sclerosis.  · Infectious. Many types of viral infections can damage sensory nerves and cause pain. Shingles infection is a common cause of this type of pain.  · Inherited. Neuropathic pain can be a symptom of many diseases that are passed down through families (genetic).  What increases the risk?  You are more likely to develop this condition if:  · You have diabetes.  · You  smoke.  · You drink too much alcohol.  · You are taking certain medicines, including medicines that kill cancer cells (chemotherapy) or that treat immune system disorders.  What are the signs or symptoms?  The main symptom is pain. Neuropathic pain is often described as:  · Burning.  · Shock-like.  · Stinging.  · Hot or cold.  · Itching.  How is this diagnosed?  No single test can diagnose neuropathic pain. It is diagnosed based on:  · Physical exam and your symptoms. Your health care provider will ask you about your pain. You may be asked to use a pain scale to describe how bad your pain is.  · Tests. These may be done to see if you have a high sensitivity to pain and to help find the cause and location of any sensory nerve damage. They include:  ? Nerve conduction studies to test how well nerve signals travel through your sensory nerves (electrodiagnostic testing).  ? Stimulating your sensory nerves through electrodes on your skin and measuring the response in your spinal cord and brain (somatosensory evoked potential).  · Imaging studies, such as:  ? X-rays.  ? CT scan.  ? MRI.  How is this treated?  Treatment for neuropathic pain may change over time. You may need to try different treatment options or a combination of treatments. Some options include:  · Treating the underlying cause of the neuropathy, such as diabetes, kidney disease, or vitamin deficiencies.  · Stopping medicines that can cause neuropathy, such as chemotherapy.  · Medicine to relieve pain. Medicines may include:  ? Prescription or over-the-counter pain medicine.  ? Anti-seizure medicine.  ? Antidepressant medicines.  ? Pain-relieving patches that are applied to painful areas of skin.  ? A medicine to numb the area (local anesthetic), which can be injected as a nerve block.  · Transcutaneous nerve stimulation. This uses electrical currents to block painful nerve signals. The treatment is painless.  · Alternative treatments, such  as:  ? Acupuncture.  ? Meditation.  ? Massage.  ? Physical therapy.  ? Pain management programs.  ? Counseling.  Follow these instructions at home:  Medicines    · Take over-the-counter and prescription medicines only as told by your health care provider.  · Do not drive or use heavy machinery while taking prescription pain medicine.  · If you are taking prescription pain medicine, take actions to prevent or treat constipation. Your health care provider may recommend that you:  ? Drink enough fluid to keep your urine pale yellow.  ? Eat foods that are high in fiber, such as fresh fruits and vegetables, whole grains, and beans.  ? Limit foods that are high in fat and processed sugars, such as fried or sweet foods.  ? Take an over-the-counter or prescription medicine for constipation.  Lifestyle    · Have a good support system at home.  · Consider joining a chronic pain support group.  · Do not use any products that contain nicotine or tobacco, such as cigarettes and e-cigarettes. If you need help quitting, ask your health care provider.  · Do not drink alcohol.  General instructions  · Learn as much as you can about your condition.  · Work closely with all your health care providers to find the treatment plan that works best for you.  · Ask your health care provider what activities are safe for you.  · Keep all follow-up visits as told by your health care provider. This is important.  Contact a health care provider if:  · Your pain treatments are not working.  · You are having side effects from your medicines.  · You are struggling with tiredness (fatigue), mood changes, depression, or anxiety.  Summary  · Neuropathic pain is pain caused by damage to the nerves that are responsible for certain sensations in your body (sensory nerves).  · Neuropathic pain may come and go as damaged nerves heal, or it may stay at the same level for years.  · Neuropathic pain is usually a long-term condition that can be difficult to  treat. Consider joining a chronic pain support group.  This information is not intended to replace advice given to you by your health care provider. Make sure you discuss any questions you have with your health care provider.  Document Released: 09/14/2005 Document Revised: 01/04/2019 Document Reviewed: 01/04/2019  [employer] Interactive Patient Education © 2020 [employer]  Gabapentin capsules or tablets  What is this medicine?  GABAPENTIN (GA ba pen tin) is used to control seizures in certain types of epilepsy. It is also used to treat certain types of nerve pain.  This medicine may be used for other purposes; ask your health care provider or pharmacist if you have questions.  COMMON BRAND NAME(S): Active-PAC with Gabapentin, Gabarone, Neurontin  What should I tell my health care provider before I take this medicine?  They need to know if you have any of these conditions:  -history of drug abuse or alcohol abuse problem  -kidney disease  -lung or breathing disease  -suicidal thoughts, plans, or attempt; a previous suicide attempt by you or a family member  -an unusual or allergic reaction to gabapentin, other medicines, foods, dyes, or preservatives  -pregnant or trying to get pregnant  -breast-feeding  How should I use this medicine?  Take this medicine by mouth with a glass of water. Follow the directions on the prescription label. You can take it with or without food. If it upsets your stomach, take it with food. Take your medicine at regular intervals. Do not take it more often than directed. Do not stop taking except on your doctor's advice.  If you are directed to break the 600 or 800 mg tablets in half as part of your dose, the extra half tablet should be used for the next dose. If you have not used the extra half tablet within 28 days, it should be thrown away.  A special MedGuide will be given to you by the pharmacist with each prescription and refill. Be sure to read this information carefully each  time.  Talk to your pediatrician regarding the use of this medicine in children. While this drug may be prescribed for children as young as 3 years for selected conditions, precautions do apply.  Overdosage: If you think you have taken too much of this medicine contact a poison control center or emergency room at once.  NOTE: This medicine is only for you. Do not share this medicine with others.  What if I miss a dose?  If you miss a dose, take it as soon as you can. If it is almost time for your next dose, take only that dose. Do not take double or extra doses.  What may interact with this medicine?  This medicine may interact with the following medications:  -alcohol  -antihistamines for allergy, cough, and cold  -certain medicines for anxiety or sleep  -certain medicines for depression like amitriptyline, fluoxetine, sertraline  -certain medicines for seizures like phenobarbital, primidone  -certain medicines for stomach problems  -general anesthetics like halothane, isoflurane, methoxyflurane, propofol  -local anesthetics like lidocaine, pramoxine, tetracaine  -medicines that relax muscles for surgery  -narcotic medicines for pain  -phenothiazines like chlorpromazine, mesoridazine, prochlorperazine, thioridazine  This list may not describe all possible interactions. Give your health care provider a list of all the medicines, herbs, non-prescription drugs, or dietary supplements you use. Also tell them if you smoke, drink alcohol, or use illegal drugs. Some items may interact with your medicine.  What should I watch for while using this medicine?  Visit your doctor or health care professional for regular checks on your progress. You may want to keep a record at home of how you feel your condition is responding to treatment. You may want to share this information with your doctor or health care professional at each visit. You should contact your doctor or health care professional if your seizures get worse or if  you have any new types of seizures. Do not stop taking this medicine or any of your seizure medicines unless instructed by your doctor or health care professional. Stopping your medicine suddenly can increase your seizures or their severity.  Wear a medical identification bracelet or chain if you are taking this medicine for seizures, and carry a card that lists all your medications.  You may get drowsy, dizzy, or have blurred vision. Do not drive, use machinery, or do anything that needs mental alertness until you know how this medicine affects you. To reduce dizzy or fainting spells, do not sit or stand up quickly, especially if you are an older patient. Alcohol can increase drowsiness and dizziness. Avoid alcoholic drinks.  Your mouth may get dry. Chewing sugarless gum or sucking hard candy, and drinking plenty of water will help.  The use of this medicine may increase the chance of suicidal thoughts or actions. Pay special attention to how you are responding while on this medicine. Any worsening of mood, or thoughts of suicide or dying should be reported to your health care professional right away.  Women who become pregnant while using this medicine may enroll in the [place] Antiepileptic Drug Pregnancy Registry by calling 1-787.232.1153. This registry collects information about the safety of antiepileptic drug use during pregnancy.  What side effects may I notice from receiving this medicine?  Side effects that you should report to your doctor or health care professional as soon as possible:  -allergic reactions like skin rash, itching or hives, swelling of the face, lips, or tongue  -breathing problems  -suicidal thoughts, mood changes  Side effects that usually do not require medical attention (report to your doctor or health care professional if they continue or are bothersome):  -dizziness  -drowsiness  -headache  -nausea, vomiting  -swelling of ankles, feet, hands  -tiredness  This list may not  describe all possible side effects. Call your doctor for medical advice about side effects. You may report side effects to FDA at 2-231-FDA-5154.  Where should I keep my medicine?  Keep out of reach of children.  This medicine may cause accidental overdose and death if it taken by other adults, children, or pets. Mix any unused medicine with a substance like cat litter or coffee grounds. Then throw the medicine away in a sealed container like a sealed bag or a coffee can with a lid. Do not use the medicine after the expiration date.  Store at room temperature between 15 and 30 degrees C (59 and 86 degrees F).  NOTE: This sheet is a summary. It may not cover all possible information. If you have questions about this medicine, talk to your doctor, pharmacist, or health care provider.  © 2020 [employer] Standard (2019-12-20 12:53:09)  Levothyroxine injection  What is this medicine?  LEVOTHYROXINE (rogers voe thye FEROZ een) is a thyroid hormone. This medicine can improve symptoms of thyroid deficiency such as slow speech, lack of energy, weight gain, hair loss, dry skin, and feeling cold. It also helps to treat goiter (an enlarged thyroid gland).  This medicine may be used for other purposes; ask your health care provider or pharmacist if you have questions.  COMMON BRAND NAME(S): Synthroid  What should I tell my health care provider before I take this medicine?  They need to know if you have any of these conditions:  -[place]agu[place]'s disease or other adrenal gland problem  -angina  -bone problems  -diabetes  -dieting or on a weight loss program  -fertility problems  -heart disease  -pituitary gland problem  -take medicines that treat or prevent blood clots  -an unusual or allergic reaction to levothyroxine, thyroid hormones, other medicines, foods, dyes, or preservatives  -pregnant or trying to get pregnant  -breast-feeding  How should I use this medicine?  This medicine is for injection into a muscle or into a vein. It is  given by a health care professional in a hospital or clinic setting.  Contact your pediatrician regarding the use of this medicine in children. While this drug may be prescribed for children and infants as young as a few days of age for selected conditions, precautions do apply.  Overdosage: If you think you have taken too much of this medicine contact a poison control center or emergency room at once.  NOTE: This medicine is only for you. Do not share this medicine with others.  What if I miss a dose?  This does not apply.  What may interact with this medicine?  -amiodarone  -carbamazepine  -certain medicines for depression  -certain medicines to treat cancer  -clofibrate  -digoxin  -female hormones, like estrogens and birth control pills, patches, rings, or injections  -ketamine  -lithium  -medicines for colds and breathing difficulties  -medicines for diabetes  -medicines or dietary supplements for weight loss  -methadone  -oxandrolone  -phenobarbital or other barbiturates  -phenytoin  -rifampin  -soy isoflavones  -steroid medicines like prednisone or cortisone  -testosterone  -theophylline  -warfarin  This list may not describe all possible interactions. Give your health care provider a list of all the medicines, herbs, non-prescription drugs, or dietary supplements you use. Also tell them if you smoke, drink alcohol, or use illegal drugs. Some items may interact with your medicine.  What should I watch for while using this medicine?  You will need regular exams and occasional blood tests to check the response to treatment. If you are receiving this medicine for an underactive thyroid, it may be several weeks before you notice an improvement. Check with your doctor or health care professional if your symptoms do not improve.  It may be necessary for you to take this medicine for the rest of your life. Do not stop using this medicine unless your doctor or health care professional advises you to.  This medicine  can affect blood sugar levels. If you have diabetes, check your blood sugar as directed.  You may lose some of your hair when you first start treatment. With time, this usually corrects itself.  If you are going to have surgery, tell your doctor or health care professional that you are taking this medicine.  What side effects may I notice from receiving this medicine?  Side effects that you should report to your doctor or health care professional as soon as possible:  -allergic reactions like skin rash, itching or hives, swelling of the face, lips, or tongue  -anxious  -breathing problems  -changes in menstrual periods  -chest pain  -diarrhea  -excessive sweating or intolerance to heat  -fast or irregular heartbeat  -leg cramps  -nervousness  -swelling of ankles, feet, or legs  -tremors  -trouble sleeping  -vomiting  Side effects that usually do not require medical attention (report to your doctor or health care professional if they continue or are bothersome):  -changes in appetite  -headache  -irritable  -nausea  -weight loss  This list may not describe all possible side effects. Call your doctor for medical advice about side effects. You may report side effects to FDA at 1-747-FDA-8095.  Where should I keep my medicine?  This does not apply. This medicine will be given to you in a hospital or health clinic setting. You will not store this medicine at home.  NOTE: This sheet is a summary. It may not cover all possible information. If you have questions about this medicine, talk to your doctor, pharmacist, or health care provider.  © 2020 [employer] (2018-01-29 15:37:52)    Hypoglycemia  Hypoglycemia is when the sugar (glucose) level in your blood is too low. Signs of low blood sugar may include:  · Feeling:  ? Hungry.  ? Worried or nervous (anxious).  ? Sweaty and clammy.  ? Confused.  ? Dizzy.  ? Sleepy.  ? Sick to your stomach (nauseous).  · Having:  ? A fast heartbeat.  ? A headache.  ? A change in  your vision.  ? Tingling or no feeling (numbness) around your mouth, lips, or tongue.  ? Jerky movements that you cannot control (seizure).  · Having trouble with:  ? Moving (coordination).  ? Sleeping.  ? Passing out (fainting).  ? Getting upset easily (irritability).  Low blood sugar can happen to people who have diabetes and people who do not have diabetes. Low blood sugar can happen quickly, and it can be an emergency.  Treating low blood sugar  Low blood sugar is often treated by eating or drinking something sugary right away, such as:  · Fruit juice, 4-6 oz (120-150 mL).  · Regular soda (not diet soda), 4-6 oz (120-150 mL).  · Low-fat milk, 4 oz (120 mL).  · Several pieces of hard candy.  · Sugar or honey, 1 Tbsp (15 mL).  Treating low blood sugar if you have diabetes  If you can think clearly and swallow safely, follow the 15:15 rule:  · Take 15 grams of a fast-acting carb (carbohydrate). Talk with your doctor about how much you should take.  · Always keep a source of fast-acting carb with you, such as:  ? Sugar tablets (glucose pills). Take 3-4 pills.  ? 6-8 pieces of hard candy.  ? 4-6 oz (120-150 mL) of fruit juice.  ? 4-6 oz (120-150 mL) of regular (not diet) soda.  ? 1 Tbsp (15 mL) honey or sugar.  · Check your blood sugar 15 minutes after you take the carb.  · If your blood sugar is still at or below 70 mg/dL (3.9 mmol/L), take 15 grams of a carb again.  · If your blood sugar does not go above 70 mg/dL (3.9 mmol/L) after 3 tries, get help right away.  · After your blood sugar goes back to normal, eat a meal or a snack within 1 hour.    Treating very low blood sugar  If your blood sugar is at or below 54 mg/dL (3 mmol/L), you have very low blood sugar (severe hypoglycemia). This may also cause:  · Passing out.  · Jerky movements you cannot control (seizure).  · Losing consciousness (coma).  This is an emergency. Do not wait to see if the symptoms will go away. Get medical help right away. Call your local  emergency services (911 in the [place] Do not drive yourself to the hospital.  If you have very low blood sugar and you cannot eat or drink, you may need a glucagon shot (injection). A family member or friend should learn how to check your blood sugar and how to give you a glucagon shot. Ask your doctor if you need to have a glucagon shot kit at home.  Follow these instructions at home:  General instructions  · Take over-the-counter and prescription medicines only as told by your doctor.  · Stay aware of your blood sugar as told by your doctor.  · Limit alcohol intake to no more than 1 drink a day for nonpregnant women and 2 drinks a day for men. One drink equals 12 oz of beer (355 mL), 5 oz of wine (148 mL), or 1½ oz of hard liquor (44 mL).  · Keep all follow-up visits as told by your doctor. This is important.  If you have diabetes:    · Follow your diabetes care plan as told by your doctor. Make sure you:  ? Know the signs of low blood sugar.  ? Take your medicines as told.  ? Follow your exercise and meal plan.  ? Eat on time. Do not skip meals.  ? Check your blood sugar as often as told by your doctor. Always check it before and after exercise.  ? Follow your sick day plan when you cannot eat or drink normally. Make this plan ahead of time with your doctor.  · Share your diabetes care plan with:  ? Your work or school.  ? People you live with.  · Check your pee (urine) for ketones:  ? When you are sick.  ? As told by your doctor.  · Carry a card or wear jewelry that says you have diabetes.  Contact a doctor if:  · You have trouble keeping your blood sugar in your target range.  · You have low blood sugar often.  Get help right away if:  · You still have symptoms after you eat or drink something sugary.  · Your blood sugar is at or below 54 mg/dL (3 mmol/L).  · You have jerky movements that you cannot control.  · You pass out.  These symptoms may be an emergency. Do not wait to see if the symptoms will go away.  Get medical help right away. Call your local emergency services (911 in the [place] Do not drive yourself to the hospital.  Summary  · Hypoglycemia happens when the level of sugar (glucose) in your blood is too low.  · Low blood sugar can happen to people who have diabetes and people who do not have diabetes. Low blood sugar can happen quickly, and it can be an emergency.  · Make sure you know the signs of low blood sugar and know how to treat it.  · Always keep a source of sugar (fast-acting carb) with you to treat low blood sugar.  This information is not intended to replace advice given to you by your health care provider. Make sure you discuss any questions you have with your health care provider.  Document Released: 03/14/2011 Document Revised: 06/11/2019 Document Reviewed: 01/20/2017  [employer] Interactive Patient Education © 2020 [employer]

## 2020-06-02 DIAGNOSIS — E55.9 VITAMIN D DEFICIENCY: ICD-10-CM

## 2020-06-02 DIAGNOSIS — E11.65 TYPE 2 DIABETES MELLITUS WITH HYPERGLYCEMIA, WITHOUT LONG-TERM CURRENT USE OF INSULIN (HCC): ICD-10-CM

## 2020-06-02 DIAGNOSIS — E78.2 MIXED HYPERLIPIDEMIA: ICD-10-CM

## 2020-06-02 DIAGNOSIS — I10 BENIGN ESSENTIAL HYPERTENSION: ICD-10-CM

## 2020-06-03 RX ORDER — SITAGLIPTIN AND METFORMIN HYDROCHLORIDE 1000; 50 MG/1; MG/1
TABLET, FILM COATED ORAL
Qty: 180 TABLET | Refills: 10 | Status: SHIPPED | OUTPATIENT
Start: 2020-06-03 | End: 2021-06-16 | Stop reason: ALTCHOICE

## 2020-07-15 ENCOUNTER — OFFICE VISIT (OUTPATIENT)
Dept: INTERNAL MEDICINE | Age: 80
End: 2020-07-15

## 2020-07-15 VITALS
BODY MASS INDEX: 21.03 KG/M2 | HEIGHT: 67 IN | OXYGEN SATURATION: 97 % | RESPIRATION RATE: 13 BRPM | SYSTOLIC BLOOD PRESSURE: 120 MMHG | DIASTOLIC BLOOD PRESSURE: 60 MMHG | TEMPERATURE: 97.7 F | WEIGHT: 134 LBS | HEART RATE: 76 BPM

## 2020-07-15 DIAGNOSIS — E11.42 DIABETIC PERIPHERAL NEUROPATHY (HCC): ICD-10-CM

## 2020-07-15 DIAGNOSIS — Z00.00 MEDICARE ANNUAL WELLNESS VISIT, SUBSEQUENT: Primary | ICD-10-CM

## 2020-07-15 DIAGNOSIS — E78.2 MIXED HYPERLIPIDEMIA: ICD-10-CM

## 2020-07-15 DIAGNOSIS — I10 BENIGN ESSENTIAL HYPERTENSION: ICD-10-CM

## 2020-07-15 DIAGNOSIS — E11.65 TYPE 2 DIABETES MELLITUS WITH HYPERGLYCEMIA, WITHOUT LONG-TERM CURRENT USE OF INSULIN (HCC): ICD-10-CM

## 2020-07-15 PROCEDURE — 99214 OFFICE O/P EST MOD 30 MIN: CPT | Performed by: INTERNAL MEDICINE

## 2020-07-15 PROCEDURE — G0439 PPPS, SUBSEQ VISIT: HCPCS | Performed by: INTERNAL MEDICINE

## 2020-07-15 NOTE — PROGRESS NOTES
"The ABCs of the Annual Wellness Visit  Subsequent Medicare Wellness Visit    Chief Complaint   Patient presents with   • Medicare Wellness-subsequent   • Hypertension     Amlodipine 5 mg   • Hyperlipidemia     Endocrinology treatment, management and lab testing-[facility]   • Diabetes     As above   • Peripheral Neuropathy     Gabapentin 100 mg - 2 tablets at at bedtime; \"not helping a great deal\"       Subjective   History of Present Illness:  Lissa Ortiz is a 79 y.o. female who presents for a Subsequent Medicare Wellness Visit.    HEALTH RISK ASSESSMENT    Recent Hospitalizations:  No hospitalization(s) within the last year.    Current Medical Providers:  Patient Care Team:  Figueroa Berger MD as PCP - General (Internal Medicine)  Figueroa Berger MD as PCP - Claims Attributed    Smoking Status:  Social History     Tobacco Use   Smoking Status Never Smoker   Smokeless Tobacco Never Used       Alcohol Consumption:  Social History     Substance and Sexual Activity   Alcohol Use No    Comment: Occasional glass of wine one per month       Depression Screen:   PHQ-2/PHQ-9 Depression Screening 7/15/2020   Little interest or pleasure in doing things 0   Feeling down, depressed, or hopeless 0   Trouble falling or staying asleep, or sleeping too much -   Feeling tired or having little energy -   Poor appetite or overeating -   Feeling bad about yourself - or that you are a failure or have let yourself or your family down -   Trouble concentrating on things, such as reading the newspaper or watching television -   Moving or speaking so slowly that other people could have noticed. Or the opposite - being so fidgety or restless that you have been moving around a lot more than usual -   Thoughts that you would be better off dead, or of hurting yourself in some way -   Total Score 0   If you checked off any problems, how difficult have these problems made it for you to do your work, take care of things at home, or " get along with other people? -       Fall Risk Screen:  ANTELMO Fall Risk Assessment was completed, and patient is at LOW risk for falls.Assessment completed on:7/15/2020    Health Habits and Functional and Cognitive Screening:  Functional & Cognitive Status 7/15/2020   Do you have difficulty preparing food and eating? No   Do you have difficulty bathing yourself, getting dressed or grooming yourself? No   Do you have difficulty using the toilet? No   Do you have difficulty moving around from place to place? No   Do you have trouble with steps or getting out of a bed or a chair? No   Current Diet -   Dental Exam -   Eye Exam -   Exercise (times per week) -   Current Exercise Activities Include -   Do you need help using the phone?  No   Are you deaf or do you have serious difficulty hearing?  No   Do you need help with transportation? No   Do you need help shopping? No   Do you need help preparing meals?  No   Do you need help with housework?  No   Do you need help with laundry? -   Do you need help taking your medications? No   Do you need help managing money? No   Do you ever drive or ride in a car without wearing a seat belt? No   Have you felt unusual stress, anger or loneliness in the last month? -   Who do you live with? -   If you need help, do you have trouble finding someone available to you? -   Have you been bothered in the last four weeks by sexual problems? -   Do you have difficulty concentrating, remembering or making decisions? -         Does the patient have evidence of cognitive impairment? No    Asprin use counseling:Does not need ASA (and currently is not on it)    Age-appropriate Screening Schedule:  Refer to the list below for future screening recommendations based on patient's age, sex and/or medical conditions. Orders for these recommended tests are listed in the plan section. The patient has been provided with a written plan.    Health Maintenance   Topic Date Due   • TDAP/TD VACCINES (1 -  Tdap) 11/05/1951   • PAP SMEAR  05/30/2020   • DXA SCAN  05/30/2020   • INFLUENZA VACCINE  08/01/2020   • HEMOGLOBIN A1C  11/05/2020   • DIABETIC EYE EXAM  04/29/2021   • LIPID PANEL  05/05/2021   • URINE MICROALBUMIN  05/05/2021   • MAMMOGRAM  06/03/2021   • COLONOSCOPY  02/13/2023   • ZOSTER VACCINE  Addressed          The following portions of the patient's history were reviewed and updated as appropriate: allergies, current medications, past family history, past medical history, past social history, past surgical history and problem list.    Outpatient Medications Prior to Visit   Medication Sig Dispense Refill   • amLODIPine (NORVASC) 5 MG tablet TAKE ONE TABLET BY MOUTH DAILY 90 tablet 0   • azelastine (ASTELIN) 0.1 % nasal spray 2 sprays into the nostril(s) as directed by provider 2 (Two) Times a Day. Use in each nostril as directed 2 each 5   • Biotin 10 MG capsule Take  by mouth.     • Blood Glucose Monitoring Suppl (TRUE METRIX AIR GLUCOSE METER) device 1 Device Daily As Needed (check bs's twice daily). 1 Device 0   • cholecalciferol (VITAMIN D3) 1000 units tablet Take 1,000 Units by mouth Every Other Day.     • Cinnamon 500 MG capsule 500 mg 2 (Two) Times a Day.     • COMBIGAN 0.2-0.5 % ophthalmic solution      • folic acid (FOLVITE) 1 MG tablet Take 1 tablet by mouth daily.     • gabapentin (Neurontin) 100 MG capsule Start with 1 pill at bedtime may take 1 po tid 90 capsule 2   • Insulin Pen Needle (BD Pen Needle Vicenta U/F) 32G X 4 MM misc Using 1 pen needle daily 100 each 1   • JANUMET  MG per tablet TAKE ONE TABLET BY MOUTH TWICE A DAY WITH MEALS 180 tablet 10   • JARDIANCE 25 MG tablet Take 25 mg by mouth Daily With Breakfast. 30 tablet 11   • levothyroxine (SYNTHROID, LEVOTHROID) 25 MCG tablet Take 1 tablet by mouth Daily. 30 tablet 5   • Methotrexate Sodium 50 MG/2ML injection Inject 7 mg into the appropriate muscle as directed by prescriber 1 (One) Time Per Week.     • Multiple Vitamin (MULTI  VITAMIN PO) Take 1 tablet by mouth Daily.     • Omega 3 1000 MG capsule Take 1 capsule by mouth.     • pravastatin (PRAVACHOL) 20 MG tablet TAKE ONE TABLET BY MOUTH DAILY 90 tablet 3   • Probiotic capsule Take 1 capsule by mouth.     • rOPINIRole (Requip) 0.25 MG tablet Take 1 tablet by mouth Every Night. Take 1 hour before bedtime. 30 tablet 2   • TRAVATAN Z 0.004 % solution ophthalmic solution Administer 1 drop to both eyes Every Night.     • vitamin B-12 (CYANOCOBALAMIN) 100 MCG tablet Take 1 tablet by mouth Every Other Day.     • Insulin Glargine, 1 Unit Dial, (Toujeo SoloStar) 300 UNIT/ML solution pen-injector injection Inject 20 Units under the skin into the appropriate area as directed Daily for 30 days. 2 mL 5     No facility-administered medications prior to visit.        Patient Active Problem List   Diagnosis   • Benign essential hypertension   • Mixed anxiety depressive disorder   • Type 2 diabetes mellitus with hyperglycemia, without long-term current use of insulin (CMS/HCC)   • Glaucoma   • Hyperlipidemia   • Insomnia   • Restless legs syndrome   • Rheumatoid arthritis (CMS/HCC)   • Vitamin D deficiency   • Routine health maintenance   • Midline low back pain without sciatica   • Encounter for immunization   • Neck pain on right side   • Microalbuminuria   • Encounter for screening colonoscopy   • Tubular adenoma of colon   • Polymyositis (CMS/HCC)   • Diabetic peripheral neuropathy (CMS/HCC)       Advanced Care Planning:  ACP discussion was held with the patient during this visit. Patient has an advance directive in EMR which is still valid.     Review of Systems    Compared to one year ago, the patient feels her physical health is the same.  Compared to one year ago, the patient feels her mental health is the same.    Reviewed chart for potential of high risk medication in the elderly: yes  Reviewed chart for potential of harmful drug interactions in the elderly:yes    Objective         Vitals:     "07/15/20 1258 07/15/20 1324   BP:  120/60   Pulse: 87 76   Resp:  13   Temp: 97.7 °F (36.5 °C)    SpO2: 97%    Weight: 60.8 kg (134 lb)    Height: 170.2 cm (67.01\")        Body mass index is 20.98 kg/m².  Discussed the patient's BMI with her. The BMI is in the acceptable range.    Physical Exam: See dictated note from today's visit.    Lab Results   Component Value Date     (H) 05/05/2020    CHLPL 162 05/05/2020    TRIG 57 05/05/2020    HDL 70 (H) 05/05/2020    LDL 81 05/05/2020    VLDL 11.4 05/05/2020    HGBA1C 8.40 (H) 05/05/2020        Assessment/Plan   Medicare Risks and Personalized Health Plan  CMS Preventative Services Quick Reference  Breast Cancer/Mammogram Screening  Colon Cancer Screening  Osteoprorosis Risk    The above risks/problems have been discussed with the patient.  Pertinent information has been shared with the patient in the After Visit Summary.  Follow up plans and orders are seen below in the Assessment/Plan Section.    Diagnoses and all orders for this visit:    1. Medicare annual wellness visit, subsequent (Primary)    2. Benign essential hypertension    3. Mixed hyperlipidemia    4. Type 2 diabetes mellitus with hyperglycemia, without long-term current use of insulin (CMS/[facility])    5. Diabetic peripheral neuropathy (CMS/[facility])      Follow Up:  Return in about 6 months (around 1/15/2021) for Blood pressure check; 1 year subsequent Medicare wellness visit with any necessary lab updates then..     For her peripheral neuropathy will increase her gabapentin 100 mg from 2 at bedtime to 3 at bedtime.    Continue endocrinology care, treatment and management of diabetes, hyperlipidemia and hypothyroidism.    Included in today's exam was face to face time spent in preventative counseling regarding weight loss, daily exercise, and minimizing sweets and carbohydrates.  Additionally, health maintenance needs were discussed and reviewed as well.    An After Visit Summary and PPPS were given to the " patient.

## 2020-07-15 NOTE — PROGRESS NOTES
"  Lissa Ortiz is a 79 y.o. female who presents with   Chief Complaint   Patient presents with   • Medicare Wellness-subsequent   • Hypertension     Amlodipine 5 mg   • Hyperlipidemia     Endocrinology treatment, management and lab testing-[facility]   • Diabetes     As above   • Peripheral Neuropathy     Gabapentin 100 mg - 2 tablets at at bedtime; \"not helping a great deal\"   .    This patient is a 79-year-old female who presents for a subsequent Medicare wellness visit.  In addition to the wellness visit the physical issues as outlined above were dealt with separately and individually.  Also recent labs were reviewed with the patient and all medications were reviewed as well.  She also has a history of rheumatoid arthritis and polymyositis and sees Dr. Kyle Chow for both of these.  She has episodes every 2 to 3 months she says where her pulse races with her.  About 10 years ago while in [place] she had a Holter monitor and at that time nothing was ever found she says.  Her peripheral neuropathy is not doing well on her minimal dose of gabapentin.  Total amount of time spent with this patient dealing with these physical issues over and above the amount of time spent performing the Medicare wellness visit amounted to 28 minutes.  Better than 50% of the office visit time was spent in direct face-to-face consultation with the patient regarding these issues.      Hypertension   This is a chronic problem. The current episode started more than 1 year ago. The problem is unchanged. The problem is controlled. Current antihypertension treatment includes calcium channel blockers. The current treatment provides moderate improvement. There are no compliance problems.    Hyperlipidemia   This is a chronic (Endocrinology care, treatment, management and lab testing) problem. Current antihyperlipidemic treatment includes statins.   Diabetes   She presents for her follow-up diabetic visit. She has type 2 " "(Endocrinology care, treatment, management and lab testing.) diabetes mellitus. Her disease course has been stable. There are no hypoglycemic associated symptoms. There are no hypoglycemic complications. Diabetic complications include peripheral neuropathy.   Peripheral Neuropathy   This is a chronic problem. The current episode started more than 1 year ago. The problem has been waxing and waning. Treatments tried: Gabapentin 100 mg 2 at at bedtime. The treatment provided no relief.        /60   Pulse 76   Temp 97.7 °F (36.5 °C)   Resp 13   Ht 170.2 cm (67.01\")   Wt 60.8 kg (134 lb)   LMP  (LMP Unknown)   SpO2 97%   BMI 20.98 kg/m²     The following portions of the patient's history were reviewed and updated as appropriate: allergies, current medications, past family history, past medical history, past social history, past surgical history and problem list.    Review of Systems   Constitutional: Negative.    HENT: Negative.    Eyes: Negative.    Respiratory: Negative.    Cardiovascular: Negative.    Genitourinary: Negative.    Musculoskeletal: Negative.    Skin: Negative.    Neurological: Negative.    Psychiatric/Behavioral: Negative.        Objective   Physical Exam   Constitutional: She is oriented to person, place, and time. She appears well-developed and well-nourished. No distress.   HENT:   Head: Normocephalic and atraumatic.   Right Ear: External ear normal.   Left Ear: External ear normal.   Nose: Nose normal.   Mouth/Throat: Oropharynx is clear and moist. No oropharyngeal exudate.   Eyes: Pupils are equal, round, and reactive to light. Conjunctivae and EOM are normal. Right eye exhibits no discharge. Left eye exhibits no discharge. No scleral icterus.   Neck: Normal range of motion. Neck supple. No JVD present. No tracheal deviation present. No thyromegaly present.   Neck exam negative.  Carotid auscultation normal-no bruits heard.   Cardiovascular: Normal rate, regular rhythm, normal heart " sounds and intact distal pulses. Exam reveals no gallop and no friction rub.   No murmur heard.  Pulmonary/Chest: Effort normal and breath sounds normal. No respiratory distress. She has no wheezes. She has no rales. She exhibits no tenderness.   Abdominal: Soft. Bowel sounds are normal. She exhibits no distension and no mass. There is no tenderness. No hernia.   Musculoskeletal: Normal range of motion. She exhibits no edema, tenderness or deformity.   Lymphadenopathy:     She has no cervical adenopathy.   Neurological: She is alert and oriented to person, place, and time. She has normal reflexes. She displays normal reflexes. No cranial nerve deficit. She exhibits normal muscle tone. Coordination normal.   Skin: Skin is warm and dry. No rash noted. She is not diaphoretic. No erythema. No pallor.   Psychiatric: She has a normal mood and affect. Her behavior is normal. Judgment and thought content normal.       Assessment/Plan   Lissa was seen today for medicare wellness-subsequent, hypertension, hyperlipidemia, diabetes and peripheral neuropathy.    Diagnoses and all orders for this visit:    Medicare annual wellness visit, subsequent    Benign essential hypertension    Mixed hyperlipidemia    Type 2 diabetes mellitus with hyperglycemia, without long-term current use of insulin (CMS/[facility])    Diabetic peripheral neuropathy (CMS/[facility])    Plan: Patient is getting labs through endocrinology.  These were done in May and were reviewed with her on today's visit.  There does not appear to be any need for repetition of labs today.    Continue all treatment as prescribed for now other than for gabapentin.  We will try increasing her gabapentin 100 mg to 3 tablets at at bedtime for her symptoms of peripheral neuropathy.    6-month general checkup advised.    Subsequent Medicare wellness visit with any necessary labs-1 year advised.    Included in today's exam was face to face time spent in preventative counseling regarding weight  loss, daily exercise, and minimizing sweets and carbohydrates.  Additionally, health maintenance needs were discussed and reviewed as well.

## 2020-07-21 DIAGNOSIS — I10 ESSENTIAL HYPERTENSION: ICD-10-CM

## 2020-07-21 RX ORDER — AMLODIPINE BESYLATE 5 MG/1
TABLET ORAL
Qty: 90 TABLET | Refills: 1 | Status: SHIPPED | OUTPATIENT
Start: 2020-07-21 | End: 2020-12-29 | Stop reason: SDUPTHER

## 2020-09-17 DIAGNOSIS — K52.9 CHRONIC DIARRHEA: Primary | ICD-10-CM

## 2020-10-09 ENCOUNTER — OFFICE VISIT (OUTPATIENT)
Dept: GASTROENTEROLOGY | Facility: CLINIC | Age: 80
End: 2020-10-09

## 2020-10-09 VITALS — HEIGHT: 67 IN | WEIGHT: 141.8 LBS | BODY MASS INDEX: 22.26 KG/M2 | TEMPERATURE: 96.8 F

## 2020-10-09 DIAGNOSIS — R14.3 EXCESSIVE GAS: ICD-10-CM

## 2020-10-09 DIAGNOSIS — D12.6 ADENOMATOUS POLYP OF COLON, UNSPECIFIED PART OF COLON: ICD-10-CM

## 2020-10-09 DIAGNOSIS — K59.00 CONSTIPATION, UNSPECIFIED CONSTIPATION TYPE: Primary | ICD-10-CM

## 2020-10-09 PROCEDURE — 99214 OFFICE O/P EST MOD 30 MIN: CPT | Performed by: NURSE PRACTITIONER

## 2020-10-09 RX ORDER — INSULIN GLARGINE 300 U/ML
15 INJECTION, SOLUTION SUBCUTANEOUS DAILY
COMMUNITY
Start: 2020-06-22 | End: 2021-03-13 | Stop reason: SDUPTHER

## 2020-10-09 NOTE — PROGRESS NOTES
Chief Complaint   Patient presents with   • Change in bowels   • Fatigue       Lissa Ortiz is a  79 y.o. female here for a follow up visit for change in bowel habits.    HPI  79-year-old female presents today for follow-up visit for change in bowel habits.  She is a patient of Dr. Smith.  She was last seen for screening colonoscopy on 2/13/2018.  She was found to have some adenomatous colon polyps.  Recall in 5 years.  She admits that she has had a lot of issues with chronic constipation.  She does me just seems like it is been getting worse lately.  She has been having lots of gas and bloating.  She does not take anything to stay regular.  She denies any fiber or probiotics.  She admits when her stools do come out a little bit he balls.  She admits she does not drink much water.  She denies any dysphagia, reflux, abdominal pain, nausea and vomiting, diarrhea, rectal bleeding or melena.  She is appetite is good and her weight is stable.  Past Medical History:   Diagnosis Date   • Allergic rhinitis    • Arthritis    • Cancer (CMS/HCC)     skin leg   • Cataract    • Diabetes mellitus (CMS/HCC)     type 2   • Hypertension    • Long sleeper     post anesthesia       Past Surgical History:   Procedure Laterality Date   • COLONOSCOPY N/A 2/13/2018    Procedure: COLONOSCOPY to cecum and TI with hot snare polypectomy;  Surgeon: Rowan Smith MD;  Location: [facility] ENDOSCOPY;  Service:    • EYE SURGERY      tearduct sgy   • EYE SURGERY      MARY KAY CATARACTS   • HYSTERECTOMY     • TONSILLECTOMY         Scheduled Meds:    Continuous Infusions:No current facility-administered medications for this visit.       PRN Meds:.    Allergies   Allergen Reactions   • Sulfamethoxazole Nausea Only   • Trimethoprim Nausea Only   • Bactrim [Sulfamethoxazole-Trimethoprim] Nausea And Vomiting   • Fluticasone Furoate Unknown (See Comments)   • Vilanterol Unknown (See Comments)       Social History     Socioeconomic History   • Marital status:       Spouse name: Not on file   • Number of children: 3   • Years of education: Not on file   • Highest education level: Not on file   Tobacco Use   • Smoking status: Never Smoker   • Smokeless tobacco: Never Used   Substance and Sexual Activity   • Alcohol use: No     Comment: Occasional glass of wine one per month   • Drug use: No   Lifestyle   • Physical activity     Days per week: 6 days     Minutes per session: 60 min   • Stress: Not on file       Family History   Problem Relation Age of Onset   • Diabetes Mother    • Coronary artery disease Father         MI   • Diabetes Father    • Stroke Father        Review of Systems   Constitutional: Negative for appetite change, chills, diaphoresis, fatigue, fever and unexpected weight change.   HENT: Negative for nosebleeds, postnasal drip, sore throat, trouble swallowing and voice change.    Respiratory: Negative for cough, choking, chest tightness, shortness of breath and wheezing.    Cardiovascular: Negative for chest pain, palpitations and leg swelling.   Gastrointestinal: Positive for abdominal distention and constipation. Negative for abdominal pain, anal bleeding, blood in stool, diarrhea, nausea, rectal pain and vomiting.   Endocrine: Negative for polydipsia, polyphagia and polyuria.   Musculoskeletal: Negative for gait problem.   Skin: Negative for rash and wound.   Allergic/Immunologic: Negative for food allergies.   Neurological: Negative for dizziness, speech difficulty and light-headedness.   Psychiatric/Behavioral: Negative for confusion, self-injury, sleep disturbance and suicidal ideas.       Vitals:    10/09/20 1412   Temp: 96.8 °F (36 °C)       Physical Exam  Constitutional:       General: She is not in acute distress.     Appearance: She is well-developed. She is not ill-appearing.   HENT:      Head: Normocephalic.   Eyes:      Pupils: Pupils are equal, round, and reactive to light.   Cardiovascular:      Rate and Rhythm: Normal rate and  regular rhythm.      Heart sounds: Normal heart sounds.   Pulmonary:      Effort: Pulmonary effort is normal.      Breath sounds: Normal breath sounds.   Abdominal:      General: Bowel sounds are normal. There is distension.      Palpations: Abdomen is soft. There is no mass.      Tenderness: There is no abdominal tenderness. There is no guarding or rebound.      Hernia: No hernia is present.   Musculoskeletal: Normal range of motion.   Skin:     General: Skin is warm and dry.   Neurological:      Mental Status: She is alert and oriented to person, place, and time.   Psychiatric:         Speech: Speech normal.         Behavior: Behavior normal.         Judgment: Judgment normal.         No radiology results for the last 7 days     Assessment and plan     1. Constipation, unspecified constipation type    2. Excessive gas    3. Adenomatous polyp of colon, unspecified part of colon    As like her bowels are just not moving well consistently.  Sounds like the Jardiance and Janumet may also be causing some unwanted side effects.  Recommend she increase her water intake and exercise as tolerated.  Also think she would benefit from either eating some daily prunes or 1 daily fiber supplement.  This would be a good place for her to start.  Patient to call the office with any issues.  Patient to follow-up with me in 3 months.  Next screening colonoscopy due in 2023.

## 2020-11-05 ENCOUNTER — LAB (OUTPATIENT)
Dept: ENDOCRINOLOGY | Age: 80
End: 2020-11-05

## 2020-11-05 DIAGNOSIS — E78.2 MIXED HYPERLIPIDEMIA: ICD-10-CM

## 2020-11-05 DIAGNOSIS — E55.9 VITAMIN D DEFICIENCY: ICD-10-CM

## 2020-11-05 DIAGNOSIS — I10 BENIGN ESSENTIAL HYPERTENSION: Primary | ICD-10-CM

## 2020-11-05 DIAGNOSIS — E11.65 TYPE 2 DIABETES MELLITUS WITH HYPERGLYCEMIA, WITHOUT LONG-TERM CURRENT USE OF INSULIN (HCC): ICD-10-CM

## 2020-11-05 DIAGNOSIS — R80.9 MICROALBUMINURIA: ICD-10-CM

## 2020-11-05 DIAGNOSIS — I10 BENIGN ESSENTIAL HYPERTENSION: ICD-10-CM

## 2020-11-06 LAB
25(OH)D3+25(OH)D2 SERPL-MCNC: 76.2 NG/ML (ref 30–100)
ALBUMIN SERPL-MCNC: 4.3 G/DL (ref 3.5–5.2)
ALBUMIN/GLOB SERPL: 2.2 G/DL
ALP SERPL-CCNC: 82 U/L (ref 39–117)
ALT SERPL-CCNC: 18 U/L (ref 1–33)
AST SERPL-CCNC: 23 U/L (ref 1–32)
BILIRUB SERPL-MCNC: 0.4 MG/DL (ref 0–1.2)
BUN SERPL-MCNC: 13 MG/DL (ref 8–23)
BUN/CREAT SERPL: 21 (ref 7–25)
C PEPTIDE SERPL-MCNC: 0.5 NG/ML (ref 1.1–4.4)
CALCIUM SERPL-MCNC: 8.9 MG/DL (ref 8.6–10.5)
CHLORIDE SERPL-SCNC: 101 MMOL/L (ref 98–107)
CHOLEST SERPL-MCNC: 162 MG/DL (ref 0–200)
CO2 SERPL-SCNC: 27.5 MMOL/L (ref 22–29)
CREAT SERPL-MCNC: 0.62 MG/DL (ref 0.57–1)
FT4I SERPL CALC-MCNC: 2.1 (ref 1.2–4.9)
GLOBULIN SER CALC-MCNC: 2 GM/DL
GLUCOSE SERPL-MCNC: 99 MG/DL (ref 65–99)
HBA1C MFR BLD: 7.43 % (ref 4.8–5.6)
HDLC SERPL-MCNC: 63 MG/DL (ref 40–60)
INTERPRETATION: NORMAL
LDLC SERPL CALC-MCNC: 87 MG/DL (ref 0–100)
Lab: NORMAL
POTASSIUM SERPL-SCNC: 4.7 MMOL/L (ref 3.5–5.2)
PROT SERPL-MCNC: 6.3 G/DL (ref 6–8.5)
SODIUM SERPL-SCNC: 139 MMOL/L (ref 136–145)
T3FREE SERPL-MCNC: 2.7 PG/ML (ref 2–4.4)
T3RU NFR SERPL: 28 % (ref 24–39)
T4 FREE SERPL-MCNC: 1.23 NG/DL (ref 0.93–1.7)
T4 SERPL-MCNC: 7.4 UG/DL (ref 4.5–12)
TRIGL SERPL-MCNC: 57 MG/DL (ref 0–150)
TSH SERPL DL<=0.005 MIU/L-ACNC: 3.21 UIU/ML (ref 0.45–4.5)
UNABLE TO VOID: NORMAL
VLDLC SERPL CALC-MCNC: 12 MG/DL (ref 5–40)

## 2020-11-19 ENCOUNTER — OFFICE VISIT (OUTPATIENT)
Dept: ENDOCRINOLOGY | Age: 80
End: 2020-11-19

## 2020-11-19 VITALS
SYSTOLIC BLOOD PRESSURE: 110 MMHG | HEIGHT: 67 IN | WEIGHT: 143 LBS | DIASTOLIC BLOOD PRESSURE: 62 MMHG | BODY MASS INDEX: 22.44 KG/M2

## 2020-11-19 DIAGNOSIS — E78.2 MIXED HYPERLIPIDEMIA: ICD-10-CM

## 2020-11-19 DIAGNOSIS — E11.65 TYPE 2 DIABETES MELLITUS WITH HYPERGLYCEMIA, WITHOUT LONG-TERM CURRENT USE OF INSULIN (HCC): Primary | ICD-10-CM

## 2020-11-19 DIAGNOSIS — I10 BENIGN ESSENTIAL HYPERTENSION: ICD-10-CM

## 2020-11-19 DIAGNOSIS — E55.9 VITAMIN D DEFICIENCY: ICD-10-CM

## 2020-11-19 DIAGNOSIS — E11.42 DIABETIC PERIPHERAL NEUROPATHY (HCC): ICD-10-CM

## 2020-11-19 PROCEDURE — 99214 OFFICE O/P EST MOD 30 MIN: CPT | Performed by: NURSE PRACTITIONER

## 2020-11-19 RX ORDER — DORZOLAMIDE HYDROCHLORIDE AND TIMOLOL MALEATE 20; 5 MG/ML; MG/ML
SOLUTION/ DROPS OPHTHALMIC EVERY 12 HOURS
COMMUNITY

## 2020-11-30 RX ORDER — PEN NEEDLE, DIABETIC 32GX 5/32"
NEEDLE, DISPOSABLE MISCELLANEOUS
Qty: 100 EACH | Refills: 0 | Status: SHIPPED | OUTPATIENT
Start: 2020-11-30 | End: 2021-03-11

## 2020-12-01 RX ORDER — LEVOTHYROXINE SODIUM 0.03 MG/1
TABLET ORAL
Qty: 30 TABLET | Refills: 4 | Status: SHIPPED | OUTPATIENT
Start: 2020-12-01 | End: 2021-05-05 | Stop reason: SDUPTHER

## 2020-12-17 ENCOUNTER — TELEPHONE (OUTPATIENT)
Dept: GASTROENTEROLOGY | Facility: CLINIC | Age: 80
End: 2020-12-17

## 2020-12-17 NOTE — TELEPHONE ENCOUNTER
Okay to use MiraLAX every other day or every day to continue regular bowel movements and prevent constipation.  She can use half a dose or full dose depending on her response

## 2020-12-17 NOTE — TELEPHONE ENCOUNTER
----- Message from Lissa Ortiz sent at 12/17/2020 10:12 AM EST -----  Regarding: Non-Urgent Medical Question  Contact: [phone]  I'm having a problem with constipation - haven't gone in 5 days. Wonder if you could write me a Rx for that blasting powder you prescribe for colonoscopy prep.

## 2020-12-17 NOTE — TELEPHONE ENCOUNTER
Called pt and pt reports that she has not had a bm in 5 days.  Pt states she did take miralax one dose on Monday and Tuesday.  Pt also reports that after sending message she a very large soft stool.  She states she feels fine. She is asking what can she do to prevent getting constipated again. Jeff Lesli is out of the office and will send message to Dr Smith.

## 2020-12-28 RX ORDER — GABAPENTIN 100 MG/1
CAPSULE ORAL
Qty: 90 CAPSULE | Refills: 1 | Status: SHIPPED | OUTPATIENT
Start: 2020-12-28 | End: 2021-07-06 | Stop reason: SDUPTHER

## 2020-12-29 ENCOUNTER — OFFICE VISIT (OUTPATIENT)
Dept: INTERNAL MEDICINE | Facility: CLINIC | Age: 80
End: 2020-12-29

## 2020-12-29 VITALS
WEIGHT: 143 LBS | DIASTOLIC BLOOD PRESSURE: 70 MMHG | TEMPERATURE: 97.7 F | BODY MASS INDEX: 22.44 KG/M2 | HEART RATE: 80 BPM | SYSTOLIC BLOOD PRESSURE: 120 MMHG | OXYGEN SATURATION: 98 % | HEIGHT: 67 IN

## 2020-12-29 DIAGNOSIS — I10 BENIGN ESSENTIAL HYPERTENSION: Primary | ICD-10-CM

## 2020-12-29 DIAGNOSIS — Z76.89 ENCOUNTER TO ESTABLISH CARE WITH NEW DOCTOR: ICD-10-CM

## 2020-12-29 DIAGNOSIS — E78.2 MIXED HYPERLIPIDEMIA: ICD-10-CM

## 2020-12-29 PROCEDURE — 99214 OFFICE O/P EST MOD 30 MIN: CPT | Performed by: FAMILY MEDICINE

## 2020-12-29 RX ORDER — AMLODIPINE BESYLATE 5 MG/1
5 TABLET ORAL DAILY
Qty: 90 TABLET | Refills: 3 | Status: SHIPPED | OUTPATIENT
Start: 2020-12-29 | End: 2022-01-25

## 2020-12-29 RX ORDER — PRAVASTATIN SODIUM 20 MG
20 TABLET ORAL DAILY
Qty: 90 TABLET | Refills: 3 | Status: SHIPPED | OUTPATIENT
Start: 2020-12-29 | End: 2021-12-28

## 2020-12-29 NOTE — PROGRESS NOTES
Subjective   Lissa Ortiz is a 80 y.o. female. Establish care and discuss HTN, HLD    History of Present Illness   New patient to me  Prior patient of Dr. Berger    Hypertension - stable.  Patient taking medication as prescribed.  Denies chest pain, shortness of breath, headache, lower extremity edema.  Patient is taking amlodipine.    HLD-stable.  Patient taking pravastatin as prescribed.  Trying to adhere to a low-fat diet.      The following portions of the patient's history were reviewed and updated as appropriate: allergies, current medications, past family history, past medical history, past social history, past surgical history and problem list.    Review of Systems   Constitutional: Negative for fatigue and fever.   Respiratory: Negative for shortness of breath and wheezing.    Cardiovascular: Negative for chest pain and palpitations.   Gastrointestinal: Negative for constipation and nausea.       Objective   Physical Exam  Vitals signs and nursing note reviewed.   Constitutional:       General: She is not in acute distress.     Appearance: Normal appearance. She is well-developed.   HENT:      Mouth/Throat:      Pharynx: No oropharyngeal exudate.   Eyes:      General:         Right eye: No discharge.         Left eye: No discharge.      Conjunctiva/sclera: Conjunctivae normal.   Neck:      Musculoskeletal: Neck supple.   Cardiovascular:      Rate and Rhythm: Normal rate and regular rhythm.      Heart sounds: Normal heart sounds. No murmur. No friction rub. No gallop.    Pulmonary:      Effort: Pulmonary effort is normal.      Breath sounds: Normal breath sounds. No wheezing or rales.   Abdominal:      General: Bowel sounds are normal. There is no distension.      Palpations: Abdomen is soft.      Tenderness: There is no abdominal tenderness.   Musculoskeletal:         General: No deformity.   Lymphadenopathy:      Cervical: No cervical adenopathy.   Skin:     General: Skin is warm and dry.      Findings: No  rash.   Neurological:      Mental Status: She is alert and oriented to person, place, and time.   Psychiatric:         Mood and Affect: Mood normal.         Behavior: Behavior normal.         Assessment/Plan   Diagnoses and all orders for this visit:    1. Benign essential hypertension (Primary)  -     amLODIPine (NORVASC) 5 MG tablet; Take 1 tablet by mouth Daily.  Dispense: 90 tablet; Refill: 3    2. Mixed hyperlipidemia  -     pravastatin (PRAVACHOL) 20 MG tablet; Take 1 tablet by mouth Daily.  Dispense: 90 tablet; Refill: 3        Refilled her medications.  Both conditions appear stable.  See back in the summer.

## 2020-12-30 DIAGNOSIS — E78.2 MIXED HYPERLIPIDEMIA: ICD-10-CM

## 2020-12-30 RX ORDER — PRAVASTATIN SODIUM 20 MG
TABLET ORAL
Qty: 90 TABLET | Refills: 2 | OUTPATIENT
Start: 2020-12-30

## 2021-01-04 ENCOUNTER — TELEPHONE (OUTPATIENT)
Dept: GASTROENTEROLOGY | Facility: CLINIC | Age: 81
End: 2021-01-04

## 2021-01-04 NOTE — TELEPHONE ENCOUNTER
----- Message from Lissa Ortiz sent at 1/1/2021  9:57 AM EST -----  Regarding: Complaint  Contact: [phone]  I am no longer constipated but now suffer with loose stools - haven't taken Miralax in over a week but loose stool every day (2-3 times) no cramps - please advise

## 2021-01-04 NOTE — TELEPHONE ENCOUNTER
She taking any new medication or any new antibiotics or new supplements?  Anything changes to cause the loose stools?  If the loose stools continue this week she probably should come in and just have stool studies to rule out any infectious process.  For now she needs to stay well-hydrated and drink plenty of fluids.

## 2021-01-05 NOTE — TELEPHONE ENCOUNTER
Called pt and pt denies any new medications, antibx or supplements.  Also pt reports that her diarrhea has cleared up and she is doing well now. Pt states she will call us back if it returns.   Update sent to Lesli BRIONES.

## 2021-01-27 ENCOUNTER — IMMUNIZATION (OUTPATIENT)
Dept: VACCINE CLINIC | Facility: HOSPITAL | Age: 81
End: 2021-01-27

## 2021-01-27 PROCEDURE — 0001A: CPT | Performed by: INTERNAL MEDICINE

## 2021-01-27 PROCEDURE — 91300 HC SARSCOV02 VAC 30MCG/0.3ML IM: CPT | Performed by: INTERNAL MEDICINE

## 2021-01-29 ENCOUNTER — TELEPHONE (OUTPATIENT)
Dept: GASTROENTEROLOGY | Facility: CLINIC | Age: 81
End: 2021-01-29

## 2021-01-29 NOTE — TELEPHONE ENCOUNTER
I would have her go down to the Metamucil tablet 1 daily and see how that does.  I do not think she needs to.  I think it is causing the diarrhea.  WelChol is for diarrhea.  She is having constipation for several days and then having spurious diarrhea after being constipated.  I think the WelChol would only make her constipation worse.

## 2021-01-29 NOTE — TELEPHONE ENCOUNTER
----- Message from Lissa Ortiz sent at 1/29/2021 10:40 AM EST -----  Regarding: Complaint  Contact: [phone]  Hi  I am still having irregular bowel movements - constipation for 2-3 days (take 2 Metamucil gummies) then have diarrhea for 2-3 days (going on for months).  My sister had a similar problem and was prescribed (colesevelam) Welchol 625 (3/day) and no more problems.  Could this possibly help me?

## 2021-02-09 ENCOUNTER — TELEPHONE (OUTPATIENT)
Dept: INTERNAL MEDICINE | Facility: CLINIC | Age: 81
End: 2021-02-09

## 2021-02-09 ENCOUNTER — OFFICE VISIT (OUTPATIENT)
Dept: INTERNAL MEDICINE | Facility: CLINIC | Age: 81
End: 2021-02-09

## 2021-02-09 VITALS
HEART RATE: 81 BPM | RESPIRATION RATE: 16 BRPM | BODY MASS INDEX: 22.54 KG/M2 | DIASTOLIC BLOOD PRESSURE: 78 MMHG | HEIGHT: 67 IN | WEIGHT: 143.6 LBS | OXYGEN SATURATION: 98 % | TEMPERATURE: 96.9 F | SYSTOLIC BLOOD PRESSURE: 128 MMHG

## 2021-02-09 DIAGNOSIS — R30.0 BURNING WITH URINATION: Primary | ICD-10-CM

## 2021-02-09 LAB
BACTERIA UR QL AUTO: ABNORMAL /HPF
BILIRUB UR QL STRIP: NEGATIVE
CLARITY UR: ABNORMAL
COLOR UR: YELLOW
GLUCOSE UR STRIP-MCNC: ABNORMAL MG/DL
HGB UR QL STRIP.AUTO: NEGATIVE
HYALINE CASTS UR QL AUTO: ABNORMAL /LPF
KETONES UR QL STRIP: ABNORMAL
LEUKOCYTE ESTERASE UR QL STRIP.AUTO: ABNORMAL
MUCOUS THREADS URNS QL MICRO: ABNORMAL /HPF
NITRITE UR QL STRIP: NEGATIVE
PH UR STRIP.AUTO: 5.5 [PH] (ref 5–8)
PROT UR QL STRIP: NEGATIVE
RBC # UR: ABNORMAL /HPF
REF LAB TEST METHOD: ABNORMAL
SP GR UR STRIP: >=1.03 (ref 1–1.03)
SQUAMOUS #/AREA URNS HPF: ABNORMAL /HPF
UROBILINOGEN UR QL STRIP: ABNORMAL
WBC UR QL AUTO: ABNORMAL /HPF

## 2021-02-09 PROCEDURE — 99213 OFFICE O/P EST LOW 20 MIN: CPT | Performed by: NURSE PRACTITIONER

## 2021-02-09 PROCEDURE — 81001 URINALYSIS AUTO W/SCOPE: CPT | Performed by: NURSE PRACTITIONER

## 2021-02-09 RX ORDER — NITROFURANTOIN 25; 75 MG/1; MG/1
100 CAPSULE ORAL 2 TIMES DAILY
Qty: 10 CAPSULE | Refills: 0 | Status: SHIPPED | OUTPATIENT
Start: 2021-02-09 | End: 2021-02-14

## 2021-02-09 RX ORDER — AMOXICILLIN AND CLAVULANATE POTASSIUM 875; 125 MG/1; MG/1
1 TABLET, FILM COATED ORAL 2 TIMES DAILY
Qty: 14 TABLET | Refills: 0 | Status: SHIPPED | OUTPATIENT
Start: 2021-02-09 | End: 2021-02-09

## 2021-02-09 NOTE — PROGRESS NOTES
"        Chief Complaint  burning urinating       Subjective:      History of Present Illness {CC  Problem List  Visit  Diagnosis   Encounters  Notes  Medications  Labs  Result Review Imaging  Media :23}     Lissa Ortiz is a patient of Adin Bragg MD and presents to [facility] for:      Urinary Tract Infection   This is a recurrent problem. Episode onset: Sunday  Associated symptoms include frequency and urgency. Pertinent negatives include no chills, flank pain or hematuria. Her past medical history is significant for recurrent UTIs. Diabetes       States BGs are controlled.     UTI: E.coli 3/2020   Prior UTI tx: keflex, bactrim (doesn't tolerate), Augmentin   Last Cr=0.62  11/2020     Objective:      Physical Exam  Cardiovascular:      Rate and Rhythm: Normal rate.   Pulmonary:      Effort: Pulmonary effort is normal.   Abdominal:      General: Bowel sounds are normal.      Palpations: Abdomen is soft.      Tenderness: There is no abdominal tenderness. There is no right CVA tenderness or left CVA tenderness.        Result Review  Data Reviewed:{ Labs  Result Review  Imaging  Med Tab  Media :23}     Brief Urine Lab Results  (Last result in the past 365 days)      Color   Clarity   Blood   Leuk Est   Nitrite   Protein   CREAT   Urine HCG        02/09/21 1450 Yellow Slightly Cloudy Negative Trace Negative Negative                    Vital Signs:   /78 (BP Location: Right arm, Patient Position: Sitting, Cuff Size: Adult)   Pulse 81   Temp 96.9 °F (36.1 °C) (Oral)   Resp 16   Ht 170.2 cm (67\")   Wt 65.1 kg (143 lb 9.6 oz)   SpO2 98%   BMI 22.49 kg/m²          Assessment and Plan:      Assessment and Plan {CC Problem List  Visit Diagnosis  ROS  Review (Popup)  Health Maintenance  Quality  BestPractice  Medications  SmartSets  SnapShot Encounters  Media :23}     Problem List Items Addressed This Visit     None      Visit Diagnoses     Burning " with urination    -  Primary    See patient instructions     Relevant Medications    amoxicillin-clavulanate (AUGMENTIN) 875-125 MG per tablet    Other Relevant Orders    Urinalysis With Culture If Indicated - Urine, Clean Catch    Urinalysis, Microscopic Only - Urine, Clean Catch          Follow Up {Instructions Charge Capture  Follow-up Communications :23}     Return if symptoms worsen or fail to improve.  Patient was given instructions and counseling regarding her condition or for health maintenance advice. Please see specific information pulled into the AVS if appropriate.    Dragon disclaimer:   Much of this encounter note is an electronic transcription/translation of spoken language to printed text. The electronic translation of spoken language may permit erroneous, or at times, nonsensical words or phrases to be inadvertently transcribed; Although I have reviewed the note for such errors, some may still exist.     Additional Patient Counseling:       Patient Instructions   You have been diagnosed with a urinary tract infection (UTI). An antibiotic has been prescribed for you that needs to be taken until gone, even if you feel better prior to completing the medication. It is recommended that you take a probiotic AFTER completing your antibiotic course for about 30 days; probiotics are available over the counter in pill form and can be found in certain yogurt brands. Increase you intake of water; you may also drink low sugar cranberry juice. Avoid caffeine as this tends to irritate the bladder wall.     You may take over the counter AZO for no more than 3 days for bladder pain; AZO will turn you urine very orange or red. Use Motrin or Tylenol for fever/pain if no contradictions .   For recurrent infections it is best to avoid bathing in a tub, always wipe from front to back, urinate before and after sexual intercourse, avoid tight fitting pants, and wear cotton underwear.  If you develop fever, nausea,  vomiting, flank pain notify your provider.

## 2021-02-09 NOTE — PATIENT INSTRUCTIONS
You have been diagnosed with a urinary tract infection (UTI). An antibiotic has been prescribed for you that needs to be taken until gone, even if you feel better prior to completing the medication. It is recommended that you take a probiotic AFTER completing your antibiotic course for about 30 days; probiotics are available over the counter in pill form and can be found in certain yogurt brands. Increase you intake of water; you may also drink low sugar cranberry juice. Avoid caffeine as this tends to irritate the bladder wall.     You may take over the counter AZO for no more than 3 days for bladder pain; AZO will turn you urine very orange or red. Use Motrin or Tylenol for fever/pain if no contradictions .   For recurrent infections it is best to avoid bathing in a tub, always wipe from front to back, urinate before and after sexual intercourse, avoid tight fitting pants, and wear cotton underwear.  If you develop fever, nausea, vomiting, flank pain notify your provider.

## 2021-02-12 LAB
BACTERIA UR CULT: ABNORMAL
OTHER ANTIBIOTIC SUSC ISLT: ABNORMAL

## 2021-02-15 LAB — REF LAB TEST METHOD: NORMAL

## 2021-02-17 ENCOUNTER — IMMUNIZATION (OUTPATIENT)
Dept: VACCINE CLINIC | Facility: HOSPITAL | Age: 81
End: 2021-02-17

## 2021-02-17 PROCEDURE — 91300 HC SARSCOV02 VAC 30MCG/0.3ML IM: CPT | Performed by: INTERNAL MEDICINE

## 2021-02-17 PROCEDURE — 0002A: CPT | Performed by: INTERNAL MEDICINE

## 2021-02-19 ENCOUNTER — TELEPHONE (OUTPATIENT)
Dept: GASTROENTEROLOGY | Facility: CLINIC | Age: 81
End: 2021-02-19

## 2021-02-19 NOTE — TELEPHONE ENCOUNTER
Please call the patient and let her know I dont think the fiber tabs are strong enough. Has she tried miralax before? I would add some daily miralax to the fiber and see how she does. Have her call us back next week with update. If no BM by Sunday have her call the on call MD for advice. But having gas is a good sign.

## 2021-02-19 NOTE — TELEPHONE ENCOUNTER
----- Message from Lissa Ortiz sent at 2/19/2021 11:18 AM EST -----  Regarding: Complaint  Contact: [phone]  Hi!  I am a bit concerned that I have been constipated for the past week.  I have been taking the [place] tabs 1-2 a day as you suggested but no results.  I am having no pain or discomfort, just a lot of gas.  Please advise...

## 2021-02-22 ENCOUNTER — TELEPHONE (OUTPATIENT)
Dept: GASTROENTEROLOGY | Facility: CLINIC | Age: 81
End: 2021-02-22

## 2021-02-22 NOTE — TELEPHONE ENCOUNTER
----- Message from Lissa Ortiz sent at 2/21/2021 12:09 PM EST -----  Regarding: Complaint  Contact: [phone]  Hi!  I was wondering if I could possibly have Celiac disease or maybe need some strong probiotics  As My digestive problems are getting worse and I am miserable.

## 2021-02-22 NOTE — TELEPHONE ENCOUNTER
I think at this point she really should have a follow-up with Dr. Smith to discuss all of this.  Thanks

## 2021-02-23 NOTE — TELEPHONE ENCOUNTER
Called pt and advised of Lesli's note. Advised will send message to Dr Smith to find appt time. Verb understanding.

## 2021-02-23 NOTE — TELEPHONE ENCOUNTER
Called pt and advised of Lesli's note. Pt verb understanding . Pt reports that she has tried miralax in the past and it did not work well.  Pt states she will just wait till she see Dr Smith.

## 2021-03-11 RX ORDER — PEN NEEDLE, DIABETIC 32GX 5/32"
NEEDLE, DISPOSABLE MISCELLANEOUS
Qty: 100 EACH | Refills: 2 | Status: SHIPPED | OUTPATIENT
Start: 2021-03-11 | End: 2022-11-22

## 2021-03-13 RX ORDER — INSULIN GLARGINE 300 U/ML
10 INJECTION, SOLUTION SUBCUTANEOUS DAILY
Qty: 3 ML | Refills: 0 | Status: SHIPPED | OUTPATIENT
Start: 2021-03-13 | End: 2021-07-27 | Stop reason: SDUPTHER

## 2021-03-17 ENCOUNTER — TELEPHONE (OUTPATIENT)
Dept: GASTROENTEROLOGY | Facility: CLINIC | Age: 81
End: 2021-03-17

## 2021-03-17 DIAGNOSIS — R19.7 DIARRHEA, UNSPECIFIED TYPE: Primary | ICD-10-CM

## 2021-03-17 NOTE — TELEPHONE ENCOUNTER
----- Message from Lissa Ortiz sent at 3/16/2021  5:05 PM EDT -----  Regarding: Complaint  Contact: [phone]  Hi!  I am sorry to be such a bother, but I am still having diarrhea every other day - been taking the [place] tabs so no more constipation, but so much diarrhea.  Am I allergic to something? Should I try to make an appointment?

## 2021-03-17 NOTE — TELEPHONE ENCOUNTER
The Metamucil tabs may actually be causing this stools to be looser.  I would try taking them every other day then if every day is causing diarrhea.

## 2021-03-25 NOTE — TELEPHONE ENCOUNTER
If she is still having crazy diarrhea then there are some thing else going on here.  Because normally she has constipation.  I would have her come by the office and let us do some labs and some stool studies to start.  She definitely needs a follow-up with either me or Dr. Smith.  Lets check a CBC, CMP, amylase, lipase and lets do a GI PCR, fecal lactoferrin and a C. difficile stool test.  In the meantime she can try taking some Imodium or Pepto-Bismol to see if it can calm down the diarrhea.  If she gets worse over the weekend have her go to the [facility] ER for immediate evaluation.

## 2021-03-25 NOTE — TELEPHONE ENCOUNTER
Call from pt.  States stopped metamucil one wk ago.  Having urgent diarrhea - to the point of incontinence - 2 to 3x/day.  Denies blood, pain, cramping - but discouraged re: urgency.     Message to TRACE Du.

## 2021-03-26 NOTE — TELEPHONE ENCOUNTER
Call to pt.  Advise per M Florian note.  Verb understanding.     Lab appt scheduled for 3/29 at 11am.  Stool kit at .      F/u appt scheduled with M Florian for 3/31 @ 11am.     Labs orders placed -  Message to M Florian.

## 2021-03-29 ENCOUNTER — LAB (OUTPATIENT)
Dept: GASTROENTEROLOGY | Facility: CLINIC | Age: 81
End: 2021-03-29

## 2021-03-30 LAB
ALBUMIN SERPL-MCNC: 4.4 G/DL (ref 3.5–5.2)
ALBUMIN/GLOB SERPL: 1.8 G/DL
ALP SERPL-CCNC: 75 U/L (ref 39–117)
ALT SERPL-CCNC: 17 U/L (ref 1–33)
AMYLASE SERPL-CCNC: 74 U/L (ref 28–100)
AST SERPL-CCNC: 26 U/L (ref 1–32)
BASOPHILS # BLD AUTO: 0.06 10*3/MM3 (ref 0–0.2)
BASOPHILS NFR BLD AUTO: 0.9 % (ref 0–1.5)
BILIRUB SERPL-MCNC: 0.4 MG/DL (ref 0–1.2)
BUN SERPL-MCNC: 13 MG/DL (ref 8–23)
BUN/CREAT SERPL: 20.3 (ref 7–25)
CALCIUM SERPL-MCNC: 9.2 MG/DL (ref 8.6–10.5)
CHLORIDE SERPL-SCNC: 104 MMOL/L (ref 98–107)
CO2 SERPL-SCNC: 28.2 MMOL/L (ref 22–29)
CREAT SERPL-MCNC: 0.64 MG/DL (ref 0.57–1)
EOSINOPHIL # BLD AUTO: 0.31 10*3/MM3 (ref 0–0.4)
EOSINOPHIL NFR BLD AUTO: 4.5 % (ref 0.3–6.2)
ERYTHROCYTE [DISTWIDTH] IN BLOOD BY AUTOMATED COUNT: 14 % (ref 12.3–15.4)
GLOBULIN SER CALC-MCNC: 2.4 GM/DL
GLUCOSE SERPL-MCNC: 95 MG/DL (ref 65–99)
HCT VFR BLD AUTO: 39 % (ref 34–46.6)
HGB BLD-MCNC: 13.1 G/DL (ref 12–15.9)
IMM GRANULOCYTES # BLD AUTO: 0.02 10*3/MM3 (ref 0–0.05)
IMM GRANULOCYTES NFR BLD AUTO: 0.3 % (ref 0–0.5)
LIPASE SERPL-CCNC: 35 U/L (ref 13–60)
LYMPHOCYTES # BLD AUTO: 1.19 10*3/MM3 (ref 0.7–3.1)
LYMPHOCYTES NFR BLD AUTO: 17.2 % (ref 19.6–45.3)
MCH RBC QN AUTO: 31.3 PG (ref 26.6–33)
MCHC RBC AUTO-ENTMCNC: 33.6 G/DL (ref 31.5–35.7)
MCV RBC AUTO: 93.3 FL (ref 79–97)
MONOCYTES # BLD AUTO: 0.78 10*3/MM3 (ref 0.1–0.9)
MONOCYTES NFR BLD AUTO: 11.3 % (ref 5–12)
NEUTROPHILS # BLD AUTO: 4.55 10*3/MM3 (ref 1.7–7)
NEUTROPHILS NFR BLD AUTO: 65.8 % (ref 42.7–76)
NRBC BLD AUTO-RTO: 0 /100 WBC (ref 0–0.2)
PLATELET # BLD AUTO: 329 10*3/MM3 (ref 140–450)
POTASSIUM SERPL-SCNC: 4.6 MMOL/L (ref 3.5–5.2)
PROT SERPL-MCNC: 6.8 G/DL (ref 6–8.5)
RBC # BLD AUTO: 4.18 10*6/MM3 (ref 3.77–5.28)
SODIUM SERPL-SCNC: 143 MMOL/L (ref 136–145)
WBC # BLD AUTO: 6.91 10*3/MM3 (ref 3.4–10.8)

## 2021-03-31 ENCOUNTER — OFFICE VISIT (OUTPATIENT)
Dept: GASTROENTEROLOGY | Facility: CLINIC | Age: 81
End: 2021-03-31

## 2021-03-31 ENCOUNTER — TELEPHONE (OUTPATIENT)
Dept: GASTROENTEROLOGY | Facility: CLINIC | Age: 81
End: 2021-03-31

## 2021-03-31 VITALS — BODY MASS INDEX: 22.6 KG/M2 | TEMPERATURE: 96.6 F | HEIGHT: 67 IN | WEIGHT: 144 LBS

## 2021-03-31 DIAGNOSIS — R19.4 CHANGE IN BOWEL HABITS: Primary | ICD-10-CM

## 2021-03-31 DIAGNOSIS — Z87.440 HISTORY OF UTI: ICD-10-CM

## 2021-03-31 DIAGNOSIS — R19.7 DIARRHEA, UNSPECIFIED TYPE: ICD-10-CM

## 2021-03-31 DIAGNOSIS — R15.2 FECAL URGENCY: ICD-10-CM

## 2021-03-31 LAB

## 2021-03-31 PROCEDURE — 99214 OFFICE O/P EST MOD 30 MIN: CPT | Performed by: NURSE PRACTITIONER

## 2021-03-31 NOTE — TELEPHONE ENCOUNTER
----- Message from MAINE Chow sent at 3/30/2021  4:43 PM EDT -----  Please call the patient and let her know labs look good.  Waiting on stool studies.

## 2021-03-31 NOTE — TELEPHONE ENCOUNTER
----- Message from Lissa Ortiz sent at 3/30/2021  7:37 PM EDT -----  Regarding: Test Results Question  Contact: [phone]  What does a 17.2 lymphocyte reading mean?

## 2021-04-08 LAB — LACTOFERRIN STL-MCNC: NORMAL UG/ML(G)

## 2021-05-08 RX ORDER — LEVOTHYROXINE SODIUM 0.03 MG/1
25 TABLET ORAL DAILY
Qty: 30 TABLET | Refills: 1 | Status: SHIPPED | OUTPATIENT
Start: 2021-05-08 | End: 2021-07-01

## 2021-05-19 ENCOUNTER — OFFICE VISIT (OUTPATIENT)
Dept: ENDOCRINOLOGY | Age: 81
End: 2021-05-19

## 2021-05-19 VITALS
BODY MASS INDEX: 22.6 KG/M2 | HEIGHT: 67 IN | DIASTOLIC BLOOD PRESSURE: 72 MMHG | WEIGHT: 144 LBS | SYSTOLIC BLOOD PRESSURE: 130 MMHG

## 2021-05-19 DIAGNOSIS — E78.2 MIXED HYPERLIPIDEMIA: ICD-10-CM

## 2021-05-19 DIAGNOSIS — I10 BENIGN ESSENTIAL HYPERTENSION: ICD-10-CM

## 2021-05-19 DIAGNOSIS — G25.81 RESTLESS LEGS SYNDROME: ICD-10-CM

## 2021-05-19 DIAGNOSIS — E11.9 TYPE 2 DIABETES MELLITUS WITHOUT COMPLICATION, WITHOUT LONG-TERM CURRENT USE OF INSULIN (HCC): Primary | ICD-10-CM

## 2021-05-19 DIAGNOSIS — E55.9 VITAMIN D DEFICIENCY: ICD-10-CM

## 2021-05-19 DIAGNOSIS — E03.9 ACQUIRED HYPOTHYROIDISM: ICD-10-CM

## 2021-05-19 PROCEDURE — 99214 OFFICE O/P EST MOD 30 MIN: CPT | Performed by: INTERNAL MEDICINE

## 2021-05-24 LAB
25(OH)D3+25(OH)D2 SERPL-MCNC: 139 NG/ML (ref 30–100)
ALBUMIN SERPL-MCNC: 4.5 G/DL (ref 3.5–5.2)
ALBUMIN/CREAT UR: 19 MG/G CREAT (ref 0–29)
ALBUMIN/GLOB SERPL: 2 G/DL
ALP SERPL-CCNC: 72 U/L (ref 39–117)
ALT SERPL-CCNC: 22 U/L (ref 1–33)
AST SERPL-CCNC: 27 U/L (ref 1–32)
BILIRUB SERPL-MCNC: 0.4 MG/DL (ref 0–1.2)
BUN SERPL-MCNC: 15 MG/DL (ref 8–23)
BUN/CREAT SERPL: 25 (ref 7–25)
C PEPTIDE SERPL-MCNC: 0.6 NG/ML (ref 1.1–4.4)
CALCIUM SERPL-MCNC: 9.5 MG/DL (ref 8.6–10.5)
CHLORIDE SERPL-SCNC: 105 MMOL/L (ref 98–107)
CHOLEST SERPL-MCNC: 162 MG/DL (ref 0–200)
CO2 SERPL-SCNC: 26.6 MMOL/L (ref 22–29)
CREAT SERPL-MCNC: 0.6 MG/DL (ref 0.57–1)
CREAT UR-MCNC: 83.1 MG/DL
GAD65 AB SER IA-ACNC: <5 U/ML (ref 0–5)
GLOBULIN SER CALC-MCNC: 2.2 GM/DL
GLUCOSE SERPL-MCNC: 91 MG/DL (ref 65–99)
HBA1C MFR BLD: 7.8 % (ref 4.8–5.6)
HDLC SERPL-MCNC: 67 MG/DL (ref 40–60)
IMP & REVIEW OF LAB RESULTS: NORMAL
LDLC SERPL CALC-MCNC: 82 MG/DL (ref 0–100)
MICROALBUMIN UR-MCNC: 15.5 UG/ML
POTASSIUM SERPL-SCNC: 4.4 MMOL/L (ref 3.5–5.2)
PROT SERPL-MCNC: 6.7 G/DL (ref 6–8.5)
SODIUM SERPL-SCNC: 141 MMOL/L (ref 136–145)
T4 FREE SERPL-MCNC: 1.28 NG/DL (ref 0.93–1.7)
TRIGL SERPL-MCNC: 66 MG/DL (ref 0–150)
TSH SERPL DL<=0.005 MIU/L-ACNC: 2.36 UIU/ML (ref 0.27–4.2)
VLDLC SERPL CALC-MCNC: 13 MG/DL (ref 5–40)

## 2021-06-03 RX ORDER — GLIPIZIDE 2.5 MG/1
2.5 TABLET, EXTENDED RELEASE ORAL EVERY MORNING
Qty: 30 TABLET | Refills: 11 | Status: SHIPPED | OUTPATIENT
Start: 2021-06-03 | End: 2022-01-10 | Stop reason: ALTCHOICE

## 2021-06-04 ENCOUNTER — TELEPHONE (OUTPATIENT)
Dept: INTERNAL MEDICINE | Facility: CLINIC | Age: 81
End: 2021-06-04

## 2021-06-04 NOTE — TELEPHONE ENCOUNTER
Patient states frequent urination x few weeks and thinks she has a UTI. Pt was advised we do not have any openings and she need to be evaluated at a [facility] for treatment and to have her urine checked. She verbalized she understood and thank you.

## 2021-06-16 DIAGNOSIS — E78.2 MIXED HYPERLIPIDEMIA: ICD-10-CM

## 2021-06-16 DIAGNOSIS — E55.9 VITAMIN D DEFICIENCY: ICD-10-CM

## 2021-06-16 DIAGNOSIS — E11.65 TYPE 2 DIABETES MELLITUS WITH HYPERGLYCEMIA, WITHOUT LONG-TERM CURRENT USE OF INSULIN (HCC): ICD-10-CM

## 2021-06-16 DIAGNOSIS — I10 BENIGN ESSENTIAL HYPERTENSION: ICD-10-CM

## 2021-06-27 NOTE — PROGRESS NOTES
"Chief Complaint  Hypertension (6 month check up )    Subjective          Lissa Ortiz presents to [facility]  History of Present Illness     Hypertension - stable.  Patient taking medication as prescribed.  Denies chest pain, shortness of breath, headache, lower extremity edema.  Patient is taking amlodipine.    Delayed wound healing -also complaining of some delayed wound healing on her right knee.  She had taken a fall which caused a laceration which has been treated with ointment antibiotic.  Denies any pus or erythema or increasing pain.    Bilateral swelling -also complaining of late day tonight peripheral edema.  Is not causing any pain but she was concerned that it could mean something more than that.  She has been on amlodipine for up to 20 years does not appear any other medications could be doing it.    Also mentions some urinary frequency.  She says it has been a bit chronic 1 to make sure she does not have a UTI.  I explained to her that if it is not a UTI, it is possible she may need to see a urologist for further work-up.      Objective   Vital Signs:   /72 (BP Location: Left arm, Patient Position: Sitting, Cuff Size: Adult)   Pulse 72   Temp 97.4 °F (36.3 °C)   Resp 14   Ht 170.2 cm (67\")   Wt 62.1 kg (137 lb)   SpO2 98%   BMI 21.46 kg/m²     Physical Exam  Vitals and nursing note reviewed.   Constitutional:       General: She is not in acute distress.     Appearance: Normal appearance.   Cardiovascular:      Rate and Rhythm: Normal rate and regular rhythm.      Heart sounds: Normal heart sounds. No murmur heard.        Comments: Trace bilateral nonpitting edema  Pulmonary:      Effort: Pulmonary effort is normal.      Breath sounds: Normal breath sounds.   Neurological:      Mental Status: She is alert.        Result Review :   The following data was reviewed by: Adin Bragg MD on 06/29/2021:  Common labs    Common Labsle 11/5/20 11/5/20 11/5/20 3/29/21 " 3/29/21 5/19/21 5/19/21 5/19/21 5/19/21    0937 0937 0937 1114 1114 1052 1052 1052 1052   Glucose   99  95 91      BUN   13  13 15      Creatinine   0.62  0.64 0.60      eGFR Non  Am   93  90 96      eGFR  Am   113  108 117      Sodium   139  143 141      Potassium   4.7  4.6 4.4      Chloride   101  104 105      Calcium   8.9  9.2 9.5      Total Protein   6.3  6.8 6.7      Albumin   4.30  4.40 4.50      Total Bilirubin   0.4  0.4 0.4      Alkaline Phosphatase   82  75 72      AST (SGOT)   23  26 27      ALT (SGPT)   18  17 22      WBC    6.91        Hemoglobin    13.1        Hematocrit    39.0        Platelets    329        Total Cholesterol  162     162     Triglycerides  57     66     HDL Cholesterol  63 (A)     67 (A)     LDL Cholesterol   87     82     Hemoglobin A1C 7.43 (A)       7.80 (A)    Microalbumin, Urine         15.5   (A) Abnormal value       Comments are available for some flowsheets but are not being displayed.                     Assessment and Plan    Diagnoses and all orders for this visit:    1. Benign essential hypertension (Primary)    2. Delayed wound healing    3. Peripheral venous insufficiency    4. Urinary frequency  -     Urinalysis With Culture If Indicated - Urine, Clean Catch  -     Urinalysis, Microscopic Only - Urine, Clean Catch      Her hypertension is stable and I believe the wound will continue to heal it is just a little delayed from the diabetes.  I believe the most logical explanation for the swelling in the legs is peripheral venous insufficiency and I recommended to prop up her legs when possible and use light Ace wrap if needed.  We will also get a urinalysis with culture if needed for the urinary frequency.      Follow Up {Instructions Charge Capture  Follow-up Communications :23}  Return in about 6 months (around 1/10/2022) for Medicare Wellness.  There haveReview  Patient was given instructions and counseling regarding her condition or for health  maintenance advice. Please see specific information pulled into the AVS if appropriate.

## 2021-06-29 ENCOUNTER — OFFICE VISIT (OUTPATIENT)
Dept: INTERNAL MEDICINE | Facility: CLINIC | Age: 81
End: 2021-06-29

## 2021-06-29 VITALS
WEIGHT: 137 LBS | BODY MASS INDEX: 21.5 KG/M2 | HEIGHT: 67 IN | TEMPERATURE: 97.4 F | DIASTOLIC BLOOD PRESSURE: 72 MMHG | SYSTOLIC BLOOD PRESSURE: 128 MMHG | OXYGEN SATURATION: 98 % | RESPIRATION RATE: 14 BRPM | HEART RATE: 72 BPM

## 2021-06-29 DIAGNOSIS — I87.2 PERIPHERAL VENOUS INSUFFICIENCY: ICD-10-CM

## 2021-06-29 DIAGNOSIS — I10 BENIGN ESSENTIAL HYPERTENSION: Primary | ICD-10-CM

## 2021-06-29 DIAGNOSIS — R35.0 URINARY FREQUENCY: ICD-10-CM

## 2021-06-29 DIAGNOSIS — T14.8XXD DELAYED WOUND HEALING: ICD-10-CM

## 2021-06-29 PROBLEM — E03.9 ACQUIRED HYPOTHYROIDISM: Chronic | Status: ACTIVE | Noted: 2021-05-19

## 2021-06-29 LAB
BACTERIA UR QL AUTO: ABNORMAL /HPF
BILIRUB UR QL STRIP: NEGATIVE
CLARITY UR: CLEAR
COLOR UR: YELLOW
GLUCOSE UR STRIP-MCNC: NEGATIVE MG/DL
HGB UR QL STRIP.AUTO: NEGATIVE
HYALINE CASTS UR QL AUTO: ABNORMAL /LPF
KETONES UR QL STRIP: ABNORMAL
LEUKOCYTE ESTERASE UR QL STRIP.AUTO: ABNORMAL
MUCOUS THREADS URNS QL MICRO: ABNORMAL /HPF
NITRITE UR QL STRIP: NEGATIVE
PH UR STRIP.AUTO: 7 [PH] (ref 5–8)
PROT UR QL STRIP: NEGATIVE
RBC # UR: ABNORMAL /HPF
REF LAB TEST METHOD: ABNORMAL
SP GR UR STRIP: 1.02 (ref 1–1.03)
SQUAMOUS #/AREA URNS HPF: ABNORMAL /HPF
UROBILINOGEN UR QL STRIP: ABNORMAL
WBC UR QL AUTO: ABNORMAL /HPF

## 2021-06-29 PROCEDURE — 99214 OFFICE O/P EST MOD 30 MIN: CPT | Performed by: FAMILY MEDICINE

## 2021-06-29 PROCEDURE — 81001 URINALYSIS AUTO W/SCOPE: CPT | Performed by: FAMILY MEDICINE

## 2021-06-29 RX ORDER — TAFLUPROST OPTHALMIC 0 MG/.3ML
1 SOLUTION/ DROPS OPHTHALMIC NIGHTLY
COMMUNITY

## 2021-06-29 NOTE — PATIENT INSTRUCTIONS
Chronic Venous Insufficiency  Chronic venous insufficiency is a condition where the leg veins cannot effectively pump blood from the legs to the heart. This happens when the vein walls are either stretched, weakened, or damaged, or when the valves inside the vein are damaged. With the right treatment, you should be able to continue with an active life. This condition is also called venous stasis.  What are the causes?  Common causes of this condition include:  · High blood pressure inside the veins (venous hypertension).  · Sitting or standing too long, causing increased blood pressure in the leg veins.  · A blood clot that blocks blood flow in a vein (deep vein thrombosis, DVT).  · Inflammation of a vein (phlebitis) that causes a blood clot to form.  · Tumors in the pelvis that cause blood to back up.  What increases the risk?  The following factors may make you more likely to develop this condition:  · Having a family history of this condition.  · Obesity.  · Pregnancy.  · Living without enough regular physical activity or exercise (sedentary lifestyle).  · Smoking.  · Having a job that requires long periods of standing or sitting in one place.  · Being a certain age. Women in their 40s and 50s and men in their 70s are more likely to develop this condition.  What are the signs or symptoms?  Symptoms of this condition include:  · Veins that are enlarged, bulging, or twisted (varicose veins).  · Skin breakdown or ulcers.  · Reddened skin or dark discoloration of skin on the leg between the knee and ankle.  · Brown, smooth, tight, and painful skin just above the ankle, usually on the inside of the leg (lipodermatosclerosis).  · Swelling of the legs.  How is this diagnosed?  This condition may be diagnosed based on:  · Your medical history.  · A physical exam.  · Tests, such as:  ? A procedure that creates an image of a blood vessel and nearby organs and provides information about blood flow through the blood vessel  (duplex ultrasound).  ? A procedure that tests blood flow (plethysmography).  ? A procedure that looks at the veins using X-ray and dye (venogram).  How is this treated?  The goals of treatment are to help you return to an active life and to minimize pain or disability. Treatment depends on the severity of your condition, and it may include:  · Wearing compression stockings. These can help relieve symptoms and help prevent your condition from getting worse. However, they do not cure the condition.  · Sclerotherapy. This procedure involves an injection of a solution that shrinks damaged veins.  · Surgery. This may involve:  ? Removing a diseased vein (vein stripping).  ? Cutting off blood flow through the vein (laser ablation surgery).  ? Repairing or reconstructing a valve within the affected vein.  Follow these instructions at home:         · Wear compression stockings as told by your health care provider. These stockings help to prevent blood clots and reduce swelling in your legs.  · Take over-the-counter and prescription medicines only as told by your health care provider.  · Stay active by exercising, walking, or doing different activities. Ask your health care provider what activities are safe for you and how much exercise you need.  · Drink enough fluid to keep your urine pale yellow.  · Do not use any products that contain nicotine or tobacco, such as cigarettes, e-cigarettes, and chewing tobacco. If you need help quitting, ask your health care provider.  · Keep all follow-up visits as told by your health care provider. This is important.  Contact a health care provider if you:  · Have redness, swelling, or more pain in the affected area.  · See a red streak or line that goes up or down from the affected area.  · Have skin breakdown or skin loss in the affected area, even if the breakdown is small.  · Get an injury in the affected area.  Get help right away if:  · You get an injury and an open wound in the  affected area.  · You have:  ? Severe pain that does not get better with medicine.  ? Sudden numbness or weakness in the foot or ankle below the affected area.  ? Trouble moving your foot or ankle.  ? A fever.  ? Worse or persistent symptoms.  ? Chest pain.  ? Shortness of breath.  Summary  · Chronic venous insufficiency is a condition where the leg veins cannot effectively pump blood from the legs to the heart.  · Chronic venous insufficiency occurs when the vein walls become stretched, weakened, or damaged, or when valves within the vein are damaged.  · Treatment depends on how severe your condition is. It often involves wearing compression stockings and may involve having a procedure.  · Make sure you stay active by exercising, walking, or doing different activities. Ask your health care provider what activities are safe for you and how much exercise you need.  This information is not intended to replace advice given to you by your health care provider. Make sure you discuss any questions you have with your health care provider.  Document Revised: 09/10/2019 Document Reviewed: 09/10/2019  [employer] Patient Education © 2021 [employer]

## 2021-06-30 DIAGNOSIS — R35.0 URINARY FREQUENCY: Primary | ICD-10-CM

## 2021-06-30 DIAGNOSIS — N39.0 RECURRENT UTI: ICD-10-CM

## 2021-07-01 ENCOUNTER — OFFICE VISIT (OUTPATIENT)
Dept: NEUROLOGY | Facility: CLINIC | Age: 81
End: 2021-07-01

## 2021-07-01 VITALS
WEIGHT: 134 LBS | BODY MASS INDEX: 21.03 KG/M2 | HEART RATE: 72 BPM | OXYGEN SATURATION: 100 % | SYSTOLIC BLOOD PRESSURE: 128 MMHG | DIASTOLIC BLOOD PRESSURE: 78 MMHG | HEIGHT: 67 IN

## 2021-07-01 DIAGNOSIS — E11.42 DIABETIC PERIPHERAL NEUROPATHY (HCC): Primary | ICD-10-CM

## 2021-07-01 DIAGNOSIS — G72.41 INCLUSION BODY MYOSITIS: ICD-10-CM

## 2021-07-01 PROCEDURE — 99205 OFFICE O/P NEW HI 60 MIN: CPT | Performed by: PSYCHIATRY & NEUROLOGY

## 2021-07-01 RX ORDER — LEVOTHYROXINE SODIUM 0.03 MG/1
TABLET ORAL
Qty: 90 TABLET | Refills: 1 | Status: SHIPPED | OUTPATIENT
Start: 2021-07-01 | End: 2021-11-03

## 2021-07-01 NOTE — PROGRESS NOTES
CC: Progressive weakness, balance trouble and a history of inclusion body myositis    HPI:  Lissa Ortiz is a  80 y.o. right-handed White female who was sent for neurological consultation by Dr. Kyle Chow regarding progressive weakness, balance trouble and a history of inclusion body myositis.  The patient states that Dr. Chow treats her for rheumatoid arthritis and approximately 10 years ago sent her for an evaluation for weakness to [place].  The patient does not recall the physician she saw in [place] but does recall he did an EMG on her.  She denies any muscle biopsy history.  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  I am unable to identify her records in our system from that evaluation.  She is currently treated for hypothyroidism and hyperlipidemia (Pravachol).  She has a 30-year history of diabetes.  She states to some degree numbness and tingling in the feet but not any burning or stabbing pains in the feet.  She has been told this is due to diabetic neuropathy.    She denies history of significant head or spine injuries, meningitis, seizure, stroke or syncope.  She denies any family history of stroke, brain tumor, brain hemorrhage or aneurysm of the brain artery.  She denies knowledge of anyone in the family with any nerve or muscle disease.  She does indicate some degree of low back pain which occurs intermittently without radicular pain.  No specific neck pain.  She does indicate that she falls.  She has some lightheadedness when she first gets up in the morning.  She states that she has trouble when on an uneven surface.  She states that she must use her hands to help get up out of the chair and does have a trouble getting up from the floor.  She says she can get up if she is in sand on the beach for instance.  She states she tends to shuffle and does not pick her feet up well and she has trouble walking up stairs both getting the leg to the  next step and pushing off.    Most recent hemoglobin A1c was 7.8%.  Cholesterol 162; HDL 67, LDL 82, triglycerides 66.  Vitamin D 139    Past Medical History:   Diagnosis Date   • Allergic rhinitis    • Arthritis    • Cancer (CMS/HCC)     skin leg   • Cataract    • Diabetes mellitus (CMS/HCC)     type 2   • Hypertension    • Long sleeper     post anesthesia         Past Surgical History:   Procedure Laterality Date   • COLONOSCOPY N/A 2/13/2018    Procedure: COLONOSCOPY to cecum and TI with hot snare polypectomy;  Surgeon: Rowan Smith MD;  Location: [facility] ENDOSCOPY;  Service:    • EYE SURGERY      tearduct sgy   • EYE SURGERY      MARY KAY CATARACTS   • HYSTERECTOMY     • TONSILLECTOMY             Current Outpatient Medications:   •  amLODIPine (NORVASC) 5 MG tablet, Take 1 tablet by mouth Daily., Disp: 90 tablet, Rfl: 3  •  azelastine (ASTELIN) 0.1 % nasal spray, 2 sprays into the nostril(s) as directed by provider 2 (Two) Times a Day. Use in each nostril as directed, Disp: 2 each, Rfl: 5  •  dorzolamide-timolol (Cosopt) 22.3-6.8 MG/ML ophthalmic solution, Apply  to eye(s) as directed by provider Every 12 (Twelve) Hours., Disp: , Rfl:   •  folic acid (FOLVITE) 1 MG tablet, Take 1 tablet by mouth daily., Disp: , Rfl:   •  gabapentin (NEURONTIN) 100 MG capsule, TAKE ONE CAPSULE BY MOUTH EVERY NIGHT AT BEDTIME TO START, MAY TAKE ONE CAPSULE BY MOUTH THREE TIMES A DAY (Patient taking differently: 300 mg. TAKE ONE CAPSULE BY MOUTH EVERY NIGHT AT BEDTIME TO START, MAY TAKE ONE CAPSULE BY MOUTH THREE TIMES A DAY), Disp: 90 capsule, Rfl: 1  •  glipizide (GLUCOTROL XL) 2.5 MG 24 hr tablet, Take 1 tablet by mouth Every Morning., Disp: 30 tablet, Rfl: 11  •  levothyroxine (SYNTHROID, LEVOTHROID) 25 MCG tablet, Take 1 tablet by mouth Daily., Disp: 30 tablet, Rfl: 1  •  Methotrexate Sodium 50 MG/2ML injection, Inject 7 mg into the appropriate muscle as directed by prescriber 1 (One) Time Per Week., Disp: , Rfl:   •  Multiple  Vitamin (MULTI VITAMIN PO), Take 1 tablet by mouth Daily., Disp: , Rfl:   •  pravastatin (PRAVACHOL) 20 MG tablet, Take 1 tablet by mouth Daily., Disp: 90 tablet, Rfl: 3  •  Probiotic capsule, Take 1 capsule by mouth., Disp: , Rfl:   •  SITagliptin (Januvia) 100 MG tablet, Take 1 tablet by mouth Daily., Disp: 90 tablet, Rfl: 2  •  Tafluprost, PF, (ZIOPTAN) 0.0015 % solution ophthalmic solution, 1 drop Every Night., Disp: , Rfl:   •  vitamin B-12 (CYANOCOBALAMIN) 100 MCG tablet, Take 1 tablet by mouth Every Other Day., Disp: , Rfl:   •  Blood Glucose Monitoring Suppl (TRUE METRIX AIR GLUCOSE METER) device, 1 Device Daily As Needed (check bs's twice daily)., Disp: 1 Device, Rfl: 0  •  Insulin Glargine, 2 Unit Dial, (Toujeo Max SoloStar) 300 UNIT/ML solution pen-injector injection, Inject 10 Units under the skin into the appropriate area as directed Daily for 90 days., Disp: 3 mL, Rfl: 0  •  [employer] Pen Needles 32G X 4 MM misc, USE ONCE DAILY, Disp: 100 each, Rfl: 2      Family History   Problem Relation Age of Onset   • Diabetes Mother    • Stroke Mother    • Coronary artery disease Father         MI   • Diabetes Father    • Stroke Father    • Thyroid cancer Daughter          Social History     Socioeconomic History   • Marital status:      Spouse name: Not on file   • Number of children: 3   • Years of education: Not on file   • Highest education level: Not on file   Tobacco Use   • Smoking status: Never Smoker   • Smokeless tobacco: Never Used   Substance and Sexual Activity   • Alcohol use: No     Comment: Occasional glass of wine one per month   • Drug use: No         Allergies   Allergen Reactions   • Sulfamethoxazole Nausea Only   • Trimethoprim Nausea Only   • Bactrim [Sulfamethoxazole-Trimethoprim] Nausea And Vomiting   • Fluticasone Furoate Unknown (See Comments)   • Vilanterol Unknown (See Comments)         Pain Scale: 0/10        ROS:  Review of Systems   Constitutional: Positive for fatigue. Negative  "for activity change and appetite change.   HENT: Negative for ear pain, facial swelling and hearing loss.    Eyes: Negative for pain, redness and itching.   Respiratory: Negative for cough, choking and shortness of breath.    Cardiovascular: Negative for chest pain and leg swelling.   Gastrointestinal: Negative for abdominal pain, nausea and vomiting.   Endocrine: Negative for cold intolerance and heat intolerance.   Musculoskeletal: Positive for gait problem. Negative for arthralgias and back pain.   Skin: Negative for color change, pallor and rash.   Allergic/Immunologic: Positive for environmental allergies. Negative for food allergies.   Neurological: Positive for numbness. Negative for dizziness, tremors, seizures, syncope, facial asymmetry, speech difficulty, weakness, light-headedness and headaches.   Psychiatric/Behavioral: Negative for agitation, behavioral problems, confusion, decreased concentration, dysphoric mood, hallucinations, self-injury, sleep disturbance and suicidal ideas. The patient is not nervous/anxious and is not hyperactive.          I have reviewed and agree with the above ROS completed by the medical assistant.      Physical Exam:  Vitals:    07/01/21 0905   BP: 128/78   Pulse: 72   SpO2: 100%   Weight: 60.8 kg (134 lb)   Height: 170.2 cm (67\")     Orthostatic BP:    Body mass index is 20.99 kg/m².    Physical Exam  General: Slender elderly female no acute distress  HEENT: Normocephalic no evidence of trauma.  Discs flat.  No AV nicking.  Throat negative.  Neck: Supple.  No thyromegaly.  No cervical bruits.  Heart: Regular rate and rhythm no murmurs.  No pedal edema.  Extremities: Radial pulses were strong and simultaneous.      Neurological Exam:   Mental Status: Awake, alert, oriented to person, place and time.  Conversant without evidence of an affective disorder, thought disorder, delusions or hallucinations.  Attention span and concentration are normal.  HCF: No aphasia, apraxia or " dysarthria.  Recent and remote memory intact however did not recall the name of the neurologist she saw 10 years ago..  Knowledge of recent events intact.  CN: I:   II: Visual fields full without left inattention   III, IV, VI: Eye movements intact without nystagmus or ptosis.  Pupils equal round and reactive to light.   V,VII: Light touch and pinprick intact all 3 divisions of V.  Facial muscles symmetrical.   VIII: Hearing intact to finger rub   IX,X: Soft palate elevates symmetrically   XI: Sternomastoid and trapezius are strong.   XII: Tongue midline without atrophy or fasciculations  Motor: Normal tone and bulk in the upper and lower extremities   Power testing: Mild weakness of left supraspinatus/deltoid.  Other proximal muscles in the upper extremities were strong.  There is weakness of the abductor pollicis brevis, first dorsal interosseous and abductor digiti quinti muscles bilaterally.  In the lower extremities the iliopsoas muscles are weak bilaterally.  Other proximal muscles are all strong.  There is mild weakness of toe extension but good toe flexion.  Reflexes: Upper extremities: +1 biceps difficult to obtain triceps and brachial radialis        Lower extremities: +1 knee jerks.  Trace ankle jerks        Toe signs: Predominantly withdrawal/equivocal  Sensory: Light touch: Diffusely intact in the upper extremities and lower extremities        Pinprick: Diffusely intact upper and lower extremities        Vibration: Reduced at the ankles        Position: Intact at the great toes    Cerebellar: Finger-to-nose: Intact           Rapid movement: Intact           Heel-to-shin: Intact  Gait and Station: Casual walk; patient uses hands to get up from the chair.  No shuffling or festination.  Mildly broad-based.  Able to raise on toes and heels.  No Romberg and no drift    Results:      Lab Results   Component Value Date    BUN 15 05/19/2021    CREATININE 0.60 05/19/2021    EGFRIFNONA 96 05/19/2021    EGFRIFAFRI  117 05/19/2021    BCR 25.0 05/19/2021    CO2 26.6 05/19/2021    CALCIUM 9.5 05/19/2021    PROTENTOTREF 6.7 05/19/2021    ALBUMIN 4.50 05/19/2021    LABIL2 2.0 05/19/2021    AST 27 05/19/2021    ALT 22 05/19/2021       Lab Results   Component Value Date    WBC 6.91 03/29/2021    HGB 13.1 03/29/2021    HCT 39.0 03/29/2021    MCV 93.3 03/29/2021     03/29/2021         .No results found for: RPR      Lab Results   Component Value Date    TSH 2.360 05/19/2021    A1WBPIY 7.4 11/05/2020         Lab Results   Component Value Date    YEQWAIXD64 1,830 (H) 06/19/2019         No results found for: FOLATE      Lab Results   Component Value Date    HGBA1C 7.80 (H) 05/19/2021         Lab Results   Component Value Date    BUN 15 05/19/2021    CREATININE 0.60 05/19/2021    EGFRIFNONA 96 05/19/2021    EGFRIFAFRI 117 05/19/2021    BCR 25.0 05/19/2021    K 4.4 05/19/2021    CO2 26.6 05/19/2021    CALCIUM 9.5 05/19/2021    PROTENTOTREF 6.7 05/19/2021    ALBUMIN 4.50 05/19/2021    LABIL2 2.0 05/19/2021    AST 27 05/19/2021    ALT 22 05/19/2021         Lab Results   Component Value Date    WBC 6.91 03/29/2021    HGB 13.1 03/29/2021    HCT 39.0 03/29/2021    MCV 93.3 03/29/2021     03/29/2021             Assessment:   1.  Peripheral neuropathy most likely due to diabetes  2.  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  For having this disease for over a decade I find that her evidence is really quite minimal.  I think this is an unusual feature and suspect for the diagnosis.  However since no specific treatment is useful inclusion body myositis trying to prove that diagnosis is not really necessary but only to exclude other diagnoses from the list.  Since she is on a statin it is conceivable she may have some muscle weakness due to statin use but I am not sure what her exam was previously when the diagnosis of [employer] was made or if she was on a statin at that time.          Plan:  1.  EMG left  arm and leg  2.  Labs for treatable causes of neuropathy including sed rate, RPR, B12 and folate, SPE, IEP, cryoglobulins, heavy metal screen, copper level.  Recheck CPK  3.  To obtain previous records from the [place] neurologist.  The patient states that Dr. Chow obtained the consult and should have those records.  4.  Follow-up with Valerie Paz NP in about a month-subsequent to her testing.          Time: 60 minutes      Addendum:    Damian, please check with Dr. Chow to see if he has the records from [facility] from 10 years ago when she was seen there and diagnosed with inclusion body myositis.    GNS          Dictated utilizing Dragon dictation.

## 2021-07-06 DIAGNOSIS — G25.81 RESTLESS LEG SYNDROME: Primary | ICD-10-CM

## 2021-07-06 RX ORDER — GABAPENTIN 300 MG/1
CAPSULE ORAL
Qty: 30 CAPSULE | Refills: 1 | Status: SHIPPED | OUTPATIENT
Start: 2021-07-06 | End: 2021-09-09

## 2021-07-09 PROBLEM — I87.2 PERIPHERAL VENOUS INSUFFICIENCY: Status: ACTIVE | Noted: 2021-07-09

## 2021-07-09 PROBLEM — I87.2 PERIPHERAL VENOUS INSUFFICIENCY: Chronic | Status: ACTIVE | Noted: 2021-07-09

## 2021-07-27 RX ORDER — INSULIN GLARGINE 300 U/ML
10 INJECTION, SOLUTION SUBCUTANEOUS DAILY
Qty: 3 ML | Refills: 1 | Status: SHIPPED | OUTPATIENT
Start: 2021-07-27 | End: 2021-08-11 | Stop reason: SDUPTHER

## 2021-08-11 ENCOUNTER — TELEPHONE (OUTPATIENT)
Dept: ENDOCRINOLOGY | Age: 81
End: 2021-08-11

## 2021-08-11 RX ORDER — INSULIN GLARGINE 300 U/ML
10 INJECTION, SOLUTION SUBCUTANEOUS DAILY
Qty: 5 PEN | Refills: 6 | Status: SHIPPED | OUTPATIENT
Start: 2021-08-11 | End: 2021-12-10

## 2021-08-11 NOTE — TELEPHONE ENCOUNTER
----- Message from Lissa Ortiz sent at 8/11/2021 10:53 AM EDT -----  Regarding: Prescription Question  Contact: [phone]  I am out of my insulin and [facility] will not refill because it now comes in 5 pen needle injections rather than one and they need your approval for #5.

## 2021-09-08 DIAGNOSIS — G25.81 RESTLESS LEG SYNDROME: ICD-10-CM

## 2021-09-09 RX ORDER — GABAPENTIN 300 MG/1
CAPSULE ORAL
Qty: 30 CAPSULE | Refills: 0 | Status: SHIPPED | OUTPATIENT
Start: 2021-09-09 | End: 2021-10-08

## 2021-10-04 ENCOUNTER — HOSPITAL ENCOUNTER (OUTPATIENT)
Dept: INFUSION THERAPY | Facility: HOSPITAL | Age: 81
Discharge: HOME | End: 2021-10-04

## 2021-10-04 ENCOUNTER — LAB (OUTPATIENT)
Dept: LAB | Facility: HOSPITAL | Age: 81
End: 2021-10-04

## 2021-10-04 DIAGNOSIS — G72.41 INCLUSION BODY MYOSITIS: ICD-10-CM

## 2021-10-04 DIAGNOSIS — E11.42 DIABETIC PERIPHERAL NEUROPATHY (HCC): ICD-10-CM

## 2021-10-04 LAB
CK SERPL-CCNC: 82 U/L (ref 20–180)
ERYTHROCYTE [SEDIMENTATION RATE] IN BLOOD: 5 MM/HR (ref 0–30)
FOLATE SERPL-MCNC: >20 NG/ML (ref 4.78–24.2)
RPR SER QL: NORMAL
VIT B12 BLD-MCNC: >2000 PG/ML (ref 211–946)

## 2021-10-04 PROCEDURE — 83655 ASSAY OF LEAD: CPT | Performed by: PSYCHIATRY & NEUROLOGY

## 2021-10-04 PROCEDURE — 82550 ASSAY OF CK (CPK): CPT

## 2021-10-04 PROCEDURE — 95911 NRV CNDJ TEST 9-10 STUDIES: CPT | Performed by: PSYCHIATRY & NEUROLOGY

## 2021-10-04 PROCEDURE — 84165 PROTEIN E-PHORESIS SERUM: CPT | Performed by: PSYCHIATRY & NEUROLOGY

## 2021-10-04 PROCEDURE — 86592 SYPHILIS TEST NON-TREP QUAL: CPT | Performed by: PSYCHIATRY & NEUROLOGY

## 2021-10-04 PROCEDURE — 36415 COLL VENOUS BLD VENIPUNCTURE: CPT | Performed by: PSYCHIATRY & NEUROLOGY

## 2021-10-04 PROCEDURE — 95886 MUSC TEST DONE W/N TEST COMP: CPT | Performed by: PSYCHIATRY & NEUROLOGY

## 2021-10-04 PROCEDURE — 82595 ASSAY OF CRYOGLOBULIN: CPT

## 2021-10-04 PROCEDURE — 82175 ASSAY OF ARSENIC: CPT | Performed by: PSYCHIATRY & NEUROLOGY

## 2021-10-04 PROCEDURE — 82607 VITAMIN B-12: CPT | Performed by: PSYCHIATRY & NEUROLOGY

## 2021-10-04 PROCEDURE — 95911 NRV CNDJ TEST 9-10 STUDIES: CPT

## 2021-10-04 PROCEDURE — 86334 IMMUNOFIX E-PHORESIS SERUM: CPT

## 2021-10-04 PROCEDURE — 84155 ASSAY OF PROTEIN SERUM: CPT | Performed by: PSYCHIATRY & NEUROLOGY

## 2021-10-04 PROCEDURE — 85652 RBC SED RATE AUTOMATED: CPT | Performed by: PSYCHIATRY & NEUROLOGY

## 2021-10-04 PROCEDURE — 82784 ASSAY IGA/IGD/IGG/IGM EACH: CPT

## 2021-10-04 PROCEDURE — 82746 ASSAY OF FOLIC ACID SERUM: CPT | Performed by: PSYCHIATRY & NEUROLOGY

## 2021-10-04 PROCEDURE — 95886 MUSC TEST DONE W/N TEST COMP: CPT

## 2021-10-04 PROCEDURE — 82525 ASSAY OF COPPER: CPT

## 2021-10-04 PROCEDURE — 83825 ASSAY OF MERCURY: CPT | Performed by: PSYCHIATRY & NEUROLOGY

## 2021-10-04 NOTE — PROCEDURES
EMG and Nerve Conduction Studies    I.      Instrument used: Neuromax 1002  II.     Please see data sheets for tabular summary of NCS and details on methods, temperatures and lab standards.   III.    EMG muscles tested for upper extremity studies include the deltoid, biceps, triceps, pronator teres, extensor digitorum communis, first dorsal interosseous and abductor pollicis brevis.    IV.   EMG muscles tested for lower extremity studies include the vastus lateralis, tibialis anterior, peroneus longus, medial gastrocnemius and extensor digitorum brevis.    V.    Additional muscles tested as needed.  Paraspinal muscles tested as needed.   VI.   Please see data sheets for tabular summary of EMG findings.   VII. The complete report includes the data sheets.      Indication: Peripheral neuropathy and history of inclusion body myositis  History: 80-year-old white female with diabetes who has some exam findings for peripheral neuropathy and has a history dating at least 10 years ago of inclusion body myositis made in [place].  Examination disclosed some intrinsic hand weakness bilaterally and some iliopsoas weakness bilaterally.  She does have some right-sided low back pain without radiation into either lower extremity.      Ht: 170.2 cm  Wt: 63.5 kg  HbA1C:   Lab Results   Component Value Date    HGBA1C 7.80 (H) 05/19/2021     TSH:   Lab Results   Component Value Date    TSH 2.360 05/19/2021       Technical summary:  Nerve conduction studies were obtained in the left arm and left leg.  Skin temperatures were initially extremely cold and it was difficult to get her warm enough for the study.  Temperature correction was used if indicated.  Needle examination was obtained on selected muscles in the left arm and left leg.    Results:  1.  Prolonged left median antidromic sensory distal latency at 3.9 ms with normal amplitude.  2.  Mildly prolonged left ulnar antidromic sensory distal latency with normal amplitude.  3.   Normal left radial sensory distal latency and amplitude.  4.  Normal left median motor conduction velocity, distal latency and amplitudes.  5.  Normal left ulnar motor conduction velocities, distal latency and amplitudes.  6.  Normal left sural sensory distal latency with temperature correction but low amplitude of 2.7 µV.  7.  Normal left superficial peroneal sensory distal latency and amplitude.  8.  Normal left peroneal motor conduction velocities with normal distal latency but low amplitude from ankle stimulation at 1.6 mV.  9.  Normal left tibial motor conduction velocity, distal latency and amplitudes.  10.  Needle examination of selected muscles of the left arm and left leg showed normal insertional activities throughout the left arm.  There were normal motor units and recruitment patterns throughout.    In the left leg there were a few positive sharp waves in the tibialis anterior with normal-appearing motor units and recruitment.  All other muscles tested showed normal insertional activities, motor units and recruitment other than some pain inhibition.  Lumbar paraspinals showed no abnormality at L5 on either side.    Impression:  Mildly abnormal study showing mild evidence of peripheral neuropathy.  Some irritability is seen in the left tibialis anterior without any other features of a lumbosacral radiculopathy.  Clinical correlation is suggested.  Specifically there was no evidence of a myopathy on this study.  Study results were discussed with the patient.    Tony Luciano M.D.        Addendum:  The patient was asked to complete her lab tests and follow-up with Valerie Paz in our office.  GNS      Dictated utilizing Dragon dictation.

## 2021-10-05 LAB
ALBUMIN SERPL ELPH-MCNC: 3.7 G/DL (ref 2.9–4.4)
ALBUMIN/GLOB SERPL: 1.2 {RATIO} (ref 0.7–1.7)
ALPHA1 GLOB SERPL ELPH-MCNC: 0.2 G/DL (ref 0–0.4)
ALPHA2 GLOB SERPL ELPH-MCNC: 0.6 G/DL (ref 0.4–1)
B-GLOBULIN SERPL ELPH-MCNC: 1 G/DL (ref 0.7–1.3)
GAMMA GLOB SERPL ELPH-MCNC: 1.3 G/DL (ref 0.4–1.8)
GLOBULIN SER CALC-MCNC: 3.1 G/DL (ref 2.2–3.9)
IGA SERPL-MCNC: 348 MG/DL (ref 64–422)
IGG SERPL-MCNC: 1176 MG/DL (ref 586–1602)
IGM SERPL-MCNC: 52 MG/DL (ref 26–217)
LABORATORY COMMENT REPORT: ABNORMAL
M PROTEIN SERPL ELPH-MCNC: 0.2 G/DL
PROT PATTERN SERPL ELPH-IMP: ABNORMAL
PROT PATTERN SERPL IFE-IMP: ABNORMAL
PROT SERPL-MCNC: 6.8 G/DL (ref 6–8.5)

## 2021-10-06 DIAGNOSIS — D47.2 MGUS (MONOCLONAL GAMMOPATHY OF UNKNOWN SIGNIFICANCE): ICD-10-CM

## 2021-10-06 DIAGNOSIS — E11.42 DIABETIC PERIPHERAL NEUROPATHY (HCC): Primary | ICD-10-CM

## 2021-10-07 LAB
ARSENIC BLD-MCNC: <1 UG/L (ref 2–23)
LEAD BLDV-MCNC: 1 UG/DL (ref 0–4)
MERCURY BLD-MCNC: <1 UG/L (ref 0–14.9)

## 2021-10-08 ENCOUNTER — TELEPHONE (OUTPATIENT)
Dept: NEUROLOGY | Facility: CLINIC | Age: 81
End: 2021-10-08

## 2021-10-08 DIAGNOSIS — G25.81 RESTLESS LEG SYNDROME: ICD-10-CM

## 2021-10-08 LAB
COPPER SERPL-MCNC: 140 UG/DL (ref 80–158)
CRYOGLOB SER QL 1D COLD INC: NORMAL

## 2021-10-08 RX ORDER — GABAPENTIN 300 MG/1
CAPSULE ORAL
Qty: 30 CAPSULE | Refills: 1 | Status: SHIPPED | OUTPATIENT
Start: 2021-10-08 | End: 2021-10-29 | Stop reason: SDUPTHER

## 2021-10-08 NOTE — TELEPHONE ENCOUNTER
Spoke with patient she is scheduled with hematology on 10/26. She has an appt with Valerie on 10/13 she wanted to know if she should follow up with Valerie after she see hematology?

## 2021-10-08 NOTE — TELEPHONE ENCOUNTER
It may take a little longer for the motor and sensory neuropathy panel to come back anyway so it might be best to have her seen back by Valerie after she has seen hematology.    CORRINE

## 2021-10-08 NOTE — TELEPHONE ENCOUNTER
----- Message from Tony Luciano MD sent at 10/6/2021  1:43 PM EDT -----  Damian,  Her SPE/IEP shows an MGUS so she will need a motor and sensory neuropathy panel for antibodies against the nerves. She will also need a heme consult.    Please let her know. The orders have been placed    gns

## 2021-10-26 ENCOUNTER — APPOINTMENT (OUTPATIENT)
Dept: LAB | Facility: HOSPITAL | Age: 81
End: 2021-10-26

## 2021-10-28 ENCOUNTER — LAB (OUTPATIENT)
Dept: LAB | Facility: HOSPITAL | Age: 81
End: 2021-10-28

## 2021-10-28 ENCOUNTER — CONSULT (OUTPATIENT)
Dept: ONCOLOGY | Facility: CLINIC | Age: 81
End: 2021-10-28

## 2021-10-28 VITALS
OXYGEN SATURATION: 98 % | RESPIRATION RATE: 16 BRPM | WEIGHT: 153 LBS | HEIGHT: 67 IN | HEART RATE: 60 BPM | DIASTOLIC BLOOD PRESSURE: 73 MMHG | SYSTOLIC BLOOD PRESSURE: 124 MMHG | BODY MASS INDEX: 24.01 KG/M2 | TEMPERATURE: 97.9 F

## 2021-10-28 DIAGNOSIS — E11.42 DIABETIC PERIPHERAL NEUROPATHY (HCC): Primary | ICD-10-CM

## 2021-10-28 DIAGNOSIS — D47.2 MONOCLONAL GAMMOPATHY: Primary | ICD-10-CM

## 2021-10-28 LAB
BASOPHILS # BLD AUTO: 0.08 10*3/MM3 (ref 0–0.2)
BASOPHILS NFR BLD AUTO: 1.3 % (ref 0–1.5)
DEPRECATED RDW RBC AUTO: 48.3 FL (ref 37–54)
EOSINOPHIL # BLD AUTO: 0.38 10*3/MM3 (ref 0–0.4)
EOSINOPHIL NFR BLD AUTO: 6.3 % (ref 0.3–6.2)
ERYTHROCYTE [DISTWIDTH] IN BLOOD BY AUTOMATED COUNT: 14.6 % (ref 12.3–15.4)
HCT VFR BLD AUTO: 40.3 % (ref 34–46.6)
HGB BLD-MCNC: 13.4 G/DL (ref 12–15.9)
IMM GRANULOCYTES # BLD AUTO: 0.03 10*3/MM3 (ref 0–0.05)
IMM GRANULOCYTES NFR BLD AUTO: 0.5 % (ref 0–0.5)
LYMPHOCYTES # BLD AUTO: 1.41 10*3/MM3 (ref 0.7–3.1)
LYMPHOCYTES NFR BLD AUTO: 23.5 % (ref 19.6–45.3)
MCH RBC QN AUTO: 30.5 PG (ref 26.6–33)
MCHC RBC AUTO-ENTMCNC: 33.3 G/DL (ref 31.5–35.7)
MCV RBC AUTO: 91.8 FL (ref 79–97)
MONOCYTES # BLD AUTO: 0.85 10*3/MM3 (ref 0.1–0.9)
MONOCYTES NFR BLD AUTO: 14.2 % (ref 5–12)
NEUTROPHILS NFR BLD AUTO: 3.25 10*3/MM3 (ref 1.7–7)
NEUTROPHILS NFR BLD AUTO: 54.2 % (ref 42.7–76)
NRBC BLD AUTO-RTO: 0 /100 WBC (ref 0–0.2)
PLATELET # BLD AUTO: 160 10*3/MM3 (ref 140–450)
PMV BLD AUTO: 11 FL (ref 6–12)
RBC # BLD AUTO: 4.39 10*6/MM3 (ref 3.77–5.28)
WBC # BLD AUTO: 6 10*3/MM3 (ref 3.4–10.8)

## 2021-10-28 PROCEDURE — 85025 COMPLETE CBC W/AUTO DIFF WBC: CPT

## 2021-10-28 PROCEDURE — 99205 OFFICE O/P NEW HI 60 MIN: CPT | Performed by: INTERNAL MEDICINE

## 2021-10-28 PROCEDURE — 36415 COLL VENOUS BLD VENIPUNCTURE: CPT

## 2021-10-28 RX ORDER — AMPICILLIN TRIHYDRATE 250 MG
500 CAPSULE ORAL DAILY
COMMUNITY
End: 2023-01-16

## 2021-10-28 RX ORDER — VIT C/B6/B5/MAGNESIUM/HERB 173 50-5-6-5MG
CAPSULE ORAL
COMMUNITY
End: 2023-01-16

## 2021-10-28 NOTE — PROGRESS NOTES
[facility]    CONSULTING IN BLOOD DISORDERS & CANCER      REASON FOR CONSULTATION/CHIEF COMPLAINT:     Evaluation & management for MGUS, IgA kappa                             REQUESTING PHYSICIAN: Tony Luciano MD  RECORDS OBTAINED:  Records of the patients history including those from the electronic medical record were reviewed and summarized in detail.    HISTORY OF PRESENT ILLNESS:    The patient is a 80 y.o. year old female with past medical history significant for DM2 (over 40 years), inclusion body myositis (diagnosed 10 yrs ago, on methotrexate), hypothyroidism, & peripheral neuropathy who had presented to her neurologist,  with signs/symptoms of bilateral feet neuropathy. During this work up, SPEP/VIGNESH from October 2021 showed presence of IgA kappa M-protein, 0.2 gm/dl.   Patient c/o b/l feet neuropathy, along with generalized weakness, which she attributes to inclusion body myositis.   Denies any specific bone pain. On labs, no cytopenias. Renal function & calcium levels within normal limits.   No fever, chills, chest pain, cough or weight loss. No palpable lymphadenopathy. Has generalized weakness secondary to inclusion body myositis. Denies smoking, alcohol or drug abuse.   No family history of blood or bone marrow disorder.         Past Medical History:   Diagnosis Date   • Allergic rhinitis    • Arthritis    • Cancer (HCC)     skin leg   • Cataract    • Diabetes mellitus (HCC)     type 2   • Hypertension    • Long sleeper     post anesthesia     Past Surgical History:   Procedure Laterality Date   • COLONOSCOPY N/A 2/13/2018    Procedure: COLONOSCOPY to cecum and TI with hot snare polypectomy;  Surgeon: Rowan Smith MD;  Location: [facility] ENDOSCOPY;  Service:    • EYE SURGERY      tearduct sgy   • EYE SURGERY      MARY KAY CATARACTS   • HYSTERECTOMY     • TONSILLECTOMY         MEDICATIONS    Current Outpatient Medications:   •  amLODIPine (NORVASC) 5 MG tablet, Take 1 tablet by mouth Daily.,  Disp: 90 tablet, Rfl: 3  •  azelastine (ASTELIN) 0.1 % nasal spray, 2 sprays into the nostril(s) as directed by provider 2 (Two) Times a Day. Use in each nostril as directed, Disp: 2 each, Rfl: 5  •  Blood Glucose Monitoring Suppl (TRUE METRIX AIR GLUCOSE METER) device, 1 Device Daily As Needed (check bs's twice daily)., Disp: 1 Device, Rfl: 0  •  Cinnamon 500 MG capsule, Take 500 mg by mouth Daily., Disp: , Rfl:   •  dorzolamide-timolol (Cosopt) 22.3-6.8 MG/ML ophthalmic solution, Apply  to eye(s) as directed by provider Every 12 (Twelve) Hours., Disp: , Rfl:   •  folic acid (FOLVITE) 1 MG tablet, Take 1 tablet by mouth daily., Disp: , Rfl:   •  gabapentin (NEURONTIN) 300 MG capsule, TAKE ONE CAPSULE BY MOUTH AT BEDTIME, Disp: 30 capsule, Rfl: 1  •  glipizide (GLUCOTROL XL) 2.5 MG 24 hr tablet, Take 1 tablet by mouth Every Morning., Disp: 30 tablet, Rfl: 11  •  Insulin Glargine, 2 Unit Dial, (Toujeo Max SoloStar) 300 UNIT/ML solution pen-injector injection, Inject 10 Units under the skin into the appropriate area as directed Daily for 90 days., Disp: 5 pen, Rfl: 6  •  [employer] Pen Needles 32G X 4 MM misc, USE ONCE DAILY, Disp: 100 each, Rfl: 2  •  levothyroxine (SYNTHROID, LEVOTHROID) 25 MCG tablet, TAKE ONE TABLET BY MOUTH DAILY, Disp: 90 tablet, Rfl: 1  •  Methotrexate Sodium 50 MG/2ML injection, Inject 7 mg into the appropriate muscle as directed by prescriber 1 (One) Time Per Week., Disp: , Rfl:   •  Multiple Vitamin (MULTI VITAMIN PO), Take 1 tablet by mouth Daily., Disp: , Rfl:   •  pravastatin (PRAVACHOL) 20 MG tablet, Take 1 tablet by mouth Daily., Disp: 90 tablet, Rfl: 3  •  Probiotic capsule, Take 1 capsule by mouth., Disp: , Rfl:   •  SITagliptin (Januvia) 100 MG tablet, Take 1 tablet by mouth Daily., Disp: 90 tablet, Rfl: 2  •  Tafluprost, PF, (ZIOPTAN) 0.0015 % solution ophthalmic solution, 1 drop Every Night., Disp: , Rfl:   •  Turmeric 500 MG capsule, Take  by mouth., Disp: , Rfl:   •  vitamin B-12  "(CYANOCOBALAMIN) 100 MCG tablet, Take 1 tablet by mouth Every Other Day., Disp: , Rfl:     ALLERGIES:     Allergies   Allergen Reactions   • Sulfamethoxazole Nausea Only   • Trimethoprim Nausea Only   • Bactrim [Sulfamethoxazole-Trimethoprim] Nausea And Vomiting   • Fluticasone Furoate Unknown (See Comments)   • Vilanterol Unknown (See Comments)       SOCIAL HISTORY:       Social History     Socioeconomic History   • Marital status:    • Number of children: 3   Tobacco Use   • Smoking status: Never Smoker   • Smokeless tobacco: Never Used   Substance and Sexual Activity   • Alcohol use: No     Comment: Occasional glass of wine one per month   • Drug use: No         FAMILY HISTORY:  Family History   Problem Relation Age of Onset   • Diabetes Mother    • Stroke Mother    • Coronary artery disease Father         MI   • Diabetes Father    • Stroke Father    • Thyroid cancer Daughter        REVIEW OF SYSTEMS:  Constitutional: [No fevers, chills, sweats]  Eye: [No recent visual problems]  ENMT: [No ear pain, nasal congestion, sore throat]  Respiratory: [No shortness of breath, cough]  Cardiovascular: [No Chest pain, palpitations, syncope]  Gastrointestinal: [No nausea, vomiting, diarrhea]  Genitourinary: [No hematuria]  Hema/Lymph: [Negative for bruising tendency, swollen lymph glands]  Endocrine: [Negative for excessive thirst, excessive hunger]  Musculoskeletal: [Denies any musculoskeletal pain or swelling]  Integumentary: [No rash, pruritus, abrasions]  Neurologic: [ No weakness or numbness, Alert & oriented X 4]           Vitals:    10/28/21 1036   BP: 124/73   Pulse: 60   Resp: 16   Temp: 97.9 °F (36.6 °C)   TempSrc: Oral   SpO2: 98%   Weight: 69.4 kg (153 lb)   Height: 169 cm (66.54\")   PainSc: 0-No pain     Current Status 10/28/2021   ECOG score 0      PHYSICAL EXAM:    CONSTITUTIONAL:  Vital signs reviewed.  No distress, looks comfortable.  EYES:  Conjunctiva and lids unremarkable.   EARS,NOSE,MOUTH,THROAT: "  Ears and nose appear unremarkable.  Lips, teeth, gums appear unremarkable.  RESPIRATORY:  Normal respiratory effort.  Lungs clear to auscultation bilaterally.  CARDIOVASCULAR:  Normal S1, S2.  No murmurs rubs or gallops.  No significant lower extremity edema.  GASTROINTESTINAL: Abdomen appears unremarkable.  Nondistended  LYMPHATIC:  No cervical, supraclavicular lymphadenopathy.  NEURO: cranial nerves 2-12 grossly intact.  No focal deficits.  Appears to have equal strength all 4 extremities.  MUSCULOSKELETAL:  Unremarkable digits/nails.  No cyanosis or clubbing.  No apparent joint deformities.  SKIN:  Warm.  No rashes.  PSYCHIATRIC:  Normal judgment and insight.  Normal mood and affect.     RECENT LABS:        Lab on 10/28/2021   Component Date Value Ref Range Status   • WBC 10/28/2021 6.00  3.40 - 10.80 10*3/mm3 Final   • RBC 10/28/2021 4.39  3.77 - 5.28 10*6/mm3 Final   • Hemoglobin 10/28/2021 13.4  12.0 - 15.9 g/dL Final   • Hematocrit 10/28/2021 40.3  34.0 - 46.6 % Final   • MCV 10/28/2021 91.8  79.0 - 97.0 fL Final   • MCH 10/28/2021 30.5  26.6 - 33.0 pg Final   • MCHC 10/28/2021 33.3  31.5 - 35.7 g/dL Final   • RDW 10/28/2021 14.6  12.3 - 15.4 % Final   • RDW-SD 10/28/2021 48.3  37.0 - 54.0 fl Final   • MPV 10/28/2021 11.0  6.0 - 12.0 fL Final   • Platelets 10/28/2021 160  140 - 450 10*3/mm3 Final   • Neutrophil % 10/28/2021 54.2  42.7 - 76.0 % Final   • Lymphocyte % 10/28/2021 23.5  19.6 - 45.3 % Final   • Monocyte % 10/28/2021 14.2* 5.0 - 12.0 % Final   • Eosinophil % 10/28/2021 6.3* 0.3 - 6.2 % Final   • Basophil % 10/28/2021 1.3  0.0 - 1.5 % Final   • Immature Grans % 10/28/2021 0.5  0.0 - 0.5 % Final   • Neutrophils, Absolute 10/28/2021 3.25  1.70 - 7.00 10*3/mm3 Final   • Lymphocytes, Absolute 10/28/2021 1.41  0.70 - 3.10 10*3/mm3 Final   • Monocytes, Absolute 10/28/2021 0.85  0.10 - 0.90 10*3/mm3 Final   • Eosinophils, Absolute 10/28/2021 0.38  0.00 - 0.40 10*3/mm3 Final   • Basophils, Absolute  "10/28/2021 0.08  0.00 - 0.20 10*3/mm3 Final   • Immature Grans, Absolute 10/28/2021 0.03  0.00 - 0.05 10*3/mm3 Final   • nRBC 10/28/2021 0.0  0.0 - 0.2 /100 WBC Final         ASSESSMENT:   is a pleasant 81 y/o WF with past medical history significant for DM2 (over 40 years), inclusion body myositis (diagnosed 10 yrs ago, on methotrexate), hypothyroidism, & peripheral neuropathy comes to this clinic for monoclonal gammopathy evaluation.     # Monoclonal gammopathy:  · Patient had presented to her neurologist,  with signs/symptoms of bilateral feet neuropathy. During this work up SPEP/VIGNESH was performed which showed presence of IgA kappa M-protein, 0.2 gm/dl.   · Patient c/o b/l feet neuropathy, along with generalized weakness, which she attributes to inclusion body myositis.   · Denies any specific bone pain. On labs, no cytopenias. Renal function & calcium levels within normal limits. No B-symptoms.   · Informed patient that with low level of IgA M-protein & no \"CRAB\" features, this is likely MGUS, low-intermediate risk. Informed patient that MGUS is a common finding in elderly population, however,  there is an 1% per year risk of progression to multiple myeloma.   · Will plan to monitor and repeat SPEP/VIGNESH & FLC in 4 months time.  · Her peripheral neuropathy is unlikely related to monoclonal gammopathy. Suspect diabetic neuropathy. Additional work up being performed by neurology.     # Inclusion body myositis: On MTX 7.5 mg weekly. Follows up with   # DM2: On insulin, januvia & glipizide.   # Hypothyrodism: On synthroid    PLAN:   - IgA kappa MGUS. Low-intermediate risk. Unlikely attributing to peripheral neuropathy.  - Active surveillance. Repeat labs in 4 months.     Orders Placed This Encounter   Procedures   • VIGNESH,PE and FLC, Serum     Standing Status:   Future     Standing Expiration Date:   10/28/2022     Order Specific Question:   Release to patient     Answer:   Immediate   • Basic " Metabolic Panel     Standing Status:   Future     Standing Expiration Date:   10/28/2022     Order Specific Question:   Release to patient     Answer:   Immediate   • CBC & Differential     Standing Status:   Future     Standing Expiration Date:   10/28/2022     Order Specific Question:   Manual Differential     Answer:   No       Total time spent during this patient encounter is 65 minutes. The total time spent with the patient includes at least one or more of the following: preparing to see the patient by reviewing of tests, prior notes or other relevant information, performing appropriate independent examination & evaluation, counseling, ordering of medications, tests or procedures, communicating with other healthcare professionals, when appropriate to coordinate care, documenting clinic information in the electronic medical records or other health records, independently interpreting results of tests and communicating the results to the patient/family or caregiver.

## 2021-10-29 ENCOUNTER — PATIENT ROUNDING (BHMG ONLY) (OUTPATIENT)
Dept: ONCOLOGY | Facility: CLINIC | Age: 81
End: 2021-10-29

## 2021-10-29 ENCOUNTER — OFFICE VISIT (OUTPATIENT)
Dept: NEUROLOGY | Facility: CLINIC | Age: 81
End: 2021-10-29

## 2021-10-29 ENCOUNTER — LAB (OUTPATIENT)
Dept: LAB | Facility: HOSPITAL | Age: 81
End: 2021-10-29

## 2021-10-29 VITALS
SYSTOLIC BLOOD PRESSURE: 122 MMHG | DIASTOLIC BLOOD PRESSURE: 74 MMHG | HEIGHT: 67 IN | HEART RATE: 69 BPM | BODY MASS INDEX: 23.84 KG/M2 | WEIGHT: 151.9 LBS | OXYGEN SATURATION: 100 %

## 2021-10-29 DIAGNOSIS — G25.81 RESTLESS LEG SYNDROME: ICD-10-CM

## 2021-10-29 DIAGNOSIS — G72.41 INCLUSION BODY MYOSITIS: ICD-10-CM

## 2021-10-29 DIAGNOSIS — G62.9 PERIPHERAL POLYNEUROPATHY: Primary | ICD-10-CM

## 2021-10-29 DIAGNOSIS — D47.2 MGUS (MONOCLONAL GAMMOPATHY OF UNKNOWN SIGNIFICANCE): ICD-10-CM

## 2021-10-29 DIAGNOSIS — E11.42 DIABETIC PERIPHERAL NEUROPATHY (HCC): ICD-10-CM

## 2021-10-29 PROCEDURE — 36415 COLL VENOUS BLD VENIPUNCTURE: CPT

## 2021-10-29 PROCEDURE — 83516 IMMUNOASSAY NONANTIBODY: CPT

## 2021-10-29 PROCEDURE — 99215 OFFICE O/P EST HI 40 MIN: CPT | Performed by: PHYSICIAN ASSISTANT

## 2021-10-29 PROCEDURE — 86255 FLUORESCENT ANTIBODY SCREEN: CPT

## 2021-10-29 RX ORDER — GABAPENTIN 300 MG/1
300 CAPSULE ORAL
Qty: 90 CAPSULE | Refills: 1 | Status: SHIPPED | OUTPATIENT
Start: 2021-10-29 | End: 2022-04-29 | Stop reason: SDUPTHER

## 2021-10-29 NOTE — PROGRESS NOTES
CC: Follow-up: peripheral neuropathy, presence of MGUS, history of inclusion body myositis    HPI:  Lissa Ortiz is a  80 y.o.  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  Other past medical history is notable for rheumatoid arthritis on long-term DMARD therapy with methotrexate, type 2 diabetes, hypertension, hyperlipidemia, hypothyroidism and cataracts.  Patient was last seen in our office on 7/1/2021 per Dr. Luciano, summary of her condition is taken from previous note with amendments and additions as needed:    Patient was initially referred for neurological consultation by her rheumatologist Dr. Kyle Chow regarding progressive weakness, balance trouble and a history of inclusion body myositis.  Dr. Chow manages the patient's RA and approximately 10 years ago sent her for an evaluation for weakness to [place].  The patient does not recall the physician she saw in [place] but does recall he did an EMG on her. She denies any muscle biopsy history.  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  The records of this evaluation is not available in our system.  In terms of her peripheral neuropathy symptoms, she does endorse numbness and tingling in the feet, which seems worse in the evenings actually at bedtime.  She does not seem to have any burning or stabbing pains in the feet.  She does indicate some degree of low back pain which occurs intermittently without radicular pain.  No specific neck pain.    She has been prescribed gabapentin per her endocrinologist which she takes at bedtime at 300 mg dose.  She states this has been effective at controlling her symptoms in her feet and reports she is tolerating it well.     In terms of her gait she reports she has noticed some problems with imbalance,  specifically this is an issue on uneven terrain.  In the past year she reports she is fallen about 3 times (usually on an uneven surface).  She has some lightheadedness when she first gets up in the morning, but attributes this to the gabapentin as it was not present before she started this medication.   She does report weakness in her legs, states she must use her hands to help get up out of the chair and does have a trouble getting up from the floor.  She says she can get up if she is in sand on the beach for instance.  She states she tends to shuffle and does not pick her feet up well and she has trouble walking up stairs both getting the leg to the next step and pushing off.    Work-up to date has included an EMG which was performed on 10/4/2021 and revealed mild evidence of peripheral neuropathy.  Some irritability is seen in the left tibialis anterior without any other features of a lumbosacral radiculopathy. Specifically there was no evidence of a myopathy on this study.  Laboratory studies for treatable causes of neuropathy included the following:    RPR nonreactive  Cryoglobulins negative  Copper normal at 140  Heavy metals including lead arsenic and mercury all within normal limits  Sed rate normal at 5  Vitamin B12 and folate normal at greater than 2000 and greater than 20 respectively  CK level normal at 82  Protein electrophoresis did reveal an M spike, immunofixation showed presence of IgA kappa M-protein, 0.2 gm/dl      Past Medical History:   Diagnosis Date   • Allergic rhinitis    • Arthritis    • Cancer (HCC)     skin leg   • Cataract    • Diabetes mellitus (HCC)     type 2   • Hypertension    • Long sleeper     post anesthesia         Past Surgical History:   Procedure Laterality Date   • COLONOSCOPY N/A 2/13/2018    Procedure: COLONOSCOPY to cecum and TI with hot snare polypectomy;  Surgeon: Rowan Smith MD;  Location: [facility] ENDOSCOPY;  Service:    • EYE SURGERY      tearduct sgy   • EYE  SURGERY      MARY KAY CATARACTS   • HYSTERECTOMY     • TONSILLECTOMY           Current Outpatient Medications:   •  amLODIPine (NORVASC) 5 MG tablet, Take 1 tablet by mouth Daily., Disp: 90 tablet, Rfl: 3  •  azelastine (ASTELIN) 0.1 % nasal spray, 2 sprays into the nostril(s) as directed by provider 2 (Two) Times a Day. Use in each nostril as directed, Disp: 2 each, Rfl: 5  •  Blood Glucose Monitoring Suppl (TRUE METRIX AIR GLUCOSE METER) device, 1 Device Daily As Needed (check bs's twice daily)., Disp: 1 Device, Rfl: 0  •  Cinnamon 500 MG capsule, Take 500 mg by mouth Daily., Disp: , Rfl:   •  dorzolamide-timolol (Cosopt) 22.3-6.8 MG/ML ophthalmic solution, Apply  to eye(s) as directed by provider Every 12 (Twelve) Hours., Disp: , Rfl:   •  folic acid (FOLVITE) 1 MG tablet, Take 1 tablet by mouth daily., Disp: , Rfl:   •  gabapentin (NEURONTIN) 300 MG capsule, Take 1 capsule by mouth every night at bedtime for 90 days., Disp: 90 capsule, Rfl: 1  •  glipizide (GLUCOTROL XL) 2.5 MG 24 hr tablet, Take 1 tablet by mouth Every Morning., Disp: 30 tablet, Rfl: 11  •  Insulin Glargine, 2 Unit Dial, (Toujeo Max SoloStar) 300 UNIT/ML solution pen-injector injection, Inject 10 Units under the skin into the appropriate area as directed Daily for 90 days., Disp: 5 pen, Rfl: 6  •  [employer] Pen Needles 32G X 4 MM misc, USE ONCE DAILY, Disp: 100 each, Rfl: 2  •  levothyroxine (SYNTHROID, LEVOTHROID) 25 MCG tablet, TAKE ONE TABLET BY MOUTH DAILY, Disp: 90 tablet, Rfl: 1  •  Methotrexate Sodium 50 MG/2ML injection, Inject 7 mg into the appropriate muscle as directed by prescriber 1 (One) Time Per Week., Disp: , Rfl:   •  Multiple Vitamin (MULTI VITAMIN PO), Take 1 tablet by mouth Daily., Disp: , Rfl:   •  pravastatin (PRAVACHOL) 20 MG tablet, Take 1 tablet by mouth Daily., Disp: 90 tablet, Rfl: 3  •  Probiotic capsule, Take 1 capsule by mouth., Disp: , Rfl:   •  SITagliptin (Januvia) 100 MG tablet, Take 1 tablet by mouth Daily., Disp: 90  tablet, Rfl: 2  •  Tafluprost, PF, (ZIOPTAN) 0.0015 % solution ophthalmic solution, 1 drop Every Night., Disp: , Rfl:   •  Turmeric 500 MG capsule, Take  by mouth., Disp: , Rfl:   •  vitamin B-12 (CYANOCOBALAMIN) 100 MCG tablet, Take 1 tablet by mouth Every Other Day., Disp: , Rfl:       Family History   Problem Relation Age of Onset   • Diabetes Mother    • Stroke Mother    • Coronary artery disease Father         MI   • Diabetes Father    • Stroke Father    • Thyroid cancer Daughter          Social History     Socioeconomic History   • Marital status:    • Number of children: 3   Tobacco Use   • Smoking status: Never Smoker   • Smokeless tobacco: Never Used   Substance and Sexual Activity   • Alcohol use: No     Comment: Occasional glass of wine one per month   • Drug use: No         Allergies   Allergen Reactions   • Sulfamethoxazole Nausea Only   • Trimethoprim Nausea Only   • Bactrim [Sulfamethoxazole-Trimethoprim] Nausea And Vomiting   • Fluticasone Furoate Unknown (See Comments)   • Vilanterol Unknown (See Comments)       ROS:  Review of Systems   Constitutional: Negative for activity change, appetite change and fatigue.   Eyes: Negative for pain, redness and itching.   Endocrine: Negative for heat intolerance and polydipsia.   Allergic/Immunologic: Negative for environmental allergies and food allergies.   Neurological: Negative for dizziness, tremors, seizures, syncope, facial asymmetry, speech difficulty, weakness, light-headedness, numbness and headaches.   Psychiatric/Behavioral: Positive for sleep disturbance. Negative for agitation, behavioral problems, confusion, decreased concentration, dysphoric mood, hallucinations, self-injury and suicidal ideas. The patient is not nervous/anxious and is not hyperactive.      I have reviewed the above ROS put in by the medical assistant and am in agreement.     Physical Exam:  Vitals:    10/29/21 1001   BP: 122/74   Pulse: 69   SpO2: 100%   Weight: 68.9 kg  "(151 lb 14.4 oz)   Height: 170.2 cm (67\")       Body mass index is 23.79 kg/m².    Physical Exam  General: Well-developed elderly female no acute distress  HEENT: Normocephalic, atraumatic   Neck: Supple.    Heart: Regular rate and rhythm no murmurs.    Extremities:  Minimal pedal edema.  Distal pulses palpable.     Neurological Exam:   Mental Status: Awake, alert, oriented to person, place and time.  Conversant without evidence of an affective disorder, thought disorder, delusions or hallucinations.  Attention span and concentration are normal.  HCF: No aphasia, apraxia or dysarthria.  Recent and remote memory intact.  Knowledge of recent events intact.  CN:      I:              II: Visual fields full without left inattention              III, IV, VI: Eye movements intact without nystagmus or ptosis.  Pupils equal round and reactive to light.              V,VII: Light touch and pinprick intact all 3 divisions of V.  Facial muscles symmetrical.              VIII: Hearing intact to finger rub              IX,X: Soft palate elevates symmetrically              XI: Sternomastoid and trapezius are strong.              XII: Tongue midline without atrophy or fasciculations  Motor: Normal tone and bulk in the upper and lower extremities              Power testing: Proximal muscles in the upper extremities were strong today, mild weakness of the abductor pollicis brevis, first dorsal interosseous and abductor digiti quinti muscles bilaterally.  In the lower extremities there is mild weakness iliopsoas muscles bilaterally.  Other proximal muscles are all strong.  There is mild weakness of toe extension but good toe flexion.  Reflexes: Upper extremities: +1 biceps difficult to obtain triceps and brachial radialis                   Lower extremities: +1 knee jerks.  Trace ankle jerks  Sensory: Light touch:                    Pinprick:  Cerebellar: Finger-to-nose: Intact                      Rapid movement: Intact  Gait and " Station: Casual walk; patient uses hands to get up from the chair.  No shuffling or festination.  Mildly broad-based.  Able to raise on toes and heels.  No Romberg and no drift    Results:      Lab Results   Component Value Date    BUN 15 05/19/2021    CREATININE 0.60 05/19/2021    EGFRIFNONA 96 05/19/2021    EGFRIFAFRI 117 05/19/2021    BCR 25.0 05/19/2021    CO2 26.6 05/19/2021    CALCIUM 9.5 05/19/2021    PROTENTOTREF 6.8 10/04/2021    ALBUMIN 3.7 10/04/2021    LABIL2 1.2 10/04/2021    AST 27 05/19/2021    ALT 22 05/19/2021       Lab Results   Component Value Date    WBC 6.00 10/28/2021    HGB 13.4 10/28/2021    HCT 40.3 10/28/2021    MCV 91.8 10/28/2021     10/28/2021         .  Lab Results   Component Value Date    RPR Non-Reactive 10/04/2021         Lab Results   Component Value Date    TSH 2.360 05/19/2021    K8ICPXM 7.4 11/05/2020         Lab Results   Component Value Date    NZDWFLAS12 >2,000 (H) 10/04/2021         Lab Results   Component Value Date    FOLATE >20.00 10/04/2021         Lab Results   Component Value Date    HGBA1C 7.80 (H) 05/19/2021         Lab Results   Component Value Date    BUN 15 05/19/2021    CREATININE 0.60 05/19/2021    EGFRIFNONA 96 05/19/2021    EGFRIFAFRI 117 05/19/2021    BCR 25.0 05/19/2021    K 4.4 05/19/2021    CO2 26.6 05/19/2021    CALCIUM 9.5 05/19/2021    PROTENTOTREF 6.8 10/04/2021    ALBUMIN 3.7 10/04/2021    LABIL2 1.2 10/04/2021    AST 27 05/19/2021    ALT 22 05/19/2021         Lab Results   Component Value Date    WBC 6.00 10/28/2021    HGB 13.4 10/28/2021    HCT 40.3 10/28/2021    MCV 91.8 10/28/2021     10/28/2021        Technical summary:  Nerve conduction studies were obtained in the left arm and left leg.  Skin temperatures were initially extremely cold and it was difficult to get her warm enough for the study.  Temperature correction was used if indicated.  Needle examination was obtained on selected muscles in the left arm and left  leg.     Results:  1.  Prolonged left median antidromic sensory distal latency at 3.9 ms with normal amplitude.  2.  Mildly prolonged left ulnar antidromic sensory distal latency with normal amplitude.  3.  Normal left radial sensory distal latency and amplitude.  4.  Normal left median motor conduction velocity, distal latency and amplitudes.  5.  Normal left ulnar motor conduction velocities, distal latency and amplitudes.  6.  Normal left sural sensory distal latency with temperature correction but low amplitude of 2.7 µV.  7.  Normal left superficial peroneal sensory distal latency and amplitude.  8.  Normal left peroneal motor conduction velocities with normal distal latency but low amplitude from ankle stimulation at 1.6 mV.  9.  Normal left tibial motor conduction velocity, distal latency and amplitudes.  10.  Needle examination of selected muscles of the left arm and left leg showed normal insertional activities throughout the left arm.  There were normal motor units and recruitment patterns throughout.     In the left leg there were a few positive sharp waves in the tibialis anterior with normal-appearing motor units and recruitment.  All other muscles tested showed normal insertional activities, motor units and recruitment other than some pain inhibition.  Lumbar paraspinals showed no abnormality at L5 on either side.     Impression:  Mildly abnormal study showing mild evidence of peripheral neuropathy.  Some irritability is seen in the left tibialis anterior without any other features of a lumbosacral radiculopathy.  Clinical correlation is suggested.  Specifically there was no evidence of a myopathy on this study.  Study results were discussed with the patient.     Tony Luciano M.D.      Assessment:   1.  Peripheral neuropathy, likely diabetic in etiology but with presence of MGUS  2.  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  Rheumatoid arthritis on  long-term DMARD therapy with methotrexate  4.  Recurrent falls    Plan:  1.  Discussed results of neuropathy work-up today, her EMG did show findings consistent with a mild peripheral neuropathy, which is probably at least partially due to her diabetes.  Laboratory studies were grossly normal except for her SPEP/VIGNESH which showed presence of IgA kappa M-protein, 0.2 gm/dl.  Resultantly patient has been referred to hematology oncology, had first appointment yesterday, also she has been ordered to have a motor and sensory neuropathy panel which she has not yet completed, will do this today.   -She reports adequate control of her painful symptoms with the gabapentin 300 mg dosed at bedtime (states most of her painful symptoms are at night, does not really have them during the day).  Patient request that we take over prescription, Luca obtained today and reviewed.  - Patient does indicate more troubles with balance particularly on uneven terrain, states she has had about 3 falls within the past year, fortunately sustained no significant injuries.  She states she has a cane which was her 's but she rarely uses it.  I think it would be good for her to go back to physical therapy, see if they can work with her regarding her balance    2.  As far as her proximal muscle weakness, I really do not detect this in her upper extremities, she does have some weakness with hip flexion however.  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  Also she reports that she tends to use her upper body when standing from a seated position though she is able to stand from a seated position without using her arms (she does this today).   - She has a reported history of inclusion body myositis dating back about 10 years ago, I am uncertain as to what this work-up involved, patient does states she never had a muscle biopsy and there  are no MRI studies of thighs or large muscle groups.  There is an old CK level from 2019 which is normal, her most recent CK level which was taken last month was also within normal limits. EMG did show a few positive sharp waves in the left tibialis anterior with normal-appearing motor units and recruitment.  All other muscles tested showed normal insertional activities, motor units and recruitment other than some pain inhibition.  Lumbar paraspinals showed no abnormality at L5 on either side.  - Discussed that the typical course of inclusion body myositis generally progresses more quickly then this patient's case, if disease has been present x10 years already she would be expected to have a much higher degree of disability at this point.  Noted that she has been on longstanding immunomodulatory therapy for her RA and perhaps this is mitigating any underlying inflammatory process which could be going on.  Nevertheless, as her weakness has remained stable I do not think further work-up is indicated presently though I will discuss this with Dr. Luciano to see if he has more to add.  Hopefully she can improve strength wise with PT    We will plan for follow-up in 6 months or sooner should need arise        Time 40 min          Dictated utilizing Dragon dictation.

## 2021-10-29 NOTE — PROGRESS NOTES
October 29, 2021    Hello, may I speak with Lissa Ortiz?    My name is Marcie      I am  with [facility]  [address] [address] [phone].    Before we get started may I verify your date of birth? 1940    I am calling to officially welcome you to our practice and ask about your recent visit. Is this a good time to talk? No - Voicemail Left    Tell me about your visit with us. What things went well?         We're always looking for ways to make our patients' experiences even better. Do you have recommendations on ways we may improve?      Overall were you satisfied with your first visit to our practice?        I appreciate you taking the time to speak with me today. Is there anything else I can do for you?     Thank you, and have a great day.

## 2021-11-03 RX ORDER — LEVOTHYROXINE SODIUM 0.03 MG/1
TABLET ORAL
Qty: 90 TABLET | Refills: 1 | Status: SHIPPED | OUTPATIENT
Start: 2021-11-03 | End: 2022-05-04

## 2021-11-04 LAB — REF LAB TEST METHOD: NORMAL

## 2021-11-23 ENCOUNTER — OFFICE VISIT (OUTPATIENT)
Dept: INTERNAL MEDICINE | Facility: CLINIC | Age: 81
End: 2021-11-23

## 2021-11-23 VITALS
BODY MASS INDEX: 23.54 KG/M2 | OXYGEN SATURATION: 98 % | TEMPERATURE: 97.5 F | WEIGHT: 150 LBS | SYSTOLIC BLOOD PRESSURE: 128 MMHG | HEIGHT: 67 IN | DIASTOLIC BLOOD PRESSURE: 76 MMHG | HEART RATE: 69 BPM

## 2021-11-23 DIAGNOSIS — Z09 HOSPITAL DISCHARGE FOLLOW-UP: ICD-10-CM

## 2021-11-23 DIAGNOSIS — R00.2 PALPITATIONS: Primary | ICD-10-CM

## 2021-11-23 PROBLEM — Z00.00 HEALTH CARE MAINTENANCE: Status: ACTIVE | Noted: 2021-07-14

## 2021-11-23 PROBLEM — Z01.419 ENCOUNTER FOR GYNECOLOGICAL EXAMINATION: Status: ACTIVE | Noted: 2021-07-14

## 2021-11-23 PROCEDURE — 99213 OFFICE O/P EST LOW 20 MIN: CPT | Performed by: NURSE PRACTITIONER

## 2021-11-23 RX ORDER — SOLIFENACIN SUCCINATE 10 MG/1
TABLET, FILM COATED ORAL
COMMUNITY
Start: 2021-11-13

## 2021-11-23 NOTE — PATIENT INSTRUCTIONS
If you have shortness of breath, nausea, sweating, chest pain or pressure go to the ER.     Palpitations  Palpitations are feelings that your heartbeat is not normal. Your heartbeat may feel like it is:  · Uneven.  · Faster than normal.  · Fluttering.  · Skipping a beat.  This is usually not a serious problem. In some cases, you may need tests to rule out any serious problems.  Follow these instructions at home:  Pay attention to any changes in your condition. Take these actions to help manage your symptoms:  Eating and drinking  · Avoid:  ? Coffee, tea, soft drinks, and energy drinks.  ? Chocolate.  ? Alcohol.  ? Diet pills.  Lifestyle    · Try to lower your stress. These things can help you relax:  ? Yoga.  ? Deep breathing and meditation.  ? Exercise.  ? Using words and images to create positive thoughts (guided imagery).  ? Using your mind to control things in your body (biofeedback).  · Do not use drugs.  · Get plenty of rest and sleep. Keep a regular bed time.    General instructions    · Take over-the-counter and prescription medicines only as told by your doctor.  · Do not use any products that contain nicotine or tobacco, such as cigarettes and e-cigarettes. If you need help quitting, ask your doctor.  · Keep all follow-up visits as told by your doctor. This is important. You may need more tests if palpitations do not go away or get worse.    Contact a doctor if:  · Your symptoms last more than 24 hours.  · Your symptoms occur more often.  Get help right away if you:  · Have chest pain.  · Feel short of breath.  · Have a very bad headache.  · Feel dizzy.  · Pass out (faint).  Summary  · Palpitations are feelings that your heartbeat is uneven or faster than normal. It may feel like your heart is fluttering or skipping a beat.  · Avoid food and drinks that may cause palpitations. These include caffeine, chocolate, and alcohol.  · Try to lower your stress. Do not smoke or use drugs.  · Get help right away if  you faint or have chest pain, shortness of breath, a severe headache, or dizziness.  This information is not intended to replace advice given to you by your health care provider. Make sure you discuss any questions you have with your health care provider.  Document Revised: 01/30/2019 Document Reviewed: 01/30/2019  Elsevier Patient Education © 2021 [employer]

## 2021-11-23 NOTE — PROGRESS NOTES
"Chief Complaint  Hospital Follow Up Visit and Rapid Heart Rate    Subjective          Lissa Ortiz presents to [facility]  History of Present Illness    She went to the ER in [facility] on 11- for palpitations and \"throat was tight\". She denied any chest pressure/pain, dizziness, headaches, or SOB. They did an EKG, blood work, and a CT of chest which was normal. The EKG showed sinus rhythm and inverted T wave.     Objective   Vital Signs:   /76   Pulse 69   Temp 97.5 °F (36.4 °C)   Ht 170.2 cm (67.01\")   Wt 68 kg (150 lb)   SpO2 98%   BMI 23.49 kg/m²     Physical Exam  Vitals and nursing note reviewed.   Constitutional:       Appearance: Normal appearance.   HENT:      Head: Normocephalic.      Nose: Nose normal.      Mouth/Throat:      Mouth: Mucous membranes are moist.   Eyes:      Pupils: Pupils are equal, round, and reactive to light.   Cardiovascular:      Rate and Rhythm: Normal rate and regular rhythm.      Pulses: Normal pulses.      Heart sounds: Normal heart sounds.      Comments: No peripheral edema noted and 2+ pulses noted bilaterally to lower extremities and upper extremities.  Pulmonary:      Effort: Pulmonary effort is normal. No respiratory distress.      Breath sounds: Normal breath sounds. No stridor. No wheezing, rhonchi or rales.   Chest:      Chest wall: No tenderness.   Musculoskeletal:         General: Normal range of motion.      Cervical back: Normal range of motion.   Skin:     General: Skin is warm.      Capillary Refill: Capillary refill takes less than 2 seconds.   Neurological:      Mental Status: She is alert and oriented to person, place, and time.   Psychiatric:         Behavior: Behavior normal.        Result Review :            Paperwork the patient brought in from her ER visit on 11-.      Assessment and Plan    Diagnoses and all orders for this visit:    1. Palpitations (Primary)  -     Ambulatory Referral to " Cardiology    2. Hospital discharge follow-up    Had ambulatory referral was placed for cardiology in the office today due to her palpitations.  Patient states 15 years ago she had a cardiology work-up and everything was normal.  Patient is aware if she has any of these palpitations with shortness of breath or chest pain/pressure, nausea/vomiting and she needs to go to the emergency room.  Patient is aware of this treatment plan and is in correspondence with this plan at this time.    Follow Up   Return if symptoms worsen or fail to improve.  Patient was given instructions and counseling regarding her condition or for health maintenance advice. Please see specific information pulled into the AVS if appropriate.

## 2021-12-03 ENCOUNTER — TELEPHONE (OUTPATIENT)
Dept: NEUROLOGY | Facility: CLINIC | Age: 81
End: 2021-12-03

## 2021-12-03 DIAGNOSIS — E11.42 DIABETIC PERIPHERAL NEUROPATHY (HCC): Primary | ICD-10-CM

## 2021-12-03 DIAGNOSIS — G72.41 INCLUSION BODY MYOSITIS: ICD-10-CM

## 2021-12-03 DIAGNOSIS — R26.89 IMBALANCE: ICD-10-CM

## 2021-12-03 NOTE — TELEPHONE ENCOUNTER
Sveta with [facility] called regarding the patients physical therapy. They are in need of a new order for the PT. She asks that we fax this as soon as possible so they may keep the upcoming appointment.     Please fax new order/referral to [phone]. Thank you.

## 2021-12-10 RX ORDER — INSULIN GLARGINE 300 U/ML
12 INJECTION, SOLUTION SUBCUTANEOUS DAILY
Qty: 5 PEN | Refills: 6
Start: 2021-12-10 | End: 2022-09-09

## 2021-12-27 DIAGNOSIS — E78.2 MIXED HYPERLIPIDEMIA: ICD-10-CM

## 2021-12-28 RX ORDER — PRAVASTATIN SODIUM 20 MG
TABLET ORAL
Qty: 90 TABLET | Refills: 3 | Status: SHIPPED | OUTPATIENT
Start: 2021-12-28 | End: 2022-12-08 | Stop reason: SDUPTHER

## 2022-01-05 ENCOUNTER — OFFICE VISIT (OUTPATIENT)
Dept: CARDIOLOGY | Facility: CLINIC | Age: 82
End: 2022-01-05

## 2022-01-05 VITALS
SYSTOLIC BLOOD PRESSURE: 138 MMHG | DIASTOLIC BLOOD PRESSURE: 78 MMHG | HEIGHT: 67 IN | BODY MASS INDEX: 23.54 KG/M2 | WEIGHT: 150 LBS

## 2022-01-05 DIAGNOSIS — E78.2 MIXED HYPERLIPIDEMIA: Chronic | ICD-10-CM

## 2022-01-05 DIAGNOSIS — I10 BENIGN ESSENTIAL HYPERTENSION: Chronic | ICD-10-CM

## 2022-01-05 DIAGNOSIS — R00.2 PALPITATIONS: Primary | ICD-10-CM

## 2022-01-05 PROCEDURE — 93000 ELECTROCARDIOGRAM COMPLETE: CPT | Performed by: INTERNAL MEDICINE

## 2022-01-05 PROCEDURE — 99204 OFFICE O/P NEW MOD 45 MIN: CPT | Performed by: INTERNAL MEDICINE

## 2022-01-05 NOTE — PROGRESS NOTES
PATIENTINFORMATION    Date of Office Visit: 2022  Encounter Provider: Davie Mcmanus MD  Place of Service: [facility] CARDIOLOGY  Patient Name: Lissa Ortiz  : 1940    Subjective:     Encounter Date:2022      Patient ID: Lissa Ortiz is a 81 y.o. female.    No chief complaint on file.    HPI  Ms. Ortiz is a pleasant 82 yo lady with PMH hypertension, hyperlipidemia, type II DM came to cardiology clinic today for evaluation of palpitations.  She has had palpitations for the past 20 years and occur every 1-2 months and last for about 5 minutes except the episode that occurred in 2021 while she was visiting with her sister pain [place] that lasted for about an hour and prompted her to go to the ER.  It has already resolved when she arrived to the ER.  She describes episodes as heart beating fast with radiation to the neck and feeling tight in the neck without any known exacerbating or relieving factor.  She denies any significant associated symptoms.  She exercises regularly on elliptical not she does for about 20 minutes without any limiting exertional symptoms.  She has longstanding diabetes and hypertension and hyperlipidemia on treatment.  No known prior heart disease otherwise.  She denies any current tobacco, recreational drug use or alcohol abuse    ROS   All systems reviewed and negative except as noted in HPI.    Past Medical History:   Diagnosis Date   • Allergic rhinitis    • Arthritis    • Cancer (HCC)     skin leg   • Cataract    • Diabetes mellitus (HCC)     type 2   • Hypertension    • Long sleeper     post anesthesia       Past Surgical History:   Procedure Laterality Date   • COLONOSCOPY N/A 2018    Procedure: COLONOSCOPY to cecum and TI with hot snare polypectomy;  Surgeon: Rowan Smith MD;  Location: [facility] ENDOSCOPY;  Service:    • EYE SURGERY      tearduct sgy   • EYE SURGERY      MARY KAY CATARACTS   • HYSTERECTOMY     •  "TONSILLECTOMY         Social History     Socioeconomic History   • Marital status:    • Number of children: 3   Tobacco Use   • Smoking status: Never Smoker   • Smokeless tobacco: Never Used   Substance and Sexual Activity   • Alcohol use: No     Comment: Occasional glass of wine one per month   • Drug use: No       Family History   Problem Relation Age of Onset   • Diabetes Mother    • Stroke Mother    • Coronary artery disease Father         MI   • Diabetes Father    • Stroke Father    • Thyroid cancer Daughter            ECG 12 Lead    Date/Time: 1/5/2022 9:31 AM  Performed by: Davie Mcmanus MD  Authorized by: Davie Mcmanus MD   Comparison: compared with previous ECG from 11/26/2021  Similar to previous ECG  Rhythm: sinus rhythm  Rate: normal  Conduction: conduction normal  ST Segments: ST segments normal  T Waves: T waves normal  QRS axis: normal  Other findings: non-specific ST-T wave changes    Clinical impression: abnormal EKG               Objective:     /78 (BP Location: Left arm, Patient Position: Sitting, Cuff Size: Adult)   Ht 170.2 cm (67.01\")   Wt 68 kg (150 lb)   LMP  (LMP Unknown)   BMI 23.49 kg/m²  Body mass index is 23.49 kg/m².     Constitutional:       General: Not in acute distress.     Appearance: Well-developed. Not diaphoretic.   Eyes:      Pupils: Pupils are equal, round, and reactive to light.   HENT:      Head: Normocephalic and atraumatic.   Neck:      Thyroid: No thyromegaly.   Pulmonary:      Effort: Pulmonary effort is normal. No respiratory distress.      Breath sounds: Normal breath sounds. No wheezing. No rales.   Chest:      Chest wall: Not tender to palpatation.   Cardiovascular:      Normal rate. Regular rhythm.      No gallop.   Pulses:     Intact distal pulses.   Edema:     Peripheral edema absent.   Abdominal:      General: Bowel sounds are normal. There is no distension.      Palpations: Abdomen is soft.      Tenderness: There is no guarding. "   Musculoskeletal: Normal range of motion.         General: No deformity.      Cervical back: Normal range of motion and neck supple. Skin:     General: Skin is warm and dry.      Findings: No rash.   Neurological:      Mental Status: Alert and oriented to person, place, and time.      Cranial Nerves: No cranial nerve deficit.      Deep Tendon Reflexes: Reflexes are normal and symmetric.   Psychiatric:         Judgment: Judgment normal.         Review Of Data:   Echo in April 2018 normal       Assessment/Plan:         1.  Palpitations-sounds like SVT-normal echocardiogram 2018  -Happens very infrequently without significant associated symptoms.  -I will send her out in a 48 Holter to rule out any significant asymptomatic arrhythmia.    2.  Hypertension that is well controlled on  amlodipine 5 mg p.o. daily    3.  Type 2 diabetes mellitus-most recent A1c not at goal and she is currently working with your PCP and started on insulin    4.  Hypercholesterolemia-most recent lipid panel at goal on pravastatin      Diagnosis and plan of care discussed with patient and verbalized understanding.           Davie Mcmanus MD  01/05/22  10:17 EST

## 2022-01-10 ENCOUNTER — OFFICE VISIT (OUTPATIENT)
Dept: ENDOCRINOLOGY | Age: 82
End: 2022-01-10

## 2022-01-10 VITALS
SYSTOLIC BLOOD PRESSURE: 124 MMHG | RESPIRATION RATE: 20 BRPM | WEIGHT: 151.2 LBS | DIASTOLIC BLOOD PRESSURE: 46 MMHG | HEIGHT: 67 IN | HEART RATE: 79 BPM | OXYGEN SATURATION: 100 % | BODY MASS INDEX: 23.73 KG/M2

## 2022-01-10 DIAGNOSIS — I10 BENIGN ESSENTIAL HYPERTENSION: Chronic | ICD-10-CM

## 2022-01-10 DIAGNOSIS — E78.2 MIXED HYPERLIPIDEMIA: Chronic | ICD-10-CM

## 2022-01-10 DIAGNOSIS — E03.9 ACQUIRED HYPOTHYROIDISM: Chronic | ICD-10-CM

## 2022-01-10 DIAGNOSIS — E11.9 TYPE 2 DIABETES MELLITUS WITHOUT COMPLICATION, WITHOUT LONG-TERM CURRENT USE OF INSULIN: Primary | Chronic | ICD-10-CM

## 2022-01-10 DIAGNOSIS — E55.9 VITAMIN D DEFICIENCY: ICD-10-CM

## 2022-01-10 PROCEDURE — 99214 OFFICE O/P EST MOD 30 MIN: CPT | Performed by: INTERNAL MEDICINE

## 2022-01-10 RX ORDER — SITAGLIPTIN AND METFORMIN HYDROCHLORIDE 1000; 50 MG/1; MG/1
1 TABLET, FILM COATED ORAL DAILY
COMMUNITY
End: 2022-01-10 | Stop reason: ALTCHOICE

## 2022-01-10 NOTE — PROGRESS NOTES
Subjective   Lissa Ortiz is a 81 y.o. female.     History of Present Illness         Patient is a 81-year-old female who came in for follow-up.       She has known diabetes mellitus since 1981.  She is on Januvia 100 mg twice a day, Jardiance 10 mg/day and Toujeo 12 units every morning.    Janumet was discontinued because of diarrhea.  She checks her blood sugar once a day.  Fasting glucose 100-113.  She denies hypoglycemia.  She has gained 7 lbs since 5/21.     She denies eye complaints.  Her last eye examination was in February 2021.  She has glaucoma but no retinopathy.  She has restless leg at night and her feet feels tight and numb  which is improved by gabapentin 300 mg  at bedtime prescribed by neurologist RONALD Paz.  Urine microalbumin is normal in May 2021.     She has hyperlipidemia and is on pravastatin 20 mg/day.  She denies myalgia.     She has hypothyroidism and is on levothyroxine 25 mcg/day.  She has no history of head or neck radiation therapy.  She has no history of thyroid surgery.     She has hypertension and is on amlodipine 5 mg once a day.  She denies history of heart attack or stroke.  She denies chest pain or shortness of breath.     She has rheumatoid arthritis and is on methotrexate and folic acid prescribed by Dr. Chow.  She is not on oral steroids.     Bone density done at her gynecologist in May 2018 was normal with 4.4% decrease on the left hip.  She was advised follow-up bone density in 2023.  Her mother had hip fracture.  She is being followed by her gynecologist.    The following portions of the patient's history were reviewed and updated as appropriate: allergies, current medications, past family history, past medical history, past social history, past surgical history and problem list.    Review of Systems   HENT: Negative.    Eyes: Negative.    Respiratory: Negative.  Negative for shortness of breath.    Cardiovascular: Negative.  Negative for chest pain and palpitations.  "  Gastrointestinal: Negative.    Endocrine: Negative.  Negative for cold intolerance and heat intolerance.   Genitourinary: Negative.  Negative for dysuria.   Musculoskeletal: Negative for myalgias.   Neurological: Negative for numbness.     Objective      Vitals:    01/10/22 1143   BP: 124/46   Pulse: 79   Resp: 20   SpO2: 100%   Weight: 68.6 kg (151 lb 3.2 oz)   Height: 170.2 cm (67\")     Physical Exam  Constitutional:       General: She is not in acute distress.     Appearance: Normal appearance. She is obese. She is not ill-appearing, toxic-appearing or diaphoretic.   HENT:      Nose: Nose normal.      Mouth/Throat:      Mouth: Mucous membranes are moist.   Eyes:      General: No scleral icterus.        Right eye: No discharge.         Left eye: No discharge.      Extraocular Movements: Extraocular movements intact.      Conjunctiva/sclera: Conjunctivae normal.   Neck:      Vascular: No carotid bruit.   Cardiovascular:      Rate and Rhythm: Normal rate and regular rhythm.      Pulses: Normal pulses.      Heart sounds: Normal heart sounds. No murmur heard.  No friction rub.   Pulmonary:      Effort: Pulmonary effort is normal. No respiratory distress.      Breath sounds: Normal breath sounds. No stridor. No rales.   Chest:      Chest wall: No tenderness.   Abdominal:      General: Bowel sounds are normal. There is no distension.      Palpations: Abdomen is soft. There is no mass.      Tenderness: There is no right CVA tenderness or left CVA tenderness.   Musculoskeletal:         General: No swelling or tenderness. Normal range of motion.      Cervical back: Normal range of motion. No rigidity or tenderness.      Right lower leg: No edema.      Left lower leg: No edema.   Lymphadenopathy:      Cervical: No cervical adenopathy.   Skin:     General: Skin is warm and dry.   Neurological:      General: No focal deficit present.      Mental Status: She is alert and oriented to person, place, and time.      Comments: " Intact light touch on lower ext       Hospital Outpatient Visit on 01/05/2022   Component Date Value Ref Range Status   • Target HR (85%) 01/05/2022 119  bpm In process   • Max. Pred. HR (100%) 01/05/2022 140  bpm In process     Assessment/Plan   Diagnoses and all orders for this visit:    1. Type 2 diabetes mellitus without complication, without long-term current use of insulin (HCC) (Primary)  -     Hemoglobin A1c  -     Microalbumin / Creatinine Urine Ratio - Urine, Clean Catch  -     Urinalysis With Culture If Indicated -    2. Benign essential hypertension  -     Comprehensive Metabolic Panel  -     Urinalysis With Culture If Indicated -    3. Mixed hyperlipidemia  -     Lipid Panel    4. Acquired hypothyroidism  -     TSH  -     T4, Free    5. Vitamin D deficiency  -     Vitamin D 25 Hydroxy      Continue Toujeo, Januvia, and Jardiance.  Patient advised to wash herself after urinating to reduce vaginal yeast infection.  Consider switching from Januvia to Ozempic. Discussed with patient about medication and side effects.  Sample was given to the patient but not to be started yet.  Continue gabapentin per neurologist.  Continue pravastatin 20 mg a day.  Continue levothyroxine 25 mcg/day.  Continue amlodipine per PCP.    Copy of my note sent to Dr. Bragg and RONALD Pulido.    RTC 3 mos with KYLEE Guido NP.

## 2022-01-11 LAB
25(OH)D3+25(OH)D2 SERPL-MCNC: 54.1 NG/ML (ref 30–100)
ALBUMIN SERPL-MCNC: 4 G/DL (ref 3.7–4.7)
ALBUMIN/CREAT UR: 55 MG/G CREAT (ref 0–29)
ALBUMIN/GLOB SERPL: 1.4 {RATIO} (ref 1.2–2.2)
ALP SERPL-CCNC: 108 IU/L (ref 44–121)
ALT SERPL-CCNC: 19 IU/L (ref 0–32)
AST SERPL-CCNC: 28 IU/L (ref 0–40)
BILIRUB SERPL-MCNC: 0.4 MG/DL (ref 0–1.2)
BUN SERPL-MCNC: 16 MG/DL (ref 8–27)
BUN/CREAT SERPL: 23 (ref 12–28)
CALCIUM SERPL-MCNC: 8.7 MG/DL (ref 8.7–10.3)
CHLORIDE SERPL-SCNC: 102 MMOL/L (ref 96–106)
CHOLEST SERPL-MCNC: 165 MG/DL (ref 100–199)
CO2 SERPL-SCNC: 23 MMOL/L (ref 20–29)
CREAT SERPL-MCNC: 0.7 MG/DL (ref 0.57–1)
CREAT UR-MCNC: 80.6 MG/DL
GLOBULIN SER CALC-MCNC: 2.8 G/DL (ref 1.5–4.5)
GLUCOSE SERPL-MCNC: 275 MG/DL (ref 65–99)
HBA1C MFR BLD: 10.2 % (ref 4.8–5.6)
HDLC SERPL-MCNC: 51 MG/DL
IMP & REVIEW OF LAB RESULTS: NORMAL
LDLC SERPL CALC-MCNC: 82 MG/DL (ref 0–99)
MICROALBUMIN UR-MCNC: 44.6 UG/ML
POTASSIUM SERPL-SCNC: 3.7 MMOL/L (ref 3.5–5.2)
PROT SERPL-MCNC: 6.8 G/DL (ref 6–8.5)
SODIUM SERPL-SCNC: 138 MMOL/L (ref 134–144)
T4 FREE SERPL-MCNC: 1.25 NG/DL (ref 0.82–1.77)
TRIGL SERPL-MCNC: 192 MG/DL (ref 0–149)
TSH SERPL DL<=0.005 MIU/L-ACNC: 1.82 UIU/ML (ref 0.45–4.5)
VLDLC SERPL CALC-MCNC: 32 MG/DL (ref 5–40)

## 2022-01-16 RX ORDER — SEMAGLUTIDE 1.34 MG/ML
INJECTION, SOLUTION SUBCUTANEOUS
Qty: 3 PEN | Refills: 2 | Status: SHIPPED | OUTPATIENT
Start: 2022-01-16 | End: 2022-05-05 | Stop reason: DRUGHIGH

## 2022-01-16 NOTE — PROGRESS NOTES
Serum glucose 275.LDL 82.  HDL 51.  Continue pravastatin 20 mg a day.Hemoglobin A1c higher at 10.2% since metformin was discontinued because of diarrhea.  Discontinue Januvia and switch to Ozempic 0.25 mg weekly for 4 weeks then 0.5 weekly thereafter.Urine microalbumin elevated.  Recheck with next follow-up.Normal thyroid function tests.  Continue levothyroxine 25 mcg a day.Normal vitamin D at 54.1 ng/mL.Copy of labs sent to Dr. Bragg and RONALD Garcia.Please notify patient of results and instructions.

## 2022-01-24 DIAGNOSIS — I10 BENIGN ESSENTIAL HYPERTENSION: ICD-10-CM

## 2022-01-25 RX ORDER — AMLODIPINE BESYLATE 5 MG/1
TABLET ORAL
Qty: 90 TABLET | Refills: 3 | Status: SHIPPED | OUTPATIENT
Start: 2022-01-25 | End: 2022-06-03 | Stop reason: SDUPTHER

## 2022-02-17 ENCOUNTER — LAB (OUTPATIENT)
Dept: LAB | Facility: HOSPITAL | Age: 82
End: 2022-02-17

## 2022-02-17 DIAGNOSIS — D47.2 MONOCLONAL GAMMOPATHY: ICD-10-CM

## 2022-02-17 LAB
ANION GAP SERPL CALCULATED.3IONS-SCNC: 7.7 MMOL/L (ref 5–15)
BASOPHILS # BLD AUTO: 0.06 10*3/MM3 (ref 0–0.2)
BASOPHILS NFR BLD AUTO: 0.8 % (ref 0–1.5)
BUN SERPL-MCNC: 15 MG/DL (ref 6–20)
BUN/CREAT SERPL: 23.8 (ref 7.3–30)
CALCIUM SPEC-SCNC: 8.8 MG/DL (ref 8.5–10.2)
CHLORIDE SERPL-SCNC: 102 MMOL/L (ref 98–107)
CO2 SERPL-SCNC: 28.3 MMOL/L (ref 22–29)
CREAT SERPL-MCNC: 0.63 MG/DL (ref 0.6–1.1)
DEPRECATED RDW RBC AUTO: 49 FL (ref 37–54)
EOSINOPHIL # BLD AUTO: 0.54 10*3/MM3 (ref 0–0.4)
EOSINOPHIL NFR BLD AUTO: 7 % (ref 0.3–6.2)
ERYTHROCYTE [DISTWIDTH] IN BLOOD BY AUTOMATED COUNT: 14.3 % (ref 12.3–15.4)
GFR SERPL CREATININE-BSD FRML MDRD: 91 ML/MIN/1.73
GLUCOSE SERPL-MCNC: 260 MG/DL (ref 74–124)
HCT VFR BLD AUTO: 42.2 % (ref 34–46.6)
HGB BLD-MCNC: 13.9 G/DL (ref 12–15.9)
IMM GRANULOCYTES # BLD AUTO: 0.02 10*3/MM3 (ref 0–0.05)
IMM GRANULOCYTES NFR BLD AUTO: 0.3 % (ref 0–0.5)
LYMPHOCYTES # BLD AUTO: 1.12 10*3/MM3 (ref 0.7–3.1)
LYMPHOCYTES NFR BLD AUTO: 14.5 % (ref 19.6–45.3)
MCH RBC QN AUTO: 31 PG (ref 26.6–33)
MCHC RBC AUTO-ENTMCNC: 32.9 G/DL (ref 31.5–35.7)
MCV RBC AUTO: 94.2 FL (ref 79–97)
MONOCYTES # BLD AUTO: 0.81 10*3/MM3 (ref 0.1–0.9)
MONOCYTES NFR BLD AUTO: 10.5 % (ref 5–12)
NEUTROPHILS NFR BLD AUTO: 5.19 10*3/MM3 (ref 1.7–7)
NEUTROPHILS NFR BLD AUTO: 66.9 % (ref 42.7–76)
NRBC BLD AUTO-RTO: 0 /100 WBC (ref 0–0.2)
PLATELET # BLD AUTO: 294 10*3/MM3 (ref 140–450)
PMV BLD AUTO: 9.6 FL (ref 6–12)
POTASSIUM SERPL-SCNC: 4.2 MMOL/L (ref 3.5–4.7)
RBC # BLD AUTO: 4.48 10*6/MM3 (ref 3.77–5.28)
SODIUM SERPL-SCNC: 138 MMOL/L (ref 134–145)
WBC NRBC COR # BLD: 7.74 10*3/MM3 (ref 3.4–10.8)

## 2022-02-17 PROCEDURE — 36415 COLL VENOUS BLD VENIPUNCTURE: CPT

## 2022-02-17 PROCEDURE — 85025 COMPLETE CBC W/AUTO DIFF WBC: CPT

## 2022-02-17 PROCEDURE — 80048 BASIC METABOLIC PNL TOTAL CA: CPT

## 2022-02-18 LAB
ALBUMIN SERPL ELPH-MCNC: 3.7 G/DL (ref 2.9–4.4)
ALBUMIN/GLOB SERPL: 1.2 {RATIO} (ref 0.7–1.7)
ALPHA1 GLOB SERPL ELPH-MCNC: 0.2 G/DL (ref 0–0.4)
ALPHA2 GLOB SERPL ELPH-MCNC: 0.8 G/DL (ref 0.4–1)
B-GLOBULIN SERPL ELPH-MCNC: 1.1 G/DL (ref 0.7–1.3)
GAMMA GLOB SERPL ELPH-MCNC: 1.3 G/DL (ref 0.4–1.8)
GLOBULIN SER-MCNC: 3.3 G/DL (ref 2.2–3.9)
IGA SERPL-MCNC: 413 MG/DL (ref 64–422)
IGG SERPL-MCNC: 1152 MG/DL (ref 586–1602)
IGM SERPL-MCNC: 55 MG/DL (ref 26–217)
INTERPRETATION SERPL IEP-IMP: ABNORMAL
KAPPA LC FREE SER-MCNC: 28.1 MG/L (ref 3.3–19.4)
KAPPA LC FREE/LAMBDA FREE SER: 1.22 {RATIO} (ref 0.26–1.65)
LABORATORY COMMENT REPORT: ABNORMAL
LAMBDA LC FREE SERPL-MCNC: 23 MG/L (ref 5.7–26.3)
M PROTEIN SERPL ELPH-MCNC: 0.3 G/DL
PROT SERPL-MCNC: 7 G/DL (ref 6–8.5)

## 2022-02-24 ENCOUNTER — OFFICE VISIT (OUTPATIENT)
Dept: ONCOLOGY | Facility: CLINIC | Age: 82
End: 2022-02-24

## 2022-02-24 ENCOUNTER — APPOINTMENT (OUTPATIENT)
Dept: LAB | Facility: HOSPITAL | Age: 82
End: 2022-02-24

## 2022-02-24 VITALS
DIASTOLIC BLOOD PRESSURE: 62 MMHG | HEIGHT: 67 IN | HEART RATE: 93 BPM | SYSTOLIC BLOOD PRESSURE: 116 MMHG | TEMPERATURE: 97.1 F | OXYGEN SATURATION: 96 % | RESPIRATION RATE: 16 BRPM | WEIGHT: 145.8 LBS | BODY MASS INDEX: 22.88 KG/M2

## 2022-02-24 DIAGNOSIS — D47.2 MONOCLONAL GAMMOPATHY: Primary | ICD-10-CM

## 2022-02-24 PROCEDURE — 99215 OFFICE O/P EST HI 40 MIN: CPT | Performed by: INTERNAL MEDICINE

## 2022-02-24 NOTE — PROGRESS NOTES
[facility]    CONSULTING IN BLOOD DISORDERS & CANCER      REASON FOR CONSULTATION/CHIEF COMPLAINT:     Evaluation & management for MGUS, IgA kappa                             REQUESTING PHYSICIAN: No ref. provider found  RECORDS OBTAINED:  Records of the patients history including those from the electronic medical record were reviewed and summarized in detail.    HISTORY OF PRESENT ILLNESS:    The patient is a 81 y.o. year old female with past medical history significant for DM2 (over 40 years), inclusion body myositis (diagnosed 10 yrs ago, on methotrexate), hypothyroidism, & peripheral neuropathy who had presented to her neurologist,  with signs/symptoms of bilateral feet neuropathy. During this work up, SPEP/VIGNESH from October 2021 showed presence of IgA kappa M-protein, 0.2 gm/dl.   Patient c/o b/l feet neuropathy, along with generalized weakness, which she attributes to inclusion body myositis.   Denies any specific bone pain. On labs, no cytopenias. Renal function & calcium levels within normal limits.   No fever, chills, chest pain, cough or weight loss. No palpable lymphadenopathy. Has generalized weakness secondary to inclusion body myositis. Denies smoking, alcohol or drug abuse.   No family history of blood or bone marrow disorder.     INTERIM HISTORY:  Patient returns to the clinic for a f/u visit. Denies any new complaints. She continues to have peripheral neuropathy symptoms in her feet. Reports of tingling and restlessness at night time. No new pain. Energy level stable.     Past Medical History:   Diagnosis Date   • Allergic rhinitis    • Arthritis    • Cancer (HCC)     skin leg   • Cataract    • Diabetes mellitus (HCC)     type 2   • Hypertension    • Long sleeper     post anesthesia     Past Surgical History:   Procedure Laterality Date   • COLONOSCOPY N/A 2/13/2018    Procedure: COLONOSCOPY to cecum and TI with hot snare polypectomy;  Surgeon: Rowan Smith MD;  Location: [facility]  ENDOSCOPY;  Service:    • EYE SURGERY      tearduct sgy   • EYE SURGERY      MARY KAY CATARACTS   • HYSTERECTOMY     • TONSILLECTOMY         MEDICATIONS    Current Outpatient Medications:   •  amLODIPine (NORVASC) 5 MG tablet, TAKE ONE TABLET BY MOUTH DAILY, Disp: 90 tablet, Rfl: 3  •  azelastine (ASTELIN) 0.1 % nasal spray, 2 sprays into the nostril(s) as directed by provider 2 (Two) Times a Day. Use in each nostril as directed, Disp: 2 each, Rfl: 5  •  Blood Glucose Monitoring Suppl (TRUE METRIX AIR GLUCOSE METER) device, 1 Device Daily As Needed (check bs's twice daily)., Disp: 1 Device, Rfl: 0  •  Calcium Citrate (CITRACAL PO), Take  by mouth. 2 TABS DAILY, Disp: , Rfl:   •  Cinnamon 500 MG capsule, Take 500 mg by mouth Daily., Disp: , Rfl:   •  dorzolamide-timolol (Cosopt) 22.3-6.8 MG/ML ophthalmic solution, Apply  to eye(s) as directed by provider Every 12 (Twelve) Hours., Disp: , Rfl:   •  folic acid (FOLVITE) 1 MG tablet, Take 1 tablet by mouth daily., Disp: , Rfl:   •  Insulin Glargine, 2 Unit Dial, (Toujeo Max SoloStar) 300 UNIT/ML solution pen-injector injection, Inject 12 Units under the skin into the appropriate area as directed Daily for 90 days., Disp: 5 pen, Rfl: 6  •  [employer] Pen Needles 32G X 4 MM misc, USE ONCE DAILY, Disp: 100 each, Rfl: 2  •  levothyroxine (SYNTHROID, LEVOTHROID) 25 MCG tablet, TAKE ONE TABLET BY MOUTH DAILY, Disp: 90 tablet, Rfl: 1  •  METHOTREXATE SODIUM PO, Take 50 mg by mouth 1 (One) Time Per Week. 50MG/ 7 ML  ONE INJECTION WEEKLY USED FOR THE TREATMENT OF RA, Disp: , Rfl:   •  Multiple Vitamin (MULTI VITAMIN PO), Take 1 tablet by mouth Daily., Disp: , Rfl:   •  pravastatin (PRAVACHOL) 20 MG tablet, TAKE ONE TABLET BY MOUTH DAILY, Disp: 90 tablet, Rfl: 3  •  Probiotic capsule, Take 1 capsule by mouth., Disp: , Rfl:   •  Semaglutide,0.25 or 0.5MG/DOS, (Ozempic, 0.25 or 0.5 MG/DOSE,) 2 MG/1.5ML solution pen-injector, 0.25 mg weekly subcutaneously for 4 weeks then 0.5 mg weekly  thereafter., Disp: 3 pen, Rfl: 2  •  solifenacin (VESICARE) 10 MG tablet, , Disp: , Rfl:   •  Tafluprost, PF, (ZIOPTAN) 0.0015 % solution ophthalmic solution, 1 drop Every Night., Disp: , Rfl:   •  Turmeric 500 MG capsule, Take  by mouth., Disp: , Rfl:   •  gabapentin (NEURONTIN) 300 MG capsule, Take 1 capsule by mouth every night at bedtime for 90 days., Disp: 90 capsule, Rfl: 1    ALLERGIES:     Allergies   Allergen Reactions   • Bactrim [Sulfamethoxazole-Trimethoprim] Nausea And Vomiting   • Sulfamethoxazole Nausea Only   • Trimethoprim Nausea Only       SOCIAL HISTORY:       Social History     Socioeconomic History   • Marital status:    • Number of children: 3   Tobacco Use   • Smoking status: Never Smoker   • Smokeless tobacco: Never Used   Vaping Use   • Vaping Use: Never used   Substance and Sexual Activity   • Alcohol use: No     Comment: Occasional glass of wine one per month   • Drug use: No   • Sexual activity: Defer         FAMILY HISTORY:  Family History   Problem Relation Age of Onset   • Diabetes Mother    • Stroke Mother    • Coronary artery disease Father         MI   • Diabetes Father    • Stroke Father    • Thyroid cancer Daughter        REVIEW OF SYSTEMS:  Constitutional: [No fevers, chills, sweats]  Eye: [No recent visual problems]  ENMT: [No ear pain, nasal congestion, sore throat]  Respiratory: [No shortness of breath, cough]  Cardiovascular: [No Chest pain, palpitations, syncope]  Gastrointestinal: [No nausea, vomiting, diarrhea]  Genitourinary: [No hematuria]  Hema/Lymph: [Negative for bruising tendency, swollen lymph glands]  Endocrine: [Negative for excessive thirst, excessive hunger]  Musculoskeletal: [Denies any musculoskeletal pain or swelling]  Integumentary: [No rash, pruritus, abrasions]  Neurologic: [ No weakness or numbness, Alert & oriented X 4]           Vitals:    02/24/22 0938   BP: 116/62   Pulse: 93   Resp: 16   Temp: 97.1 °F (36.2 °C)   TempSrc: Temporal   SpO2: 96%  "  Weight: 66.1 kg (145 lb 12.8 oz)   Height: 170.2 cm (67.01\")   PainSc: 0-No pain     Current Status 2/24/2022   ECOG score 1      PHYSICAL EXAM:    CONSTITUTIONAL:  Vital signs reviewed.  No distress, looks comfortable.  EYES:  Conjunctiva and lids unremarkable.   EARS,NOSE,MOUTH,THROAT:  Ears and nose appear unremarkable.  Lips, teeth, gums appear unremarkable.  RESPIRATORY:  Normal respiratory effort.  Lungs clear to auscultation bilaterally.  CARDIOVASCULAR:  Normal S1, S2.  No murmurs rubs or gallops.  No significant lower extremity edema.  GASTROINTESTINAL: Abdomen appears unremarkable.  Nondistended  LYMPHATIC:  No cervical, supraclavicular lymphadenopathy.  NEURO: AAO x 3,  No focal deficits.  Appears to have equal strength all 4 extremities.  MUSCULOSKELETAL:  Unremarkable digits/nails.  No cyanosis or clubbing.  No apparent joint deformities.  SKIN:  Warm.  No rashes.  PSYCHIATRIC:  Normal judgment and insight.  Normal mood and affect.     RECENT LABS:        No visits with results within 7 Day(s) from this visit.   Latest known visit with results is:   Lab on 02/17/2022   Component Date Value Ref Range Status   • IgG 02/17/2022 1152  586 - 1602 mg/dL Final   • IgA 02/17/2022 413  64 - 422 mg/dL Final   • IgM 02/17/2022 55  26 - 217 mg/dL Final   • Total Protein 02/17/2022 7.0  6.0 - 8.5 g/dL Final   • Albumin 02/17/2022 3.7  2.9 - 4.4 g/dL Final   • Alpha-1-Globulin 02/17/2022 0.2  0.0 - 0.4 g/dL Final   • Alpha-2-Globulin 02/17/2022 0.8  0.4 - 1.0 g/dL Final   • Beta Globulin 02/17/2022 1.1  0.7 - 1.3 g/dL Final   • Gamma Globulin 02/17/2022 1.3  0.4 - 1.8 g/dL Final   • M-Camilo 02/17/2022 0.3* Not Observed g/dL Final   • Globulin 02/17/2022 3.3  2.2 - 3.9 g/dL Final   • A/G Ratio 02/17/2022 1.2  0.7 - 1.7 Final   • Immunofixation Reflex, Serum 02/17/2022 Comment*  Final    Immunofixation shows IgA monoclonal protein with kappa light chain  specificity.   • Please note 02/17/2022 Comment   Final    " Protein electrophoresis scan will follow via computer, mail, or   delivery.   • Free Light Chain, Kappa 02/17/2022 28.1* 3.3 - 19.4 mg/L Final   • Free Lambda Light Chains 02/17/2022 23.0  5.7 - 26.3 mg/L Final   • Kappa/Lambda Ratio 02/17/2022 1.22  0.26 - 1.65 Final   • Glucose 02/17/2022 260* 74 - 124 mg/dL Final   • BUN 02/17/2022 15  6 - 20 mg/dL Final   • Creatinine 02/17/2022 0.63  0.60 - 1.10 mg/dL Final   • Sodium 02/17/2022 138  134 - 145 mmol/L Final   • Potassium 02/17/2022 4.2  3.5 - 4.7 mmol/L Final   • Chloride 02/17/2022 102  98 - 107 mmol/L Final   • CO2 02/17/2022 28.3  22.0 - 29.0 mmol/L Final   • Calcium 02/17/2022 8.8  8.5 - 10.2 mg/dL Final   • eGFR Non African Amer 02/17/2022 91  >60 mL/min/1.73 Final   • BUN/Creatinine Ratio 02/17/2022 23.8  7.3 - 30.0 Final   • Anion Gap 02/17/2022 7.7  5.0 - 15.0 mmol/L Final   • WBC 02/17/2022 7.74  3.40 - 10.80 10*3/mm3 Final   • RBC 02/17/2022 4.48  3.77 - 5.28 10*6/mm3 Final   • Hemoglobin 02/17/2022 13.9  12.0 - 15.9 g/dL Final   • Hematocrit 02/17/2022 42.2  34.0 - 46.6 % Final   • MCV 02/17/2022 94.2  79.0 - 97.0 fL Final   • MCH 02/17/2022 31.0  26.6 - 33.0 pg Final   • MCHC 02/17/2022 32.9  31.5 - 35.7 g/dL Final   • RDW 02/17/2022 14.3  12.3 - 15.4 % Final   • RDW-SD 02/17/2022 49.0  37.0 - 54.0 fl Final   • MPV 02/17/2022 9.6  6.0 - 12.0 fL Final   • Platelets 02/17/2022 294  140 - 450 10*3/mm3 Final   • Neutrophil % 02/17/2022 66.9  42.7 - 76.0 % Final   • Lymphocyte % 02/17/2022 14.5* 19.6 - 45.3 % Final   • Monocyte % 02/17/2022 10.5  5.0 - 12.0 % Final   • Eosinophil % 02/17/2022 7.0* 0.3 - 6.2 % Final   • Basophil % 02/17/2022 0.8  0.0 - 1.5 % Final   • Immature Grans % 02/17/2022 0.3  0.0 - 0.5 % Final   • Neutrophils, Absolute 02/17/2022 5.19  1.70 - 7.00 10*3/mm3 Final   • Lymphocytes, Absolute 02/17/2022 1.12  0.70 - 3.10 10*3/mm3 Final   • Monocytes, Absolute 02/17/2022 0.81  0.10 - 0.90 10*3/mm3 Final   • Eosinophils, Absolute  "02/17/2022 0.54* 0.00 - 0.40 10*3/mm3 Final   • Basophils, Absolute 02/17/2022 0.06  0.00 - 0.20 10*3/mm3 Final   • Immature Grans, Absolute 02/17/2022 0.02  0.00 - 0.05 10*3/mm3 Final   • nRBC 02/17/2022 0.0  0.0 - 0.2 /100 WBC Final         ASSESSMENT:   is a pleasant 79 y/o WF with past medical history significant for DM2 (over 40 years), inclusion body myositis (diagnosed 10 yrs ago, on methotrexate), hypothyroidism, & peripheral neuropathy comes to this clinic for monoclonal gammopathy evaluation.     # Monoclonal gammopathy:  · Patient had presented to her neurologist,  with signs/symptoms of bilateral feet neuropathy. During this work up SPEP/VIGNESH was performed which showed presence of IgA kappa M-protein, 0.2 gm/dl.   · Patient c/o b/l feet neuropathy, along with generalized weakness, which she attributes to inclusion body myositis.   · Denies any specific bone pain. On labs, no cytopenias. Renal function & calcium levels within normal limits. No B-symptoms.   · Informed patient that with low level of IgA M-protein & no \"CRAB\" features, this is likely MGUS, low-intermediate risk. Informed patient that MGUS is a common finding in elderly population, however,  there is an 1% per year risk of progression to multiple myeloma.   · A repeat SPEP/VIGNESH from Feb 2022 show stable IgA kappa M-protein level of 0.3 gm/dl. No symptoms concerning for progression.   · Will plan to monitor and repeat SPEP/VIGNESH & FLC in 4-5 months time.  · Her peripheral neuropathy is unlikely related to monoclonal gammopathy. Suspect diabetic neuropathy. Additional work up being performed by neurology.     # Inclusion body myositis: On MTX 7.5 mg weekly. Follows up with   # DM2: On insulin, januvia & glipizide.   # Hypothyrodism: On synthroid  # peripheral neuropathy: Is on gabapentin 900 mg at bedtime. Symptoms not controlled. Advised to take additional dose of 300 mg in the afternoon. Advised to maintain follow up with " neurology.    PLAN:   - IgA kappa MGUS. Low-intermediate risk. Unlikely attributing to peripheral neuropathy. Repeat labs from Feb 2022 stable.   - Active surveillance. Repeat labs in 4-5 months.     Orders Placed This Encounter   Procedures   • Comprehensive Metabolic Panel     Standing Status:   Future     Standing Expiration Date:   2/24/2023     Order Specific Question:   Release to patient     Answer:   Immediate   • VIGNESH,PE and FLC, Serum     Standing Status:   Future     Standing Expiration Date:   2/24/2023     Order Specific Question:   Release to patient     Answer:   Immediate   • CBC & Differential     Standing Status:   Future     Standing Expiration Date:   2/24/2023     Order Specific Question:   Manual Differential     Answer:   No       Total time spent during this patient encounter is 45 minutes. The total time spent with the patient includes at least one or more of the following: preparing to see the patient by reviewing of tests, prior notes or other relevant information, performing appropriate independent examination & evaluation, counseling, ordering of medications, tests or procedures, communicating with other healthcare professionals, when appropriate to coordinate care, documenting clinic information in the electronic medical records or other health records, independently interpreting results of tests and communicating the results to the patient/family or caregiver.

## 2022-03-09 RX ORDER — PIOGLITAZONEHYDROCHLORIDE 15 MG/1
15 TABLET ORAL DAILY
Qty: 30 TABLET | Refills: 5 | Status: SHIPPED | OUTPATIENT
Start: 2022-03-09 | End: 2022-03-11 | Stop reason: SDUPTHER

## 2022-03-11 RX ORDER — PIOGLITAZONEHYDROCHLORIDE 15 MG/1
15 TABLET ORAL DAILY
Qty: 30 TABLET | Refills: 5 | Status: SHIPPED | OUTPATIENT
Start: 2022-03-11 | End: 2022-07-13 | Stop reason: SDUPTHER

## 2022-04-05 ENCOUNTER — TELEPHONE (OUTPATIENT)
Dept: ENDOCRINOLOGY | Age: 82
End: 2022-04-05

## 2022-04-05 NOTE — ANESTHESIA POSTPROCEDURE EVALUATION
"Patient: Lissa Ortiz    Procedure Summary     Date Anesthesia Start Anesthesia Stop Room / Location    02/13/18 0844 0913  [facility]OP ENDOSCOPY 5 /  ANOOP ENDOSCOPY       Procedure Diagnosis Surgeon Provider    COLONOSCOPY to cecum and TI with hot snare polypectomy (N/A ) Encounter for screening colonoscopy  (Encounter for screening colonoscopy [Z12.11]) MD Kristopher Murillo MD          Anesthesia Type: MAC  Last vitals  BP   119/68 (02/13/18 0937)   Temp   36.4 °C (97.6 °F) (02/13/18 0927)   Pulse   61 (02/13/18 0937)   Resp   16 (02/13/18 0937)     SpO2   99 % (02/13/18 0937)     Post Anesthesia Care and Evaluation    Patient location during evaluation: PACU  Patient participation: complete - patient participated  Level of consciousness: awake  Pain score: 0  Pain management: adequate  Airway patency: patent  Anesthetic complications: No anesthetic complications    Cardiovascular status: acceptable  Respiratory status: acceptable  Hydration status: acceptable    Comments: Blood pressure 119/68, pulse 61, temperature 36.4 °C (97.6 °F), temperature source Oral, resp. rate 16, height 170.2 cm (67\"), weight 62.1 kg (137 lb), SpO2 99 %.    No anesthesia care post op    " Gave 2nd flu injection IM in left leg. Patient tolerated well. Patient accompanied by mom. No adverse reactions.

## 2022-04-29 ENCOUNTER — TELEPHONE (OUTPATIENT)
Dept: NEUROLOGY | Facility: CLINIC | Age: 82
End: 2022-04-29

## 2022-04-29 ENCOUNTER — TELEPHONE (OUTPATIENT)
Dept: ENDOCRINOLOGY | Age: 82
End: 2022-04-29

## 2022-04-29 ENCOUNTER — LAB (OUTPATIENT)
Dept: LAB | Facility: HOSPITAL | Age: 82
End: 2022-04-29

## 2022-04-29 ENCOUNTER — OFFICE VISIT (OUTPATIENT)
Dept: NEUROLOGY | Facility: CLINIC | Age: 82
End: 2022-04-29

## 2022-04-29 VITALS
OXYGEN SATURATION: 98 % | WEIGHT: 142.7 LBS | DIASTOLIC BLOOD PRESSURE: 74 MMHG | HEART RATE: 75 BPM | HEIGHT: 67 IN | SYSTOLIC BLOOD PRESSURE: 122 MMHG | BODY MASS INDEX: 22.4 KG/M2

## 2022-04-29 DIAGNOSIS — Z79.4 TYPE 2 DIABETES MELLITUS WITH HYPOGLYCEMIA WITHOUT COMA, WITH LONG-TERM CURRENT USE OF INSULIN: ICD-10-CM

## 2022-04-29 DIAGNOSIS — G25.81 RESTLESS LEG SYNDROME: ICD-10-CM

## 2022-04-29 DIAGNOSIS — E11.42 DIABETIC PERIPHERAL NEUROPATHY: Primary | ICD-10-CM

## 2022-04-29 DIAGNOSIS — R78.71 ABNORMAL LEAD LEVEL IN BLOOD: ICD-10-CM

## 2022-04-29 DIAGNOSIS — E11.649 TYPE 2 DIABETES MELLITUS WITH HYPOGLYCEMIA WITHOUT COMA, WITH LONG-TERM CURRENT USE OF INSULIN: ICD-10-CM

## 2022-04-29 DIAGNOSIS — R79.9 ABNORMAL BLOOD CHEMISTRY: ICD-10-CM

## 2022-04-29 LAB
FERRITIN SERPL-MCNC: 75.1 NG/ML (ref 13–150)
HBA1C MFR BLD: 9.5 % (ref 4.8–5.6)

## 2022-04-29 PROCEDURE — 99215 OFFICE O/P EST HI 40 MIN: CPT | Performed by: PHYSICIAN ASSISTANT

## 2022-04-29 PROCEDURE — 83540 ASSAY OF IRON: CPT

## 2022-04-29 PROCEDURE — 82728 ASSAY OF FERRITIN: CPT

## 2022-04-29 PROCEDURE — 36415 COLL VENOUS BLD VENIPUNCTURE: CPT | Performed by: PHYSICIAN ASSISTANT

## 2022-04-29 PROCEDURE — 83036 HEMOGLOBIN GLYCOSYLATED A1C: CPT | Performed by: PHYSICIAN ASSISTANT

## 2022-04-29 PROCEDURE — 84466 ASSAY OF TRANSFERRIN: CPT

## 2022-04-29 RX ORDER — GABAPENTIN 300 MG/1
600 CAPSULE ORAL
Qty: 180 CAPSULE | Refills: 1 | Status: SHIPPED | OUTPATIENT
Start: 2022-04-29 | End: 2023-01-23 | Stop reason: SDUPTHER

## 2022-04-29 NOTE — TELEPHONE ENCOUNTER
----- Message from Mandi Paz PA-C sent at 4/29/2022  3:45 PM EDT -----  Can you let patient know that her hemoglobin A1c was better but only slightly.  It was still quite high at 9.5.    Since she has an upcoming appointment with her primary care I would ask him if there is anything that should be done in the interim until she is seen by endocrinology again.  May be resuming metformin at a low dose or escalating the Ozempic

## 2022-04-29 NOTE — PROGRESS NOTES
CC: Follow-up: peripheral neuropathy -probably diabetic in etiology but was presence of MGUS    HPI:  Lissa Ortiz is a  81 y.o.  right-handed female who I am seeing in follow-up today regarding some peripheral neuropathy, probably diabetic in etiology with presence of MGUS.   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  Other past medical history is notable for rheumatoid arthritis on long-term DMARD therapy with methotrexate, type 2 diabetes, hypertension, hyperlipidemia, hypothyroidism and cataracts. Patient was last seen in our office on 10/29/2021, summary of her condition is taken from previous note with amendments and additions as needed:     Patient was initially referred for neurological consultation by her rheumatologist Dr. Kyle Chow regarding progressive weakness, balance trouble and a history of inclusion body myositis.  Dr. Chow manages the patient's RA and approximately 10 years ago sent her for an evaluation for weakness to [facility].  The patient does not recall the physician she saw in [facility] but does state he did an EMG on her. She denies any muscle biopsy history.  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  The records of this evaluation are not available in our system.  In terms of her peripheral neuropathy symptoms, she does endorse longstanding numbness and tingling in the feet, which seems worse in the evenings actually at bedtime.  She does not seem to have any burning or stabbing pains in the feet.  She does indicate some degree of low back pain which occurs intermittently without radicular pain.  No specific neck pain.       Within the past few years she also has noticed some problems with gait and balance, specifically this is an issue on uneven terrain.  In the past year she reports she is fallen (usually on an uneven surface).  She has some lightheadedness when she first gets up in the morning,  but attributes this to the gabapentin as it was not present before she started this medication.   She does report weakness in her legs, states she must use her hands to help get up out of the chair and does have a trouble getting up from the floor.  She says she can get up if she is in sand on the beach for instance.  She states she tends to shuffle and does not pick her feet up well and she has trouble walking up stairs both getting the leg to the next step and pushing off.     Work-up to date has included an EMG which was performed on 10/4/2021 and revealed mild evidence of peripheral neuropathy.  Some irritability is seen in the left tibialis anterior without any other features of a lumbosacral radiculopathy. Specifically there was no evidence of a myopathy on this study.  Laboratory studies for treatable causes of neuropathy included the following:     RPR nonreactive  Cryoglobulins negative  Copper normal at 140  Heavy metals including lead arsenic and mercury all within normal limits  Sed rate normal at 5  Vitamin B12 and folate normal at greater than 2000 and greater than 20 respectively  CK level normal at 82  Protein electrophoresis did reveal an M spike, immunofixation showed presence of IgA kappa M-protein, 0.2 gm/dl    In terms of treatment she has taken gabapentin 300 mg at bedtime for some time now, which she reports is usually effective at controlling her symptoms in her feet.  She continues to tolerate it well with no significant side effects of sedation or dizziness.     Interim history  Since I have seen her last 6 months ago patient has finished physical therapy.  She notes she really did not see much improvement in terms of her gait but may be she does feel stronger in her legs.  She still has significant numbness and tingling which probably has gotten worse in the last 6 months.  She states symptoms cause her to feel restless in the evening.  She states she often cannot fall asleep secondary to  the symptoms she has in her lower extremities.  She still endorses very little in the way of back pain or neck pain.  She denies any recent falls.    Of note chart review shows that her previous hemoglobin A1c was quite elevated at 10.2.  Patient is followed by endocrinology and reports she continues to take regimen which includes Actos, Ozempic and insulin.    Regarding her IgA kappa M-protein, patient was seen by hematology oncology about 2 months ago.  It looks like they suspect her MGUS is low/indeterminate risk and she was put on 6-month surveillance.  They also suspect that her neuropathy is not associated with the MGUS and is more likely diabetic in etiology.      Past Medical History:   Diagnosis Date   • Allergic rhinitis    • Arthritis    • Cancer (HCC)     skin leg   • Cataract    • Diabetes mellitus (HCC)     type 2   • Hypertension    • Long sleeper     post anesthesia         Past Surgical History:   Procedure Laterality Date   • COLONOSCOPY N/A 2/13/2018    Procedure: COLONOSCOPY to cecum and TI with hot snare polypectomy;  Surgeon: Rowan Smith MD;  Location: [facility] ENDOSCOPY;  Service:    • EYE SURGERY      tearduct sgy   • EYE SURGERY      MARY KAY CATARACTS   • HYSTERECTOMY     • TONSILLECTOMY             Current Outpatient Medications:   •  amLODIPine (NORVASC) 5 MG tablet, TAKE ONE TABLET BY MOUTH DAILY, Disp: 90 tablet, Rfl: 3  •  azelastine (ASTELIN) 0.1 % nasal spray, 2 sprays into the nostril(s) as directed by provider 2 (Two) Times a Day. Use in each nostril as directed, Disp: 2 each, Rfl: 5  •  Calcium Citrate (CITRACAL PO), Take  by mouth. 2 TABS DAILY, Disp: , Rfl:   •  Cinnamon 500 MG capsule, Take 500 mg by mouth Daily., Disp: , Rfl:   •  dorzolamide-timolol (COSOPT) 22.3-6.8 MG/ML ophthalmic solution, Apply  to eye(s) as directed by provider Every 12 (Twelve) Hours., Disp: , Rfl:   •  levothyroxine (SYNTHROID, LEVOTHROID) 25 MCG tablet, TAKE ONE TABLET BY MOUTH DAILY, Disp: 90 tablet,  Rfl: 1  •  METHOTREXATE SODIUM PO, Take 50 mg by mouth 1 (One) Time Per Week. 50MG/ 7 ML  ONE INJECTION WEEKLY USED FOR THE TREATMENT OF RA, Disp: , Rfl:   •  Multiple Vitamin (MULTI VITAMIN PO), Take 1 tablet by mouth Daily., Disp: , Rfl:   •  pioglitazone (Actos) 15 MG tablet, Take 1 tablet by mouth Daily., Disp: 30 tablet, Rfl: 5  •  pravastatin (PRAVACHOL) 20 MG tablet, TAKE ONE TABLET BY MOUTH DAILY, Disp: 90 tablet, Rfl: 3  •  Probiotic capsule, Take 1 capsule by mouth., Disp: , Rfl:   •  Semaglutide,0.25 or 0.5MG/DOS, (Ozempic, 0.25 or 0.5 MG/DOSE,) 2 MG/1.5ML solution pen-injector, 0.25 mg weekly subcutaneously for 4 weeks then 0.5 mg weekly thereafter., Disp: 3 pen, Rfl: 2  •  solifenacin (VESICARE) 10 MG tablet, , Disp: , Rfl:   •  Tafluprost, PF, (ZIOPTAN) 0.0015 % solution ophthalmic solution, 1 drop Every Night., Disp: , Rfl:   •  Turmeric 500 MG capsule, Take  by mouth., Disp: , Rfl:   •  Blood Glucose Monitoring Suppl (TRUE METRIX AIR GLUCOSE METER) device, 1 Device Daily As Needed (check bs's twice daily)., Disp: 1 Device, Rfl: 0  •  folic acid (FOLVITE) 1 MG tablet, Take 1 tablet by mouth daily., Disp: , Rfl:   •  gabapentin (NEURONTIN) 300 MG capsule, Take 1 capsule by mouth every night at bedtime for 90 days., Disp: 90 capsule, Rfl: 1  •  Insulin Glargine, 2 Unit Dial, (Toujeo Max SoloStar) 300 UNIT/ML solution pen-injector injection, Inject 12 Units under the skin into the appropriate area as directed Daily for 90 days., Disp: 5 pen, Rfl: 6  •  [employer] Pen Needles 32G X 4 MM misc, USE ONCE DAILY, Disp: 100 each, Rfl: 2      Family History   Problem Relation Age of Onset   • Diabetes Mother    • Stroke Mother    • Coronary artery disease Father         MI   • Diabetes Father    • Stroke Father    • Thyroid cancer Daughter          Social History     Socioeconomic History   • Marital status:    • Number of children: 3   Tobacco Use   • Smoking status: Never Smoker   • Smokeless tobacco: Never  "Used   Vaping Use   • Vaping Use: Never used   Substance and Sexual Activity   • Alcohol use: No     Comment: Occasional glass of wine one per month   • Drug use: No   • Sexual activity: Defer         Allergies   Allergen Reactions   • Bactrim [Sulfamethoxazole-Trimethoprim] Nausea And Vomiting   • Sulfamethoxazole Nausea Only   • Trimethoprim Nausea Only       ROS:  Review of Systems   Constitutional: Positive for fatigue. Negative for activity change and appetite change.   Eyes: Negative for pain, redness and itching.   Respiratory: Negative for cough, choking and shortness of breath.    Allergic/Immunologic: Negative for environmental allergies and food allergies.   Neurological: Positive for numbness. Negative for dizziness, tremors, seizures, syncope, facial asymmetry, speech difficulty, weakness, light-headedness and headaches.   Psychiatric/Behavioral: Positive for sleep disturbance. Negative for agitation, behavioral problems, confusion, decreased concentration, dysphoric mood, hallucinations, self-injury and suicidal ideas. The patient is not nervous/anxious and is not hyperactive.      I have reviewed the above ROS put in by the medical assistant and am in agreement.     Physical Exam:  Vitals:    04/29/22 1012   BP: 122/74   Pulse: 75   SpO2: 98%   Weight: 64.7 kg (142 lb 11.2 oz)   Height: 170.2 cm (67.01\")       Body mass index is 22.34 kg/m².    Physical Exam  General: Slender white female, no acute distress  HEENT: Head is normocephalic, atraumatic  Neck: Supple with no masses  Heart: Regular rate and rhythm  Extremities: Distal pulses are palpable and equal.  No pedal edema.    Neurological Exam:   Mental Status: Awake, alert, oriented to person, place and time.  Conversant without evidence of an affective disorder, thought disorder, delusions or hallucinations.  Attention span and concentration are normal.  HCF: No aphasia, apraxia or dysarthria.  Recent and remote memory intact.  Knowledge of " recent events intact.  CN:      I:              II: Visual fields full without left inattention              III, IV, VI: Eye movements intact without nystagmus or ptosis.  Pupils equal round and reactive to light.              V,VII: Light touch and pinprick intact all 3 divisions of V.  Facial muscles symmetrical.              VIII: Hearing intact to finger rub              IX,X: Soft palate elevates symmetrically              XI: Sternomastoid and trapezius are strong.              XII: Tongue midline without atrophy or fasciculations  Motor: Normal tone and bulk in the upper and lower extremities              Power testing: Proximal muscles in the upper extremities were strong today, mild weakness of the abductor pollicis brevis, first dorsal interosseous and abductor digiti quinti muscles bilaterally.  In the lower extremities there proximally she is strong today, I detect no weakness of hip girdle muscles.  There is mild weakness of toe extension but good toe flexion.  Reflexes: Upper extremities: +1 diffusely                   Lower extremities: +1 at knees, trace ankles  Sensory: Light touch:                    Pinprick:  Cerebellar: Finger-to-nose: Intact                      Rapid movement: Intact  Gait and Station: Patient comes to stand slowly but without much difficulty.  Her casual walk is slightly broad-based but otherwise looks normal today..  No shuffling or festination.   Able to raise on toes and heels.  No Romberg and no drift  Results:      Lab Results   Component Value Date    GLUCOSE 260 (H) 02/17/2022    BUN 15 02/17/2022    CREATININE 0.63 02/17/2022    EGFRIFNONA 91 02/17/2022    EGFRIFAFRI 95 01/10/2022    BCR 23.8 02/17/2022    CO2 28.3 02/17/2022    CALCIUM 8.8 02/17/2022    PROTENTOTREF 7.0 02/17/2022    ALBUMIN 3.7 02/17/2022    LABIL2 1.2 02/17/2022    AST 28 01/10/2022    ALT 19 01/10/2022       Lab Results   Component Value Date    WBC 7.74 02/17/2022    HGB 13.9 02/17/2022    HCT  42.2 02/17/2022    MCV 94.2 02/17/2022     02/17/2022         .  Lab Results   Component Value Date    RPR Non-Reactive 10/04/2021         Lab Results   Component Value Date    TSH 1.820 01/10/2022    S4IUREJ 7.4 11/05/2020         Lab Results   Component Value Date    MVETGHAJ38 >2,000 (H) 10/04/2021         Lab Results   Component Value Date    FOLATE >20.00 10/04/2021         Lab Results   Component Value Date    HGBA1C 10.2 (H) 01/10/2022         Lab Results   Component Value Date    GLUCOSE 260 (H) 02/17/2022    BUN 15 02/17/2022    CREATININE 0.63 02/17/2022    EGFRIFNONA 91 02/17/2022    EGFRIFAFRI 95 01/10/2022    BCR 23.8 02/17/2022    K 4.2 02/17/2022    CO2 28.3 02/17/2022    CALCIUM 8.8 02/17/2022    PROTENTOTREF 7.0 02/17/2022    ALBUMIN 3.7 02/17/2022    LABIL2 1.2 02/17/2022    AST 28 01/10/2022    ALT 19 01/10/2022         Lab Results   Component Value Date    WBC 7.74 02/17/2022    HGB 13.9 02/17/2022    HCT 42.2 02/17/2022    MCV 94.2 02/17/2022     02/17/2022             Assessment:   1.  Peripheral neuropathy, likely diabetic in etiology but with presence of MGUS; symptomatically she is slightly worse  2.  Uncontrolled type 2 diabetes  3.  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  Patient reports that she is having more bothersome numbness and tingling in her lower extremities especially at bedtime.  She also indicates some restless leg type symptoms - she states symptoms are interfering with sleep.  She does not have a history of anemia but I will go ahead and check iron stores as this is known to potentiate RLS.  Additionally the think it would be reasonable to try escalating her gabapentin from 300 mg to 600 mg nightly.  See if this is helpful.  - Continue to follow-up as indicated with hematology oncology regarding her MGUS    2.  Patient's hemoglobin A1c was last checked on 1/10/2022, it was quite elevated at 10.2.  Apparently prior to that she had  been taken off of metformin secondary to diarrhea, she reports she was subsequently switched from Januvia to Ozempic which is she is still taking.  She indicates that fingerstick blood sugars have still been elevated at home.  We will go ahead and repeat hemoglobin A1c since it has been 3 months.  Call patient with results.  She does have follow-up scheduled next month with PCP.  Not scheduled to see endocrine until June.   - Advise her that good control of glucose is certainly important for slowing the progression of neuropathy and other microvascular complications, so getting hemoglobin A1c down should be a priority    3.  In terms of her reported inclusion body myositis, she actually looks slightly better than when I saw her 6 months ago -proximally in her legs she is stronger than she was.  Again we discussed that she does not fit the usual picture of inclusion body myositis which tends progress to disability over a period of years (patient's history dates back to about 12 years ago).   - Again as symptoms are stable, no further work-up indicated at this time    We will plan on following up again in about 6 months or sooner should need arise    I spent 40 minutes face-to-face with patient today, 20 minutes of that time was counseling patient on disease course and plan of care and answering any questions that they had.           Dictated utilizing Dragon dictation.

## 2022-05-04 RX ORDER — LEVOTHYROXINE SODIUM 0.03 MG/1
TABLET ORAL
Qty: 90 TABLET | Refills: 1 | Status: SHIPPED | OUTPATIENT
Start: 2022-05-04 | End: 2022-11-03

## 2022-05-05 DIAGNOSIS — E11.9 TYPE 2 DIABETES MELLITUS WITHOUT COMPLICATION, WITHOUT LONG-TERM CURRENT USE OF INSULIN: Primary | ICD-10-CM

## 2022-05-05 LAB
IRON SATN MFR SERPL: 34 % SATURATION
IRON SERPL-MCNC: 109 UG/DL
TRANSFERRIN SERPL-MCNC: 229 MG/DL

## 2022-05-05 RX ORDER — SEMAGLUTIDE 1.34 MG/ML
1 INJECTION, SOLUTION SUBCUTANEOUS WEEKLY
Qty: 9 ML | Refills: 1 | Status: SHIPPED | OUTPATIENT
Start: 2022-05-05 | End: 2022-12-11

## 2022-05-06 DIAGNOSIS — I10 BENIGN ESSENTIAL HYPERTENSION: ICD-10-CM

## 2022-05-06 DIAGNOSIS — E55.9 VITAMIN D DEFICIENCY: ICD-10-CM

## 2022-05-06 DIAGNOSIS — E03.9 ACQUIRED HYPOTHYROIDISM: ICD-10-CM

## 2022-05-06 DIAGNOSIS — E78.2 MIXED HYPERLIPIDEMIA: ICD-10-CM

## 2022-05-06 DIAGNOSIS — E11.9 TYPE 2 DIABETES MELLITUS WITHOUT COMPLICATION, WITHOUT LONG-TERM CURRENT USE OF INSULIN: Primary | ICD-10-CM

## 2022-05-09 NOTE — PROGRESS NOTES
ON CALL NOTE: Pt called 5/8 at 8:20am due to c/o feeling dizzy this morning with an elevated glucose of 190 (A1c 9 with recent labs). Her Ozempic dose was increased last week due to elevated glucose. She also c/o bilateral shoulder pain and stiffness. She is advised to report to Urgent Care for evaluation and/or call office Monday morning for further evaluation if sx do not improve. She may take Tylenol for pain and report to ER with chest pain and/or palpitations.

## 2022-05-26 ENCOUNTER — LAB (OUTPATIENT)
Dept: ENDOCRINOLOGY | Age: 82
End: 2022-05-26

## 2022-05-26 DIAGNOSIS — E55.9 VITAMIN D DEFICIENCY: ICD-10-CM

## 2022-05-26 DIAGNOSIS — I10 BENIGN ESSENTIAL HYPERTENSION: ICD-10-CM

## 2022-05-26 DIAGNOSIS — E78.2 MIXED HYPERLIPIDEMIA: ICD-10-CM

## 2022-05-26 DIAGNOSIS — E11.9 TYPE 2 DIABETES MELLITUS WITHOUT COMPLICATION, WITHOUT LONG-TERM CURRENT USE OF INSULIN: ICD-10-CM

## 2022-05-26 DIAGNOSIS — E03.9 ACQUIRED HYPOTHYROIDISM: ICD-10-CM

## 2022-05-27 LAB
25(OH)D3+25(OH)D2 SERPL-MCNC: 58 NG/ML (ref 30–100)
ALBUMIN SERPL-MCNC: 3.9 G/DL (ref 3.7–4.7)
ALBUMIN/GLOB SERPL: 1.6 {RATIO} (ref 1.2–2.2)
ALP SERPL-CCNC: 110 IU/L (ref 44–121)
ALT SERPL-CCNC: 20 IU/L (ref 0–32)
AST SERPL-CCNC: 24 IU/L (ref 0–40)
BILIRUB SERPL-MCNC: 0.6 MG/DL (ref 0–1.2)
BUN SERPL-MCNC: 17 MG/DL (ref 8–27)
BUN/CREAT SERPL: 23 (ref 12–28)
CALCIUM SERPL-MCNC: 8.6 MG/DL (ref 8.7–10.3)
CHLORIDE SERPL-SCNC: 100 MMOL/L (ref 96–106)
CHOLEST SERPL-MCNC: 128 MG/DL (ref 100–199)
CO2 SERPL-SCNC: 23 MMOL/L (ref 20–29)
CREAT SERPL-MCNC: 0.73 MG/DL (ref 0.57–1)
EGFRCR SERPLBLD CKD-EPI 2021: 83 ML/MIN/1.73
GLOBULIN SER CALC-MCNC: 2.4 G/DL (ref 1.5–4.5)
GLUCOSE SERPL-MCNC: 260 MG/DL (ref 65–99)
HBA1C MFR BLD: 9.1 % (ref 4.8–5.6)
HDLC SERPL-MCNC: 51 MG/DL
IMP & REVIEW OF LAB RESULTS: NORMAL
LDLC SERPL CALC-MCNC: 64 MG/DL (ref 0–99)
POTASSIUM SERPL-SCNC: 4.7 MMOL/L (ref 3.5–5.2)
PROT SERPL-MCNC: 6.3 G/DL (ref 6–8.5)
SODIUM SERPL-SCNC: 137 MMOL/L (ref 134–144)
T4 FREE SERPL-MCNC: 1.34 NG/DL (ref 0.82–1.77)
TRIGL SERPL-MCNC: 61 MG/DL (ref 0–149)
TSH SERPL DL<=0.005 MIU/L-ACNC: 1.82 UIU/ML (ref 0.45–4.5)
VLDLC SERPL CALC-MCNC: 13 MG/DL (ref 5–40)

## 2022-06-03 ENCOUNTER — OFFICE VISIT (OUTPATIENT)
Dept: INTERNAL MEDICINE | Facility: CLINIC | Age: 82
End: 2022-06-03

## 2022-06-03 VITALS
SYSTOLIC BLOOD PRESSURE: 122 MMHG | TEMPERATURE: 98 F | WEIGHT: 144 LBS | HEART RATE: 68 BPM | BODY MASS INDEX: 22.6 KG/M2 | OXYGEN SATURATION: 98 % | DIASTOLIC BLOOD PRESSURE: 68 MMHG | HEIGHT: 67 IN

## 2022-06-03 DIAGNOSIS — Z00.00 MEDICARE ANNUAL WELLNESS VISIT, SUBSEQUENT: Primary | ICD-10-CM

## 2022-06-03 DIAGNOSIS — I10 BENIGN ESSENTIAL HYPERTENSION: Chronic | ICD-10-CM

## 2022-06-03 DIAGNOSIS — E11.65 TYPE 2 DIABETES MELLITUS WITH HYPERGLYCEMIA, WITH LONG-TERM CURRENT USE OF INSULIN: Chronic | ICD-10-CM

## 2022-06-03 DIAGNOSIS — E78.2 MIXED HYPERLIPIDEMIA: Chronic | ICD-10-CM

## 2022-06-03 DIAGNOSIS — Z79.4 TYPE 2 DIABETES MELLITUS WITH HYPERGLYCEMIA, WITH LONG-TERM CURRENT USE OF INSULIN: Chronic | ICD-10-CM

## 2022-06-03 PROCEDURE — 99214 OFFICE O/P EST MOD 30 MIN: CPT | Performed by: FAMILY MEDICINE

## 2022-06-03 PROCEDURE — 1125F AMNT PAIN NOTED PAIN PRSNT: CPT | Performed by: FAMILY MEDICINE

## 2022-06-03 PROCEDURE — 1170F FXNL STATUS ASSESSED: CPT | Performed by: FAMILY MEDICINE

## 2022-06-03 PROCEDURE — 1159F MED LIST DOCD IN RCRD: CPT | Performed by: FAMILY MEDICINE

## 2022-06-03 PROCEDURE — G0439 PPPS, SUBSEQ VISIT: HCPCS | Performed by: FAMILY MEDICINE

## 2022-06-03 RX ORDER — AMLODIPINE BESYLATE 2.5 MG/1
2.5 TABLET ORAL DAILY
Qty: 90 TABLET | Refills: 4 | Status: SHIPPED | OUTPATIENT
Start: 2022-06-03

## 2022-06-03 NOTE — PROGRESS NOTES
The ABCs of the Annual Wellness Visit  Subsequent Medicare Wellness Visit    Chief Complaint   Patient presents with   • Medicare Wellness-subsequent   • Constipation   • Dry Mouth      Subjective    History of Present Illness:  Lissa Ortiz is a 81 y.o. female who presents for a Subsequent Medicare Wellness Visit.    The following portions of the patient's history were reviewed and   updated as appropriate: allergies, current medications, past family history, past medical history, past social history, past surgical history and problem list.    Compared to one year ago, the patient feels her physical   health is the same.    Compared to one year ago, the patient feels her mental   health is the same.    Additional code chronic care management:    Hypertension - stable.  Patient taking medication as prescribed.  Denies chest pain, shortness of breath, headache, lower extremity edema.  Patient is taking amlodipine.  May decrease dose of amlodipine due to fatigue, dry mouth, and constipation as BP is well controlled and well below target.    HLD-stable.  Patient taking pravastatin as prescribed.  Trying to adhere to a balanced diet.    Insulin-dependent diabetes mellitus-labs completed recently and still shows poorly controlled diabetes but A1c is decreasing and last was 9.1.  Working with Dr. Pimentel.  She is taking all her medications as prescribed.BS 120s in AM and high in the 200s at bedtime.    Continue taking medications as above.  Reviewed labs from Dr. Pimentel as below.    Recent Hospitalizations:  She was not admitted to the hospital during the last year.       Current Medical Providers:  Patient Care Team:  Adin Bragg MD as PCP - General (Family Medicine)  Kyle Chow MD as Consulting Physician (Rheumatology)  Tony Luciano MD as Referring Physician (Neurology)    Outpatient Medications Prior to Visit   Medication Sig Dispense Refill   • azelastine (ASTELIN) 0.1 % nasal spray 2 sprays into the nostril(s)  as directed by provider 2 (Two) Times a Day. Use in each nostril as directed 2 each 5   • Blood Glucose Monitoring Suppl (TRUE METRIX AIR GLUCOSE METER) device 1 Device Daily As Needed (check bs's twice daily). 1 Device 0   • Calcium Citrate (CITRACAL PO) Take  by mouth. 2 TABS DAILY     • Cinnamon 500 MG capsule Take 500 mg by mouth Daily.     • dorzolamide-timolol (COSOPT) 22.3-6.8 MG/ML ophthalmic solution Apply  to eye(s) as directed by provider Every 12 (Twelve) Hours.     • folic acid (FOLVITE) 1 MG tablet Take 1 tablet by mouth daily.     • gabapentin (NEURONTIN) 300 MG capsule Take 2 capsules by mouth every night at bedtime for 90 days. 180 capsule 1   • [employer] Pen Needles 32G X 4 MM misc USE ONCE DAILY 100 each 2   • levothyroxine (SYNTHROID, LEVOTHROID) 25 MCG tablet TAKE ONE TABLET BY MOUTH DAILY 90 tablet 1   • METHOTREXATE SODIUM PO Take 50 mg by mouth 1 (One) Time Per Week. 50MG/ 7 ML   ONE INJECTION WEEKLY USED FOR THE TREATMENT OF RA     • Multiple Vitamin (MULTI VITAMIN PO) Take 1 tablet by mouth Daily.     • pioglitazone (Actos) 15 MG tablet Take 1 tablet by mouth Daily. 30 tablet 5   • pravastatin (PRAVACHOL) 20 MG tablet TAKE ONE TABLET BY MOUTH DAILY 90 tablet 3   • Probiotic capsule Take 1 capsule by mouth.     • Semaglutide, 1 MG/DOSE, (Ozempic, 1 MG/DOSE,) 4 MG/3ML solution pen-injector Inject 1 mg under the skin into the appropriate area as directed 1 (One) Time Per Week. 9 mL 1   • solifenacin (VESICARE) 10 MG tablet      • Tafluprost, PF, (ZIOPTAN) 0.0015 % solution ophthalmic solution 1 drop Every Night.     • Turmeric 500 MG capsule Take  by mouth.     • amLODIPine (NORVASC) 5 MG tablet TAKE ONE TABLET BY MOUTH DAILY 90 tablet 3   • Insulin Glargine, 2 Unit Dial, (Toujeo Max SoloStar) 300 UNIT/ML solution pen-injector injection Inject 12 Units under the skin into the appropriate area as directed Daily for 90 days. 5 pen 6   • amoxicillin (AMOXIL) 875 MG tablet Take 1 tablet by mouth 2  "(Two) Times a Day. 14 tablet 0     No facility-administered medications prior to visit.       No opioid medication identified on active medication list. I have reviewed chart for other potential  high risk medication/s and harmful drug interactions in the elderly.          Aspirin is not on active medication list.  Aspirin use is not indicated based on review of current medical condition/s. Risk of harm outweighs potential benefits.  .    Patient Active Problem List   Diagnosis   • Benign essential hypertension   • Mixed anxiety depressive disorder   • Type 2 diabetes mellitus with hyperglycemia, with long-term current use of insulin (HCC)   • Glaucoma   • Hyperlipidemia   • Insomnia   • Restless legs syndrome   • Rheumatoid arthritis (HCC)   • Vitamin D deficiency   • Routine health maintenance   • Midline low back pain without sciatica   • Encounter for immunization   • Neck pain on right side   • Microalbuminuria   • Encounter for screening colonoscopy   • Tubular adenoma of colon   • Diabetic peripheral neuropathy (HCC)   • Acquired hypothyroidism   • Inclusion body myositis   • Peripheral venous insufficiency   • Encounter for gynecological examination   • Health care maintenance     Advance Care Planning  Advance Directive is not on file.  ACP discussion was held with the patient during this visit. Patient has an advance directive (not in EMR), copy requested.          Objective    Vitals:    06/03/22 1130   BP: 122/68   BP Location: Left arm   Patient Position: Sitting   Cuff Size: Adult   Pulse: 68   Temp: 98 °F (36.7 °C)   SpO2: 98%   Weight: 65.3 kg (144 lb)   Height: 170.2 cm (67\")   PainSc:   2     Body mass index is 22.55 kg/m².  BMI is within normal parameters. No other follow-up for BMI required.    Does the patient have evidence of cognitive impairment? No    Physical Exam  Vitals and nursing note reviewed.   Constitutional:       General: She is not in acute distress.     Appearance: Normal " appearance.   Cardiovascular:      Rate and Rhythm: Normal rate and regular rhythm.      Heart sounds: Normal heart sounds. No murmur heard.  Pulmonary:      Effort: Pulmonary effort is normal.      Breath sounds: Normal breath sounds.   Neurological:      Mental Status: She is alert.       Lab Results   Component Value Date    CHLPL 128 05/26/2022    TRIG 61 05/26/2022    HDL 51 05/26/2022    LDL 64 05/26/2022    VLDL 13 05/26/2022    HGBA1C 9.1 (H) 05/26/2022    HGBA1C 9.50 (H) 04/29/2022            HEALTH RISK ASSESSMENT    Smoking Status:  Social History     Tobacco Use   Smoking Status Never Smoker   Smokeless Tobacco Never Used     Alcohol Consumption:  Social History     Substance and Sexual Activity   Alcohol Use No    Comment: Occasional glass of wine one per month     Fall Risk Screen:    ANTELMO Fall Risk Assessment was completed, and patient is at MODERATE risk for falls. Assessment completed on:6/3/2022    Depression Screening:  PHQ-2/PHQ-9 Depression Screening 6/3/2022   Retired PHQ-9 Total Score -   Retired Total Score -   Little Interest or Pleasure in Doing Things 0-->not at all   Feeling Down, Depressed or Hopeless 0-->not at all   PHQ-9: Brief Depression Severity Measure Score 0       Health Habits and Functional and Cognitive Screening:  Functional & Cognitive Status 6/3/2022   Do you have difficulty preparing food and eating? No   Do you have difficulty bathing yourself, getting dressed or grooming yourself? No   Do you have difficulty using the toilet? No   Do you have difficulty moving around from place to place? No   Do you have trouble with steps or getting out of a bed or a chair? No   Current Diet Other   Dental Exam Up to date   Eye Exam Up to date   Exercise (times per week) 8 times per week   Current Exercises Include Walking   Current Exercise Activities Include -   Do you need help using the phone?  No   Are you deaf or do you have serious difficulty hearing?  No   Do you need help  with transportation? No   Do you need help shopping? No   Do you need help preparing meals?  No   Do you need help with housework?  No   Do you need help with laundry? No   Do you need help taking your medications? No   Do you need help managing money? No   Do you ever drive or ride in a car without wearing a seat belt? No   Have you felt unusual stress, anger or loneliness in the last month? No   Who do you live with? Alone   If you need help, do you have trouble finding someone available to you? No   Have you been bothered in the last four weeks by sexual problems? No   Do you have difficulty concentrating, remembering or making decisions? No       Age-appropriate Screening Schedule:  Refer to the list below for future screening recommendations based on patient's age, sex and/or medical conditions. Orders for these recommended tests are listed in the plan section. The patient has been provided with a written plan.    Health Maintenance   Topic Date Due   • INFLUENZA VACCINE  08/01/2022   • DIABETIC EYE EXAM  09/13/2022   • HEMOGLOBIN A1C  11/26/2022   • URINE MICROALBUMIN  01/10/2023   • LIPID PANEL  05/26/2023   • DXA SCAN  05/30/2023   • MAMMOGRAM  07/14/2023   • TDAP/TD VACCINES (3 - Tdap) 03/13/2028   • ZOSTER VACCINE  Addressed   • PAP SMEAR  Discontinued              Common labs    Common Labsle 2/17/22 2/17/22 2/17/22 4/29/22 5/26/22 5/26/22 5/26/22    1112 1112 1112  1006 1006 1006   Glucose  260 (A)    260 (A)    BUN  15    17    Creatinine  0.63    0.73    eGFR Non African Am  91        Sodium  138    137    Potassium  4.2    4.7    Chloride  102    100    Calcium  8.8    8.6 (A)    Total Protein   7.0   6.3    Albumin   3.7   3.9    Total Bilirubin      0.6    Alkaline Phosphatase      110    AST (SGOT)      24    ALT (SGPT)      20    WBC 7.74         Hemoglobin 13.9         Hematocrit 42.2         Platelets 294         Total Cholesterol     128     Triglycerides     61     HDL Cholesterol     51      LDL Cholesterol      64     Hemoglobin A1C    9.50 (A)   9.1 (A)   (A) Abnormal value       Comments are available for some flowsheets but are not being displayed.               Assessment & Plan   CMS Preventative Services Quick Reference  Risk Factors Identified During Encounter  None Identified  The above risks/problems have been discussed with the patient.  Follow up actions/plans if indicated are seen below in the Assessment/Plan Section.  Pertinent information has been shared with the patient in the After Visit Summary.    Diagnoses and all orders for this visit:    1. Medicare annual wellness visit, subsequent (Primary)    2. Benign essential hypertension  -     amLODIPine (NORVASC) 2.5 MG tablet; Take 1 tablet by mouth Daily.  Dispense: 90 tablet; Refill: 4    3. Mixed hyperlipidemia    4. Type 2 diabetes mellitus with hyperglycemia, with long-term current use of insulin (HCC)          Follow Up:   Return in about 6 months (around 12/3/2022) for Next scheduled follow up.     An After Visit Summary and PPPS were made available to the patient.

## 2022-06-09 ENCOUNTER — OFFICE VISIT (OUTPATIENT)
Dept: ENDOCRINOLOGY | Age: 82
End: 2022-06-09

## 2022-06-09 VITALS
HEART RATE: 70 BPM | DIASTOLIC BLOOD PRESSURE: 72 MMHG | OXYGEN SATURATION: 98 % | TEMPERATURE: 96.8 F | BODY MASS INDEX: 22.26 KG/M2 | WEIGHT: 141.8 LBS | SYSTOLIC BLOOD PRESSURE: 122 MMHG | HEIGHT: 67 IN

## 2022-06-09 DIAGNOSIS — E78.2 MIXED HYPERLIPIDEMIA: Chronic | ICD-10-CM

## 2022-06-09 DIAGNOSIS — E11.42 DIABETIC PERIPHERAL NEUROPATHY: ICD-10-CM

## 2022-06-09 DIAGNOSIS — E03.9 ACQUIRED HYPOTHYROIDISM: Chronic | ICD-10-CM

## 2022-06-09 DIAGNOSIS — E55.9 VITAMIN D DEFICIENCY: ICD-10-CM

## 2022-06-09 DIAGNOSIS — R80.9 MICROALBUMINURIA: ICD-10-CM

## 2022-06-09 DIAGNOSIS — E11.65 POORLY CONTROLLED TYPE 2 DIABETES MELLITUS: Primary | ICD-10-CM

## 2022-06-09 PROCEDURE — 99214 OFFICE O/P EST MOD 30 MIN: CPT | Performed by: INTERNAL MEDICINE

## 2022-06-09 RX ORDER — GLIPIZIDE 2.5 MG/1
2.5 TABLET, EXTENDED RELEASE ORAL DAILY
Qty: 30 TABLET | Refills: 5 | Status: SHIPPED | OUTPATIENT
Start: 2022-06-09 | End: 2022-12-01

## 2022-06-09 NOTE — PROGRESS NOTES
Subjective   Lissa Ortiz is a 81 y.o. female.     History of Present Illness     Patient is a 81-year-old female who came in for follow-up.       She has known diabetes mellitus since 1981.  AILYN antibody was negative in May 2021.  She is on Ozempic 1 mg weekly, Actos 15 mg  per day and Toujeo 10 units every morning.    Janumet was discontinued because of diarrhea.    Jardiance was too expensive.  She checks her blood sugar once a day.  Fasting glucose .  Bedtime BS > 200.  She denies hypoglycemia.  She has lost 10 pounds since January 2022.  Hemoglobin A1c done in May 2022 is 9.1%.     She denies eye complaints.  Her last eye examination was in May 2022.  She has glaucoma but no retinopathy.  She has restless leg at night and her feet feels tight and numb  which is improved by gabapentin 300 mg  at bedtime prescribed by neurologist RONALD Paz.  Urine microalbumin is elevated in January 2022.     She has hyperlipidemia and is on pravastatin 20 mg/day.  She denies myalgia.  Lipid panel done in May 2022 are as follows: Cholesterol 128.  HDL 51.  LDL 64.  Triglycerides 61.     She has hypothyroidism and is on levothyroxine 25 mcg/day.  She has no history of head or neck radiation therapy.  She has no history of thyroid surgery.  TSH done in May 2022 is normal at 1.82 with a normal free T4 at 1.34 ng per DL.     She has hypertension and is on amlodipine 2.5 mg once a day.  She denies history of heart attack or stroke.  She denies chest pain or shortness of breath.     She has rheumatoid arthritis and is on methotrexate and folic acid prescribed by Dr. Chow.  She is not on oral steroids.     Bone density done at her gynecologist in May 2018 was normal with 4.4% decrease on the left hip.  She was advised follow-up bone density in 2023.  Her mother had hip fracture.  She is being followed by her gynecologist Dr. Cecilia Arnold.    She has monoclonal gammopathy of unknown significance and is being followed by  "Dr. Thompson.       The following portions of the patient's history were reviewed and updated as appropriate: allergies, current medications, past family history, past medical history, past social history, past surgical history and problem list.    Review of Systems  Vitals:    06/09/22 1459   BP: 122/72   Pulse: 70   Temp: 96.8 °F (36 °C)   SpO2: 98%   Weight: 64.3 kg (141 lb 12.8 oz)   Height: 170.2 cm (67.01\")      Objective   Physical Exam  Constitutional:       General: She is not in acute distress.     Appearance: Normal appearance. She is not ill-appearing, toxic-appearing or diaphoretic.   Eyes:      General: No scleral icterus.        Right eye: No discharge.         Left eye: No discharge.      Extraocular Movements: Extraocular movements intact.      Conjunctiva/sclera: Conjunctivae normal.   Neck:      Vascular: No carotid bruit.   Cardiovascular:      Rate and Rhythm: Normal rate and regular rhythm.      Pulses: Normal pulses.      Heart sounds: Normal heart sounds. No murmur heard.    No friction rub.   Pulmonary:      Effort: Pulmonary effort is normal. No respiratory distress.      Breath sounds: Normal breath sounds. No stridor. No rales.   Chest:      Chest wall: No tenderness.   Abdominal:      General: Bowel sounds are normal.      Palpations: Abdomen is soft.      Tenderness: There is no right CVA tenderness or left CVA tenderness.   Musculoskeletal:      Right lower leg: No edema.      Left lower leg: No edema.   Lymphadenopathy:      Cervical: No cervical adenopathy.   Neurological:      Mental Status: She is alert and oriented to person, place, and time.      Comments: Intact light touch in lower extremities.       Lab on 05/26/2022   Component Date Value Ref Range Status   • 25 Hydroxy, Vitamin D 05/26/2022 58.0  30.0 - 100.0 ng/mL Final    Comment: Vitamin D deficiency has been defined by the [place] of  [employer] and an Endocrine Society practice guideline as a  level of serum 25-OH vitamin " D less than 20 ng/mL (1,2).  The Endocrine Society went on to further define vitamin D  insufficiency as a level between 21 and 29 ng/mL (2).  1. IOM ([place] [employer]). 2010. Dietary reference     intakes for calcium and D. [place]: The     National Academies Press.  2. Kimani MF, Evin VINCENT, Mckayla CARBAJAL, et al.     Evaluation, treatment, and prevention of vitamin D     deficiency: an Endocrine Society clinical practice     guideline. JCEM. 2011 Jul; 96(7):1911-30.     • Free T4 05/26/2022 1.34  0.82 - 1.77 ng/dL Final   • TSH 05/26/2022 1.820  0.450 - 4.500 uIU/mL Final   • Total Cholesterol 05/26/2022 128  100 - 199 mg/dL Final   • Triglycerides 05/26/2022 61  0 - 149 mg/dL Final   • HDL Cholesterol 05/26/2022 51  >39 mg/dL Final   • VLDL Cholesterol Akbar 05/26/2022 13  5 - 40 mg/dL Final   • LDL Chol Calc (NIH) 05/26/2022 64  0 - 99 mg/dL Final   • Glucose 05/26/2022 260 (A) 65 - 99 mg/dL Final   • BUN 05/26/2022 17  8 - 27 mg/dL Final   • Creatinine 05/26/2022 0.73  0.57 - 1.00 mg/dL Final   • EGFR Result 05/26/2022 83  >59 mL/min/1.73 Final   • BUN/Creatinine Ratio 05/26/2022 23  12 - 28 Final   • Sodium 05/26/2022 137  134 - 144 mmol/L Final   • Potassium 05/26/2022 4.7  3.5 - 5.2 mmol/L Final   • Chloride 05/26/2022 100  96 - 106 mmol/L Final   • Total CO2 05/26/2022 23  20 - 29 mmol/L Final   • Calcium 05/26/2022 8.6 (A) 8.7 - 10.3 mg/dL Final   • Total Protein 05/26/2022 6.3  6.0 - 8.5 g/dL Final   • Albumin 05/26/2022 3.9  3.7 - 4.7 g/dL Final   • Globulin 05/26/2022 2.4  1.5 - 4.5 g/dL Final   • A/G Ratio 05/26/2022 1.6  1.2 - 2.2 Final   • Total Bilirubin 05/26/2022 0.6  0.0 - 1.2 mg/dL Final   • Alkaline Phosphatase 05/26/2022 110  44 - 121 IU/L Final   • AST (SGOT) 05/26/2022 24  0 - 40 IU/L Final   • ALT (SGPT) 05/26/2022 20  0 - 32 IU/L Final   • Hemoglobin A1C 05/26/2022 9.1 (A) 4.8 - 5.6 % Final    Comment:          Prediabetes: 5.7 - 6.4           Diabetes: >6.4            Glycemic control for adults with diabetes: <7.0     • Interpretation 05/26/2022 Note   Final    Supplemental report is available.     Assessment & Plan   Diagnoses and all orders for this visit:    1. Poorly controlled type 2 diabetes mellitus (HCC) (Primary)  -     Microalbumin / Creatinine Urine Ratio - Urine, Clean Catch    2. Mixed hyperlipidemia    3. Acquired hypothyroidism    4. Vitamin D deficiency    5. Microalbuminuria    6. Diabetic peripheral neuropathy (HCC)    Other orders  -     glipizide (GLUCOTROL XL) 2.5 MG 24 hr tablet; Take 1 tablet by mouth Daily.  Dispense: 30 tablet; Refill: 5      Start glipizide XL 2.5 mg every morning.  Continue Ozempic 1 mg daily, Toujeo 10 mg every morning, and Actos 15 mg/day.  Continue pravastatin 20 mg/day.  Continue levothyroxine 25 mcg/day.  Continue amlodipine 2.5 mg/day.  Recheck urine microalbumin.  Consider switching to ACE inhibitor.    Copy my note sent to Dr. Bragg.    RTC 3 mos with KYLEE Guido NP.

## 2022-06-10 LAB
ALBUMIN/CREAT UR: 18 MG/G CREAT (ref 0–29)
CREAT UR-MCNC: 136.3 MG/DL
MICROALBUMIN UR-MCNC: 24.9 UG/ML

## 2022-07-07 ENCOUNTER — LAB (OUTPATIENT)
Dept: LAB | Facility: HOSPITAL | Age: 82
End: 2022-07-07

## 2022-07-07 DIAGNOSIS — D47.2 MONOCLONAL GAMMOPATHY: ICD-10-CM

## 2022-07-07 LAB
ALBUMIN SERPL-MCNC: 3.9 G/DL (ref 3.5–5.2)
ALBUMIN/GLOB SERPL: 1.4 G/DL (ref 1.1–2.4)
ALP SERPL-CCNC: 88 U/L (ref 38–116)
ALT SERPL W P-5'-P-CCNC: 13 U/L (ref 0–33)
ANION GAP SERPL CALCULATED.3IONS-SCNC: 9.4 MMOL/L (ref 5–15)
AST SERPL-CCNC: 23 U/L (ref 0–32)
BASOPHILS # BLD AUTO: 0.06 10*3/MM3 (ref 0–0.2)
BASOPHILS NFR BLD AUTO: 1.3 % (ref 0–1.5)
BILIRUB SERPL-MCNC: 0.4 MG/DL (ref 0.2–1.2)
BUN SERPL-MCNC: 14 MG/DL (ref 6–20)
BUN/CREAT SERPL: 20.6 (ref 7.3–30)
CALCIUM SPEC-SCNC: 8.8 MG/DL (ref 8.5–10.2)
CHLORIDE SERPL-SCNC: 106 MMOL/L (ref 98–107)
CO2 SERPL-SCNC: 24.6 MMOL/L (ref 22–29)
CREAT SERPL-MCNC: 0.68 MG/DL (ref 0.6–1.1)
DEPRECATED RDW RBC AUTO: 52.4 FL (ref 37–54)
EGFRCR SERPLBLD CKD-EPI 2021: 88.2 ML/MIN/1.73
EOSINOPHIL # BLD AUTO: 0.33 10*3/MM3 (ref 0–0.4)
EOSINOPHIL NFR BLD AUTO: 7.2 % (ref 0.3–6.2)
ERYTHROCYTE [DISTWIDTH] IN BLOOD BY AUTOMATED COUNT: 14.4 % (ref 12.3–15.4)
GLOBULIN UR ELPH-MCNC: 2.8 GM/DL (ref 1.8–3.5)
GLUCOSE SERPL-MCNC: 162 MG/DL (ref 74–124)
HCT VFR BLD AUTO: 39.7 % (ref 34–46.6)
HGB BLD-MCNC: 12.6 G/DL (ref 12–15.9)
IMM GRANULOCYTES # BLD AUTO: 0.01 10*3/MM3 (ref 0–0.05)
IMM GRANULOCYTES NFR BLD AUTO: 0.2 % (ref 0–0.5)
LYMPHOCYTES # BLD AUTO: 1.13 10*3/MM3 (ref 0.7–3.1)
LYMPHOCYTES NFR BLD AUTO: 24.6 % (ref 19.6–45.3)
MCH RBC QN AUTO: 31.4 PG (ref 26.6–33)
MCHC RBC AUTO-ENTMCNC: 31.7 G/DL (ref 31.5–35.7)
MCV RBC AUTO: 99 FL (ref 79–97)
MONOCYTES # BLD AUTO: 0.54 10*3/MM3 (ref 0.1–0.9)
MONOCYTES NFR BLD AUTO: 11.8 % (ref 5–12)
NEUTROPHILS NFR BLD AUTO: 2.52 10*3/MM3 (ref 1.7–7)
NEUTROPHILS NFR BLD AUTO: 54.9 % (ref 42.7–76)
NRBC BLD AUTO-RTO: 0 /100 WBC (ref 0–0.2)
PLATELET # BLD AUTO: 225 10*3/MM3 (ref 140–450)
PMV BLD AUTO: 10 FL (ref 6–12)
POTASSIUM SERPL-SCNC: 4.2 MMOL/L (ref 3.5–4.7)
PROT SERPL-MCNC: 6.7 G/DL (ref 6.3–8)
RBC # BLD AUTO: 4.01 10*6/MM3 (ref 3.77–5.28)
SODIUM SERPL-SCNC: 140 MMOL/L (ref 134–145)
WBC NRBC COR # BLD: 4.59 10*3/MM3 (ref 3.4–10.8)

## 2022-07-07 PROCEDURE — 36415 COLL VENOUS BLD VENIPUNCTURE: CPT

## 2022-07-07 PROCEDURE — 85025 COMPLETE CBC W/AUTO DIFF WBC: CPT

## 2022-07-07 PROCEDURE — 80053 COMPREHEN METABOLIC PANEL: CPT

## 2022-07-11 LAB
ALBUMIN SERPL ELPH-MCNC: 3.7 G/DL (ref 2.9–4.4)
ALBUMIN/GLOB SERPL: 1.3 {RATIO} (ref 0.7–1.7)
ALPHA1 GLOB SERPL ELPH-MCNC: 0.2 G/DL (ref 0–0.4)
ALPHA2 GLOB SERPL ELPH-MCNC: 0.6 G/DL (ref 0.4–1)
B-GLOBULIN SERPL ELPH-MCNC: 1 G/DL (ref 0.7–1.3)
GAMMA GLOB SERPL ELPH-MCNC: 1.1 G/DL (ref 0.4–1.8)
GLOBULIN SER-MCNC: 2.9 G/DL (ref 2.2–3.9)
IGA SERPL-MCNC: 385 MG/DL (ref 64–422)
IGG SERPL-MCNC: 1106 MG/DL (ref 586–1602)
IGM SERPL-MCNC: 59 MG/DL (ref 26–217)
INTERPRETATION SERPL IEP-IMP: ABNORMAL
KAPPA LC FREE SER-MCNC: 31.8 MG/L (ref 3.3–19.4)
KAPPA LC FREE/LAMBDA FREE SER: 1.47 {RATIO} (ref 0.26–1.65)
LABORATORY COMMENT REPORT: ABNORMAL
LAMBDA LC FREE SERPL-MCNC: 21.6 MG/L (ref 5.7–26.3)
M PROTEIN SERPL ELPH-MCNC: ABNORMAL G/DL
PROT SERPL-MCNC: 6.6 G/DL (ref 6–8.5)

## 2022-07-13 RX ORDER — PIOGLITAZONEHYDROCHLORIDE 15 MG/1
15 TABLET ORAL DAILY
Qty: 30 TABLET | Refills: 5 | Status: SHIPPED | OUTPATIENT
Start: 2022-07-13 | End: 2023-01-16 | Stop reason: SDUPTHER

## 2022-07-14 ENCOUNTER — APPOINTMENT (OUTPATIENT)
Dept: LAB | Facility: HOSPITAL | Age: 82
End: 2022-07-14

## 2022-07-14 ENCOUNTER — OFFICE VISIT (OUTPATIENT)
Dept: ONCOLOGY | Facility: CLINIC | Age: 82
End: 2022-07-14

## 2022-07-14 VITALS
BODY MASS INDEX: 22.24 KG/M2 | DIASTOLIC BLOOD PRESSURE: 69 MMHG | HEART RATE: 78 BPM | HEIGHT: 67 IN | RESPIRATION RATE: 16 BRPM | OXYGEN SATURATION: 96 % | SYSTOLIC BLOOD PRESSURE: 114 MMHG | WEIGHT: 141.7 LBS | TEMPERATURE: 98.9 F

## 2022-07-14 DIAGNOSIS — D47.2 MONOCLONAL GAMMOPATHY: Primary | ICD-10-CM

## 2022-07-14 PROCEDURE — 99214 OFFICE O/P EST MOD 30 MIN: CPT | Performed by: INTERNAL MEDICINE

## 2022-07-14 RX ORDER — CHLORHEXIDINE GLUCONATE 0.12 MG/ML
RINSE ORAL
COMMUNITY
Start: 2022-06-23

## 2022-07-14 NOTE — PROGRESS NOTES
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 provider found  RECORDS OBTAINED:  Records of the patients history including those from the electronic medical record were reviewed and summarized in detail.    HISTORY OF PRESENT ILLNESS:    The patient is a 81 y.o. year old female with past medical history significant for DM2 (over 40 years), inclusion body myositis (diagnosed 10 yrs ago, on methotrexate), hypothyroidism, & peripheral neuropathy who had presented to her neurologist,  with signs/symptoms of bilateral feet neuropathy. During this work up, SPEP/VIGNESH from October 2021 showed presence of IgA kappa M-protein, 0.2 gm/dl.   Patient c/o b/l feet neuropathy, along with generalized weakness, which she attributes to inclusion body myositis.   Denies any specific bone pain. On labs, no cytopenias. Renal function & calcium levels within normal limits.   No fever, chills, chest pain, cough or weight loss. No palpable lymphadenopathy. Has generalized weakness secondary to inclusion body myositis. Denies smoking, alcohol or drug abuse.   No family history of blood or bone marrow disorder.     February 2022: SPEP/VIGNESH shows IgA kappa M protein, 0.3 mg/dl.  Normal FLC ratio  July 2022: SPEP/VIGNESH does not show any M protein.  Normal FLC ratio.  CBC and CMP unremarkable.    INTERIM HISTORY:  Patient returns to the clinic for a f/u visit. Denies any new complaints. She continues to have mild peripheral neuropathy symptoms in her feet. No new pain. Energy level stable.     Past Medical History:   Diagnosis Date   • Allergic rhinitis    • Arthritis    • Cancer (HCC)     skin leg   • Cataract    • Diabetes mellitus (HCC)     type 2   • Hypertension    • Long sleeper     post anesthesia     Past Surgical History:   Procedure Laterality Date   • COLONOSCOPY N/A  2/13/2018    Procedure: COLONOSCOPY to cecum and TI with hot snare polypectomy;  Surgeon: Rowan Smith MD;  Location: [facility] ENDOSCOPY;  Service:    • EYE SURGERY      tearduct sgy   • EYE SURGERY      MARY KAY CATARACTS   • HYSTERECTOMY     • TONSILLECTOMY         MEDICATIONS    Current Outpatient Medications:   •  amLODIPine (NORVASC) 2.5 MG tablet, Take 1 tablet by mouth Daily., Disp: 90 tablet, Rfl: 4  •  azelastine (ASTELIN) 0.1 % nasal spray, 2 sprays into the nostril(s) as directed by provider 2 (Two) Times a Day. Use in each nostril as directed, Disp: 2 each, Rfl: 5  •  Blood Glucose Monitoring Suppl (TRUE METRIX AIR GLUCOSE METER) device, 1 Device Daily As Needed (check bs's twice daily)., Disp: 1 Device, Rfl: 0  •  Calcium Citrate (CITRACAL PO), Take  by mouth. 2 TABS DAILY, Disp: , Rfl:   •  chlorhexidine (PERIDEX) 0.12 % solution, , Disp: , Rfl:   •  Cinnamon 500 MG capsule, Take 500 mg by mouth Daily., Disp: , Rfl:   •  dorzolamide-timolol (COSOPT) 22.3-6.8 MG/ML ophthalmic solution, Apply  to eye(s) as directed by provider Every 12 (Twelve) Hours., Disp: , Rfl:   •  folic acid (FOLVITE) 1 MG tablet, Take 1 tablet by mouth daily., Disp: , Rfl:   •  gabapentin (NEURONTIN) 300 MG capsule, Take 2 capsules by mouth every night at bedtime for 90 days., Disp: 180 capsule, Rfl: 1  •  glipizide (GLUCOTROL XL) 2.5 MG 24 hr tablet, Take 1 tablet by mouth Daily., Disp: 30 tablet, Rfl: 5  •  [employer] Pen Needles 32G X 4 MM misc, USE ONCE DAILY, Disp: 100 each, Rfl: 2  •  levothyroxine (SYNTHROID, LEVOTHROID) 25 MCG tablet, TAKE ONE TABLET BY MOUTH DAILY, Disp: 90 tablet, Rfl: 1  •  METHOTREXATE SODIUM PO, Take 50 mg by mouth 1 (One) Time Per Week. 50MG/ 7 ML  ONE INJECTION WEEKLY USED FOR THE TREATMENT OF RA, Disp: , Rfl:   •  Multiple Vitamin (MULTI VITAMIN PO), Take 1 tablet by mouth Daily., Disp: , Rfl:   •  pioglitazone (Actos) 15 MG tablet, Take 1 tablet by mouth Daily., Disp: 30 tablet, Rfl: 5  •  pravastatin  (PRAVACHOL) 20 MG tablet, TAKE ONE TABLET BY MOUTH DAILY, Disp: 90 tablet, Rfl: 3  •  Probiotic capsule, Take 1 capsule by mouth., Disp: , Rfl:   •  Semaglutide, 1 MG/DOSE, (Ozempic, 1 MG/DOSE,) 4 MG/3ML solution pen-injector, Inject 1 mg under the skin into the appropriate area as directed 1 (One) Time Per Week., Disp: 9 mL, Rfl: 1  •  solifenacin (VESICARE) 10 MG tablet, , Disp: , Rfl:   •  Tafluprost, PF, (ZIOPTAN) 0.0015 % solution ophthalmic solution, 1 drop Every Night., Disp: , Rfl:   •  Turmeric 500 MG capsule, Take  by mouth., Disp: , Rfl:   •  Insulin Glargine, 2 Unit Dial, (Toujeo Max SoloStar) 300 UNIT/ML solution pen-injector injection, Inject 12 Units under the skin into the appropriate area as directed Daily for 90 days., Disp: 5 pen, Rfl: 6    ALLERGIES:     Allergies   Allergen Reactions   • Bactrim [Sulfamethoxazole-Trimethoprim] Nausea And Vomiting   • Sulfamethoxazole Nausea Only   • Trimethoprim Nausea Only       SOCIAL HISTORY:       Social History     Socioeconomic History   • Marital status:    • Number of children: 3   Tobacco Use   • Smoking status: Never Smoker   • Smokeless tobacco: Never Used   Vaping Use   • Vaping Use: Never used   Substance and Sexual Activity   • Alcohol use: No     Comment: Occasional glass of wine one per month   • Drug use: No   • Sexual activity: Defer         FAMILY HISTORY:  Family History   Problem Relation Age of Onset   • Diabetes Mother    • Stroke Mother    • Coronary artery disease Father         MI   • Diabetes Father    • Stroke Father    • Thyroid cancer Daughter        REVIEW OF SYSTEMS:  Constitutional: [No fevers, chills, sweats]  Eye: [No recent visual problems]  ENMT: [No ear pain, nasal congestion, sore throat]  Respiratory: [No shortness of breath, cough]  Cardiovascular: [No Chest pain, palpitations, syncope]  Gastrointestinal: [No nausea, vomiting, diarrhea]  Genitourinary: [No hematuria]  Hema/Lymph: [Negative for bruising tendency,  "swollen lymph glands]  Endocrine: [Negative for excessive thirst, excessive hunger]  Musculoskeletal: [Denies any musculoskeletal pain or swelling]  Integumentary: [No rash, pruritus, abrasions]  Neurologic: [ No weakness or numbness, Alert & oriented X 4]       Vitals:    07/14/22 1103   BP: 114/69   Pulse: 78   Resp: 16   Temp: 98.9 °F (37.2 °C)   TempSrc: Temporal   SpO2: 96%   Weight: 64.3 kg (141 lb 11.2 oz)   Height: 170 cm (66.93\")   PainSc: 0-No pain     Current Status 7/14/2022   ECOG score 0      PHYSICAL EXAM:    CONSTITUTIONAL:  Vital signs reviewed.  No distress, looks comfortable.  EYES:  Conjunctiva and lids unremarkable.  Elderly female in no distress.  EARS,NOSE,MOUTH,THROAT:  Ears and nose appear unremarkable.  RESPIRATORY:  Normal respiratory effort.  Lungs clear to auscultation bilaterally.  CARDIOVASCULAR:  Normal S1, S2.  No murmurs rubs or gallops.  No significant lower extremity edema.  GASTROINTESTINAL: Abdomen appears unremarkable.  Nondistended  LYMPHATIC:  No cervical, supraclavicular lymphadenopathy.  NEURO: AAO x 3,  No focal deficits.  Appears to have equal strength all 4 extremities.  MUSCULOSKELETAL:  Unremarkable digits/nails.  No cyanosis or clubbing.  No apparent joint deformities.  SKIN:  Warm.  No rashes.  PSYCHIATRIC:  Normal judgment and insight.  Normal mood and affect.     RECENT LABS:        No visits with results within 7 Day(s) from this visit.   Latest known visit with results is:   Lab on 07/07/2022   Component Date Value Ref Range Status   • Glucose 07/07/2022 162 (A) 74 - 124 mg/dL Final   • BUN 07/07/2022 14  6 - 20 mg/dL Final   • Creatinine 07/07/2022 0.68  0.60 - 1.10 mg/dL Final   • Sodium 07/07/2022 140  134 - 145 mmol/L Final   • Potassium 07/07/2022 4.2  3.5 - 4.7 mmol/L Final   • Chloride 07/07/2022 106  98 - 107 mmol/L Final   • CO2 07/07/2022 24.6  22.0 - 29.0 mmol/L Final   • Calcium 07/07/2022 8.8  8.5 - 10.2 mg/dL Final   • Total Protein 07/07/2022 6.7  " 6.3 - 8.0 g/dL Final   • Albumin 07/07/2022 3.90  3.50 - 5.20 g/dL Final   • ALT (SGPT) 07/07/2022 13  0 - 33 U/L Final   • AST (SGOT) 07/07/2022 23  0 - 32 U/L Final   • Alkaline Phosphatase 07/07/2022 88  38 - 116 U/L Final   • Total Bilirubin 07/07/2022 0.4  0.2 - 1.2 mg/dL Final   • Globulin 07/07/2022 2.8  1.8 - 3.5 gm/dL Final   • A/G Ratio 07/07/2022 1.4  1.1 - 2.4 g/dL Final   • BUN/Creatinine Ratio 07/07/2022 20.6  7.3 - 30.0 Final   • Anion Gap 07/07/2022 9.4  5.0 - 15.0 mmol/L Final   • eGFR 07/07/2022 88.2  >60.0 mL/min/1.73 Final    National Kidney Foundation and American Society of Nephrology (ASN) Task Force recommended calculation based on the Chronic Kidney Disease Epidemiology Collaboration (CKD-EPI) equation refit without adjustment for race.   • IgG 07/07/2022 1106  586 - 1602 mg/dL Final   • IgA 07/07/2022 385  64 - 422 mg/dL Final   • IgM 07/07/2022 59  26 - 217 mg/dL Final   • Total Protein 07/07/2022 6.6  6.0 - 8.5 g/dL Final   • Albumin 07/07/2022 3.7  2.9 - 4.4 g/dL Final   • Alpha-1-Globulin 07/07/2022 0.2  0.0 - 0.4 g/dL Final   • Alpha-2-Globulin 07/07/2022 0.6  0.4 - 1.0 g/dL Final   • Beta Globulin 07/07/2022 1.0  0.7 - 1.3 g/dL Final   • Gamma Globulin 07/07/2022 1.1  0.4 - 1.8 g/dL Final   • M-Camilo 07/07/2022 Not Observed  Not Observed g/dL Final   • Globulin 07/07/2022 2.9  2.2 - 3.9 g/dL Final   • A/G Ratio 07/07/2022 1.3  0.7 - 1.7 Final   • Immunofixation Reflex, Serum 07/07/2022 Comment:   Final    VIGNESH SHOWS ASYMMETRICAL IGA SUGGESTIVE OF A MONOCLONAL PROTEIN.   • Please note 07/07/2022 Comment   Final    Protein electrophoresis scan will follow via computer, mail, or   delivery.   • Free Light Chain, Kappa 07/07/2022 31.8 (A) 3.3 - 19.4 mg/L Final   • Free Lambda Light Chains 07/07/2022 21.6  5.7 - 26.3 mg/L Final   • Kappa/Lambda Ratio 07/07/2022 1.47  0.26 - 1.65 Final   • WBC 07/07/2022 4.59  3.40 - 10.80 10*3/mm3 Final   • RBC 07/07/2022 4.01  3.77 - 5.28 10*6/mm3  Final   • Hemoglobin 07/07/2022 12.6  12.0 - 15.9 g/dL Final   • Hematocrit 07/07/2022 39.7  34.0 - 46.6 % Final   • MCV 07/07/2022 99.0 (A) 79.0 - 97.0 fL Final   • MCH 07/07/2022 31.4  26.6 - 33.0 pg Final   • MCHC 07/07/2022 31.7  31.5 - 35.7 g/dL Final   • RDW 07/07/2022 14.4  12.3 - 15.4 % Final   • RDW-SD 07/07/2022 52.4  37.0 - 54.0 fl Final   • MPV 07/07/2022 10.0  6.0 - 12.0 fL Final   • Platelets 07/07/2022 225  140 - 450 10*3/mm3 Final   • Neutrophil % 07/07/2022 54.9  42.7 - 76.0 % Final   • Lymphocyte % 07/07/2022 24.6  19.6 - 45.3 % Final   • Monocyte % 07/07/2022 11.8  5.0 - 12.0 % Final   • Eosinophil % 07/07/2022 7.2 (A) 0.3 - 6.2 % Final   • Basophil % 07/07/2022 1.3  0.0 - 1.5 % Final   • Immature Grans % 07/07/2022 0.2  0.0 - 0.5 % Final   • Neutrophils, Absolute 07/07/2022 2.52  1.70 - 7.00 10*3/mm3 Final   • Lymphocytes, Absolute 07/07/2022 1.13  0.70 - 3.10 10*3/mm3 Final   • Monocytes, Absolute 07/07/2022 0.54  0.10 - 0.90 10*3/mm3 Final   • Eosinophils, Absolute 07/07/2022 0.33  0.00 - 0.40 10*3/mm3 Final   • Basophils, Absolute 07/07/2022 0.06  0.00 - 0.20 10*3/mm3 Final   • Immature Grans, Absolute 07/07/2022 0.01  0.00 - 0.05 10*3/mm3 Final   • nRBC 07/07/2022 0.0  0.0 - 0.2 /100 WBC Final         ASSESSMENT:   is a pleasant 79 y/o WF with past medical history significant for DM2 (over 40 years), inclusion body myositis (diagnosed 10 yrs ago, on methotrexate), hypothyroidism, & peripheral neuropathy comes to this clinic for monoclonal gammopathy evaluation.     # Monoclonal gammopathy:  · Patient had presented to her neurologist,  with signs/symptoms of bilateral feet neuropathy. During this work up SPEP/VIGNESH was performed which showed presence of IgA kappa M-protein, 0.2 gm/dl.   · Patient c/o b/l feet neuropathy, along with generalized weakness, which she attributes to inclusion body myositis.   · Denies any specific bone pain. On labs, no cytopenias. Renal function &  "calcium levels within normal limits. No B-symptoms.   · Informed patient that with low level of IgA M-protein & no \"CRAB\" features, this is likely MGUS, low-intermediate risk. Informed patient that MGUS is a common finding in elderly population, however,  there is an 1% per year risk of progression to multiple myeloma.   · A repeat SPEP/VIGNESH from Feb 2022 show stable IgA kappa M-protein level of 0.3 gm/dl. No symptoms concerning for progression.   · However repeat SPEP/VIGNESH from July 2022 does not show any M protein or abnormal FLC ratio.  Other labs unremarkable as well.  Informed patient this this could be seen with transient inflammatory conditions that could have led to abnormal M protein.  · Will plan to monitor and repeat SPEP/VIGNESH & FLC in 6 months time.  · Her peripheral neuropathy is unlikely related to monoclonal gammopathy. Suspect diabetic neuropathy. Additional work up being performed by neurology.     # Inclusion body myositis: On MTX 7.5 mg weekly. Follows up with   # DM2: On insulin, januvia & glipizide.   # Hypothyrodism: On synthroid  # peripheral neuropathy: Is on gabapentin 900 mg at bedtime. Symptoms not controlled. Advised to take additional dose of 300 mg in the afternoon. Advised to maintain follow up with neurology.    PLAN:   - IgA kappa MGUS. Unlikely attributing to peripheral neuropathy. Repeat labs from July 2022 does not show any M protein's.  - Active surveillance. Repeat labs in 6 months.     Orders Placed This Encounter   Procedures   • Comprehensive Metabolic Panel     Standing Status:   Future     Standing Expiration Date:   7/14/2023     Order Specific Question:   Release to patient     Answer:   Immediate   • VIGNESH,PE and FLC, Serum     Standing Status:   Future     Standing Expiration Date:   7/14/2023     Order Specific Question:   Release to patient     Answer:   Immediate   • CBC & Differential     Standing Status:   Future     Standing Expiration Date:   7/14/2023     " Order Specific Question:   Manual Differential     Answer:   No   Total time spent during this patient encounter is 35 minutes. The total time spent with the patient includes at least one or more of the following: preparing to see the patient by reviewing of tests, prior notes or other relevant information, performing appropriate independent examination & evaluation, counseling, ordering of medications, tests or procedures, communicating with other healthcare professionals, when appropriate to coordinate care, documenting clinic information in the electronic medical records or other health records, independently interpreting results of tests and communicating the results to the patient/family or caregiver.

## 2022-07-21 ENCOUNTER — TELEPHONE (OUTPATIENT)
Dept: INTERNAL MEDICINE | Facility: CLINIC | Age: 82
End: 2022-07-21

## 2022-07-21 DIAGNOSIS — Z78.0 POST-MENOPAUSAL: Primary | ICD-10-CM

## 2022-07-21 NOTE — TELEPHONE ENCOUNTER
Caller: Lissa Ortiz    Relationship: Self    Best call back number: [phone] (H)    What specialty or service is being requested: BONE SCAN    What is the provider, practice or medical service name: PATIENT STATES OFFICE IN [place]/ URGENT CARE    What is the office phone number: [email or web address]    Any additional details: PATIENT CALLED REQUESTING A REFERRAL TO GET A BONE SCAN

## 2022-08-19 ENCOUNTER — TELEPHONE (OUTPATIENT)
Dept: ENDOCRINOLOGY | Age: 82
End: 2022-08-19

## 2022-08-19 DIAGNOSIS — E03.9 ACQUIRED HYPOTHYROIDISM: ICD-10-CM

## 2022-08-19 DIAGNOSIS — Z79.4 TYPE 2 DIABETES MELLITUS WITH HYPERGLYCEMIA, WITH LONG-TERM CURRENT USE OF INSULIN: ICD-10-CM

## 2022-08-19 DIAGNOSIS — E78.5 HYPERLIPIDEMIA, UNSPECIFIED HYPERLIPIDEMIA TYPE: Primary | ICD-10-CM

## 2022-08-19 DIAGNOSIS — E11.65 TYPE 2 DIABETES MELLITUS WITH HYPERGLYCEMIA, WITH LONG-TERM CURRENT USE OF INSULIN: ICD-10-CM

## 2022-08-26 ENCOUNTER — LAB (OUTPATIENT)
Dept: ENDOCRINOLOGY | Age: 82
End: 2022-08-26

## 2022-08-26 DIAGNOSIS — E03.9 ACQUIRED HYPOTHYROIDISM: ICD-10-CM

## 2022-08-26 DIAGNOSIS — E11.65 TYPE 2 DIABETES MELLITUS WITH HYPERGLYCEMIA, WITH LONG-TERM CURRENT USE OF INSULIN: ICD-10-CM

## 2022-08-26 DIAGNOSIS — Z79.4 TYPE 2 DIABETES MELLITUS WITH HYPERGLYCEMIA, WITH LONG-TERM CURRENT USE OF INSULIN: ICD-10-CM

## 2022-08-26 DIAGNOSIS — E78.5 HYPERLIPIDEMIA, UNSPECIFIED HYPERLIPIDEMIA TYPE: ICD-10-CM

## 2022-08-27 LAB
ALBUMIN SERPL-MCNC: 3.9 G/DL (ref 3.7–4.7)
ALBUMIN/GLOB SERPL: 1.6 {RATIO} (ref 1.2–2.2)
ALP SERPL-CCNC: 82 IU/L (ref 44–121)
ALT SERPL-CCNC: 18 IU/L (ref 0–32)
AST SERPL-CCNC: 26 IU/L (ref 0–40)
BILIRUB SERPL-MCNC: 0.6 MG/DL (ref 0–1.2)
BUN SERPL-MCNC: 14 MG/DL (ref 8–27)
BUN/CREAT SERPL: 18 (ref 12–28)
CALCIUM SERPL-MCNC: 8.8 MG/DL (ref 8.7–10.3)
CHLORIDE SERPL-SCNC: 103 MMOL/L (ref 96–106)
CHOLEST SERPL-MCNC: 132 MG/DL (ref 100–199)
CO2 SERPL-SCNC: 24 MMOL/L (ref 20–29)
CREAT SERPL-MCNC: 0.77 MG/DL (ref 0.57–1)
EGFRCR-CYS SERPLBLD CKD-EPI 2021: 78 ML/MIN/1.73
GLOBULIN SER CALC-MCNC: 2.5 G/DL (ref 1.5–4.5)
GLUCOSE SERPL-MCNC: 165 MG/DL (ref 65–99)
HBA1C MFR BLD: 7 % (ref 4.8–5.6)
HDLC SERPL-MCNC: 49 MG/DL
IMP & REVIEW OF LAB RESULTS: NORMAL
LDLC SERPL CALC-MCNC: 70 MG/DL (ref 0–99)
POTASSIUM SERPL-SCNC: 4.4 MMOL/L (ref 3.5–5.2)
PROT SERPL-MCNC: 6.4 G/DL (ref 6–8.5)
SODIUM SERPL-SCNC: 140 MMOL/L (ref 134–144)
T4 FREE SERPL-MCNC: 1.4 NG/DL (ref 0.82–1.77)
TRIGL SERPL-MCNC: 61 MG/DL (ref 0–149)
TSH SERPL DL<=0.005 MIU/L-ACNC: 2.12 UIU/ML (ref 0.45–4.5)
VLDLC SERPL CALC-MCNC: 13 MG/DL (ref 5–40)

## 2022-09-06 ENCOUNTER — TRANSCRIBE ORDERS (OUTPATIENT)
Dept: ADMINISTRATIVE | Facility: HOSPITAL | Age: 82
End: 2022-09-06

## 2022-09-06 DIAGNOSIS — I51.7 RIGHT VENTRICULAR ENLARGEMENT: ICD-10-CM

## 2022-09-06 DIAGNOSIS — I27.20 PULMONARY HTN: Primary | ICD-10-CM

## 2022-09-08 NOTE — PROGRESS NOTES
Chief Complaint  Chief Complaint   Patient presents with   • Diabetes     Type 2 diabetes fingerstick to check BS     BS this morning was 70       Subjective          History of Present Illness    Lissa Ortiz 81 y.o. presents for a follow-up evaluation for type 2 DM    She has been diabetic since 1981    Pt is on the following medications for their DM: Ozempic 1 mg weekly, Actos 15 mg daily, Toujeo 10 units every morning and glipizide XL 2.5 mg every morning    Janumet was discontinued because of diarrhea.      Jardiance was too expensive      Pt complains of intermittent numbness and tingling in feet.    Denies diarrhea, constipation, chest pain, shortness of breath, vision changes or numbness and tingling in hands.    Weight stable since last visit.    Pt does not have a history of DM retinopathy.  Last eye exam was 05/22  Pt has glaucoma    Pt does not have a history of nephropathy.  Patient is not currently taking ACE/ARB    Pt does/not have neuropathy.  She has restless leg at night and her feet feels tight and numb  which is improved by gabapentin 300 mg  at bedtime prescribed by neurologist RONALD Paz.    Pt does not have a history of CAD or CVA.    Last A1C in 08/22 was 7.0    Last microalbumin in 06/22 was negative          Blood Sugars    Blood glucoses are checked 2/day.    Fasting blood glucoses:     Bedtime blood glucoses: around 125    Pt has had episodes of hypoglycemia twice a week in the morning.            Hypothyroid    PT complains of chronic cold intolerance.    Denies fatigue, weight changes, constipation, diarrhea, hair loss, dry skin, chest pain, palpitations and heat intolerance.    Current treatment is levothyroxine 25 mcg daily    Last labs in 08/22 showed TSH 2.120 and FT4 1.40              Hyperlipidemia     Pt denies any muscle/body aches, chest pain, or shortness of breath    Pt is currently taking  pravastatin 20 mg HS    Last lipid panel in 08/22 showed Total 132,  "Triglycerides 61, HDL 49 and LDL 70            Hypertension    Pt denies any chest pain, palpitations, shortness of breath, or headache    Current regimen includes amlodipine 2.5 mg daily            Other History    She has rheumatoid arthritis follows with Dr. Chow.       Bone density done at her gynecologist in May 2018 was normal with 4.4% decrease on the left hip.  She was advised follow-up bone density in 2023.  She is being followed by her gynecologist Dr. Cecilia Arnold.       She has monoclonal gammopathy of unknown significance and is being followed by Dr. Thompson.              I have reviewed the patient's allergies, medicines, past medical hx, family hx and social hx.    Objective   Vital Signs:   /70   Pulse 70   Temp 96.4 °F (35.8 °C)   Ht 170.2 cm (67\")   Wt 64.5 kg (142 lb 3.2 oz)   LMP  (LMP Unknown)   SpO2 99%   BMI 22.27 kg/m²       Physical Exam   Physical Exam  Constitutional:       General: She is not in acute distress.     Appearance: Normal appearance. She is not diaphoretic.   HENT:      Head: Normocephalic and atraumatic.   Eyes:      General:         Right eye: No discharge.         Left eye: No discharge.   Skin:     General: Skin is warm and dry.   Neurological:      Mental Status: She is alert.   Psychiatric:         Mood and Affect: Mood normal.         Behavior: Behavior normal.                    Results Review:   Hemoglobin A1C   Date Value Ref Range Status   08/26/2022 7.0 (H) 4.8 - 5.6 % Final     Comment:              Prediabetes: 5.7 - 6.4           Diabetes: >6.4           Glycemic control for adults with diabetes: <7.0     04/29/2022 9.50 (H) 4.80 - 5.60 % Final     Triglycerides   Date Value Ref Range Status   08/26/2022 61 0 - 149 mg/dL Final     HDL Cholesterol   Date Value Ref Range Status   08/26/2022 49 >39 mg/dL Final     LDL Chol Calc (NIH)   Date Value Ref Range Status   08/26/2022 70 0 - 99 mg/dL Final     VLDL Cholesterol Akbar   Date Value Ref Range " Status   08/26/2022 13 5 - 40 mg/dL Final         Assessment and Plan {CC Problem List  Visit Diagnosis  ROS  Review (Popup)  Health Maintenance  Quality  BestPractice  Medications  SmartSets  SnapShot Encounters  Media :23  Diagnoses and all orders for this visit:    1. Type 2 diabetes mellitus without complication, with long-term current use of insulin (HCC) (Primary)  -     Insulin Glargine, 2 Unit Dial, (Toujeo Max SoloStar) 300 UNIT/ML solution pen-injector injection; Inject 8 Units under the skin into the appropriate area as directed Daily.  Dispense: 3 mL; Refill: 1  -     Hemoglobin A1c; Future    A1C is better at 7%  Continue with Ozempic 1 mg weekly, Actos 15 mg daily and glipizide XL 2.5 mg every morning  Decrease Toujeo 8 units every morning  Work on diety, exercise and weight loss to help lower it further  Check labs prior to next visit      2. Acquired hypothyroidism  -     TSH; Future  -     T4, Free; Future    Thyroid labs are good  Continue with levothyroxine 25 mcg daily  Check labs prior to next visit      3. Mixed hyperlipidemia  -     Lipid Panel; Future    Lipid panel is good  Continue with statin  Check labs prior to next visit      4. Benign essential hypertension    Stable  Continue with current medication regimen  Defer management to PCP          RTC in 4 months with me, labs prior and 8 months with Dr. Pimentel      Follow Up     Patient was given instructions and counseling regarding her condition or for health maintenance advice. Please see specific information pulled into the AVS if appropriate.              Jenny Guido, MAINE  09/09/22

## 2022-09-09 ENCOUNTER — OFFICE VISIT (OUTPATIENT)
Dept: ENDOCRINOLOGY | Age: 82
End: 2022-09-09

## 2022-09-09 VITALS
DIASTOLIC BLOOD PRESSURE: 70 MMHG | SYSTOLIC BLOOD PRESSURE: 120 MMHG | TEMPERATURE: 96.4 F | WEIGHT: 142.2 LBS | OXYGEN SATURATION: 99 % | HEART RATE: 70 BPM | BODY MASS INDEX: 22.32 KG/M2 | HEIGHT: 67 IN

## 2022-09-09 DIAGNOSIS — E11.9 TYPE 2 DIABETES MELLITUS WITHOUT COMPLICATION, WITH LONG-TERM CURRENT USE OF INSULIN: Primary | ICD-10-CM

## 2022-09-09 DIAGNOSIS — E03.9 ACQUIRED HYPOTHYROIDISM: Chronic | ICD-10-CM

## 2022-09-09 DIAGNOSIS — I10 BENIGN ESSENTIAL HYPERTENSION: Chronic | ICD-10-CM

## 2022-09-09 DIAGNOSIS — E78.2 MIXED HYPERLIPIDEMIA: Chronic | ICD-10-CM

## 2022-09-09 DIAGNOSIS — Z79.4 TYPE 2 DIABETES MELLITUS WITHOUT COMPLICATION, WITH LONG-TERM CURRENT USE OF INSULIN: Primary | ICD-10-CM

## 2022-09-09 PROCEDURE — 99214 OFFICE O/P EST MOD 30 MIN: CPT | Performed by: NURSE PRACTITIONER

## 2022-09-09 RX ORDER — INSULIN GLARGINE 300 U/ML
8 INJECTION, SOLUTION SUBCUTANEOUS DAILY
Qty: 3 ML | Refills: 1
Start: 2022-09-09

## 2022-09-09 RX ORDER — METHOTREXATE 25 MG/ML
1 INJECTION, SOLUTION INTRA-ARTERIAL; INTRAMUSCULAR; INTRAVENOUS WEEKLY
COMMUNITY
Start: 2022-08-15 | End: 2022-09-09 | Stop reason: SDUPTHER

## 2022-09-16 ENCOUNTER — HOSPITAL ENCOUNTER (OUTPATIENT)
Dept: CARDIOLOGY | Facility: HOSPITAL | Age: 82
Discharge: HOME | End: 2022-09-16
Admitting: INTERNAL MEDICINE

## 2022-09-16 VITALS
SYSTOLIC BLOOD PRESSURE: 126 MMHG | WEIGHT: 143.3 LBS | HEIGHT: 67 IN | DIASTOLIC BLOOD PRESSURE: 66 MMHG | HEART RATE: 71 BPM | BODY MASS INDEX: 22.49 KG/M2

## 2022-09-16 DIAGNOSIS — I51.7 RIGHT VENTRICULAR ENLARGEMENT: ICD-10-CM

## 2022-09-16 DIAGNOSIS — I27.20 PULMONARY HTN: ICD-10-CM

## 2022-09-16 LAB
AORTIC DIMENSIONLESS INDEX: 0.9 (DI)
ASCENDING AORTA: 2.7 CM
BH CV ECHO MEAS - ACS: 1.75 CM
BH CV ECHO MEAS - AI P1/2T: 483.1 MSEC
BH CV ECHO MEAS - AO MAX PG: 4.1 MMHG
BH CV ECHO MEAS - AO MEAN PG: 2.5 MMHG
BH CV ECHO MEAS - AO ROOT DIAM: 3.6 CM
BH CV ECHO MEAS - AO V2 MAX: 101.3 CM/SEC
BH CV ECHO MEAS - AO V2 VTI: 23.5 CM
BH CV ECHO MEAS - AVA(I,D): 2.7 CM2
BH CV ECHO MEAS - EDV(CUBED): 50.4 ML
BH CV ECHO MEAS - EDV(MOD-SP2): 52 ML
BH CV ECHO MEAS - EDV(MOD-SP4): 67 ML
BH CV ECHO MEAS - EF(MOD-BP): 57.8 %
BH CV ECHO MEAS - EF(MOD-SP2): 57.7 %
BH CV ECHO MEAS - EF(MOD-SP4): 61.2 %
BH CV ECHO MEAS - ESV(CUBED): 19.5 ML
BH CV ECHO MEAS - ESV(MOD-SP2): 22 ML
BH CV ECHO MEAS - ESV(MOD-SP4): 26 ML
BH CV ECHO MEAS - FS: 27.1 %
BH CV ECHO MEAS - IVS/LVPW: 0.91 CM
BH CV ECHO MEAS - IVSD: 0.98 CM
BH CV ECHO MEAS - LAT PEAK E' VEL: 7.7 CM/SEC
BH CV ECHO MEAS - LV DIASTOLIC VOL/BSA (35-75): 38.2 CM2
BH CV ECHO MEAS - LV MASS(C)D: 116.7 GRAMS
BH CV ECHO MEAS - LV MAX PG: 3.5 MMHG
BH CV ECHO MEAS - LV MEAN PG: 1.64 MMHG
BH CV ECHO MEAS - LV SYSTOLIC VOL/BSA (12-30): 14.8 CM2
BH CV ECHO MEAS - LV V1 MAX: 94.1 CM/SEC
BH CV ECHO MEAS - LV V1 VTI: 22.1 CM
BH CV ECHO MEAS - LVIDD: 3.7 CM
BH CV ECHO MEAS - LVIDS: 2.7 CM
BH CV ECHO MEAS - LVOT AREA: 2.8 CM2
BH CV ECHO MEAS - LVOT DIAM: 1.9 CM
BH CV ECHO MEAS - LVPWD: 1.07 CM
BH CV ECHO MEAS - MED PEAK E' VEL: 9.7 CM/SEC
BH CV ECHO MEAS - MR MAX PG: 50.8 MMHG
BH CV ECHO MEAS - MR MAX VEL: 356.3 CM/SEC
BH CV ECHO MEAS - MV A DUR: 0.2 SEC
BH CV ECHO MEAS - MV A MAX VEL: 90.3 CM/SEC
BH CV ECHO MEAS - MV DEC SLOPE: 427 CM/SEC2
BH CV ECHO MEAS - MV DEC TIME: 0.2 MSEC
BH CV ECHO MEAS - MV E MAX VEL: 99 CM/SEC
BH CV ECHO MEAS - MV E/A: 1.1
BH CV ECHO MEAS - MV MAX PG: 3.5 MMHG
BH CV ECHO MEAS - MV MEAN PG: 1.57 MMHG
BH CV ECHO MEAS - MV P1/2T: 63.3 MSEC
BH CV ECHO MEAS - MV V2 VTI: 29.4 CM
BH CV ECHO MEAS - MVA(P1/2T): 3.5 CM2
BH CV ECHO MEAS - MVA(VTI): 2.13 CM2
BH CV ECHO MEAS - PA ACC TIME: 0.07 SEC
BH CV ECHO MEAS - PA PR(ACCEL): 46.9 MMHG
BH CV ECHO MEAS - PA V2 MAX: 74.9 CM/SEC
BH CV ECHO MEAS - PULM A REVS DUR: 0.13 SEC
BH CV ECHO MEAS - PULM A REVS VEL: 28.4 CM/SEC
BH CV ECHO MEAS - PULM DIAS VEL: 36.1 CM/SEC
BH CV ECHO MEAS - PULM S/D: 1.11
BH CV ECHO MEAS - PULM SYS VEL: 39.9 CM/SEC
BH CV ECHO MEAS - QP/QS: 0.58
BH CV ECHO MEAS - RAP SYSTOLE: 24 MMHG
BH CV ECHO MEAS - RV MAX PG: 1.72 MMHG
BH CV ECHO MEAS - RV V1 MAX: 65.6 CM/SEC
BH CV ECHO MEAS - RV V1 VTI: 13 CM
BH CV ECHO MEAS - RVOT DIAM: 1.89 CM
BH CV ECHO MEAS - RVSP: 3 MMHG
BH CV ECHO MEAS - SI(MOD-SP2): 17.1 ML/M2
BH CV ECHO MEAS - SI(MOD-SP4): 23.4 ML/M2
BH CV ECHO MEAS - SV(LVOT): 62.8 ML
BH CV ECHO MEAS - SV(MOD-SP2): 30 ML
BH CV ECHO MEAS - SV(MOD-SP4): 41 ML
BH CV ECHO MEAS - SV(RVOT): 36.5 ML
BH CV ECHO MEAS - TAPSE (>1.6): 1.5 CM
BH CV ECHO MEAS - TR MAX PG: 19.6 MMHG
BH CV ECHO MEAS - TR MAX VEL: 221.5 CM/SEC
BH CV ECHO MEASUREMENTS AVERAGE E/E' RATIO: 11.38
BH CV XLRA - RV BASE: 3.3 CM
BH CV XLRA - RV LENGTH: 7.8 CM
BH CV XLRA - RV MID: 2.4 CM
BH CV XLRA - TDI S': 11.4 CM/SEC
LEFT ATRIUM VOLUME INDEX: 11.6 ML/M2
MAXIMAL PREDICTED HEART RATE: 140 BPM
SINUS: 2.9 CM
STJ: 2.6 CM
STRESS TARGET HR: 119 BPM

## 2022-09-16 PROCEDURE — 93306 TTE W/DOPPLER COMPLETE: CPT | Performed by: INTERNAL MEDICINE

## 2022-09-16 PROCEDURE — 93306 TTE W/DOPPLER COMPLETE: CPT

## 2022-09-21 ENCOUNTER — TELEPHONE (OUTPATIENT)
Dept: NEUROLOGY | Facility: CLINIC | Age: 82
End: 2022-09-21

## 2022-09-21 ENCOUNTER — IMMUNIZATION (OUTPATIENT)
Dept: VACCINE CLINIC | Facility: HOSPITAL | Age: 82
End: 2022-09-21

## 2022-09-21 DIAGNOSIS — Z23 NEED FOR VACCINATION: Primary | ICD-10-CM

## 2022-09-21 PROCEDURE — 91312 HC SARSCOV2 VAC 30MCG/0.3ML IM BIVALENT BOOSTER 12 YRS AND OLDER: CPT | Performed by: INTERNAL MEDICINE

## 2022-09-21 PROCEDURE — 0124A: CPT | Performed by: INTERNAL MEDICINE

## 2022-09-26 ENCOUNTER — TELEPHONE (OUTPATIENT)
Dept: NEUROLOGY | Facility: CLINIC | Age: 82
End: 2022-09-26

## 2022-11-03 RX ORDER — LEVOTHYROXINE SODIUM 0.03 MG/1
TABLET ORAL
Qty: 90 TABLET | Refills: 1 | Status: SHIPPED | OUTPATIENT
Start: 2022-11-03

## 2022-11-15 ENCOUNTER — HOSPITAL ENCOUNTER (OUTPATIENT)
Dept: BONE DENSITY | Facility: HOSPITAL | Age: 82
Discharge: HOME | End: 2022-11-15
Admitting: FAMILY MEDICINE

## 2022-11-15 DIAGNOSIS — Z78.0 POST-MENOPAUSAL: ICD-10-CM

## 2022-11-15 PROCEDURE — 77080 DXA BONE DENSITY AXIAL: CPT

## 2022-11-17 ENCOUNTER — OFFICE VISIT (OUTPATIENT)
Dept: NEUROLOGY | Facility: CLINIC | Age: 82
End: 2022-11-17

## 2022-11-17 VITALS
BODY MASS INDEX: 22.49 KG/M2 | WEIGHT: 143.3 LBS | DIASTOLIC BLOOD PRESSURE: 58 MMHG | HEIGHT: 67 IN | SYSTOLIC BLOOD PRESSURE: 122 MMHG

## 2022-11-17 DIAGNOSIS — G25.81 RESTLESS LEG SYNDROME: Primary | ICD-10-CM

## 2022-11-17 DIAGNOSIS — G62.9 PERIPHERAL POLYNEUROPATHY: ICD-10-CM

## 2022-11-17 PROCEDURE — 99214 OFFICE O/P EST MOD 30 MIN: CPT | Performed by: PHYSICIAN ASSISTANT

## 2022-11-17 RX ORDER — AMOXICILLIN 500 MG/1
CAPSULE ORAL
COMMUNITY
Start: 2022-10-26

## 2022-11-17 RX ORDER — METHOTREXATE 25 MG/ML
INJECTION, SOLUTION INTRA-ARTERIAL; INTRAMUSCULAR; INTRAVENOUS
COMMUNITY
Start: 2022-11-15

## 2022-11-17 RX ORDER — METHOTREXATE SODIUM 25 MG/ML
INJECTION, SOLUTION INTRA-ARTERIAL; INTRAMUSCULAR; INTRAVENOUS
COMMUNITY
Start: 2022-11-15

## 2022-11-22 RX ORDER — PEN NEEDLE, DIABETIC 32GX 5/32"
NEEDLE, DISPOSABLE MISCELLANEOUS
Qty: 100 EACH | Refills: 2 | Status: SHIPPED | OUTPATIENT
Start: 2022-11-22

## 2022-11-22 NOTE — TELEPHONE ENCOUNTER
Rx Refill Note  Requested Prescriptions     Pending Prescriptions Disp Refills   • [employer] Pen [place] 32G X 4 MM misc [Pharmacy Med Name: KRO PEN NEEDLE 4MM X 32G] 100 each 2     Sig: USE TO INJECT ONCE DAILY      Last office visit with prescribing clinician: 6/9/2022      Next office visit with prescribing clinician: 5/16/2023            Beto Luciano MA  11/22/22, 12:10 EST

## 2022-12-01 RX ORDER — GLIPIZIDE 2.5 MG/1
TABLET, EXTENDED RELEASE ORAL
Qty: 30 TABLET | Refills: 5 | Status: SHIPPED | OUTPATIENT
Start: 2022-12-01 | End: 2023-01-16 | Stop reason: SDUPTHER

## 2022-12-08 ENCOUNTER — OFFICE VISIT (OUTPATIENT)
Dept: INTERNAL MEDICINE | Facility: CLINIC | Age: 82
End: 2022-12-08

## 2022-12-08 VITALS
BODY MASS INDEX: 22.66 KG/M2 | SYSTOLIC BLOOD PRESSURE: 120 MMHG | HEIGHT: 66 IN | HEART RATE: 60 BPM | WEIGHT: 141 LBS | TEMPERATURE: 97.3 F | RESPIRATION RATE: 18 BRPM | DIASTOLIC BLOOD PRESSURE: 68 MMHG | OXYGEN SATURATION: 95 %

## 2022-12-08 DIAGNOSIS — I10 BENIGN ESSENTIAL HYPERTENSION: Primary | Chronic | ICD-10-CM

## 2022-12-08 DIAGNOSIS — Z86.010 HISTORY OF COLON POLYPS: ICD-10-CM

## 2022-12-08 DIAGNOSIS — F51.01 PRIMARY INSOMNIA: ICD-10-CM

## 2022-12-08 DIAGNOSIS — M85.852 OSTEOPENIA OF LEFT HIP: Chronic | ICD-10-CM

## 2022-12-08 DIAGNOSIS — E78.2 MIXED HYPERLIPIDEMIA: Chronic | ICD-10-CM

## 2022-12-08 PROCEDURE — 99214 OFFICE O/P EST MOD 30 MIN: CPT | Performed by: FAMILY MEDICINE

## 2022-12-08 RX ORDER — PRAVASTATIN SODIUM 20 MG
20 TABLET ORAL DAILY
Qty: 90 TABLET | Refills: 4 | Status: SHIPPED | OUTPATIENT
Start: 2022-12-08

## 2022-12-08 NOTE — PATIENT INSTRUCTIONS
Melatonin 5-10 mg per night as needed to get to sleep.  Can find in the pharmacy supplement section.

## 2022-12-08 NOTE — PROGRESS NOTES
"Chief Complaint  Follow-up and Hypertension    Subjective        Lissa Ortiz presents to [facility] PRIMARY [facility]ARE  History of Present Illness     She is here today to follow-up for her chronic care management for her hypertension, hyperlipidemia as well as to discuss the results of her recent DEXA scan.  For her hypertension, she is taking amlodipine and tolerating well.  Denies any swelling or headaches.  Blood pressure is controlled today.    Her most recent DEXA scan showed a normal lumbar spine with a mildly decreased left hip and a right hip in osteopenic range at -1.6 T score.  This is a new diagnosis for her.  Walks about a mile or so a day if possible.  She feels a little weak in that leg.      She takes pravastatin for the hyperlipidemia.  There were labs obtained by Dr. Pimentel including the lipid panel.  Her lipid panel shows excellent control with an LDL of 70, HDL 49, total cholesterol 132, triglycerides 61.  Denies myalgias.    Insomnia - takes gabapentin for neuropathy at bedtime but still difficulty getting to sleep.  Not tried melatonin but has tried Tylenol PM.  Given caution about drugs containing benadryl.      Objective   Vital Signs:  /68 (BP Location: Left arm, Patient Position: Sitting, Cuff Size: Small Adult)   Pulse 60   Temp 97.3 °F (36.3 °C)   Resp 18   Ht 167.6 cm (66\")   Wt 64 kg (141 lb)   SpO2 95%   BMI 22.76 kg/m²   Estimated body mass index is 22.76 kg/m² as calculated from the following:    Height as of this encounter: 167.6 cm (66\").    Weight as of this encounter: 64 kg (141 lb).    BMI is within normal parameters. No other follow-up for BMI required.      Physical Exam  Vitals and nursing note reviewed.   Constitutional:       General: She is not in acute distress.     Appearance: Normal appearance.   Cardiovascular:      Rate and Rhythm: Normal rate and regular rhythm.      Heart sounds: Normal heart sounds. No murmur heard.  Pulmonary:      Effort: " Pulmonary effort is normal.      Breath sounds: Normal breath sounds.   Neurological:      Mental Status: She is alert.        Result Review :  The following data was reviewed by: Adin Bragg MD on 12/08/2022:  Common labs    Common Labs 6/9/22 7/7/22 7/7/22 7/7/22 8/26/22 8/26/22 8/26/22     0928 0928 0928 0917 0917 0917   Glucose   162 (A)   165 (A)    BUN   14   14    Creatinine   0.68   0.77    Sodium   140   140    Potassium   4.2   4.4    Chloride   106   103    Calcium   8.8   8.8    Total Protein    6.6  6.4    Albumin   3.90 3.7  3.9    Total Bilirubin   0.4   0.6    Alkaline Phosphatase   88   82    AST (SGOT)   23   26    ALT (SGPT)   13   18    WBC  4.59        Hemoglobin  12.6        Hematocrit  39.7        Platelets  225        Total Cholesterol     132     Triglycerides     61     HDL Cholesterol     49     LDL Cholesterol      70     Hemoglobin A1C       7.0 (A)   Microalbumin, Urine 24.9         (A) Abnormal value       Comments are available for some flowsheets but are not being displayed.                     Assessment and Plan   Diagnoses and all orders for this visit:    1. Benign essential hypertension (Primary)    2. Osteopenia of left hip    3. Mixed hyperlipidemia  -     pravastatin (PRAVACHOL) 20 MG tablet; Take 1 tablet by mouth Daily.  Dispense: 90 tablet; Refill: 4    4. Primary insomnia    5. History of colon polyps  -     Ambulatory Referral For Screening Colonoscopy       Stable chronic conditions as above.  Continue all medications as above.  Labs reviewed from Dr. Pimentel with the patient today.  Recommended in her AVS to start melatonin 5 to 10 mg along with her gabapentin for sleep.  Also gave insomnia sleep instructions in the AVS.  I will hold off on bisphosphonate for the osteopenia right now.  I would like to start scanning every 2 years and have her continue calcium as well as weightbearing exercise.  Refilled her pravastatin as above.  Also she is due for a colonoscopy.  I  checked the colonoscopy pathology and phone calls and Dr. Moss did want to see her for 1 more colonoscopy in early 2023.         Follow Up   Return in about 6 months (around 6/12/2023) for Medicare Wellness.  Patient was given instructions and counseling regarding her condition or for health maintenance advice. Please see specific information pulled into the AVS if appropriate.

## 2022-12-11 DIAGNOSIS — E11.9 TYPE 2 DIABETES MELLITUS WITHOUT COMPLICATION, WITHOUT LONG-TERM CURRENT USE OF INSULIN: ICD-10-CM

## 2022-12-11 RX ORDER — SEMAGLUTIDE 1.34 MG/ML
INJECTION, SOLUTION SUBCUTANEOUS
Qty: 3 ML | Refills: 0 | Status: SHIPPED | OUTPATIENT
Start: 2022-12-11 | End: 2023-01-09

## 2023-01-03 ENCOUNTER — LAB (OUTPATIENT)
Dept: ENDOCRINOLOGY | Age: 83
End: 2023-01-03
Payer: MEDICARE

## 2023-01-03 DIAGNOSIS — E11.9 TYPE 2 DIABETES MELLITUS WITHOUT COMPLICATION, WITH LONG-TERM CURRENT USE OF INSULIN: ICD-10-CM

## 2023-01-03 DIAGNOSIS — E78.2 MIXED HYPERLIPIDEMIA: Chronic | ICD-10-CM

## 2023-01-03 DIAGNOSIS — E03.9 ACQUIRED HYPOTHYROIDISM: Chronic | ICD-10-CM

## 2023-01-03 DIAGNOSIS — Z79.4 TYPE 2 DIABETES MELLITUS WITHOUT COMPLICATION, WITH LONG-TERM CURRENT USE OF INSULIN: ICD-10-CM

## 2023-01-03 LAB
CHOLEST SERPL-MCNC: 169 MG/DL (ref 0–200)
HBA1C MFR BLD: 6.8 % (ref 4.8–5.6)
HDLC SERPL-MCNC: 58 MG/DL (ref 40–60)
IMP & REVIEW OF LAB RESULTS: NORMAL
LDLC SERPL CALC-MCNC: 95 MG/DL (ref 0–100)
T4 FREE SERPL-MCNC: 1.27 NG/DL (ref 0.93–1.7)
TRIGL SERPL-MCNC: 87 MG/DL (ref 0–150)
TSH SERPL DL<=0.005 MIU/L-ACNC: 3.21 UIU/ML (ref 0.27–4.2)
VLDLC SERPL CALC-MCNC: 16 MG/DL (ref 5–40)

## 2023-01-06 DIAGNOSIS — E11.9 TYPE 2 DIABETES MELLITUS WITHOUT COMPLICATION, WITHOUT LONG-TERM CURRENT USE OF INSULIN: ICD-10-CM

## 2023-01-09 RX ORDER — SEMAGLUTIDE 1.34 MG/ML
INJECTION, SOLUTION SUBCUTANEOUS
Qty: 3 ML | Refills: 0 | Status: SHIPPED | OUTPATIENT
Start: 2023-01-09 | End: 2023-02-07

## 2023-01-12 ENCOUNTER — TELEPHONE (OUTPATIENT)
Dept: GASTROENTEROLOGY | Facility: CLINIC | Age: 83
End: 2023-01-12

## 2023-01-12 ENCOUNTER — LAB (OUTPATIENT)
Dept: LAB | Facility: HOSPITAL | Age: 83
End: 2023-01-12
Payer: MEDICARE

## 2023-01-12 DIAGNOSIS — D47.2 MONOCLONAL GAMMOPATHY: ICD-10-CM

## 2023-01-12 LAB
ALBUMIN SERPL-MCNC: 4.1 G/DL (ref 3.5–5.2)
ALBUMIN/GLOB SERPL: 1.4 G/DL (ref 1.1–2.4)
ALP SERPL-CCNC: 82 U/L (ref 38–116)
ALT SERPL W P-5'-P-CCNC: 16 U/L (ref 0–33)
ANION GAP SERPL CALCULATED.3IONS-SCNC: 9.4 MMOL/L (ref 5–15)
AST SERPL-CCNC: 25 U/L (ref 0–32)
BASOPHILS # BLD AUTO: 0.06 10*3/MM3 (ref 0–0.2)
BASOPHILS NFR BLD AUTO: 1.1 % (ref 0–1.5)
BILIRUB SERPL-MCNC: 0.5 MG/DL (ref 0.2–1.2)
BUN SERPL-MCNC: 16 MG/DL (ref 6–20)
BUN/CREAT SERPL: 23.9 (ref 7.3–30)
CALCIUM SPEC-SCNC: 9.3 MG/DL (ref 8.5–10.2)
CHLORIDE SERPL-SCNC: 105 MMOL/L (ref 98–107)
CO2 SERPL-SCNC: 26.6 MMOL/L (ref 22–29)
CREAT SERPL-MCNC: 0.67 MG/DL (ref 0.6–1.1)
DEPRECATED RDW RBC AUTO: 51.9 FL (ref 37–54)
EGFRCR SERPLBLD CKD-EPI 2021: 87.9 ML/MIN/1.73
EOSINOPHIL # BLD AUTO: 0.25 10*3/MM3 (ref 0–0.4)
EOSINOPHIL NFR BLD AUTO: 4.5 % (ref 0.3–6.2)
ERYTHROCYTE [DISTWIDTH] IN BLOOD BY AUTOMATED COUNT: 15.1 % (ref 12.3–15.4)
GLOBULIN UR ELPH-MCNC: 3 GM/DL (ref 1.8–3.5)
GLUCOSE SERPL-MCNC: 138 MG/DL (ref 74–124)
HCT VFR BLD AUTO: 40.4 % (ref 34–46.6)
HGB BLD-MCNC: 13.4 G/DL (ref 12–15.9)
IMM GRANULOCYTES # BLD AUTO: 0.01 10*3/MM3 (ref 0–0.05)
IMM GRANULOCYTES NFR BLD AUTO: 0.2 % (ref 0–0.5)
LYMPHOCYTES # BLD AUTO: 1.01 10*3/MM3 (ref 0.7–3.1)
LYMPHOCYTES NFR BLD AUTO: 18.2 % (ref 19.6–45.3)
MCH RBC QN AUTO: 31.8 PG (ref 26.6–33)
MCHC RBC AUTO-ENTMCNC: 33.2 G/DL (ref 31.5–35.7)
MCV RBC AUTO: 96 FL (ref 79–97)
MONOCYTES # BLD AUTO: 0.6 10*3/MM3 (ref 0.1–0.9)
MONOCYTES NFR BLD AUTO: 10.8 % (ref 5–12)
NEUTROPHILS NFR BLD AUTO: 3.62 10*3/MM3 (ref 1.7–7)
NEUTROPHILS NFR BLD AUTO: 65.2 % (ref 42.7–76)
NRBC BLD AUTO-RTO: 0 /100 WBC (ref 0–0.2)
PLATELET # BLD AUTO: 275 10*3/MM3 (ref 140–450)
PMV BLD AUTO: 9.3 FL (ref 6–12)
POTASSIUM SERPL-SCNC: 4 MMOL/L (ref 3.5–4.7)
PROT SERPL-MCNC: 7.1 G/DL (ref 6.3–8)
RBC # BLD AUTO: 4.21 10*6/MM3 (ref 3.77–5.28)
SODIUM SERPL-SCNC: 141 MMOL/L (ref 134–145)
WBC NRBC COR # BLD: 5.55 10*3/MM3 (ref 3.4–10.8)

## 2023-01-12 PROCEDURE — 80053 COMPREHEN METABOLIC PANEL: CPT

## 2023-01-12 PROCEDURE — 36415 COLL VENOUS BLD VENIPUNCTURE: CPT

## 2023-01-12 PROCEDURE — 85025 COMPLETE CBC W/AUTO DIFF WBC: CPT

## 2023-01-13 ENCOUNTER — PRE-PROCEDURE SCREENING (OUTPATIENT)
Dept: GASTROENTEROLOGY | Facility: CLINIC | Age: 83
End: 2023-01-13
Payer: MEDICARE

## 2023-01-13 NOTE — TELEPHONE ENCOUNTER
LAST SCOPE 2/18 IN Epic --Personal history of polyps--No family history of polyps or colon cancer--No blood thinners--Medications:                amLODIPine (NORVASC) 2.5 MG tablet  amoxicillin (AMOXIL) 500 MG capsule  azelastine (ASTELIN) 0.1 % nasal spray  Blood Glucose Monitoring Suppl (TRUE METRIX AIR GLUCOSE METER) device  Calcium Citrate (CITRACAL PO)  chlorhexidine (PERIDEX) 0.12 % solution  Cinnamon 500 MG capsule  dorzolamide-timolol (COSOPT) 22.3-6.8 MG/ML ophthalmic solution  folic acid (FOLVITE) 1 MG tablet        glipizide (GLUCOTROL XL) 2.5 MG 24 hr tablet    Insulin Glargine, 2 Unit Dial, (Toujeo Max SoloStar) 300 UNIT/ML solution pen-injector injection  [employer] Pen Needles 32G X 4 MM misc    levothyroxine (SYNTHROID, LEVOTHROID) 25 MCG tablet  Methotrexate Sodium 50 MG/2ML injection  METHOTREXATE SODIUM PO  Methotrexate Sodium syringe  Multiple Vitamin (MULTI VITAMIN PO)  Ozempic, 1 MG/DOSE, 4 MG/3ML solution pen-injector    pioglitazone (Actos) 15 MG tablet    pravastatin (PRAVACHOL) 20 MG tablet  Probiotic capsule  solifenacin (VESICARE) 10 MG tablet  Tafluprost, PF, (ZIOPTAN) 0.0015 % solution ophthalmic solution  Turmeric 500 MG capsule     QUESTIONNAIRE SCEEENING  HAS BEEN SENT TO DOCTOR FOR REVIEW

## 2023-01-16 ENCOUNTER — OFFICE VISIT (OUTPATIENT)
Dept: ENDOCRINOLOGY | Age: 83
End: 2023-01-16
Payer: MEDICARE

## 2023-01-16 VITALS
DIASTOLIC BLOOD PRESSURE: 60 MMHG | TEMPERATURE: 95.1 F | BODY MASS INDEX: 23.15 KG/M2 | SYSTOLIC BLOOD PRESSURE: 130 MMHG | WEIGHT: 143.4 LBS

## 2023-01-16 DIAGNOSIS — Z79.4 TYPE 2 DIABETES MELLITUS WITHOUT COMPLICATION, WITH LONG-TERM CURRENT USE OF INSULIN: Primary | Chronic | ICD-10-CM

## 2023-01-16 DIAGNOSIS — E11.9 TYPE 2 DIABETES MELLITUS WITHOUT COMPLICATION, WITH LONG-TERM CURRENT USE OF INSULIN: Primary | Chronic | ICD-10-CM

## 2023-01-16 DIAGNOSIS — E03.9 ACQUIRED HYPOTHYROIDISM: Chronic | ICD-10-CM

## 2023-01-16 DIAGNOSIS — E78.2 MIXED HYPERLIPIDEMIA: Chronic | ICD-10-CM

## 2023-01-16 DIAGNOSIS — I10 BENIGN ESSENTIAL HYPERTENSION: Chronic | ICD-10-CM

## 2023-01-16 LAB
ALBUMIN SERPL ELPH-MCNC: 3.7 G/DL (ref 2.9–4.4)
ALBUMIN/GLOB SERPL: 1.2 {RATIO} (ref 0.7–1.7)
ALPHA1 GLOB SERPL ELPH-MCNC: 0.2 G/DL (ref 0–0.4)
ALPHA2 GLOB SERPL ELPH-MCNC: 0.6 G/DL (ref 0.4–1)
B-GLOBULIN SERPL ELPH-MCNC: 1.1 G/DL (ref 0.7–1.3)
GAMMA GLOB SERPL ELPH-MCNC: 1.2 G/DL (ref 0.4–1.8)
GLOBULIN SER-MCNC: 3.2 G/DL (ref 2.2–3.9)
IGA SERPL-MCNC: 381 MG/DL (ref 64–422)
IGG SERPL-MCNC: 1157 MG/DL (ref 586–1602)
IGM SERPL-MCNC: 50 MG/DL (ref 26–217)
INTERPRETATION SERPL IEP-IMP: ABNORMAL
KAPPA LC FREE SER-MCNC: 34.6 MG/L (ref 3.3–19.4)
KAPPA LC FREE/LAMBDA FREE SER: 1.54 {RATIO} (ref 0.26–1.65)
LABORATORY COMMENT REPORT: ABNORMAL
LAMBDA LC FREE SERPL-MCNC: 22.5 MG/L (ref 5.7–26.3)
M PROTEIN SERPL ELPH-MCNC: ABNORMAL G/DL
PROT SERPL-MCNC: 6.9 G/DL (ref 6–8.5)

## 2023-01-16 PROCEDURE — 99214 OFFICE O/P EST MOD 30 MIN: CPT | Performed by: NURSE PRACTITIONER

## 2023-01-16 RX ORDER — GLIPIZIDE 2.5 MG/1
2.5 TABLET, EXTENDED RELEASE ORAL DAILY
Qty: 90 TABLET | Refills: 1 | Status: SHIPPED | OUTPATIENT
Start: 2023-01-16

## 2023-01-16 RX ORDER — PIOGLITAZONEHYDROCHLORIDE 15 MG/1
15 TABLET ORAL DAILY
Qty: 90 TABLET | Refills: 1 | Status: SHIPPED | OUTPATIENT
Start: 2023-01-16

## 2023-01-16 NOTE — PROGRESS NOTES
Chief Complaint  Chief Complaint   Patient presents with   • Diabetes     Pt doesn't have meter. Pt check blood sugar every morning. Pt update on eye exam       Subjective          History of Present Illness    Lissa Ortiz 82 y.o. presents for a follow-up evaluation for type 2 DM     She has been diabetic since 1981     Pt is on the following medications for their DM: Ozempic 1 mg weekly, Actos 15 mg daily, Toujeo 8 units every morning and glipizide XL 2.5 mg every morning     Janumet was discontinued because of diarrhea.      Jardiance was too expensive        Pt complains of intermittent numbness and tingling in feet.    Denies diarrhea, constipation, chest pain, shortness of breath and vision changes    Weight stable since last visit.    Pt does not have a history of DM retinopathy.  Last eye exam was 05/22  Pt has glaucoma     Pt does not have a history of nephropathy.  Patient is not currently taking ACE/ARB     Pt does/not have neuropathy.  She has restless leg at night and her feet feels tight and numb  which is improved by gabapentin 300 mg  at bedtime prescribed by neurologist RONALD Paz.     Pt does not have a history of CAD or CVA.    Last A1C in 01/23 was 6.8    Last microalbumin in 06/22 was negative          Blood Sugars    Blood glucoses are checked 1/day.    Fasting blood glucoses: 90s    Pt has rare episodes of hypoglycemia.              Hypothyroid    PT complains of chronic cold intolerance.    Denies fatigue, weight changes, constipation, diarrhea, hair loss, dry skin, chest pain, palpitations and heat intolerance    Current treatment is levothyroxine 25 mcg daily    Last labs in 01/23 showed TSH 3.210 and FT4 1.27           Hyperlipidemia     Pt denies any muscle/body aches, chest pain, or shortness of breath    Pt is currently taking pravastatin 20 mg HS    Last lipid panel in 01/23 showed Total 169, Triglycerides 87, HDL 58 and LDL 95            Hypertension    Pt denies any chest pain,  palpitations, shortness of breath, or headache    Current regimen includes amlodipine 2.5 mg daily                 Other History     She has rheumatoid arthritis follows with Dr. Chow.        Bone density done at her gynecologist in May 2018 was normal with 4.4% decrease on the left hip.  She was advised follow-up bone density in 2023.  She is being followed by her gynecologist Dr. Cecilia Arnold.        She has monoclonal gammopathy of unknown significance and is being followed by Dr. Thompson            I have reviewed the patient's allergies, medicines, past medical hx, family hx and social hx.    Objective   Vital Signs:   /60 (BP Location: Left arm, Patient Position: Sitting, Cuff Size: Large Adult)   Temp 95.1 °F (35.1 °C) (Infrared)   Wt 65 kg (143 lb 6.4 oz)   LMP  (LMP Unknown)   BMI 23.15 kg/m²       Physical Exam   Physical Exam  Constitutional:       General: She is not in acute distress.     Appearance: Normal appearance. She is not diaphoretic.   HENT:      Head: Normocephalic and atraumatic.   Eyes:      General:         Right eye: No discharge.         Left eye: No discharge.   Skin:     General: Skin is warm and dry.   Neurological:      Mental Status: She is alert.   Psychiatric:         Mood and Affect: Mood normal.         Behavior: Behavior normal.                    Results Review:   Hemoglobin A1C   Date Value Ref Range Status   01/03/2023 6.80 (H) 4.80 - 5.60 % Final     Comment:     Hemoglobin A1C Ranges:  Increased Risk for Diabetes  5.7% to 6.4%  Diabetes                     >= 6.5%  Diabetic Goal                < 7.0%     04/29/2022 9.50 (H) 4.80 - 5.60 % Final     Triglycerides   Date Value Ref Range Status   01/03/2023 87 0 - 150 mg/dL Final     HDL Cholesterol   Date Value Ref Range Status   01/03/2023 58 40 - 60 mg/dL Final     LDL Chol Calc (NIH)   Date Value Ref Range Status   01/03/2023 95 0 - 100 mg/dL Final     VLDL Cholesterol Akbar   Date Value Ref Range Status    01/03/2023 16 5 - 40 mg/dL Final         Assessment and Plan {CC Problem List  Visit Diagnosis  ROS  Review (Popup)  Health Maintenance  Quality  BestPractice  Medications  SmartSets  SnapShot Encounters  Media :23  Diagnoses and all orders for this visit:    1. Type 2 diabetes mellitus without complication, with long-term current use of insulin (HCC) (Primary)  -     glipizide (GLUCOTROL XL) 2.5 MG 24 hr tablet; Take 1 tablet by mouth Daily.  Dispense: 90 tablet; Refill: 1  -     pioglitazone (Actos) 15 MG tablet; Take 1 tablet by mouth Daily.  Dispense: 90 tablet; Refill: 1  -     Hemoglobin A1c; Future  -     Comprehensive Metabolic Panel; Future  -     Microalbumin / Creatinine Urine Ratio - Urine, Clean Catch; Future    A1C is great at 6.8% with rare hypoglycemia - advised to check BG prior to bedtime and according to BG then may need to eat protein snack before going to bed  Continue with Ozempic 1 mg weekly, Actos 15 mg daily, Toujeo 8 units every morning and glipizide XL 2.5 mg every morning  Continue with BG checks - interested in CGM and wants steven 2  Check labs prior to next visit     2. Acquired hypothyroidism  -     TSH; Future  -     T4, Free; Future    Thyroid labs are good.    Continue with levothyroxine 25 mcg daily       3. Mixed hyperlipidemia  -     Comprehensive Metabolic Panel; Future  -     Lipid Panel; Future      Lipid panel is good  Continue with statin      4. Benign essential hypertension  -     Comprehensive Metabolic Panel; Future    Stable  Continue with current medication regimen  Defer management to PCP       Refills sent to pharmacy        RTC on 05/16/23 with Dr. Pimentel, labs prior - needs appointment      Follow Up     Patient was given instructions and counseling regarding her condition or for health maintenance advice. Please see specific information pulled into the AVS if appropriate.              Jenny Guido, MAINE  01/16/23

## 2023-01-17 ENCOUNTER — PREP FOR SURGERY (OUTPATIENT)
Dept: OTHER | Facility: HOSPITAL | Age: 83
End: 2023-01-17
Payer: MEDICARE

## 2023-01-17 DIAGNOSIS — Z86.010 HISTORY OF ADENOMATOUS POLYP OF COLON: Primary | ICD-10-CM

## 2023-01-19 ENCOUNTER — APPOINTMENT (OUTPATIENT)
Dept: LAB | Facility: HOSPITAL | Age: 83
End: 2023-01-19
Payer: MEDICARE

## 2023-01-19 ENCOUNTER — OFFICE VISIT (OUTPATIENT)
Dept: ONCOLOGY | Facility: CLINIC | Age: 83
End: 2023-01-19
Payer: MEDICARE

## 2023-01-19 VITALS
WEIGHT: 142.8 LBS | OXYGEN SATURATION: 96 % | SYSTOLIC BLOOD PRESSURE: 121 MMHG | HEART RATE: 70 BPM | TEMPERATURE: 97.1 F | DIASTOLIC BLOOD PRESSURE: 71 MMHG | BODY MASS INDEX: 22.95 KG/M2 | RESPIRATION RATE: 18 BRPM | HEIGHT: 66 IN

## 2023-01-19 DIAGNOSIS — D47.2 MGUS (MONOCLONAL GAMMOPATHY OF UNKNOWN SIGNIFICANCE): Primary | ICD-10-CM

## 2023-01-19 PROCEDURE — 99214 OFFICE O/P EST MOD 30 MIN: CPT | Performed by: INTERNAL MEDICINE

## 2023-01-19 NOTE — PROGRESS NOTES
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 provider found  RECORDS OBTAINED:  Records of the patients history including those from the electronic medical record were reviewed and summarized in detail.    HISTORY OF PRESENT ILLNESS:    The patient is a 82 y.o. year old female with past medical history significant for DM2 (over 40 years), inclusion body myositis (diagnosed 10 yrs ago, on methotrexate), hypothyroidism, & peripheral neuropathy who had presented to her neurologist,  with signs/symptoms of bilateral feet neuropathy. During this work up, SPEP/VIGNESH from October 2021 showed presence of IgA kappa M-protein, 0.2 gm/dl.   Patient c/o b/l feet neuropathy, along with generalized weakness, which she attributes to inclusion body myositis.   Denies any specific bone pain. On labs, no cytopenias. Renal function & calcium levels within normal limits.   No fever, chills, chest pain, cough or weight loss. No palpable lymphadenopathy. Has generalized weakness secondary to inclusion body myositis. Denies smoking, alcohol or drug abuse.   No family history of blood or bone marrow disorder.     February 2022: SPEP/VIGNESH shows IgA kappa M protein, 0.3 mg/dl.  Normal FLC ratio  July 2022: SPEP/VIGNESH does not show any M protein.  Normal FLC ratio.  CBC and CMP unremarkable.  January 2023: SPEP/VIGNESH shows faint IgA kappa M protein.  Not quantifiable.  Normal FLC ratio.  CBC and CMP unremarkable as well.    INTERIM HISTORY:  Patient returns to the clinic for a f/u visit. Denies any new complaints. She continues to have mild peripheral neuropathy symptoms in her feet.  Says gabapentin helps with that.  No new pain. Energy level stable.  She continues to feel weaker in her muscles related to myositis.  Has been taking methotrexate as prescribed by Dr. Chow, her  rheumatologist.    Past Medical History:   Diagnosis Date   • Allergic rhinitis    • Arthritis    • Cancer (HCC)     skin leg   • Cataract    • Diabetes mellitus (HCC)     type 2   • Hypertension    • Long sleeper     post anesthesia     Past Surgical History:   Procedure Laterality Date   • COLONOSCOPY N/A 2/13/2018    Procedure: COLONOSCOPY to cecum and TI with hot snare polypectomy;  Surgeon: Rowan Smith MD;  Location: [facility] ENDOSCOPY;  Service:    • EYE SURGERY      tearduct sgy   • EYE SURGERY      MARY KAY CATARACTS   • HYSTERECTOMY     • TONSILLECTOMY         MEDICATIONS    Current Outpatient Medications:   •  amLODIPine (NORVASC) 2.5 MG tablet, Take 1 tablet by mouth Daily., Disp: 90 tablet, Rfl: 4  •  amoxicillin (AMOXIL) 500 MG capsule, , Disp: , Rfl:   •  azelastine (ASTELIN) 0.1 % nasal spray, 2 sprays into the nostril(s) as directed by provider 2 (Two) Times a Day. Use in each nostril as directed, Disp: 2 each, Rfl: 5  •  Blood Glucose Monitoring Suppl (TRUE METRIX AIR GLUCOSE METER) device, 1 Device Daily As Needed (check bs's twice daily)., Disp: 1 Device, Rfl: 0  •  Calcium Citrate (CITRACAL PO), Take  by mouth. 2 TABS DAILY, Disp: , Rfl:   •  chlorhexidine (PERIDEX) 0.12 % solution, , Disp: , Rfl:   •  dorzolamide-timolol (COSOPT) 22.3-6.8 MG/ML ophthalmic solution, Apply  to eye(s) as directed by provider Every 12 (Twelve) Hours., Disp: , Rfl:   •  folic acid (FOLVITE) 1 MG tablet, Take 1 tablet by mouth daily., Disp: , Rfl:   •  glipizide (GLUCOTROL XL) 2.5 MG 24 hr tablet, Take 1 tablet by mouth Daily., Disp: 90 tablet, Rfl: 1  •  Insulin Glargine, 2 Unit Dial, (Toujeo Max SoloStar) 300 UNIT/ML solution pen-injector injection, Inject 8 Units under the skin into the appropriate area as directed Daily., Disp: 3 mL, Rfl: 1  •  [employer] Pen Needles 32G X 4 MM misc, USE TO INJECT ONCE DAILY, Disp: 100 each, Rfl: 2  •  levothyroxine (SYNTHROID, LEVOTHROID) 25 MCG tablet, TAKE ONE TABLET BY MOUTH DAILY,  Disp: 90 tablet, Rfl: 1  •  Methotrexate Sodium 50 MG/2ML injection, , Disp: , Rfl:   •  METHOTREXATE SODIUM PO, Take 50 mg by mouth 1 (One) Time Per Week. 50MG/ 7 ML  ONE INJECTION WEEKLY USED FOR THE TREATMENT OF RA, Disp: , Rfl:   •  Methotrexate Sodium syringe, INJECT 0.7 MILLILITER ONCE WEEKLY ON THE SAME DAY OF THE WEEK, Disp: , Rfl:   •  Multiple Vitamin (MULTI VITAMIN PO), Take 1 tablet by mouth Daily., Disp: , Rfl:   •  Ozempic, 1 MG/DOSE, 4 MG/3ML solution pen-injector, DIAL AND INJECT 1MG UNDER THE SKIN WEEKLY, Disp: 3 mL, Rfl: 0  •  pioglitazone (Actos) 15 MG tablet, Take 1 tablet by mouth Daily., Disp: 90 tablet, Rfl: 1  •  pravastatin (PRAVACHOL) 20 MG tablet, Take 1 tablet by mouth Daily., Disp: 90 tablet, Rfl: 4  •  Probiotic capsule, Take 1 capsule by mouth., Disp: , Rfl:   •  solifenacin (VESICARE) 10 MG tablet, , Disp: , Rfl:   •  Tafluprost, PF, (ZIOPTAN) 0.0015 % solution ophthalmic solution, 1 drop Every Night., Disp: , Rfl:   •  gabapentin (NEURONTIN) 300 MG capsule, Take 2 capsules by mouth every night at bedtime for 90 days., Disp: 180 capsule, Rfl: 1    ALLERGIES:     Allergies   Allergen Reactions   • Bactrim [Sulfamethoxazole-Trimethoprim] Nausea And Vomiting   • Sulfamethoxazole Nausea Only   • Trimethoprim Nausea Only       SOCIAL HISTORY:       Social History     Socioeconomic History   • Marital status:    • Number of children: 3   Tobacco Use   • Smoking status: Never   • Smokeless tobacco: Never   Vaping Use   • Vaping Use: Never used   Substance and Sexual Activity   • Alcohol use: No     Comment: Occasional glass of wine one per month   • Drug use: No   • Sexual activity: Defer         FAMILY HISTORY:  Family History   Problem Relation Age of Onset   • Diabetes Mother    • Stroke Mother    • Coronary artery disease Father         MI   • Diabetes Father    • Stroke Father    • Thyroid cancer Daughter        REVIEW OF SYSTEMS:  Constitutional: [No fevers, chills,  "sweats]  Eye: [No recent visual problems]  ENMT: [No ear pain, nasal congestion, sore throat]  Respiratory: [No shortness of breath, cough]  Cardiovascular: [No Chest pain, palpitations, syncope]  Gastrointestinal: [No nausea, vomiting, diarrhea]  Genitourinary: [No hematuria]  Hema/Lymph: [Negative for bruising tendency, swollen lymph glands]  Endocrine: [Negative for excessive thirst, excessive hunger]  Musculoskeletal: [Denies any musculoskeletal pain or swelling]  Integumentary: [No rash, pruritus, abrasions]  Neurologic: [ No weakness or numbness, Alert & oriented X 4]       Vitals:    01/19/23 1322   BP: 121/71   Pulse: 70   Resp: 18   Temp: 97.1 °F (36.2 °C)   TempSrc: Temporal   SpO2: 96%   Weight: 64.8 kg (142 lb 12.8 oz)   Height: 167.6 cm (65.98\")   PainSc: 0-No pain     Current Status 1/19/2023   ECOG score 0      PHYSICAL EXAM:    CONSTITUTIONAL:  Vital signs reviewed.  No distress, looks comfortable.  EYES:  Conjunctiva and lids unremarkable.  Elderly female in no distress.  EARS,NOSE,MOUTH,THROAT:  Ears and nose appear unremarkable.  RESPIRATORY:  Normal respiratory effort.  Lungs clear to auscultation bilaterally.  CARDIOVASCULAR:  Normal S1, S2.  No murmurs rubs or gallops.  No significant lower extremity edema.  GASTROINTESTINAL: Abdomen appears unremarkable.  Nondistended  LYMPHATIC:  No cervical, supraclavicular lymphadenopathy.  NEURO: AAO x 3,  No focal deficits.  Appears to have equal strength all 4 extremities.  MUSCULOSKELETAL:  Unremarkable digits/nails.  No cyanosis or clubbing.  No apparent joint deformities.  SKIN:  Warm.  No rashes.  PSYCHIATRIC:  Normal judgment and insight.  Normal mood and affect.     RECENT LABS:        No visits with results within 7 Day(s) from this visit.   Latest known visit with results is:   Lab on 01/12/2023   Component Date Value Ref Range Status   • Glucose 01/12/2023 138 (H)  74 - 124 mg/dL Final   • BUN 01/12/2023 16  6 - 20 mg/dL Final   • Creatinine " 01/12/2023 0.67  0.60 - 1.10 mg/dL Final   • Sodium 01/12/2023 141  134 - 145 mmol/L Final   • Potassium 01/12/2023 4.0  3.5 - 4.7 mmol/L Final   • Chloride 01/12/2023 105  98 - 107 mmol/L Final   • CO2 01/12/2023 26.6  22.0 - 29.0 mmol/L Final   • Calcium 01/12/2023 9.3  8.5 - 10.2 mg/dL Final   • Total Protein 01/12/2023 7.1  6.3 - 8.0 g/dL Final   • Albumin 01/12/2023 4.1  3.5 - 5.2 g/dL Final   • ALT (SGPT) 01/12/2023 16  0 - 33 U/L Final   • AST (SGOT) 01/12/2023 25  0 - 32 U/L Final   • Alkaline Phosphatase 01/12/2023 82  38 - 116 U/L Final   • Total Bilirubin 01/12/2023 0.5  0.2 - 1.2 mg/dL Final   • Globulin 01/12/2023 3.0  1.8 - 3.5 gm/dL Final   • A/G Ratio 01/12/2023 1.4  1.1 - 2.4 g/dL Final   • BUN/Creatinine Ratio 01/12/2023 23.9  7.3 - 30.0 Final   • Anion Gap 01/12/2023 9.4  5.0 - 15.0 mmol/L Final   • eGFR 01/12/2023 87.9  >60.0 mL/min/1.73 Final    National Kidney Foundation and American Society of Nephrology (ASN) Task Force recommended calculation based on the Chronic Kidney Disease Epidemiology Collaboration (CKD-EPI) equation refit without adjustment for race.   • IgG 01/12/2023 1157  586 - 1602 mg/dL Final   • IgA 01/12/2023 381  64 - 422 mg/dL Final   • IgM 01/12/2023 50  26 - 217 mg/dL Final   • Total Protein 01/12/2023 6.9  6.0 - 8.5 g/dL Final   • Albumin 01/12/2023 3.7  2.9 - 4.4 g/dL Final   • Alpha-1-Globulin 01/12/2023 0.2  0.0 - 0.4 g/dL Final   • Alpha-2-Globulin 01/12/2023 0.6  0.4 - 1.0 g/dL Final   • Beta Globulin 01/12/2023 1.1  0.7 - 1.3 g/dL Final   • Gamma Globulin 01/12/2023 1.2  0.4 - 1.8 g/dL Final   • M-Camilo 01/12/2023 Comment:  Not Observed g/dL Final    SPE shows an asymmetrical gamma.   • Globulin 01/12/2023 3.2  2.2 - 3.9 g/dL Final   • A/G Ratio 01/12/2023 1.2  0.7 - 1.7 Final   • Immunofixation Reflex, Serum 01/12/2023 Comment:   Final    VIGNESH shows asymmetrical IgA suggestive of a monoclonal protein.   • Please note 01/12/2023 Comment   Final    Protein  electrophoresis scan will follow via computer, mail, or   delivery.   • Free Light Chain, Kappa 01/12/2023 34.6 (H)  3.3 - 19.4 mg/L Final   • Free Lambda Light Chains 01/12/2023 22.5  5.7 - 26.3 mg/L Final   • Kappa/Lambda Ratio 01/12/2023 1.54  0.26 - 1.65 Final   • WBC 01/12/2023 5.55  3.40 - 10.80 10*3/mm3 Final   • RBC 01/12/2023 4.21  3.77 - 5.28 10*6/mm3 Final   • Hemoglobin 01/12/2023 13.4  12.0 - 15.9 g/dL Final   • Hematocrit 01/12/2023 40.4  34.0 - 46.6 % Final   • MCV 01/12/2023 96.0  79.0 - 97.0 fL Final   • MCH 01/12/2023 31.8  26.6 - 33.0 pg Final   • MCHC 01/12/2023 33.2  31.5 - 35.7 g/dL Final   • RDW 01/12/2023 15.1  12.3 - 15.4 % Final   • RDW-SD 01/12/2023 51.9  37.0 - 54.0 fl Final   • MPV 01/12/2023 9.3  6.0 - 12.0 fL Final   • Platelets 01/12/2023 275  140 - 450 10*3/mm3 Final   • Neutrophil % 01/12/2023 65.2  42.7 - 76.0 % Final   • Lymphocyte % 01/12/2023 18.2 (L)  19.6 - 45.3 % Final   • Monocyte % 01/12/2023 10.8  5.0 - 12.0 % Final   • Eosinophil % 01/12/2023 4.5  0.3 - 6.2 % Final   • Basophil % 01/12/2023 1.1  0.0 - 1.5 % Final   • Immature Grans % 01/12/2023 0.2  0.0 - 0.5 % Final   • Neutrophils, Absolute 01/12/2023 3.62  1.70 - 7.00 10*3/mm3 Final   • Lymphocytes, Absolute 01/12/2023 1.01  0.70 - 3.10 10*3/mm3 Final   • Monocytes, Absolute 01/12/2023 0.60  0.10 - 0.90 10*3/mm3 Final   • Eosinophils, Absolute 01/12/2023 0.25  0.00 - 0.40 10*3/mm3 Final   • Basophils, Absolute 01/12/2023 0.06  0.00 - 0.20 10*3/mm3 Final   • Immature Grans, Absolute 01/12/2023 0.01  0.00 - 0.05 10*3/mm3 Final   • nRBC 01/12/2023 0.0  0.0 - 0.2 /100 WBC Final         ASSESSMENT:   is a pleasant 81 y/o WF with past medical history significant for DM2 (over 40 years), inclusion body myositis (diagnosed 10 yrs ago, on methotrexate), hypothyroidism, & peripheral neuropathy comes to this clinic for monoclonal gammopathy evaluation.     # Monoclonal gammopathy:  · Patient had presented to her  "neurologist,  with signs/symptoms of bilateral feet neuropathy. During this work up SPEP/VIGNESH was performed which showed presence of IgA kappa M-protein, 0.2 gm/dl.   · Patient c/o b/l feet neuropathy, along with generalized weakness, which she attributes to inclusion body myositis.   · Denies any specific bone pain. On labs, no cytopenias. Renal function & calcium levels within normal limits. No B-symptoms.   · Informed patient that with low level of IgA M-protein & no \"CRAB\" features, this is likely MGUS, low-intermediate risk. Informed patient that MGUS is a common finding in elderly population, however,  there is an 1% per year risk of progression to multiple myeloma.   · A repeat SPEP/VIGNESH from Feb 2022 show stable IgA kappa M-protein level of 0.3 gm/dl. No symptoms concerning for progression.   · However repeat SPEP/VIGNESH from July 2022 does not show any M protein or abnormal FLC ratio.  Other labs unremarkable as well.  Informed patient this this could be seen with transient inflammatory conditions that could have led to abnormal M protein.  · January 2023: Repeat labs again show faint levels of IgA kappa monoclonal protein.  Rest of the labs unremarkable.  This is unlikely contributing to her peripheral neuropathy.  · Will plan to monitor and repeat SPEP/VIGNESH & FLC in 12 months time.  · Her peripheral neuropathy is unlikely related to monoclonal gammopathy. Suspect diabetic neuropathy. Additional work up being performed by neurology.     # Inclusion body myositis: On MTX 7.5 mg weekly. Follows up with   # DM2: On insulin, januvia & glipizide.   # Hypothyrodism: On synthroid  # peripheral neuropathy: Is on gabapentin 900 mg at bedtime. Symptoms not controlled. Advised to take additional dose of 300 mg in the afternoon. Advised to maintain follow up with neurology.    PLAN:   - IgA kappa MGUS. Unlikely attributing to peripheral neuropathy. Repeat labs from January 2023 shows faint levels of IgA " kappa M protein.  -Advised close follow-up with her PCP and other specialists.  - Active surveillance. Repeat labs in 12 months.     Orders Placed This Encounter   Procedures   • Comprehensive Metabolic Panel     Standing Status:   Future     Standing Expiration Date:   3/24/2024     Order Specific Question:   Release to patient     Answer:   Routine Release   • VIGNESH,PE and FLC, Serum     Standing Status:   Future     Standing Expiration Date:   1/19/2024     Order Specific Question:   Release to patient     Answer:   Routine Release   • CBC & Differential     Standing Status:   Future     Standing Expiration Date:   3/24/2024     Order Specific Question:   Manual Differential     Answer:   No   Total time spent during this patient encounter is 35 minutes. The total time spent with the patient includes at least one or more of the following: preparing to see the patient by reviewing of tests, prior notes or other relevant information, performing appropriate independent examination & evaluation, counseling, ordering of medications, tests or procedures, communicating with other healthcare professionals, when appropriate to coordinate care, documenting clinic information in the electronic medical records or other health records, independently interpreting results of tests and communicating the results to the patient/family or caregiver.

## 2023-01-23 DIAGNOSIS — G25.81 RESTLESS LEG SYNDROME: ICD-10-CM

## 2023-01-24 RX ORDER — GABAPENTIN 300 MG/1
300 CAPSULE ORAL 3 TIMES DAILY
Qty: 180 CAPSULE | Refills: 0 | Status: SHIPPED | OUTPATIENT
Start: 2023-01-24 | End: 2023-04-24

## 2023-01-30 ENCOUNTER — TELEPHONE (OUTPATIENT)
Dept: GASTROENTEROLOGY | Facility: CLINIC | Age: 83
End: 2023-01-30
Payer: MEDICARE

## 2023-01-30 PROBLEM — Z86.0101 HISTORY OF ADENOMATOUS POLYP OF COLON: Status: ACTIVE | Noted: 2023-01-30

## 2023-01-30 PROBLEM — Z86.010 HISTORY OF ADENOMATOUS POLYP OF COLON: Status: ACTIVE | Noted: 2023-01-30

## 2023-01-30 NOTE — TELEPHONE ENCOUNTER
MARGUERITE Mclain for colonoscopy on 5/3/23  arrive at 1030am   . Mailed Prep instructions to Mailing address on-file. ----miralax

## 2023-02-07 DIAGNOSIS — E11.9 TYPE 2 DIABETES MELLITUS WITHOUT COMPLICATION, WITHOUT LONG-TERM CURRENT USE OF INSULIN: ICD-10-CM

## 2023-02-07 RX ORDER — SEMAGLUTIDE 1.34 MG/ML
1 INJECTION, SOLUTION SUBCUTANEOUS
Qty: 9 ML | Refills: 1 | Status: SHIPPED | OUTPATIENT
Start: 2023-02-07 | End: 2023-05-08

## 2023-03-30 ENCOUNTER — TELEPHONE (OUTPATIENT)
Dept: NEUROLOGY | Facility: CLINIC | Age: 83
End: 2023-03-30

## 2023-03-30 NOTE — TELEPHONE ENCOUNTER
[facility] received a fax that requires your attention. The document has been indexed to the patient’s chart for your review.      Reason for sending: FYI/FOR REVIEW    Documents Description: PROGRESS NOTE_[facility], 03/28/23    Name of Sender: MAINE CURTIS    Date Indexed: 03/30/23    Notes (if needed):

## 2023-04-13 ENCOUNTER — TELEPHONE (OUTPATIENT)
Dept: NEUROLOGY | Facility: CLINIC | Age: 83
End: 2023-04-13
Payer: MEDICARE

## 2023-04-20 DIAGNOSIS — G25.81 RESTLESS LEG SYNDROME: ICD-10-CM

## 2023-04-21 RX ORDER — GABAPENTIN 300 MG/1
300 CAPSULE ORAL 3 TIMES DAILY
Qty: 90 CAPSULE | Refills: 0 | Status: SHIPPED | OUTPATIENT
Start: 2023-04-21 | End: 2023-07-20

## 2023-04-24 RX ORDER — LEVOTHYROXINE SODIUM 0.03 MG/1
TABLET ORAL
Qty: 90 TABLET | Refills: 1 | Status: SHIPPED | OUTPATIENT
Start: 2023-04-24

## 2023-05-01 DIAGNOSIS — R05.3 CHRONIC COUGH: ICD-10-CM

## 2023-05-01 RX ORDER — AZELASTINE 1 MG/ML
2 SPRAY, METERED NASAL 2 TIMES DAILY
Qty: 2 EACH | Refills: 5 | Status: SHIPPED | OUTPATIENT
Start: 2023-05-01

## 2023-05-03 ENCOUNTER — HOSPITAL ENCOUNTER (OUTPATIENT)
Facility: HOSPITAL | Age: 83
Setting detail: HOSPITAL OUTPATIENT SURGERY
Discharge: HOME | End: 2023-05-03
Attending: INTERNAL MEDICINE | Admitting: INTERNAL MEDICINE
Payer: MEDICARE

## 2023-05-03 ENCOUNTER — ANESTHESIA EVENT (OUTPATIENT)
Dept: GASTROENTEROLOGY | Facility: HOSPITAL | Age: 83
End: 2023-05-03
Payer: MEDICARE

## 2023-05-03 ENCOUNTER — ANESTHESIA (OUTPATIENT)
Dept: GASTROENTEROLOGY | Facility: HOSPITAL | Age: 83
End: 2023-05-03
Payer: MEDICARE

## 2023-05-03 VITALS
RESPIRATION RATE: 16 BRPM | BODY MASS INDEX: 22.44 KG/M2 | OXYGEN SATURATION: 100 % | SYSTOLIC BLOOD PRESSURE: 140 MMHG | DIASTOLIC BLOOD PRESSURE: 60 MMHG | HEIGHT: 67 IN | HEART RATE: 73 BPM | WEIGHT: 143 LBS

## 2023-05-03 LAB — GLUCOSE BLDC GLUCOMTR-MCNC: 132 MG/DL (ref 70–130)

## 2023-05-03 PROCEDURE — 25010000002 PROPOFOL 10 MG/ML EMULSION: Performed by: NURSE ANESTHETIST, CERTIFIED REGISTERED

## 2023-05-03 PROCEDURE — G0105 COLORECTAL SCRN; HI RISK IND: HCPCS | Performed by: INTERNAL MEDICINE

## 2023-05-03 PROCEDURE — S0260 H&P FOR SURGERY: HCPCS | Performed by: INTERNAL MEDICINE

## 2023-05-03 PROCEDURE — 82948 REAGENT STRIP/BLOOD GLUCOSE: CPT

## 2023-05-03 PROCEDURE — 0 LIDOCAINE 1 % SOLUTION: Performed by: NURSE ANESTHETIST, CERTIFIED REGISTERED

## 2023-05-03 RX ORDER — LIDOCAINE HYDROCHLORIDE 10 MG/ML
INJECTION, SOLUTION INFILTRATION; PERINEURAL AS NEEDED
Status: DISCONTINUED | OUTPATIENT
Start: 2023-05-03 | End: 2023-05-03 | Stop reason: SURG

## 2023-05-03 RX ORDER — SODIUM CHLORIDE 0.9 % (FLUSH) 0.9 %
10 SYRINGE (ML) INJECTION AS NEEDED
Status: DISCONTINUED | OUTPATIENT
Start: 2023-05-03 | End: 2023-05-03 | Stop reason: HOSPADM

## 2023-05-03 RX ORDER — SODIUM CHLORIDE, SODIUM LACTATE, POTASSIUM CHLORIDE, CALCIUM CHLORIDE 600; 310; 30; 20 MG/100ML; MG/100ML; MG/100ML; MG/100ML
1000 INJECTION, SOLUTION INTRAVENOUS CONTINUOUS
Status: DISCONTINUED | OUTPATIENT
Start: 2023-05-03 | End: 2023-05-03 | Stop reason: HOSPADM

## 2023-05-03 RX ORDER — PROPOFOL 10 MG/ML
VIAL (ML) INTRAVENOUS AS NEEDED
Status: DISCONTINUED | OUTPATIENT
Start: 2023-05-03 | End: 2023-05-03 | Stop reason: SURG

## 2023-05-03 RX ADMIN — PROPOFOL 80 MG: 10 INJECTION, EMULSION INTRAVENOUS at 11:11

## 2023-05-03 RX ADMIN — LIDOCAINE HYDROCHLORIDE 20 MG: 10 INJECTION, SOLUTION INFILTRATION; PERINEURAL at 11:11

## 2023-05-03 RX ADMIN — PROPOFOL 160 MCG/KG/MIN: 10 INJECTION, EMULSION INTRAVENOUS at 11:11

## 2023-05-03 RX ADMIN — SODIUM CHLORIDE, POTASSIUM CHLORIDE, SODIUM LACTATE AND CALCIUM CHLORIDE 1000 ML: 600; 310; 30; 20 INJECTION, SOLUTION INTRAVENOUS at 10:33

## 2023-05-03 NOTE — H&P
[facility]  Pre Procedure History & Physical    Chief Complaint:   H/o adenomatous polyps    Subjective     HPI:   81 yo here today for colonoscopy.  Pt reports no FH CRC/polyps.  Patient denies GI symptoms currrently.  Last exam 2018    Past Medical History:   Past Medical History:   Diagnosis Date   • Allergic rhinitis    • Arthritis    • Cancer     skin leg   • Cataract    • Diabetes mellitus     type 2   • Hypertension    • Long sleeper     post anesthesia       Past Surgical History:  Past Surgical History:   Procedure Laterality Date   • COLONOSCOPY N/A 02/13/2018    Procedure: COLONOSCOPY to cecum and TI with hot snare polypectomy;  Surgeon: Rowan Smith MD;  Location: [facility] ENDOSCOPY;  Service:    • COLONOSCOPY  05/03/2023    Sarah   • EYE SURGERY      tearduct sgy   • EYE SURGERY      MARY KAY CATARACTS   • HYSTERECTOMY     • TONSILLECTOMY         Family History:  Family History   Problem Relation Age of Onset   • Diabetes Mother    • Stroke Mother    • Coronary artery disease Father         MI   • Diabetes Father    • Stroke Father    • Thyroid cancer Daughter        Social History:   reports that she has never smoked. She has never used smokeless tobacco. She reports that she does not drink alcohol and does not use drugs.    Medications:   Medications Prior to Admission   Medication Sig Dispense Refill Last Dose   • amLODIPine (NORVASC) 2.5 MG tablet Take 1 tablet by mouth Daily. 90 tablet 4 5/2/2023   • Blood Glucose Monitoring Suppl (TRUE METRIX AIR GLUCOSE METER) device 1 Device Daily As Needed (check bs's twice daily). 1 Device 0 5/3/2023   • Calcium Citrate (CITRACAL PO) Take  by mouth. 2 TABS DAILY   5/2/2023   • dorzolamide-timolol (COSOPT) 22.3-6.8 MG/ML ophthalmic solution Apply  to eye(s) as directed by provider Every 12 (Twelve) Hours.   5/2/2023   • folic acid (FOLVITE) 1 MG tablet Take 1 tablet by mouth Daily.   5/2/2023   • gabapentin (NEURONTIN) 300 MG capsule  Take 1 capsule by mouth 3 (Three) Times a Day for 90 days. 90 capsule 0 5/2/2023   • glipizide (GLUCOTROL XL) 2.5 MG 24 hr tablet Take 1 tablet by mouth Daily. 90 tablet 1 5/2/2023   • Insulin Glargine, 2 Unit Dial, (Toujeo Max SoloStar) 300 UNIT/ML solution pen-injector injection Inject 8 Units under the skin into the appropriate area as directed Daily. 3 mL 1 5/2/2023   • [employer] Pen [place] 32G X 4 MM misc USE TO INJECT ONCE DAILY 100 each 2 5/2/2023   • levothyroxine (SYNTHROID, LEVOTHROID) 25 MCG tablet TAKE ONE TABLET BY MOUTH DAILY 90 tablet 1 5/2/2023   • Multiple Vitamin (MULTI VITAMIN PO) Take 1 tablet by mouth Daily.   5/2/2023   • pioglitazone (Actos) 15 MG tablet Take 1 tablet by mouth Daily. 90 tablet 1 5/2/2023   • pravastatin (PRAVACHOL) 20 MG tablet Take 1 tablet by mouth Daily. 90 tablet 4 5/2/2023   • Probiotic capsule Take 1 capsule by mouth.   5/2/2023   • solifenacin (VESICARE) 10 MG tablet    5/2/2023   • Tafluprost, PF, (ZIOPTAN) 0.0015 % solution ophthalmic solution 1 drop Every Night.   5/2/2023   • amoxicillin (AMOXIL) 500 MG capsule       • azelastine (ASTELIN) 0.1 % nasal spray 2 sprays into the nostril(s) as directed by provider 2 (Two) Times a Day. Use in each nostril as directed 2 each 5 More than a month   • chlorhexidine (PERIDEX) 0.12 % solution    More than a month   • Methotrexate Sodium 50 MG/2ML injection    5/1/2023   • METHOTREXATE SODIUM PO Take 50 mg by mouth 1 (One) Time Per Week. 50MG/ 7 ML   ONE INJECTION WEEKLY USED FOR THE TREATMENT OF RA   5/1/2023   • Methotrexate Sodium syringe INJECT 0.7 MILLILITER ONCE WEEKLY ON THE SAME DAY OF THE WEEK   5/1/2023   • nitrofurantoin, macrocrystal-monohydrate, (MACROBID) 100 MG capsule Take 1 capsule by mouth Every 12 (Twelve) Hours. 14 capsule 0    • Semaglutide, 1 MG/DOSE, (Ozempic, 1 MG/DOSE,) 4 MG/3ML solution pen-injector Inject 1 mg under the skin into the appropriate area as directed Every 7 (Seven) Days for 90 days. 9 mL 1  "4/28/2023       Allergies:  Bactrim [sulfamethoxazole-trimethoprim], Sulfamethoxazole, and Trimethoprim    ROS:    Pertinent items are noted in HPI, all other systems reviewed and negative     Objective     Blood pressure 173/76, pulse 83, resp. rate 16, height 170.2 cm (67\"), weight 64.9 kg (143 lb), SpO2 100 %, not currently breastfeeding.    Physical Exam   Constitutional: Pt is oriented to person, place, and time and well-developed, well-nourished, and in no distress.   Mouth/Throat: Oropharynx is clear and moist.   Neck: Normal range of motion.   Cardiovascular: Normal rate, regular rhythm    Pulmonary/Chest: Effort normal    Abdominal: Soft. Nontender  Skin: Skin is warm and dry.   Psychiatric: Mood, memory, affect and judgment normal.     Assessment & Plan     Diagnosis:  H/o adenomatous polyps    Anticipated Surgical Procedure:  colonoscopy    The risks, benefits, and alternatives of this procedure have been discussed with the patient or the responsible party- the patient understands and agrees to proceed.                                                              "

## 2023-05-03 NOTE — ANESTHESIA PREPROCEDURE EVALUATION
Anesthesia Evaluation     Patient summary reviewed   NPO Solid Status: > 8 hours             Airway   Mallampati: II  No difficulty expected  Dental      Pulmonary    Cardiovascular     ECG reviewed  Rhythm: regular    (+) hypertension,       Neuro/Psych  (+) psychiatric history,    GI/Hepatic/Renal/Endo    (+)   diabetes mellitus, thyroid problem     Musculoskeletal     Abdominal    Substance History      OB/GYN          Other   arthritis,                      Anesthesia Plan    ASA 3     MAC       Anesthetic plan, risks, benefits, and alternatives have been provided, discussed and informed consent has been obtained with: patient.        CODE STATUS:

## 2023-05-03 NOTE — DISCHARGE INSTRUCTIONS
For the next 24 hours patient needs to be with a responsible adult.    For 24 hours DO NOT drive, operate machinery, appliances, drink alcohol, make important decisions or sign legal documents.    Start with a light or bland diet if you are feeling sick to your stomach otherwise advance to regular diet as tolerated.    Follow recommendations on procedure report if provided by your doctor.    Call Dr Smith for problems [phone]    Problems may include but not limited to: large amounts of bleeding, trouble breathing, repeated vomiting, severe unrelieved pain, fever or chills.

## 2023-05-03 NOTE — ANESTHESIA POSTPROCEDURE EVALUATION
Patient: Lissa Ortiz    Procedure Summary     Date: 05/03/23 Room / Location: [facility] ENDOSCOPY 10 /  ANOOP ENDOSCOPY    Anesthesia Start: 1105 Anesthesia Stop: 1129    Procedure: COLONOSCOPY TO CECUM AND TERMINAL ILEUM Diagnosis:       History of adenomatous polyp of colon      (History of adenomatous polyp of colon [Z86.010])    Surgeons: Rowan Smith MD Provider: Tigre Reynolds MD    Anesthesia Type: MAC ASA Status: 3          Anesthesia Type: MAC    Vitals  Vitals Value Taken Time   /64 05/03/23 1140   Temp     Pulse 80 05/03/23 1140   Resp 16 05/03/23 1140   SpO2 100 % 05/03/23 1140           Post Anesthesia Care and Evaluation    Patient location during evaluation: PACU  Patient participation: complete - patient participated  Level of consciousness: awake  Pain score: 1  Pain management: adequate    Airway patency: patent  Anesthetic complications: No anesthetic complications  PONV Status: none  Cardiovascular status: acceptable  Respiratory status: acceptable  Hydration status: acceptable

## 2023-05-09 ENCOUNTER — LAB (OUTPATIENT)
Dept: ENDOCRINOLOGY | Age: 83
End: 2023-05-09
Payer: MEDICARE

## 2023-05-09 DIAGNOSIS — Z79.4 TYPE 2 DIABETES MELLITUS WITHOUT COMPLICATION, WITH LONG-TERM CURRENT USE OF INSULIN: Chronic | ICD-10-CM

## 2023-05-09 DIAGNOSIS — I10 BENIGN ESSENTIAL HYPERTENSION: Chronic | ICD-10-CM

## 2023-05-09 DIAGNOSIS — E11.9 TYPE 2 DIABETES MELLITUS WITHOUT COMPLICATION, WITH LONG-TERM CURRENT USE OF INSULIN: Chronic | ICD-10-CM

## 2023-05-09 DIAGNOSIS — E78.2 MIXED HYPERLIPIDEMIA: Chronic | ICD-10-CM

## 2023-05-09 DIAGNOSIS — E03.9 ACQUIRED HYPOTHYROIDISM: Chronic | ICD-10-CM

## 2023-05-10 LAB
ALBUMIN SERPL-MCNC: 4.3 G/DL (ref 3.5–5.2)
ALBUMIN/CREAT UR: 23 MG/G CREAT (ref 0–29)
ALBUMIN/GLOB SERPL: 1.7 G/DL
ALP SERPL-CCNC: 83 U/L (ref 39–117)
ALT SERPL-CCNC: 22 U/L (ref 1–33)
AST SERPL-CCNC: 27 U/L (ref 1–32)
BILIRUB SERPL-MCNC: 0.5 MG/DL (ref 0–1.2)
BUN SERPL-MCNC: 15 MG/DL (ref 8–23)
BUN/CREAT SERPL: 22.7 (ref 7–25)
CALCIUM SERPL-MCNC: 9.5 MG/DL (ref 8.6–10.5)
CHLORIDE SERPL-SCNC: 106 MMOL/L (ref 98–107)
CHOLEST SERPL-MCNC: 169 MG/DL (ref 0–200)
CO2 SERPL-SCNC: 30.1 MMOL/L (ref 22–29)
CREAT SERPL-MCNC: 0.66 MG/DL (ref 0.57–1)
CREAT UR-MCNC: 72.7 MG/DL
EGFRCR SERPLBLD CKD-EPI 2021: 88.3 ML/MIN/1.73
GLOBULIN SER CALC-MCNC: 2.5 GM/DL
GLUCOSE SERPL-MCNC: 123 MG/DL (ref 65–99)
HBA1C MFR BLD: 7.3 % (ref 4.8–5.6)
HDLC SERPL-MCNC: 61 MG/DL (ref 40–60)
IMP & REVIEW OF LAB RESULTS: NORMAL
LDLC SERPL CALC-MCNC: 95 MG/DL (ref 0–100)
MICROALBUMIN UR-MCNC: 16.9 UG/ML
POTASSIUM SERPL-SCNC: 4.4 MMOL/L (ref 3.5–5.2)
PROT SERPL-MCNC: 6.8 G/DL (ref 6–8.5)
SODIUM SERPL-SCNC: 142 MMOL/L (ref 136–145)
TRIGL SERPL-MCNC: 70 MG/DL (ref 0–150)
TSH SERPL DL<=0.005 MIU/L-ACNC: 2.89 UIU/ML (ref 0.27–4.2)
VLDLC SERPL CALC-MCNC: 13 MG/DL (ref 5–40)

## 2023-05-16 ENCOUNTER — OFFICE VISIT (OUTPATIENT)
Dept: ENDOCRINOLOGY | Age: 83
End: 2023-05-16
Payer: MEDICARE

## 2023-05-16 VITALS
OXYGEN SATURATION: 98 % | DIASTOLIC BLOOD PRESSURE: 70 MMHG | BODY MASS INDEX: 23.07 KG/M2 | WEIGHT: 147 LBS | HEIGHT: 67 IN | HEART RATE: 60 BPM | SYSTOLIC BLOOD PRESSURE: 122 MMHG | RESPIRATION RATE: 10 BRPM

## 2023-05-16 DIAGNOSIS — D47.2 MONOCLONAL GAMMOPATHY OF UNKNOWN SIGNIFICANCE (MGUS): ICD-10-CM

## 2023-05-16 DIAGNOSIS — E55.9 VITAMIN D DEFICIENCY: Chronic | ICD-10-CM

## 2023-05-16 DIAGNOSIS — M85.852 OSTEOPENIA OF LEFT HIP: Chronic | ICD-10-CM

## 2023-05-16 DIAGNOSIS — E78.2 MIXED HYPERLIPIDEMIA: Chronic | ICD-10-CM

## 2023-05-16 DIAGNOSIS — E03.9 ACQUIRED HYPOTHYROIDISM: Chronic | ICD-10-CM

## 2023-05-16 DIAGNOSIS — I10 PRIMARY HYPERTENSION: ICD-10-CM

## 2023-05-16 DIAGNOSIS — Z79.4 TYPE 2 DIABETES MELLITUS WITHOUT COMPLICATION, WITH LONG-TERM CURRENT USE OF INSULIN: Primary | Chronic | ICD-10-CM

## 2023-05-16 DIAGNOSIS — E11.9 TYPE 2 DIABETES MELLITUS WITHOUT COMPLICATION, WITH LONG-TERM CURRENT USE OF INSULIN: Primary | Chronic | ICD-10-CM

## 2023-05-16 RX ORDER — SEMAGLUTIDE 2.68 MG/ML
INJECTION, SOLUTION SUBCUTANEOUS
Qty: 3 ML | Refills: 6 | Status: SHIPPED | OUTPATIENT
Start: 2023-05-16

## 2023-05-16 NOTE — PROGRESS NOTES
Subjective   Lissa Ortiz is a 82 y.o. female.     History of Present Illness        She has known diabetes mellitus since 1981.  AILYN antibody was negative in May 2021.      She is on Ozempic 1 mg weekly, Actos 15 mg  per day, glipizide XL 2.5 mg every morning and Toujeo 8 units every morning.    Janumet was discontinued because of diarrhea.    Jardiance was too expensive.  She checks her blood sugar once a day.  Fasting glucose 100-110.  Bedtime -150..  She denies hypoglycemia.  She admits to dietary noncompliance and has gained 4 pounds since January 2023.  Hemoglobin A1c done in May 2023 is 7.3%.      She denies eye complaints.  Her last eye examination was in 11/22.  She has glaucoma but no retinopathy.  She has restless leg at night and her feet feels tight and numb  which is improved by gabapentin 300 mg  at bedtime prescribed by neurologist RONALD Paz.  Urine microalbumin is elevated in January 2022.  Urine microalbumin is normal in May 2023.     She has hyperlipidemia and is on pravastatin 20 mg/day.  She denies myalgia.   Lipid panel done in May 2023 are as follows: Cholesterol 169.  HDL 61.  LDL 95.  Triglycerides 70.   She has not used Crestor or Lipitor before.     She has hypothyroidism and is on levothyroxine 25 mcg/day.  She has no history of head or neck radiation therapy.  She has no history of thyroid surgery.  TSH done in May 2023 is normal at 2.89.     She has hypertension and is on amlodipine 2.5 mg once a day.  She denies history of heart attack or stroke.  She denies chest pain or shortness of breath.     She has rheumatoid arthritis and is on methotrexate and folic acid prescribed by Dr. Chow.  She is not on oral steroids.     She has osteopenia on bone density done in November 2022.  Her 10-year risk of major osteoporotic fracture is elevated at 28% and for hip fracture is 18%.  She is on a multivitamin 1 tablet/day and gummy calcium 2 tablets/day.  She walks 1/2 miles per day.   "Osteopenia is being managed per her PCP Dr. Bragg.     She has monoclonal gammopathy of unknown significance and is being followed by Dr. Thompson.        The following portions of the patient's history were reviewed and updated as appropriate: allergies, current medications, past family history, past medical history, past social history, past surgical history and problem list.    Review of Systems   Eyes: Negative for visual disturbance.   Respiratory: Negative for shortness of breath.    Cardiovascular: Negative for chest pain and palpitations.   Gastrointestinal: Negative.  Negative for anal bleeding and blood in stool.   Endocrine: Negative for polyuria.   Genitourinary: Negative.    Musculoskeletal: Negative for myalgias.   Neurological: Positive for numbness.     Vitals:    05/16/23 1112   BP: 122/70   Pulse: 60   Resp: 10   SpO2: 98%   Weight: 66.7 kg (147 lb)   Height: 170.2 cm (67\")      Objective   Physical Exam  Constitutional:       General: She is not in acute distress.     Appearance: Normal appearance. She is not ill-appearing.   Eyes:      Extraocular Movements: Extraocular movements intact.   Neck:      Vascular: No carotid bruit.   Cardiovascular:      Rate and Rhythm: Regular rhythm.      Heart sounds: Normal heart sounds.   Pulmonary:      Breath sounds: Normal breath sounds. No rales.   Chest:      Chest wall: No tenderness.   Abdominal:      Palpations: Abdomen is soft.      Tenderness: There is no right CVA tenderness or left CVA tenderness.   Musculoskeletal:      Right lower leg: No edema.      Left lower leg: No edema.      Comments: No plantar ulcers.  Poor dorsalis pedis pulses.  No cyanosis.   Lymphadenopathy:      Cervical: No cervical adenopathy.   Skin:     General: Skin is warm.   Neurological:      Mental Status: She is alert and oriented to person, place, and time.      Comments: Intact light touch in lower extremities.       Lab on 05/09/2023   Component Date Value Ref Range Status "   • TSH 05/09/2023 2.890  0.270 - 4.200 uIU/mL Final   • Total Cholesterol 05/09/2023 169  0 - 200 mg/dL Final    Comment: Cholesterol Reference Ranges  (U.S. Department of Health and Human Services ATP III  Classifications)  Desirable          <200 mg/dL  Borderline High    200-239 mg/dL  High Risk          >240 mg/dL  Triglyceride Reference Ranges  (U.S. Department of Health and Human Services ATP III  Classifications)  Normal           <150 mg/dL  Borderline High  150-199 mg/dL  High             200-499 mg/dL  Very High        >500 mg/dL  HDL Reference Ranges  (U.S. Department of Health and Human Services ATP III  Classifications)  Low     <40 mg/dl (major risk factor for CHD)  High    >60 mg/dl ('negative' risk factor for CHD)  LDL Reference Ranges  (U.S. Department of Health and Human Services ATP III  Classifications)  Optimal          <100 mg/dL  Near Optimal     100-129 mg/dL  Borderline High  130-159 mg/dL  High             160-189 mg/dL  Very High        >189 mg/dL     • Triglycerides 05/09/2023 70  0 - 150 mg/dL Final   • HDL Cholesterol 05/09/2023 61 (H)  40 - 60 mg/dL Final   • VLDL Cholesterol Akbar 05/09/2023 13  5 - 40 mg/dL Final   • LDL Chol Calc ([facility]) 05/09/2023 95  0 - 100 mg/dL Final   • Creatinine, Urine 05/09/2023 72.7  Not Estab. mg/dL Final   • Microalbumin, Urine 05/09/2023 16.9  Not Estab. ug/mL Final   • Microalbumin/Creatinine Ratio 05/09/2023 23  0 - 29 mg/g creat Final    Comment:                        Normal:                0 -  29                         Moderately increased: 30 - 300                         Severely increased:       >300     • Glucose 05/09/2023 123 (H)  65 - 99 mg/dL Final   • BUN 05/09/2023 15  8 - 23 mg/dL Final   • Creatinine 05/09/2023 0.66  0.57 - 1.00 mg/dL Final   • EGFR Result 05/09/2023 88.3  >60.0 mL/min/1.73 Final    Comment: GFR Normal >60  Chronic Kidney Disease <60  Kidney Failure <15  The GFR formula is only valid for adults with stable  renal  function between ages 18 and 70.     • BUN/Creatinine Ratio 05/09/2023 22.7  7.0 - 25.0 Final   • Sodium 05/09/2023 142  136 - 145 mmol/L Final   • Potassium 05/09/2023 4.4  3.5 - 5.2 mmol/L Final   • Chloride 05/09/2023 106  98 - 107 mmol/L Final   • Total CO2 05/09/2023 30.1 (H)  22.0 - 29.0 mmol/L Final   • Calcium 05/09/2023 9.5  8.6 - 10.5 mg/dL Final   • Total Protein 05/09/2023 6.8  6.0 - 8.5 g/dL Final   • Albumin 05/09/2023 4.3  3.5 - 5.2 g/dL Final   • Globulin 05/09/2023 2.5  gm/dL Final   • A/G Ratio 05/09/2023 1.7  g/dL Final   • Total Bilirubin 05/09/2023 0.5  0.0 - 1.2 mg/dL Final   • Alkaline Phosphatase 05/09/2023 83  39 - 117 U/L Final   • AST (SGOT) 05/09/2023 27  1 - 32 U/L Final   • ALT (SGPT) 05/09/2023 22  1 - 33 U/L Final   • Hemoglobin A1C 05/09/2023 7.30 (H)  4.80 - 5.60 % Final    Comment: Hemoglobin A1C Ranges:  Increased Risk for Diabetes  5.7% to 6.4%  Diabetes                     >= 6.5%  Diabetic Goal                < 7.0%     • Interpretation 05/09/2023 Note   Final    Supplemental report is available.     Assessment & Plan   Diagnoses and all orders for this visit:    1. Type 2 diabetes mellitus without complication, with long-term current use of insulin (HCC) (Primary)  -     Comprehensive Metabolic Panel; Future  -     Hemoglobin A1c; Future    2. Mixed hyperlipidemia  -     Lipid Panel; Future    3. Vitamin D deficiency  -     Vitamin D,25-Hydroxy; Future    4. Osteopenia of left hip  -     Vitamin D,25-Hydroxy; Future  -     C-Telopeptide, Serum; Future  -     Propeptide Type I Collagen; Future    5. Acquired hypothyroidism  -     TSH; Future    6. Primary hypertension  -     Comprehensive Metabolic Panel; Future    7. Monoclonal gammopathy of unknown significance (MGUS)  -     Comprehensive Metabolic Panel; Future    Other orders  -     Semaglutide, 2 MG/DOSE, (Ozempic, 2 MG/DOSE,) 8 MG/3ML solution pen-injector; 2 mg weekly subcutaneously  Dispense: 3 mL; Refill:  6      Increase Ozempic to 2 mg weekly.  Continue Actos 15 mg/day, glipizide XL 2.5 mg every morning and Toujeo 8 units every morning.    Continue pravastatin 20 mg/day and low-fat diet.    Continue levothyroxine 25 mcg/day.    Continue amlodipine per Dr. Bragg.    Patient has osteopenia with high fracture risk.  Suggest adding biphosphonate.  Patient advised to follow-up with Dr. Bragg to discuss her options for treatment.  Continue calcium and vitamin D supplements.  Continue regular exercise.    Follow-up with Dr. Thompson as scheduled.    Copy of my note sent to Dr. Bragg, Dr. Chow and Dr. ROMEL Thompson.    RTC 4 mos with KYLEE Guido NP and with me in 8 mos.

## 2023-05-16 NOTE — LETTER
May 16, 2023     Adin Bragg MD  [address]  [place] [id number]    Patient: Lissa Ortiz   YOB: 1940   Date of Visit: 5/16/2023       Dear Dr. Yemi MD:    Thank you for referring Lissa Ortiz to me for evaluation. Below are the relevant portions of my assessment and plan of care.    If you have questions, please do not hesitate to call me. I look forward to following Lissa along with you.         Sincerely,        Matt Pimentel MD        CC: MD Kiran Washburn MD Yson, Matt RUTH MD  05/16/23 1213  Signed  Subjective    Lissa Ortiz is a 82 y.o. female.     History of Present Illness        She has known diabetes mellitus since 1981.  AILYN antibody was negative in May 2021.      She is on Ozempic 1 mg weekly, Actos 15 mg  per day, glipizide XL 2.5 mg every morning and Toujeo 8 units every morning.    Janumet was discontinued because of diarrhea.    Jardiance was too expensive.  She checks her blood sugar once a day.  Fasting glucose 100-110.  Bedtime -150..  She denies hypoglycemia.  She admits to dietary noncompliance and has gained 4 pounds since January 2023.  Hemoglobin A1c done in May 2023 is 7.3%.      She denies eye complaints.  Her last eye examination was in 11/22.  She has glaucoma but no retinopathy.  She has restless leg at night and her feet feels tight and numb  which is improved by gabapentin 300 mg  at bedtime prescribed by neurologist RONALD Paz.  Urine microalbumin is elevated in January 2022.  Urine microalbumin is normal in May 2023.     She has hyperlipidemia and is on pravastatin 20 mg/day.  She denies myalgia.   Lipid panel done in May 2023 are as follows: Cholesterol 169.  HDL 61.  LDL 95.  Triglycerides 70.   She has not used Crestor or Lipitor before.     She has hypothyroidism and is on levothyroxine 25 mcg/day.  She has no history of head or neck radiation therapy.  She has no history of thyroid surgery.  TSH done in  "May 2023 is normal at 2.89.     She has hypertension and is on amlodipine 2.5 mg once a day.  She denies history of heart attack or stroke.  She denies chest pain or shortness of breath.     She has rheumatoid arthritis and is on methotrexate and folic acid prescribed by Dr. Chow.  She is not on oral steroids.     She has osteopenia on bone density done in November 2022.  Her 10-year risk of major osteoporotic fracture is elevated at 28% and for hip fracture is 18%.  She is on a multivitamin 1 tablet/day and gummy calcium 2 tablets/day.  She walks 1/2 miles per day.  Osteopenia is being managed per her PCP Dr. Bragg.     She has monoclonal gammopathy of unknown significance and is being followed by Dr. Thompson.        The following portions of the patient's history were reviewed and updated as appropriate: allergies, current medications, past family history, past medical history, past social history, past surgical history and problem list.    Review of Systems   Eyes: Negative for visual disturbance.   Respiratory: Negative for shortness of breath.    Cardiovascular: Negative for chest pain and palpitations.   Gastrointestinal: Negative.  Negative for anal bleeding and blood in stool.   Endocrine: Negative for polyuria.   Genitourinary: Negative.    Musculoskeletal: Negative for myalgias.   Neurological: Positive for numbness.     Vitals:    05/16/23 1112   BP: 122/70   Pulse: 60   Resp: 10   SpO2: 98%   Weight: 66.7 kg (147 lb)   Height: 170.2 cm (67\")      Objective    Physical Exam  Constitutional:       General: She is not in acute distress.     Appearance: Normal appearance. She is not ill-appearing.   Eyes:      Extraocular Movements: Extraocular movements intact.   Neck:      Vascular: No carotid bruit.   Cardiovascular:      Rate and Rhythm: Regular rhythm.      Heart sounds: Normal heart sounds.   Pulmonary:      Breath sounds: Normal breath sounds. No rales.   Chest:      Chest wall: No tenderness. "   Abdominal:      Palpations: Abdomen is soft.      Tenderness: There is no right CVA tenderness or left CVA tenderness.   Musculoskeletal:      Right lower leg: No edema.      Left lower leg: No edema.      Comments: No plantar ulcers.  Poor dorsalis pedis pulses.  No cyanosis.   Lymphadenopathy:      Cervical: No cervical adenopathy.   Skin:     General: Skin is warm.   Neurological:      Mental Status: She is alert and oriented to person, place, and time.      Comments: Intact light touch in lower extremities.       Lab on 05/09/2023   Component Date Value Ref Range Status   • TSH 05/09/2023 2.890  0.270 - 4.200 uIU/mL Final   • Total Cholesterol 05/09/2023 169  0 - 200 mg/dL Final    Comment: Cholesterol Reference Ranges  (U.S. Department of Health and Human Services ATP III  Classifications)  Desirable          <200 mg/dL  Borderline High    200-239 mg/dL  High Risk          >240 mg/dL  Triglyceride Reference Ranges  (U.S. Department of Health and Human Services ATP III  Classifications)  Normal           <150 mg/dL  Borderline High  150-199 mg/dL  High             200-499 mg/dL  Very High        >500 mg/dL  HDL Reference Ranges  (U.S. Department of Health and Human Services ATP III  Classifications)  Low     <40 mg/dl (major risk factor for CHD)  High    >60 mg/dl ('negative' risk factor for CHD)  LDL Reference Ranges  (U.S. Department of Health and Human Services ATP III  Classifications)  Optimal          <100 mg/dL  Near Optimal     100-129 mg/dL  Borderline High  130-159 mg/dL  High             160-189 mg/dL  Very High        >189 mg/dL     • Triglycerides 05/09/2023 70  0 - 150 mg/dL Final   • HDL Cholesterol 05/09/2023 61 (H)  40 - 60 mg/dL Final   • VLDL Cholesterol Akbar 05/09/2023 13  5 - 40 mg/dL Final   • LDL Chol Calc ([facility]) 05/09/2023 95  0 - 100 mg/dL Final   • Creatinine, Urine 05/09/2023 72.7  Not Estab. mg/dL Final   • Microalbumin, Urine 05/09/2023 16.9  Not Estab. ug/mL Final   •  Microalbumin/Creatinine Ratio 05/09/2023 23  0 - 29 mg/g creat Final    Comment:                        Normal:                0 -  29                         Moderately increased: 30 - 300                         Severely increased:       >300     • Glucose 05/09/2023 123 (H)  65 - 99 mg/dL Final   • BUN 05/09/2023 15  8 - 23 mg/dL Final   • Creatinine 05/09/2023 0.66  0.57 - 1.00 mg/dL Final   • EGFR Result 05/09/2023 88.3  >60.0 mL/min/1.73 Final    Comment: GFR Normal >60  Chronic Kidney Disease <60  Kidney Failure <15  The GFR formula is only valid for adults with stable renal  function between ages 18 and 70.     • BUN/Creatinine Ratio 05/09/2023 22.7  7.0 - 25.0 Final   • Sodium 05/09/2023 142  136 - 145 mmol/L Final   • Potassium 05/09/2023 4.4  3.5 - 5.2 mmol/L Final   • Chloride 05/09/2023 106  98 - 107 mmol/L Final   • Total CO2 05/09/2023 30.1 (H)  22.0 - 29.0 mmol/L Final   • Calcium 05/09/2023 9.5  8.6 - 10.5 mg/dL Final   • Total Protein 05/09/2023 6.8  6.0 - 8.5 g/dL Final   • Albumin 05/09/2023 4.3  3.5 - 5.2 g/dL Final   • Globulin 05/09/2023 2.5  gm/dL Final   • A/G Ratio 05/09/2023 1.7  g/dL Final   • Total Bilirubin 05/09/2023 0.5  0.0 - 1.2 mg/dL Final   • Alkaline Phosphatase 05/09/2023 83  39 - 117 U/L Final   • AST (SGOT) 05/09/2023 27  1 - 32 U/L Final   • ALT (SGPT) 05/09/2023 22  1 - 33 U/L Final   • Hemoglobin A1C 05/09/2023 7.30 (H)  4.80 - 5.60 % Final    Comment: Hemoglobin A1C Ranges:  Increased Risk for Diabetes  5.7% to 6.4%  Diabetes                     >= 6.5%  Diabetic Goal                < 7.0%     • Interpretation 05/09/2023 Note   Final    Supplemental report is available.     Assessment & Plan   Diagnoses and all orders for this visit:    1. Type 2 diabetes mellitus without complication, with long-term current use of insulin (HCC) (Primary)  -     Comprehensive Metabolic Panel; Future  -     Hemoglobin A1c; Future    2. Mixed hyperlipidemia  -     Lipid Panel; Future    3.  Vitamin D deficiency  -     Vitamin D,25-Hydroxy; Future    4. Osteopenia of left hip  -     Vitamin D,25-Hydroxy; Future  -     C-Telopeptide, Serum; Future  -     Propeptide Type I Collagen; Future    5. Acquired hypothyroidism  -     TSH; Future    6. Primary hypertension  -     Comprehensive Metabolic Panel; Future    7. Monoclonal gammopathy of unknown significance (MGUS)  -     Comprehensive Metabolic Panel; Future    Other orders  -     Semaglutide, 2 MG/DOSE, (Ozempic, 2 MG/DOSE,) 8 MG/3ML solution pen-injector; 2 mg weekly subcutaneously  Dispense: 3 mL; Refill: 6      Increase Ozempic to 2 mg weekly.  Continue Actos 15 mg/day, glipizide XL 2.5 mg every morning and Toujeo 8 units every morning.    Continue pravastatin 20 mg/day and low-fat diet.    Continue levothyroxine 25 mcg/day.    Continue amlodipine per Dr. Bragg.    Patient has osteopenia with high fracture risk.  Suggest adding biphosphonate.  Patient advised to follow-up with Dr. Bragg to discuss her options for treatment.  Continue calcium and vitamin D supplements.  Continue regular exercise.    Follow-up with Dr. Thompson as scheduled.    Copy of my note sent to Dr. Bragg, Dr. Chow and Dr. ROMEL Thompson.    RTC 4 mos with KYLEE Guido NP and with me in 8 mos.

## 2023-06-09 ENCOUNTER — TELEPHONE (OUTPATIENT)
Dept: NEUROLOGY | Facility: CLINIC | Age: 83
End: 2023-06-09
Payer: MEDICARE

## 2023-06-09 ENCOUNTER — OFFICE VISIT (OUTPATIENT)
Dept: INTERNAL MEDICINE | Facility: CLINIC | Age: 83
End: 2023-06-09
Payer: MEDICARE

## 2023-06-09 VITALS
HEART RATE: 76 BPM | BODY MASS INDEX: 22.13 KG/M2 | SYSTOLIC BLOOD PRESSURE: 124 MMHG | DIASTOLIC BLOOD PRESSURE: 70 MMHG | RESPIRATION RATE: 18 BRPM | HEIGHT: 67 IN | WEIGHT: 141 LBS | OXYGEN SATURATION: 98 %

## 2023-06-09 DIAGNOSIS — E11.9 TYPE 2 DIABETES MELLITUS WITHOUT COMPLICATION, WITH LONG-TERM CURRENT USE OF INSULIN: Chronic | ICD-10-CM

## 2023-06-09 DIAGNOSIS — Z79.4 TYPE 2 DIABETES MELLITUS WITHOUT COMPLICATION, WITH LONG-TERM CURRENT USE OF INSULIN: Chronic | ICD-10-CM

## 2023-06-09 DIAGNOSIS — E03.9 ACQUIRED HYPOTHYROIDISM: Chronic | ICD-10-CM

## 2023-06-09 DIAGNOSIS — R00.2 PALPITATIONS: ICD-10-CM

## 2023-06-09 DIAGNOSIS — I10 PRIMARY HYPERTENSION: Chronic | ICD-10-CM

## 2023-06-09 DIAGNOSIS — E78.2 MIXED HYPERLIPIDEMIA: Chronic | ICD-10-CM

## 2023-06-09 DIAGNOSIS — Z00.00 MEDICARE ANNUAL WELLNESS VISIT, SUBSEQUENT: Primary | ICD-10-CM

## 2023-06-09 PROBLEM — D47.2 MONOCLONAL GAMMOPATHY OF UNKNOWN SIGNIFICANCE (MGUS): Chronic | Status: ACTIVE | Noted: 2023-05-16

## 2023-06-09 RX ORDER — PRAVASTATIN SODIUM 20 MG
20 TABLET ORAL DAILY
Qty: 90 TABLET | Refills: 4 | Status: SHIPPED | OUTPATIENT
Start: 2023-06-09

## 2023-06-09 RX ORDER — AMLODIPINE BESYLATE 2.5 MG/1
2.5 TABLET ORAL DAILY
Qty: 90 TABLET | Refills: 4 | Status: SHIPPED | OUTPATIENT
Start: 2023-06-09

## 2023-06-09 NOTE — PROGRESS NOTES
The ABCs of the Annual Wellness Visit  Subsequent Medicare Wellness Visit    Subjective    Lissa Ortiz is a 82 y.o. female who presents for a Subsequent Medicare Wellness Visit.    The following portions of the patient's history were reviewed and   updated as appropriate: allergies, current medications, past family history, past medical history, past social history, past surgical history, and problem list.    Compared to one year ago, the patient feels her physical   health is the same.    Compared to one year ago, the patient feels her mental   health is the same.    Recent Hospitalizations:  She was not admitted to the hospital during the last year.       Current Medical Providers:  Patient Care Team:  Adin Bragg MD as PCP - General (Family Medicine)  Kyle Chow MD as Consulting Physician (Rheumatology)  Tony Luciano MD as Referring Physician (Neurology)  Kiran Thompson MD as Consulting Physician (Hematology and Oncology)    Outpatient Medications Prior to Visit   Medication Sig Dispense Refill    azelastine (ASTELIN) 0.1 % nasal spray 2 sprays into the nostril(s) as directed by provider 2 (Two) Times a Day. Use in each nostril as directed 2 each 5    Blood Glucose Monitoring Suppl (TRUE METRIX AIR GLUCOSE METER) device 1 Device Daily As Needed (check bs's twice daily). 1 Device 0    Calcium Citrate (CITRACAL PO) Take  by mouth. 2 TABS DAILY      dorzolamide-timolol (COSOPT) 22.3-6.8 MG/ML ophthalmic solution Apply  to eye(s) as directed by provider Every 12 (Twelve) Hours.      folic acid (FOLVITE) 1 MG tablet Take 1 tablet by mouth Daily.      gabapentin (NEURONTIN) 300 MG capsule Take 1 capsule by mouth 3 (Three) Times a Day for 90 days. 90 capsule 0    glipizide (GLUCOTROL XL) 2.5 MG 24 hr tablet Take 1 tablet by mouth Daily. 90 tablet 1    Insulin Glargine, 2 Unit Dial, (Toujeo Max SoloStar) 300 UNIT/ML solution pen-injector injection Inject 8 Units under the skin into the appropriate area  as directed Daily. 3 mL 1    [employer] Pen Needles 32G X 4 MM misc USE TO INJECT ONCE DAILY 100 each 2    levothyroxine (SYNTHROID, LEVOTHROID) 25 MCG tablet TAKE ONE TABLET BY MOUTH DAILY 90 tablet 1    Methotrexate Sodium 50 MG/2ML injection Injects 0.7 ml sq once a week      Methotrexate Sodium syringe INJECT 0.7 MILLILITER ONCE WEEKLY ON THE SAME DAY OF THE WEEK      Multiple Vitamin (MULTI VITAMIN PO) Take 1 tablet by mouth Daily.      pioglitazone (Actos) 15 MG tablet Take 1 tablet by mouth Daily. 90 tablet 1    Probiotic capsule Take 1 capsule by mouth.      Semaglutide, 2 MG/DOSE, (Ozempic, 2 MG/DOSE,) 8 MG/3ML solution pen-injector 2 mg weekly subcutaneously 3 mL 6    solifenacin (VESICARE) 10 MG tablet       Tafluprost, PF, (ZIOPTAN) 0.0015 % solution ophthalmic solution 1 drop Every Night.      amLODIPine (NORVASC) 2.5 MG tablet Take 1 tablet by mouth Daily. 90 tablet 4    pravastatin (PRAVACHOL) 20 MG tablet Take 1 tablet by mouth Daily. 90 tablet 4     No facility-administered medications prior to visit.       No opioid medication identified on active medication list. I have reviewed chart for other potential  high risk medication/s and harmful drug interactions in the elderly.        Aspirin is not on active medication list.  Aspirin use is not indicated based on review of current medical condition/s. Risk of harm outweighs potential benefits.  .    Patient Active Problem List   Diagnosis    Primary hypertension    Mixed anxiety depressive disorder    Type 2 diabetes mellitus without complication, with long-term current use of insulin    Glaucoma    Hyperlipidemia    Insomnia    Restless legs syndrome    Rheumatoid arthritis    Vitamin D deficiency    Routine health maintenance    Midline low back pain without sciatica    Encounter for immunization    Neck pain on right side    Microalbuminuria    Encounter for screening colonoscopy    Tubular adenoma of colon    Diabetic peripheral neuropathy    Acquired  "hypothyroidism    Inclusion body myositis    Peripheral venous insufficiency    Encounter for gynecological examination    Health care maintenance    Osteopenia of left hip    History of adenomatous polyp of colon    Monoclonal gammopathy of unknown significance (MGUS)     Advance Care Planning   Advance Care Planning     Advance Directive is on file.  ACP discussion was held with the patient during this visit. Patient has an advance directive in EMR which is still valid.      Objective    Vitals:    23 1043   BP: 124/70   BP Location: Left arm   Patient Position: Sitting   Cuff Size: Small Adult   Pulse: 76   Resp: 18   SpO2: 98%   Weight: 64 kg (141 lb)   Height: 170.2 cm (67\")     Estimated body mass index is 22.08 kg/m² as calculated from the following:    Height as of this encounter: 170.2 cm (67\").    Weight as of this encounter: 64 kg (141 lb).    BMI is within normal parameters. No other follow-up for BMI required.      Does the patient have evidence of cognitive impairment? No    Lab Results   Component Value Date    CHLPL 169 2023    TRIG 70 2023    HDL 61 (H) 2023    LDL 95 2023    VLDL 13 2023    HGBA1C 7.30 (H) 2023        HEALTH RISK ASSESSMENT    Smoking Status:  Social History     Tobacco Use   Smoking Status Never   Smokeless Tobacco Never     Alcohol Consumption:  Social History     Substance and Sexual Activity   Alcohol Use Yes    Comment: Occasional glass of wine one per month     Fall Risk Screen:    ANTELMO Fall Risk Assessment was completed, and patient is at LOW risk for falls.Assessment completed on:2023    Depression Screenin/9/2023    10:44 AM   PHQ-2/PHQ-9 Depression Screening   Little Interest or Pleasure in Doing Things 0-->not at all   Feeling Down, Depressed or Hopeless 0-->not at all   PHQ-9: Brief Depression Severity Measure Score 0       Health Habits and Functional and Cognitive Screenin/9/2023    10:43 AM "   Functional & Cognitive Status   Do you have difficulty preparing food and eating? No   Do you have difficulty bathing yourself, getting dressed or grooming yourself? No   Do you have difficulty using the toilet? No   Do you have difficulty moving around from place to place? No   Do you have trouble with steps or getting out of a bed or a chair? No   Current Diet Well Balanced Diet   Dental Exam Up to date   Eye Exam Up to date   Exercise (times per week) 5 times per week   Current Exercises Include Walking   Do you need help using the phone?  No   Are you deaf or do you have serious difficulty hearing?  Yes   Do you need help with transportation? No   Do you need help shopping? No   Do you need help preparing meals?  No   Do you need help with housework?  No   Do you need help with laundry? No   Do you need help taking your medications? No   Do you need help managing money? No   Do you ever drive or ride in a car without wearing a seat belt? No   Have you felt unusual stress, anger or loneliness in the last month? No   Who do you live with? Alone   If you need help, do you have trouble finding someone available to you? No   Have you been bothered in the last four weeks by sexual problems? No   Do you have difficulty concentrating, remembering or making decisions? No       Age-appropriate Screening Schedule:  Refer to the list below for future screening recommendations based on patient's age, sex and/or medical conditions. Orders for these recommended tests are listed in the plan section. The patient has been provided with a written plan.    Health Maintenance   Topic Date Due    COVID-19 Vaccine (5 - Pfizer series) 01/21/2023    INFLUENZA VACCINE  08/01/2023    DIABETIC EYE EXAM  11/01/2023    HEMOGLOBIN A1C  11/09/2023    LIPID PANEL  05/09/2024    URINE MICROALBUMIN  05/09/2024    ANNUAL WELLNESS VISIT  06/09/2024    MAMMOGRAM  07/20/2024    DXA SCAN  11/15/2024    TDAP/TD VACCINES (4 - Tdap) 03/13/2028     COLORECTAL CANCER SCREENING  05/03/2028    Pneumococcal Vaccine 65+  Completed    ZOSTER VACCINE  Addressed    PAP SMEAR  Discontinued                  CMS Preventative Services Quick Reference  Risk Factors Identified During Encounter  Immunizations Discussed/Encouraged: COVID19  Polypharmacy: Medication List reviewed and Medications are appropriate for patient  The above risks/problems have been discussed with the patient.  Pertinent information has been shared with the patient in the After Visit Summary.  An After Visit Summary and PPPS were made available to the patient.    Follow Up:   Next Medicare Wellness visit to be scheduled in 1 year.       Additional E&M Note during same encounter follows:  Patient has multiple medical problems which are significant and separately identifiable that require additional work above and beyond the Medicare Wellness Visit.      Chief Complaint  Medicare Wellness-subsequent    Subjective        HPI  Lissa Ortiz is also being seen today for 6-month follow-up for chronic medical conditions.    Hypertension - stable today in the office.  Patient taking medication as prescribed.    Patient is taking amlodipine.  Denies side effects of the medication.  Kidney function and liver function all look great from her labs with endocrine.    HLD-  Patient taking pravastatin as prescribed.  Trying to adhere to a balanced diet.  Denies side effects such as myalgias.  She had her lipids checked with endocrinology recently and I reviewed the results with her.  Her cholesterol looks excellent.    I reviewed the note from Dr. Pimentel from May 16, 2023.  He sees her for her diabetes and hypothyroidism.  I reviewed the labs as below.  Recommended to continue the Ozempic at an increased dose, Actos, glipizide, Toujeo.  Levothyroxine is being continued for the hypothyroidism.  She is tolerating the medications well.    Also mentions that she has intermittent palpitations but no pain.  She was evaluated  "for this previously and I reviewed the note from Dr. Mcmanus from January 5, 2022 and also reviewed the echo report, EKG.  She thought she had A-fib but it looks like she has runs of SVT.  She says this happens about once every few months.  Seem to get better after resting.  She exercises and does not have any episodes with those activities.           Objective   Vital Signs:  /70 (BP Location: Left arm, Patient Position: Sitting, Cuff Size: Small Adult)   Pulse 76   Resp 18   Ht 170.2 cm (67\")   Wt 64 kg (141 lb)   SpO2 98%   BMI 22.08 kg/m²     Physical Exam  Vitals and nursing note reviewed.   Constitutional:       General: She is not in acute distress.     Appearance: Normal appearance.   Cardiovascular:      Rate and Rhythm: Normal rate and regular rhythm.      Heart sounds: Normal heart sounds. No murmur heard.  Pulmonary:      Effort: Pulmonary effort is normal.      Breath sounds: Normal breath sounds.   Neurological:      Mental Status: She is alert.        The following data was reviewed by: Adin Bragg MD on 06/09/2023:  Common labs          1/3/2023    09:32 1/12/2023    11:09 5/9/2023    09:47   Common Labs   Glucose  138  123    BUN  16  15    Creatinine  0.67  0.66    Sodium  141  142    Potassium  4.0  4.4    Chloride  105  106    Calcium  9.3  9.5    Total Protein  6.9  6.8    Albumin  4.1     3.7  4.3    Total Bilirubin  0.5  0.5    Alkaline Phosphatase  82  83    AST (SGOT)  25  27    ALT (SGPT)  16  22    WBC  5.55     Hemoglobin  13.4     Hematocrit  40.4     Platelets  275     Total Cholesterol 169   169    Triglycerides 87   70    HDL Cholesterol 58   61    LDL Cholesterol  95   95    Hemoglobin A1C 6.80   7.30    Microalbumin, Urine   16.9                 Assessment and Plan   Diagnoses and all orders for this visit:    1. Medicare annual wellness visit, subsequent (Primary)    2. Primary hypertension  -     amLODIPine (NORVASC) 2.5 MG tablet; Take 1 tablet by mouth " Daily.  Dispense: 90 tablet; Refill: 4    3. Mixed hyperlipidemia  -     pravastatin (PRAVACHOL) 20 MG tablet; Take 1 tablet by mouth Daily.  Dispense: 90 tablet; Refill: 4    4. Type 2 diabetes mellitus without complication, with long-term current use of insulin    5. Acquired hypothyroidism    6. Palpitations      Clinically stable chronic conditions as above.  Continue all medications as above.  Labs reviewed/ordered as above.    Normal rate and rhythm on cardiac exam.  Her history sounds like intermittent SVT.  Given that it happens so infrequently, I am not sure if beta-blocker would do much to make her feel better as she already has to deal with some fatigue from her other medical problems.  If these episodes become more regular, I could start her on a low-dose of beta-blocker and refer back to cardiology.  She seems to be satisfied with that assessment and plan.  I recommend that she make sure she stays hydrated and minimize use of caffeine-containing products.           Follow Up   Return in about 6 months (around 12/9/2023) for Next scheduled follow up.  Patient was given instructions and counseling regarding her condition or for health maintenance advice. Please see specific information pulled into the AVS if appropriate.

## 2023-06-14 DIAGNOSIS — G25.81 RESTLESS LEG SYNDROME: ICD-10-CM

## 2023-06-16 RX ORDER — GABAPENTIN 300 MG/1
300 CAPSULE ORAL 3 TIMES DAILY
Qty: 90 CAPSULE | Refills: 0 | Status: SHIPPED | OUTPATIENT
Start: 2023-06-16 | End: 2023-06-19 | Stop reason: SDUPTHER

## 2023-06-19 ENCOUNTER — OFFICE VISIT (OUTPATIENT)
Dept: NEUROLOGY | Facility: CLINIC | Age: 83
End: 2023-06-19
Payer: MEDICARE

## 2023-06-19 VITALS
SYSTOLIC BLOOD PRESSURE: 120 MMHG | DIASTOLIC BLOOD PRESSURE: 76 MMHG | HEIGHT: 67 IN | WEIGHT: 141 LBS | BODY MASS INDEX: 22.13 KG/M2

## 2023-06-19 DIAGNOSIS — G25.81 RESTLESS LEG SYNDROME: ICD-10-CM

## 2023-06-19 DIAGNOSIS — Z87.39 HISTORY OF RHEUMATOID ARTHRITIS: Primary | ICD-10-CM

## 2023-06-19 DIAGNOSIS — G62.9 POLYNEUROPATHY: ICD-10-CM

## 2023-06-19 PROCEDURE — 1160F RVW MEDS BY RX/DR IN RCRD: CPT | Performed by: PHYSICIAN ASSISTANT

## 2023-06-19 PROCEDURE — 3074F SYST BP LT 130 MM HG: CPT | Performed by: PHYSICIAN ASSISTANT

## 2023-06-19 PROCEDURE — 1159F MED LIST DOCD IN RCRD: CPT | Performed by: PHYSICIAN ASSISTANT

## 2023-06-19 PROCEDURE — 3078F DIAST BP <80 MM HG: CPT | Performed by: PHYSICIAN ASSISTANT

## 2023-06-19 RX ORDER — GABAPENTIN 300 MG/1
300 CAPSULE ORAL 4 TIMES DAILY
Qty: 120 CAPSULE | Refills: 0 | Status: SHIPPED | OUTPATIENT
Start: 2023-06-19

## 2023-06-19 NOTE — PROGRESS NOTES
CC: Follow-up    HPI:  Lissa Ortiz is a  82 y.o.  right-handed female who is here for follow-up.  She was last seen in November by myself, when I was seeing her for the first time.  Prior documentation was reviewed in revised as appropriate.  We are seeing for peripheral neuropathy probably diabetic in etiology with presence of MGUS.  Other past medical history is notable for rheumatoid arthritis on long-term DMARD therapy with methotrexate, type 2 diabetes, hypertension, hyperlipidemia, hypothyroidism, cataracts.  Dr. Chow manages the patient's RA and approximately 10 years ago sent her for an evaluate for weakness to [facility]. Indeed she carries diagnosis of inclusion body myositis on what available documentation we have from [facility] but there is no collaborated history from  pertaining to workup.  She thinks she had an EMG but never muscle biopsy.  It seems she was not on any specific treatment. She endorses long standing numbness and tingling in feet which is worse at bedtime.  She describes stabbing and burning sensation.    Work-up to date has included an EMG which was performed on 10/4/2021 and revealed mild evidence of peripheral neuropathy.  Some irritability is seen in the left tibialis anterior without any other features of a lumbosacral radiculopathy. Specifically there was no evidence of a myopathy on this study.  Laboratory studies for treatable causes of neuropathy included the following:     RPR nonreactive  Cryoglobulins negative  Copper normal at 140  Heavy metals including lead arsenic and mercury all within normal limits  Sed rate normal at 5  Vitamin B12 and folate normal at greater than 2000 and greater than 20 respectively  CK level normal at 82  Protein electrophoresis did reveal an M spike, immunofixation showed presence of IgA kappa M-protein, 0.2 gm/dl     She takes 300 mg tablets of gabapentin and takes 2 of these at bedtime and has been doing so for over a year.  She  "reports painful sensation of feet and difficulty getting to sleep for this.  If she takes 3 cristopher tabs she feels sleepy but 2 does not give any adverse effect.   A1c in the 7s she tells, but having pharmacy trouble with ozempic.  She denies falls but walks slowly and feels unsteady      Social history: non  smoker, non drinker, , has a yorkie, volunteers for hospital 4h a week    Family history:      Pain Scale:        ROS:  Review of Systems   Constitutional:  Negative for appetite change, fatigue and unexpected weight change.   HENT:  Negative for ear pain, hearing loss, nosebleeds and tinnitus.    Eyes:  Negative for photophobia, pain and visual disturbance.   Respiratory:  Negative for choking, chest tightness, shortness of breath, wheezing and stridor.    Cardiovascular:  Negative for chest pain and palpitations.   Gastrointestinal:  Negative for constipation, diarrhea, nausea and vomiting.   Endocrine: Negative for cold intolerance and heat intolerance.   Genitourinary:  Positive for difficulty urinating.   Musculoskeletal:  Positive for gait problem (balance). Negative for back pain, neck pain and neck stiffness.   Skin:  Negative for rash and wound.   Allergic/Immunologic: Negative for food allergies.   Neurological:  Positive for numbness (toes-worsening). Negative for dizziness, tremors, seizures, weakness, light-headedness and headaches.   Hematological:  Bruises/bleeds easily.   Psychiatric/Behavioral:  Negative for agitation, behavioral problems, confusion, decreased concentration and sleep disturbance.        Reviewed ROS conducted by MA    Physical Exam:  Vitals:    06/19/23 0859   BP: 120/76   Weight: 64 kg (141 lb)   Height: 170.2 cm (67\")     Orthostatic BP:    Body mass index is 22.08 kg/m².        General: pt well appearing, no distress  HEENT: Normocephalic, conjunctiva normal, external canals normal, no nasal discharge, moist mucous membranes  Neck: No lymphadenopathy, thyroid not " enlarged, no JVD  CV: Regular rate and rhythm, no murmurs negative no bruits auscultated at neck, equal pulses  Pulmonary: Normal respiratory effort, clear to auscultation bilaterally  Extremities: no edema, bruising, or skin lesions  Pysch: good eye contact, cooperative, full affect, euthymic mood, good attention, good insight  Mental: alert, conversant, AOx3, provides history, 3/3 recall.  Language fluent, names, repeats.  CN: CN II-XII intact, PERRL, EOMi, no gaze palsy or nystagmus, intact facial sensation, no facial assymetry, intact hearing, symmetric palate elevation, tongue midline, good SCM strength  Motor: no abnormal movements, no pronator drift, normal  bulk and tone, 4+ out of 5 right iliopsoas and possible weakness of bilateral EBD but otherwise 5/5 strength b/l UE & LE  Sensory: Glove and stocking distribution sensory loss to temp and pain in lower extremity, no vibratory sense of bl toes  Reflexes: +1 right patella and 2+ throughout, neg babinski  Coordination: no ataxia on finger-nose, heel-shin  Gait: slowed, steady, mild sway with rhomberg, overall good        Results:      Lab Results   Component Value Date    GLUCOSE 123 (H) 05/09/2023    BUN 15 05/09/2023    CREATININE 0.66 05/09/2023    EGFRIFNONA 91 02/17/2022    EGFRIFAFRI 95 01/10/2022    BCR 22.7 05/09/2023    CO2 30.1 (H) 05/09/2023    CALCIUM 9.5 05/09/2023    PROTENTOTREF 6.8 05/09/2023    ALBUMIN 4.3 05/09/2023    LABIL2 1.7 05/09/2023    AST 27 05/09/2023    ALT 22 05/09/2023       Lab Results   Component Value Date    WBC 5.55 01/12/2023    HGB 13.4 01/12/2023    HCT 40.4 01/12/2023    MCV 96.0 01/12/2023     01/12/2023         .  Lab Results   Component Value Date    RPR Non-Reactive 10/04/2021         Lab Results   Component Value Date    TSH 2.890 05/09/2023    Z1XUMBI 7.4 11/05/2020         Lab Results   Component Value Date    TYDMHHJW23 >2,000 (H) 10/04/2021         Lab Results   Component Value Date    FOLATE >20.00  10/04/2021         Lab Results   Component Value Date    HGBA1C 7.30 (H) 05/09/2023         Lab Results   Component Value Date    GLUCOSE 123 (H) 05/09/2023    BUN 15 05/09/2023    CREATININE 0.66 05/09/2023    EGFRIFNONA 91 02/17/2022    EGFRIFAFRI 95 01/10/2022    BCR 22.7 05/09/2023    K 4.4 05/09/2023    CO2 30.1 (H) 05/09/2023    CALCIUM 9.5 05/09/2023    PROTENTOTREF 6.8 05/09/2023    ALBUMIN 4.3 05/09/2023    LABIL2 1.7 05/09/2023    AST 27 05/09/2023    ALT 22 05/09/2023         Diagnosis:  1.  Polyneuropathy    Impression: 82-year-old right-handed female with past medical history as above and also hypertension, hyperlipidemia, hypothyroidism, diabetes, rheumatoid arthritis on methotrexate, mixed anxiety and depression and report of inclusion body myositis.  She is here for follow-up.  Her symptoms of neuropathic pain are more bothersome and keeping radha up at night.  I suggest regular exercise routine and getting back to physical therapy.      Plan:  1.  Continue gabapentin 600 mg at night, add doses in morning and afternoon.  If not effective notify office and we will continue titration or adjunct   2.  Physical therapy     Follow-up in 6 months or sooner if needed

## 2023-06-19 NOTE — LETTER
June 19, 2023       No Recipients    Patient: Lissa Ortiz   YOB: 1940   Date of Visit: 6/19/2023       Dear Dr. Hernandez Recipients:    Thank you for referring Lissa Ortiz to me for evaluation. Below are the relevant portions of my assessment and plan of care.    If you have questions, please do not hesitate to call me. I look forward to following Lissa along with you.         Sincerely,        RONALD Tolbert        CC:   No Recipients    Jennifer Malik PA  06/19/23 0942  Sign when Signing Visit  CC: Follow-up    HPI:  Lissa Ortiz is a  82 y.o.  right-handed female who is here for follow-up.  She was last seen in November by myself, when I was seeing her for the first time.  Prior documentation was reviewed in revised as appropriate.  We are seeing for peripheral neuropathy probably diabetic in etiology with presence of MGUS.  Other past medical history is notable for rheumatoid arthritis on long-term DMARD therapy with methotrexate, type 2 diabetes, hypertension, hyperlipidemia, hypothyroidism, cataracts.  Dr. Chow manages the patient's RA and approximately 10 years ago sent her for an evaluate for weakness to [facility]. Indeed she carries diagnosis of inclusion body myositis on what available documentation we have from [facility] but there is no collaborated history from  pertaining to workup.  She thinks she had an EMG but never muscle biopsy.  It seems she was not on any specific treatment. She endorses long standing numbness and tingling in feet which is worse at bedtime.  She describes stabbing and burning sensation.    Work-up to date has included an EMG which was performed on 10/4/2021 and revealed mild evidence of peripheral neuropathy.  Some irritability is seen in the left tibialis anterior without any other features of a lumbosacral radiculopathy. Specifically there was no evidence of a myopathy on this study.  Laboratory studies for treatable causes of neuropathy included  the following:     RPR nonreactive  Cryoglobulins negative  Copper normal at 140  Heavy metals including lead arsenic and mercury all within normal limits  Sed rate normal at 5  Vitamin B12 and folate normal at greater than 2000 and greater than 20 respectively  CK level normal at 82  Protein electrophoresis did reveal an M spike, immunofixation showed presence of IgA kappa M-protein, 0.2 gm/dl     She takes 300 mg tablets of gabapentin and takes 2 of these at bedtime and has been doing so for over a year.  She reports painful sensation of feet and difficulty getting to sleep for this.  If she takes 3 cristopher tabs she feels sleepy but 2 does not give any adverse effect.   A1c in the 7s she tells, but having pharmacy trouble with ozempic.  She denies falls but walks slowly and feels unsteady      Social history: non  smoker, non drinker, , has a yorkie, volunteers for hospital 4h a week    Family history:      Pain Scale:        ROS:  Review of Systems   Constitutional:  Negative for appetite change, fatigue and unexpected weight change.   HENT:  Negative for ear pain, hearing loss, nosebleeds and tinnitus.    Eyes:  Negative for photophobia, pain and visual disturbance.   Respiratory:  Negative for choking, chest tightness, shortness of breath, wheezing and stridor.    Cardiovascular:  Negative for chest pain and palpitations.   Gastrointestinal:  Negative for constipation, diarrhea, nausea and vomiting.   Endocrine: Negative for cold intolerance and heat intolerance.   Genitourinary:  Positive for difficulty urinating.   Musculoskeletal:  Positive for gait problem (balance). Negative for back pain, neck pain and neck stiffness.   Skin:  Negative for rash and wound.   Allergic/Immunologic: Negative for food allergies.   Neurological:  Positive for numbness (toes-worsening). Negative for dizziness, tremors, seizures, weakness, light-headedness and headaches.   Hematological:  Bruises/bleeds easily.  "  Psychiatric/Behavioral:  Negative for agitation, behavioral problems, confusion, decreased concentration and sleep disturbance.        Reviewed ROS conducted by MA    Physical Exam:  Vitals:    06/19/23 0859   BP: 120/76   Weight: 64 kg (141 lb)   Height: 170.2 cm (67\")     Orthostatic BP:    Body mass index is 22.08 kg/m².        General: pt well appearing, no distress  HEENT: Normocephalic, conjunctiva normal, external canals normal, no nasal discharge, moist mucous membranes  Neck: No lymphadenopathy, thyroid not enlarged, no JVD  CV: Regular rate and rhythm, no murmurs negative no bruits auscultated at neck, equal pulses  Pulmonary: Normal respiratory effort, clear to auscultation bilaterally  Extremities: no edema, bruising, or skin lesions  Pysch: good eye contact, cooperative, full affect, euthymic mood, good attention, good insight  Mental: alert, conversant, AOx3, provides history, 3/3 recall.  Language fluent, names, repeats.  CN: CN II-XII intact, PERRL, EOMi, no gaze palsy or nystagmus, intact facial sensation, no facial assymetry, intact hearing, symmetric palate elevation, tongue midline, good SCM strength  Motor: no abnormal movements, no pronator drift, normal  bulk and tone, 4+ out of 5 right iliopsoas and possible weakness of bilateral EBD but otherwise 5/5 strength b/l UE & LE  Sensory: Glove and stocking distribution sensory loss to temp and pain in lower extremity, no vibratory sense of bl toes  Reflexes: +1 right patella and 2+ throughout, neg babinski  Coordination: no ataxia on finger-nose, heel-shin  Gait: slowed, steady, mild sway with rhomberg, overall good        Results:      Lab Results   Component Value Date    GLUCOSE 123 (H) 05/09/2023    BUN 15 05/09/2023    CREATININE 0.66 05/09/2023    EGFRIFNONA 91 02/17/2022    EGFRIFAFRI 95 01/10/2022    BCR 22.7 05/09/2023    CO2 30.1 (H) 05/09/2023    CALCIUM 9.5 05/09/2023    PROTENTOTREF 6.8 05/09/2023    ALBUMIN 4.3 05/09/2023    LABIL2 " 1.7 05/09/2023    AST 27 05/09/2023    ALT 22 05/09/2023       Lab Results   Component Value Date    WBC 5.55 01/12/2023    HGB 13.4 01/12/2023    HCT 40.4 01/12/2023    MCV 96.0 01/12/2023     01/12/2023         .  Lab Results   Component Value Date    RPR Non-Reactive 10/04/2021         Lab Results   Component Value Date    TSH 2.890 05/09/2023    B3RQVRZ 7.4 11/05/2020         Lab Results   Component Value Date    LXLMSCSU19 >2,000 (H) 10/04/2021         Lab Results   Component Value Date    FOLATE >20.00 10/04/2021         Lab Results   Component Value Date    HGBA1C 7.30 (H) 05/09/2023         Lab Results   Component Value Date    GLUCOSE 123 (H) 05/09/2023    BUN 15 05/09/2023    CREATININE 0.66 05/09/2023    EGFRIFNONA 91 02/17/2022    EGFRIFAFRI 95 01/10/2022    BCR 22.7 05/09/2023    K 4.4 05/09/2023    CO2 30.1 (H) 05/09/2023    CALCIUM 9.5 05/09/2023    PROTENTOTREF 6.8 05/09/2023    ALBUMIN 4.3 05/09/2023    LABIL2 1.7 05/09/2023    AST 27 05/09/2023    ALT 22 05/09/2023         Diagnosis:  1.  Polyneuropathy    Impression: 82-year-old right-handed female with past medical history as above and also hypertension, hyperlipidemia, hypothyroidism, diabetes, rheumatoid arthritis on methotrexate, mixed anxiety and depression and report of inclusion body myositis.  She is here for follow-up.  Her symptoms of neuropathic pain are more bothersome and keeping radha up at night.  I suggest regular exercise routine and getting back to physical therapy.      Plan:  1.  Continue gabapentin 600 mg at night, add doses in morning and afternoon.  If not effective notify office and we will continue titration or adjunct   2.  Physical therapy     Follow-up in 6 months or sooner if needed

## 2023-06-19 NOTE — PATIENT INSTRUCTIONS
Begin taking gabapentin 1 tablet in morning, 1 tablet in afternoon, and 2 tablets at night    Call back in about a week and we will reassess whether you need another medication or further increase    PT ordered

## 2023-07-25 DIAGNOSIS — Z79.4 TYPE 2 DIABETES MELLITUS WITHOUT COMPLICATION, WITH LONG-TERM CURRENT USE OF INSULIN: Chronic | ICD-10-CM

## 2023-07-25 DIAGNOSIS — G25.81 RESTLESS LEG SYNDROME: ICD-10-CM

## 2023-07-25 DIAGNOSIS — E11.9 TYPE 2 DIABETES MELLITUS WITHOUT COMPLICATION, WITH LONG-TERM CURRENT USE OF INSULIN: Chronic | ICD-10-CM

## 2023-07-25 RX ORDER — PIOGLITAZONEHYDROCHLORIDE 15 MG/1
TABLET ORAL
Qty: 90 TABLET | Refills: 1 | Status: SHIPPED | OUTPATIENT
Start: 2023-07-25

## 2023-07-25 RX ORDER — GABAPENTIN 300 MG/1
CAPSULE ORAL
Qty: 90 CAPSULE | Refills: 0 | Status: SHIPPED | OUTPATIENT
Start: 2023-07-25

## 2023-07-25 NOTE — TELEPHONE ENCOUNTER
Rx Refill Note  Requested Prescriptions     Pending Prescriptions Disp Refills    pioglitazone (ACTOS) 15 MG tablet [Pharmacy Med Name: PIOGLITAZONE HCL 15 MG TABLET] 90 tablet 1     Sig: TAKE ONE TABLET BY MOUTH DAILY      Last office visit with prescribing clinician: 1/16/2023   Last telemedicine visit with prescribing clinician: Visit date not found   Next office visit with prescribing clinician: 9/18/2023                         Would you like a call back once the refill request has been completed: [] Yes [] No    If the office needs to give you a call back, can they leave a voicemail: [] Yes [] No    Scarlett Porter MA  07/25/23, 11:13 EDT    Patient needing rx sent to another pharmacy

## 2023-08-21 DIAGNOSIS — I10 PRIMARY HYPERTENSION: Chronic | ICD-10-CM

## 2023-08-22 RX ORDER — AMLODIPINE BESYLATE 2.5 MG/1
TABLET ORAL
Qty: 90 TABLET | Refills: 4 | Status: SHIPPED | OUTPATIENT
Start: 2023-08-22

## 2023-09-05 ENCOUNTER — LAB (OUTPATIENT)
Dept: ENDOCRINOLOGY | Age: 83
End: 2023-09-05
Payer: MEDICARE

## 2023-09-05 DIAGNOSIS — Z79.4 TYPE 2 DIABETES MELLITUS WITHOUT COMPLICATION, WITH LONG-TERM CURRENT USE OF INSULIN: Chronic | ICD-10-CM

## 2023-09-05 DIAGNOSIS — E03.9 ACQUIRED HYPOTHYROIDISM: Chronic | ICD-10-CM

## 2023-09-05 DIAGNOSIS — M85.852 OSTEOPENIA OF LEFT HIP: Chronic | ICD-10-CM

## 2023-09-05 DIAGNOSIS — E55.9 VITAMIN D DEFICIENCY: Chronic | ICD-10-CM

## 2023-09-05 DIAGNOSIS — D47.2 MONOCLONAL GAMMOPATHY OF UNKNOWN SIGNIFICANCE (MGUS): ICD-10-CM

## 2023-09-05 DIAGNOSIS — E78.2 MIXED HYPERLIPIDEMIA: Chronic | ICD-10-CM

## 2023-09-05 DIAGNOSIS — E11.9 TYPE 2 DIABETES MELLITUS WITHOUT COMPLICATION, WITH LONG-TERM CURRENT USE OF INSULIN: Chronic | ICD-10-CM

## 2023-09-05 DIAGNOSIS — I10 PRIMARY HYPERTENSION: ICD-10-CM

## 2023-09-07 DIAGNOSIS — G25.81 RESTLESS LEG SYNDROME: ICD-10-CM

## 2023-09-08 RX ORDER — GABAPENTIN 300 MG/1
CAPSULE ORAL
Qty: 90 CAPSULE | Refills: 0 | Status: SHIPPED | OUTPATIENT
Start: 2023-09-08

## 2023-09-09 LAB
25(OH)D3+25(OH)D2 SERPL-MCNC: 53.9 NG/ML (ref 30–100)
ALBUMIN SERPL-MCNC: 4 G/DL (ref 3.7–4.7)
ALBUMIN/GLOB SERPL: 1.5 {RATIO} (ref 1.2–2.2)
ALP SERPL-CCNC: 94 IU/L (ref 44–121)
ALT SERPL-CCNC: 23 IU/L (ref 0–32)
AST SERPL-CCNC: 28 IU/L (ref 0–40)
BILIRUB SERPL-MCNC: 0.4 MG/DL (ref 0–1.2)
BUN SERPL-MCNC: 15 MG/DL (ref 8–27)
BUN/CREAT SERPL: 25 (ref 12–28)
CALCIUM SERPL-MCNC: 8.7 MG/DL (ref 8.7–10.3)
CHLORIDE SERPL-SCNC: 103 MMOL/L (ref 96–106)
CHOLEST SERPL-MCNC: 159 MG/DL (ref 100–199)
CO2 SERPL-SCNC: 24 MMOL/L (ref 20–29)
COLLAGEN CTX SERPL-MCNC: 515 PG/ML
CREAT SERPL-MCNC: 0.59 MG/DL (ref 0.57–1)
EGFRCR SERPLBLD CKD-EPI 2021: 90 ML/MIN/1.73
GLOBULIN SER CALC-MCNC: 2.6 G/DL (ref 1.5–4.5)
GLUCOSE SERPL-MCNC: 139 MG/DL (ref 70–99)
HBA1C MFR BLD: 7.3 % (ref 4.8–5.6)
HDLC SERPL-MCNC: 60 MG/DL
IMP & REVIEW OF LAB RESULTS: NORMAL
LDLC SERPL CALC-MCNC: 87 MG/DL (ref 0–99)
PINP SER-MCNC: 35.7 NG/ML (ref 10.4–97.8)
POTASSIUM SERPL-SCNC: 4.1 MMOL/L (ref 3.5–5.2)
PROT SERPL-MCNC: 6.6 G/DL (ref 6–8.5)
SODIUM SERPL-SCNC: 139 MMOL/L (ref 134–144)
TRIGL SERPL-MCNC: 61 MG/DL (ref 0–149)
TSH SERPL DL<=0.005 MIU/L-ACNC: 2.51 UIU/ML (ref 0.45–4.5)
VLDLC SERPL CALC-MCNC: 12 MG/DL (ref 5–40)

## 2023-09-18 ENCOUNTER — OFFICE VISIT (OUTPATIENT)
Dept: ENDOCRINOLOGY | Age: 83
End: 2023-09-18
Payer: MEDICARE

## 2023-09-18 VITALS
SYSTOLIC BLOOD PRESSURE: 124 MMHG | TEMPERATURE: 97.8 F | DIASTOLIC BLOOD PRESSURE: 70 MMHG | WEIGHT: 140.6 LBS | HEART RATE: 77 BPM | OXYGEN SATURATION: 100 % | BODY MASS INDEX: 22.07 KG/M2 | HEIGHT: 67 IN

## 2023-09-18 DIAGNOSIS — Z79.4 TYPE 2 DIABETES MELLITUS WITHOUT COMPLICATION, WITH LONG-TERM CURRENT USE OF INSULIN: Primary | Chronic | ICD-10-CM

## 2023-09-18 DIAGNOSIS — I10 PRIMARY HYPERTENSION: Chronic | ICD-10-CM

## 2023-09-18 DIAGNOSIS — E03.9 ACQUIRED HYPOTHYROIDISM: Chronic | ICD-10-CM

## 2023-09-18 DIAGNOSIS — E78.2 MIXED HYPERLIPIDEMIA: Chronic | ICD-10-CM

## 2023-09-18 DIAGNOSIS — E11.9 TYPE 2 DIABETES MELLITUS WITHOUT COMPLICATION, WITH LONG-TERM CURRENT USE OF INSULIN: Primary | Chronic | ICD-10-CM

## 2023-09-18 PROCEDURE — 99214 OFFICE O/P EST MOD 30 MIN: CPT | Performed by: NURSE PRACTITIONER

## 2023-09-18 PROCEDURE — 3074F SYST BP LT 130 MM HG: CPT | Performed by: NURSE PRACTITIONER

## 2023-09-18 PROCEDURE — 3078F DIAST BP <80 MM HG: CPT | Performed by: NURSE PRACTITIONER

## 2023-09-18 NOTE — PATIENT INSTRUCTIONS
Continue with Ozempic 2 mg weekly, Actos 15 mg daily, Toujeo 8 units every morning and glipizide XL 2.5 mg every morning  Check blood sugars in the morning when you get up and either before lunch/dinner or before bedtime

## 2023-09-18 NOTE — PROGRESS NOTES
Chief Complaint  Chief Complaint   Patient presents with    Diabetes     Type 2: Pt doesn't have meter today, checks bs every morning, is up to date on eye exam, no hx of retinopathy, moderate neuropathy.        Subjective          History of Present Illness    Lissa Ortiz 82 y.o. presents for a follow-up evaluation for type 2 DM     She has been diabetic since 1981     Pt is on the following medications for their DM: Ozempic 2 mg weekly, Actos 15 mg daily, Toujeo 8 units every morning and glipizide XL 2.5 mg every morning    Pt was out of Ozempic for a while due to not being able to get it.     Janumet was discontinued because of diarrhea.      Jardiance was too expensive        Pt complains of intermittent numbness and tingling in feet.     Denies diarrhea, constipation, chest pain, shortness of breath and vision changes    Weight loss of 7 lbs since last visit.    Pt does not have a history of DM retinopathy.  Last eye exam was 11/22  Pt has glaucoma     Pt does not have a history of nephropathy.  Patient is not currently taking ACE/ARB     Pt does not have neuropathy.  She has restless leg at night and her feet feels tight and numb  which is improved by gabapentin 300 mg  at bedtime prescribed by neurologist RONALD Paz.     Pt does not have a history of CAD or CVA.    Last A1C in 09/23 was 7.3    Last microalbumin in 05/23 was negative          Blood Sugars    Blood glucoses are checked 1/day.    Fasting blood glucoses: around 100    Pt has no episodes of hypoglycemia.            Hypothyroid     PT complains of chronic cold intolerance.    Denies constipation, diarrhea, hair loss, dry skin, chest pain, palpitations and heat intolerance    Current treatment is levothyroxine 25 mcg daily    Last labs in 09/23 showed TSH 2.510              Hyperlipidemia     Pt denies any muscle/body aches, chest pain or shortness of breath    Pt is currently taking pravastatin 20 mg HS     Last lipid panel in 09/23 showed  "Total 159, HDL 60, LDL 87 and Triglycerides 61            Hypertension    Pt denies any chest pain, palpitations, shortness of breath or headache    Current regimen includes amlodipine 2.5 mg daily                 Other History     She has rheumatoid arthritis follows with Dr. Chow.        Bone density done at her gynecologist in May 2018 was normal with 4.4% decrease on the left hip.  She was advised follow-up bone density in 2023.  She is being followed by her gynecologist Dr. Cecilia Arnold.        She has monoclonal gammopathy of unknown significance and is being followed by Dr. Thompson            I have reviewed the patient's allergies, medicines, past medical hx, family hx and social hx.    Objective   Vital Signs:   /70   Pulse 77   Temp 97.8 °F (36.6 °C) (Oral)   Ht 170.2 cm (67.01\")   Wt 63.8 kg (140 lb 9.6 oz)   LMP  (LMP Unknown)   SpO2 100%   BMI 22.02 kg/m²       Physical Exam   Physical Exam  Constitutional:       General: She is not in acute distress.     Appearance: Normal appearance. She is not diaphoretic.   HENT:      Head: Normocephalic and atraumatic.   Eyes:      General:         Right eye: No discharge.         Left eye: No discharge.   Skin:     General: Skin is warm and dry.   Neurological:      Mental Status: She is alert.   Psychiatric:         Mood and Affect: Mood normal.         Behavior: Behavior normal.                  Results Review:   Hemoglobin A1C   Date Value Ref Range Status   09/05/2023 7.3 (H) 4.8 - 5.6 % Final     Comment:              Prediabetes: 5.7 - 6.4           Diabetes: >6.4           Glycemic control for adults with diabetes: <7.0     04/29/2022 9.50 (H) 4.80 - 5.60 % Final     Triglycerides   Date Value Ref Range Status   09/05/2023 61 0 - 149 mg/dL Final     HDL Cholesterol   Date Value Ref Range Status   09/05/2023 60 >39 mg/dL Final     LDL Chol Calc ([facility])   Date Value Ref Range Status   09/05/2023 87 0 - 99 mg/dL Final     VLDL Cholesterol Akbar "   Date Value Ref Range Status   09/05/2023 12 5 - 40 mg/dL Final         Assessment and Plan {CC Problem List  Visit Diagnosis  ROS  Review (Popup)  Health Maintenance  Quality  BestPractice  Medications  SmartSets  SnapShot Encounters  Media :23  Diagnoses and all orders for this visit:    1. Type 2 diabetes mellitus without complication, with long-term current use of insulin (Primary)  -     Hemoglobin A1c; Future  -     Comprehensive Metabolic Panel; Future  -     Microalbumin / Creatinine Urine Ratio - Urine, Clean Catch; Future    A1c is 7.3%  Continue with Ozempic 2 mg weekly, Actos 15 mg daily, Toujeo 8 units every morning and glipizide XL 2.5 mg every morning  Check blood sugars in the morning when you get up and either before lunch/dinner or before bedtime  Advised to work on dietary modification      2. Acquired hypothyroidism  -     TSH; Future  -     T4, Free; Future    Thyroid labs are good.    Continue with levothyroxine 25 mcg daily      3. Mixed hyperlipidemia  -     Comprehensive Metabolic Panel; Future  -     Lipid Panel; Future    Lipid panel is good.    Continue with statin.       4. Primary hypertension  -     Comprehensive Metabolic Panel; Future    Stable  Continue with current medication regimen  Defer management to PCP         No refills needed at this time      RTC on 01/16/24 with Dr. Pimentel, labs prior - needs lab appointment        Follow Up     Patient was given instructions and counseling regarding her condition or for health maintenance advice. Please see specific information pulled into the AVS if appropriate.              Jenny Guido, MAINE  09/18/23

## 2023-10-09 DIAGNOSIS — E11.9 TYPE 2 DIABETES MELLITUS WITHOUT COMPLICATION, WITH LONG-TERM CURRENT USE OF INSULIN: Primary | ICD-10-CM

## 2023-10-09 DIAGNOSIS — Z79.4 TYPE 2 DIABETES MELLITUS WITHOUT COMPLICATION, WITH LONG-TERM CURRENT USE OF INSULIN: Primary | ICD-10-CM

## 2023-10-09 RX ORDER — SEMAGLUTIDE 2.68 MG/ML
2 INJECTION, SOLUTION SUBCUTANEOUS WEEKLY
Qty: 9 ML | Refills: 2 | Status: SHIPPED | OUTPATIENT
Start: 2023-10-09 | End: 2023-10-11 | Stop reason: SDUPTHER

## 2023-10-09 NOTE — TELEPHONE ENCOUNTER
Patient called in stating she has been having issues getting her ozempic filled. She is asking if we can send an order to [facility].     She is out of the medication

## 2023-10-11 DIAGNOSIS — E11.9 TYPE 2 DIABETES MELLITUS WITHOUT COMPLICATION, WITH LONG-TERM CURRENT USE OF INSULIN: ICD-10-CM

## 2023-10-11 DIAGNOSIS — Z79.4 TYPE 2 DIABETES MELLITUS WITHOUT COMPLICATION, WITH LONG-TERM CURRENT USE OF INSULIN: ICD-10-CM

## 2023-10-11 RX ORDER — SEMAGLUTIDE 2.68 MG/ML
2 INJECTION, SOLUTION SUBCUTANEOUS WEEKLY
Qty: 9 ML | Refills: 2 | Status: SHIPPED | OUTPATIENT
Start: 2023-10-11

## 2023-10-11 NOTE — TELEPHONE ENCOUNTER
Incoming Refill Request      Medication requested (name and dose): Ozempic (2mg/dose) 8mg/3ml subcutaneous solution pen--injector    [facility]     Additional details provided by patient:     Best call back number:[phone]    Does the patient have less than a 3 day supply:  [] Yes  [] No    Екатерина Amanda Rep  10/11/23, 08:41 EDT

## 2023-10-26 RX ORDER — LEVOTHYROXINE SODIUM 0.03 MG/1
25 TABLET ORAL DAILY
Qty: 90 TABLET | Refills: 1 | Status: SHIPPED | OUTPATIENT
Start: 2023-10-26

## 2023-10-26 NOTE — TELEPHONE ENCOUNTER
Incoming Refill Request      Medication requested (name and dose): LEVOTHYROXINE    Pharmacy where request should be sent: [facility]    Additional details provided by patient:     Best call back number: [phone]    Does the patient have less than a 3 day supply:  [] Yes  [] No    Екатерина Blackman Rep  10/26/23, 15:17 EDT

## 2023-10-28 DIAGNOSIS — G25.81 RESTLESS LEG SYNDROME: ICD-10-CM

## 2023-10-30 RX ORDER — GABAPENTIN 300 MG/1
CAPSULE ORAL
Qty: 90 CAPSULE | Refills: 0 | Status: SHIPPED | OUTPATIENT
Start: 2023-10-30

## 2023-12-04 ENCOUNTER — TELEPHONE (OUTPATIENT)
Dept: ONCOLOGY | Facility: CLINIC | Age: 83
End: 2023-12-04
Payer: MEDICARE

## 2023-12-04 NOTE — TELEPHONE ENCOUNTER
"Caller: Lissa Ortiz \"Godwin\"    Relationship to patient: Self    Best call back number: [phone]    Patient is needing: TO R/S 2-21-23 VITALS AND F/U AND THE 2-14-23 LABS. SHE WORK AT THE HOSPITAL ON WEDNESDAYS.  "

## 2023-12-06 ENCOUNTER — OFFICE VISIT (OUTPATIENT)
Dept: NEUROLOGY | Facility: CLINIC | Age: 83
End: 2023-12-06
Payer: MEDICARE

## 2023-12-06 VITALS
OXYGEN SATURATION: 100 % | HEIGHT: 67 IN | SYSTOLIC BLOOD PRESSURE: 132 MMHG | DIASTOLIC BLOOD PRESSURE: 74 MMHG | BODY MASS INDEX: 22.85 KG/M2 | HEART RATE: 67 BPM | WEIGHT: 145.6 LBS

## 2023-12-06 DIAGNOSIS — D47.2 MGUS (MONOCLONAL GAMMOPATHY OF UNKNOWN SIGNIFICANCE): ICD-10-CM

## 2023-12-06 DIAGNOSIS — G62.9 PERIPHERAL POLYNEUROPATHY: Primary | ICD-10-CM

## 2023-12-06 DIAGNOSIS — G72.41 INCLUSION BODY MYOSITIS: ICD-10-CM

## 2023-12-06 RX ORDER — PEN NEEDLE, DIABETIC 32GX 5/32"
1 NEEDLE, DISPOSABLE MISCELLANEOUS DAILY
Qty: 100 EACH | Refills: 2 | Status: SHIPPED | OUTPATIENT
Start: 2023-12-06

## 2023-12-06 NOTE — PROGRESS NOTES
"CC: Follow-up    HPI:  Lissa Ortiz is a  83 y.o. right-handed female here for follow-up regarding neuropathy.  Last time I saw her we had made recommendation to possibly increase her gabapentin to include a daytime dose and made referral to physical therapy.  She says she \"quit physical therapy because it was not really helping.  According to the notes she made good excellent' and was discharged with no further recs.  Her prior medical history is reviewed and addended meds made as necessary: She has rheumatoid arthritis on long-term DMARD therapy with methotrexate, type 2 diabetes, hypertension, hyperlipidemia, hypothyroidism, cataracts.  [facility] and she carries the diagnosis of inclusion body myositis.  Mostly her complaints are of muscle weakness and not being able to play tennis anymore.  She does have discomforting paresthesias just of feet and toes.  Gabapentin helps this.  She also is describing sensation to move legs that occurs both at night and during the day.  Gabapentin resolves this    Work-up to date has included an EMG which was performed on 10/4/2021 and revealed mild evidence of peripheral neuropathy.  Some irritability is seen in the left tibialis anterior without any other features of a lumbosacral radiculopathy. Specifically there was no evidence of a myopathy on this study.  Laboratory studies for treatable causes of neuropathy included the following:     RPR nonreactive  Cryoglobulins negative  Copper normal at 140  Heavy metals including lead arsenic and mercury all within normal limits  Sed rate normal at 5  Vitamin B12 and folate normal at greater than 2000 and greater than 20 respectively  CK level normal at 82  Protein electrophoresis did reveal an M spike, immunofixation showed presence of IgA kappa M-protein, 0.2 gm/dl           Social history:    Family history:      Pain Scale:        ROS:  Review of Systems   Musculoskeletal:  Positive for gait " "problem (no falls).   Neurological:  Positive for numbness (feet). Negative for dizziness, tremors, seizures, syncope, facial asymmetry, speech difficulty, weakness, light-headedness and headaches.       Reviewed ROS conducted by MA and gisele        Physical Exam:  Vitals:    12/06/23 1327   BP: 132/74   Pulse: 67   SpO2: 100%   Weight: 66 kg (145 lb 9.6 oz)   Height: 170.2 cm (67\")      Orthostatic BP:    Body mass index is 22.8 kg/m².        General: pt well appearing, no distress  HEENT: Normocephalic, conjunctiva normal, external canals normal, no nasal discharge, moist mucous membranes  Neck: No lymphadenopathy, thyroid not enlarged, no JVD  CV: Regular rate and rhythm, no murmurs negative no bruits auscultated at neck, equal pulses  Pulmonary: Normal respiratory effort, clear to auscultation bilaterally  Extremities: no edema, bruising, or skin lesions  Pysch: good eye contact, cooperative, full affect, euthymic mood, good attention, good insight  Mental: alert, conversant, AOx3, provides history, 3/3 recall.  Language fluent, names, repeats.  CN: CN II-XII intact, PERRL, EOMi, no gaze palsy or nystagmus, intact facial sensation, no facial assymetry, intact hearing, symmetric palate elevation, tongue midline, good SCM strength  Motor: no abnormal movements, no pronator drift, normal  bulk and tone, 5/5 strength b/l UE & LE  Sensory: normal sensation to crude touch, temperature, and vibration throughout  Reflexes: 2+ throughout, neg babinski  Coordination: no ataxia on finger-nose, heel-shin  Gait: normal gait, no ataxia, intact heel, slight sway with romberg        Results:      Lab Results   Component Value Date    GLUCOSE 139 (H) 09/05/2023    BUN 15 09/05/2023    CREATININE 0.59 09/05/2023    EGFRIFNONA 91 02/17/2022    EGFRIFAFRI 95 01/10/2022    BCR 25 09/05/2023    CO2 24 09/05/2023    CALCIUM 8.7 09/05/2023    PROTENTOTREF 6.6 09/05/2023    ALBUMIN 4.0 09/05/2023    LABIL2 1.5 09/05/2023    AST 28 " 09/05/2023    ALT 23 09/05/2023       Lab Results   Component Value Date    WBC 5.55 01/12/2023    HGB 13.4 01/12/2023    HCT 40.4 01/12/2023    MCV 96.0 01/12/2023     01/12/2023         .  Lab Results   Component Value Date    RPR Non-Reactive 10/04/2021         Lab Results   Component Value Date    TSH 2.510 09/05/2023    U6UWLVA 7.4 11/05/2020         Lab Results   Component Value Date    [id number] >2,000 (H) 10/04/2021         Lab Results   Component Value Date    FOLATE >20.00 10/04/2021         Lab Results   Component Value Date    HGBA1C 7.3 (H) 09/05/2023         Lab Results   Component Value Date    GLUCOSE 139 (H) 09/05/2023    BUN 15 09/05/2023    CREATININE 0.59 09/05/2023    EGFRIFNONA 91 02/17/2022    EGFRIFAFRI 95 01/10/2022    BCR 25 09/05/2023    K 4.1 09/05/2023    CO2 24 09/05/2023    CALCIUM 8.7 09/05/2023    PROTENTOTREF 6.6 09/05/2023    ALBUMIN 4.0 09/05/2023    LABIL2 1.5 09/05/2023    AST 28 09/05/2023    ALT 23 09/05/2023         Diagnosis:  1.  Polyneuropathy  2   MGUS  3   RA  4   Inclusion body myositis     Impression: 82-year-old right-handed female with past medical history as above and also hypertension, hyperlipidemia, hypothyroidism, diabetes, rheumatoid arthritis on methotrexate, mixed anxiety and depression, and inclusion body myositis. She is here for follow-up.  Her neuropathy is slowly progressive.   She is describing sxs c/w PMLD but tolerable.      She did well with PT and was released with note of excellent improvement.  She is dismayed she is no longer able to play tennis.   I suspect it more of a balance issue than myositis.  She is actually improved of strength today.       Plan:  1.  Continue gabapentin 600 mg at night.  Notify office should symptoms less tolerable and we  can further adjust  2   Encouraged edin chi program    Fu in a yr, sooner if problem    I spent at least 60 minutes interviewing, examining, and counseling patient.  I independently reviewed  documentation, laboratory and diagnostic findings, external documentation where applicable, and formulated treatment plan which was discussed with the patient.

## 2023-12-06 NOTE — PATIENT INSTRUCTIONS
[facility]. . [place]. Director: Jackson Hurst. Address: [address] [address], [place] [address]. Ph: [phone]. Email: [email or web address].    [address], [place] [address] · ~[address]  [phone]  [email or web address]  Open · Closes 8:30 PM  Suggest an edit · Your business? Claim now

## 2023-12-06 NOTE — TELEPHONE ENCOUNTER
Rx Refill Note  Requested Prescriptions     Pending Prescriptions Disp Refills    BD Pen Needle Rufus 2nd Gen 32G X 4 MM misc [Pharmacy Med Name: BD RUFUS 2 GEN PEN NDL 32G 4MM] 100 each 2     Sig: USE TO INJECT INSULIN ONCE DAILY      Last office visit with prescribing clinician: 5/16/2023   Last telemedicine visit with prescribing clinician: Visit date not found   Next office visit with prescribing clinician: 1/16/2024                         Would you like a call back once the refill request has been completed: [] Yes [] No    If the office needs to give you a call back, can they leave a voicemail: [] Yes [] No    Yudi Gerber  12/06/23, 09:11 EST

## 2023-12-06 NOTE — LETTER
"December 6, 2023       No Recipients    Patient: Lissa Ortiz   YOB: 1940   Date of Visit: 12/6/2023     Dear Adin Bragg MD:       Thank you for referring Lissa Ortiz to me for evaluation. Below are the relevant portions of my assessment and plan of care.    If you have questions, please do not hesitate to call me. I look forward to following Lissa along with you.         Sincerely,        RONALD Tolbert        CC:   No Recipients    Jennifer Malik PA  12/07/23 2250  Sign when Signing Visit  CC: Follow-up    HPI:  Lissa Ortiz is a  83 y.o. right-handed female here for follow-up regarding neuropathy.  Last time I saw her we had made recommendation to possibly increase her gabapentin to include a daytime dose and made referral to physical therapy.  She says she \"quit physical therapy because it was not really helping.  According to the notes she made good excellent' and was discharged with no further recs.  Her prior medical history is reviewed and addended meds made as necessary: She has rheumatoid arthritis on long-term DMARD therapy with methotrexate, type 2 diabetes, hypertension, hyperlipidemia, hypothyroidism, cataracts.  [facility] and she carries the diagnosis of inclusion body myositis.  Mostly her complaints are of muscle weakness and not being able to play tennis anymore.  She does have discomforting paresthesias just of feet and toes.  Gabapentin helps this.  She also is describing sensation to move legs that occurs both at night and during the day.  Gabapentin resolves this    Work-up to date has included an EMG which was performed on 10/4/2021 and revealed mild evidence of peripheral neuropathy.  Some irritability is seen in the left tibialis anterior without any other features of a lumbosacral radiculopathy. Specifically there was no evidence of a myopathy on this study.  Laboratory studies for treatable causes of neuropathy included the " "following:     RPR nonreactive  Cryoglobulins negative  Copper normal at 140  Heavy metals including lead arsenic and mercury all within normal limits  Sed rate normal at 5  Vitamin B12 and folate normal at greater than 2000 and greater than 20 respectively  CK level normal at 82  Protein electrophoresis did reveal an M spike, immunofixation showed presence of IgA kappa M-protein, 0.2 gm/dl           Social history:    Family history:      Pain Scale:        ROS:  Review of Systems   Musculoskeletal:  Positive for gait problem (no falls).   Neurological:  Positive for numbness (feet). Negative for dizziness, tremors, seizures, syncope, facial asymmetry, speech difficulty, weakness, light-headedness and headaches.       Reviewed ROS conducted by MA and gisele        Physical Exam:  Vitals:    12/06/23 1327   BP: 132/74   Pulse: 67   SpO2: 100%   Weight: 66 kg (145 lb 9.6 oz)   Height: 170.2 cm (67\")      Orthostatic BP:    Body mass index is 22.8 kg/m².        General: pt well appearing, no distress  HEENT: Normocephalic, conjunctiva normal, external canals normal, no nasal discharge, moist mucous membranes  Neck: No lymphadenopathy, thyroid not enlarged, no JVD  CV: Regular rate and rhythm, no murmurs negative no bruits auscultated at neck, equal pulses  Pulmonary: Normal respiratory effort, clear to auscultation bilaterally  Extremities: no edema, bruising, or skin lesions  Pysch: good eye contact, cooperative, full affect, euthymic mood, good attention, good insight  Mental: alert, conversant, AOx3, provides history, 3/3 recall.  Language fluent, names, repeats.  CN: CN II-XII intact, PERRL, EOMi, no gaze palsy or nystagmus, intact facial sensation, no facial assymetry, intact hearing, symmetric palate elevation, tongue midline, good SCM strength  Motor: no abnormal movements, no pronator drift, normal  bulk and tone, 5/5 strength b/l UE & LE  Sensory: normal sensation to crude touch, temperature, and vibration " throughout  Reflexes: 2+ throughout, neg babinski  Coordination: no ataxia on finger-nose, heel-shin  Gait: normal gait, no ataxia, intact heel, slight sway with romberg        Results:      Lab Results   Component Value Date    GLUCOSE 139 (H) 09/05/2023    BUN 15 09/05/2023    CREATININE 0.59 09/05/2023    EGFRIFNONA 91 02/17/2022    EGFRIFAFRI 95 01/10/2022    BCR 25 09/05/2023    CO2 24 09/05/2023    CALCIUM 8.7 09/05/2023    PROTENTOTREF 6.6 09/05/2023    ALBUMIN 4.0 09/05/2023    LABIL2 1.5 09/05/2023    AST 28 09/05/2023    ALT 23 09/05/2023       Lab Results   Component Value Date    WBC 5.55 01/12/2023    HGB 13.4 01/12/2023    HCT 40.4 01/12/2023    MCV 96.0 01/12/2023     01/12/2023         .  Lab Results   Component Value Date    RPR Non-Reactive 10/04/2021         Lab Results   Component Value Date    TSH 2.510 09/05/2023    D4VBKZV 7.4 11/05/2020         Lab Results   Component Value Date    XWPZBKQX38 >2,000 (H) 10/04/2021         Lab Results   Component Value Date    FOLATE >20.00 10/04/2021         Lab Results   Component Value Date    HGBA1C 7.3 (H) 09/05/2023         Lab Results   Component Value Date    GLUCOSE 139 (H) 09/05/2023    BUN 15 09/05/2023    CREATININE 0.59 09/05/2023    EGFRIFNONA 91 02/17/2022    EGFRIFAFRI 95 01/10/2022    BCR 25 09/05/2023    K 4.1 09/05/2023    CO2 24 09/05/2023    CALCIUM 8.7 09/05/2023    PROTENTOTREF 6.6 09/05/2023    ALBUMIN 4.0 09/05/2023    LABIL2 1.5 09/05/2023    AST 28 09/05/2023    ALT 23 09/05/2023         Diagnosis:  1.  Polyneuropathy  2   MGUS  3   RA  4   Inclusion body myositis     Impression: 82-year-old right-handed female with past medical history as above and also hypertension, hyperlipidemia, hypothyroidism, diabetes, rheumatoid arthritis on methotrexate, mixed anxiety and depression, and inclusion body myositis. She is here for follow-up.  Her neuropathy is slowly progressive.   She is describing sxs c/w PMLD but tolerable.      She  did well with PT and was released with note of excellent improvement.  She is dismayed she is no longer able to play tennis.   I suspect it more of a balance issue than myositis.  She is actually improved of strength today.       Plan:  1.  Continue gabapentin 600 mg at night.  Notify office should symptoms less tolerable and we  can further adjust  2   Encouraged edin chi program    Fu in a yr, sooner if problem    I spent at least 60 minutes interviewing, examining, and counseling patient.  I independently reviewed documentation, laboratory and diagnostic findings, external documentation where applicable, and formulated treatment plan which was discussed with the patient.

## 2023-12-08 DIAGNOSIS — Z79.4 TYPE 2 DIABETES MELLITUS WITHOUT COMPLICATION, WITH LONG-TERM CURRENT USE OF INSULIN: ICD-10-CM

## 2023-12-08 DIAGNOSIS — E11.9 TYPE 2 DIABETES MELLITUS WITHOUT COMPLICATION, WITH LONG-TERM CURRENT USE OF INSULIN: ICD-10-CM

## 2023-12-08 RX ORDER — INSULIN GLARGINE 300 U/ML
8 INJECTION, SOLUTION SUBCUTANEOUS DAILY
Qty: 6 ML | Refills: 0 | Status: SHIPPED | OUTPATIENT
Start: 2023-12-08

## 2023-12-08 NOTE — TELEPHONE ENCOUNTER
Rx Refill Note  Requested Prescriptions     Pending Prescriptions Disp Refills    Dano Dyer SoloStar 300 UNIT/ML solution pen-injector injection [Pharmacy Med Name: TOUJEO MAX SOLOSTR 300 UNIT/ML] 12 mL 1     Sig: INJECT 10 UNITS UNDER THE SKIN INTO THE APPORPRIATE AREA AS DIRECTED DAILY      Last office visit with prescribing clinician: 5/16/2023   Last telemedicine visit with prescribing clinician: Visit date not found   Next office visit with prescribing clinician: 1/16/2024                         Would you like a call back once the refill request has been completed: [] Yes [] No    If the office needs to give you a call back, can they leave a voicemail: [] Yes [] No    Yudi Gerber  12/08/23, 09:50 EST

## 2023-12-12 DIAGNOSIS — G25.81 RESTLESS LEG SYNDROME: ICD-10-CM

## 2023-12-12 RX ORDER — GABAPENTIN 300 MG/1
CAPSULE ORAL
Qty: 90 CAPSULE | Refills: 0 | Status: SHIPPED | OUTPATIENT
Start: 2023-12-12

## 2023-12-14 ENCOUNTER — OFFICE VISIT (OUTPATIENT)
Dept: INTERNAL MEDICINE | Facility: CLINIC | Age: 83
End: 2023-12-14
Payer: MEDICARE

## 2023-12-14 VITALS
OXYGEN SATURATION: 98 % | RESPIRATION RATE: 18 BRPM | WEIGHT: 145 LBS | BODY MASS INDEX: 22.76 KG/M2 | SYSTOLIC BLOOD PRESSURE: 116 MMHG | HEIGHT: 67 IN | HEART RATE: 75 BPM | DIASTOLIC BLOOD PRESSURE: 78 MMHG

## 2023-12-14 DIAGNOSIS — E11.9 TYPE 2 DIABETES MELLITUS WITHOUT COMPLICATION, WITH LONG-TERM CURRENT USE OF INSULIN: Chronic | ICD-10-CM

## 2023-12-14 DIAGNOSIS — R05.3 CHRONIC COUGH: ICD-10-CM

## 2023-12-14 DIAGNOSIS — I10 PRIMARY HYPERTENSION: Primary | Chronic | ICD-10-CM

## 2023-12-14 DIAGNOSIS — E03.9 ACQUIRED HYPOTHYROIDISM: Chronic | ICD-10-CM

## 2023-12-14 DIAGNOSIS — Z79.4 TYPE 2 DIABETES MELLITUS WITHOUT COMPLICATION, WITH LONG-TERM CURRENT USE OF INSULIN: Chronic | ICD-10-CM

## 2023-12-14 DIAGNOSIS — E78.2 MIXED HYPERLIPIDEMIA: Chronic | ICD-10-CM

## 2023-12-14 PROBLEM — G72.41 INCLUSION BODY MYOSITIS: Chronic | Status: ACTIVE | Noted: 2021-07-01

## 2023-12-14 RX ORDER — ALENDRONATE SODIUM 70 MG/1
70 TABLET ORAL
COMMUNITY

## 2023-12-14 RX ORDER — AZELASTINE 1 MG/ML
2 SPRAY, METERED NASAL 2 TIMES DAILY
Qty: 2 EACH | Refills: 5 | Status: SHIPPED | OUTPATIENT
Start: 2023-12-14

## 2023-12-14 RX ORDER — PRAVASTATIN SODIUM 20 MG
20 TABLET ORAL DAILY
Qty: 90 TABLET | Refills: 4 | Status: SHIPPED | OUTPATIENT
Start: 2023-12-14

## 2023-12-14 NOTE — PROGRESS NOTES
Chief Complaint  Follow-up and Hypertension    Subjective        Lissa Ortiz presents to [facility]  History of Present Illness    She is following up today for hypertension, hyperlipidemia, diabetes and hypothyroidism.  Blood pressure is controlled in the office today.  No new complaints regarding these conditions.  She had labs completed November 5, 2023 with her endocrine physician and we went over the results today as below.  These conditions appear stable.  I reviewed the note from endocrine from September 18, 2023 for this visit today.    Current Outpatient Medications:     alendronate (FOSAMAX) 70 MG tablet, Take 1 tablet by mouth Every 7 (Seven) Days., Disp: , Rfl:     amLODIPine (NORVASC) 2.5 MG tablet, TAKE ONE TABLET BY MOUTH DAILY, Disp: 90 tablet, Rfl: 4    azelastine (ASTELIN) 0.1 % nasal spray, 2 sprays into the nostril(s) as directed by provider 2 (Two) Times a Day. Use in each nostril as directed, Disp: 2 each, Rfl: 5    BD Pen Needle Vicenta 2nd Gen 32G X 4 MM misc, USE TO INJECT INSULIN ONCE DAILY, Disp: 100 each, Rfl: 2    Blood Glucose Monitoring Suppl (TRUE METRIX AIR GLUCOSE METER) device, 1 Device Daily As Needed (check bs's twice daily)., Disp: 1 Device, Rfl: 0    Calcium Citrate (CITRACAL PO), Take  by mouth. 2 TABS DAILY, Disp: , Rfl:     dorzolamide-timolol (COSOPT) 22.3-6.8 MG/ML ophthalmic solution, Apply  to eye(s) as directed by provider Every 12 (Twelve) Hours., Disp: , Rfl:     folic acid (FOLVITE) 1 MG tablet, Take 1 tablet by mouth Daily., Disp: , Rfl:     gabapentin (NEURONTIN) 300 MG capsule, TAKE ONE CAPSULE BY MOUTH THREE TIMES A DAY, Disp: 90 capsule, Rfl: 0    glipizide (GLUCOTROL XL) 2.5 MG 24 hr tablet, TAKE ONE TABLET BY MOUTH DAILY, Disp: 90 tablet, Rfl: 1    Insulin Glargine, 2 Unit Dial, (Toujeo Max SoloStar) 300 UNIT/ML solution pen-injector injection, Inject 8 Units under the skin into the appropriate area as directed Daily., Disp: 6 mL,  "Rfl: 0    levothyroxine (SYNTHROID, LEVOTHROID) 25 MCG tablet, Take 1 tablet by mouth Daily., Disp: 90 tablet, Rfl: 1    Methotrexate Sodium 50 MG/2ML injection, Injects 0.7 ml sq once a week, Disp: , Rfl:     Methotrexate Sodium syringe, INJECT 0.7 MILLILITER ONCE WEEKLY ON THE SAME DAY OF THE WEEK, Disp: , Rfl:     Multiple Vitamin (MULTI VITAMIN PO), Take 1 tablet by mouth Daily., Disp: , Rfl:     pioglitazone (ACTOS) 15 MG tablet, TAKE ONE TABLET BY MOUTH DAILY, Disp: 90 tablet, Rfl: 1    pravastatin (PRAVACHOL) 20 MG tablet, Take 1 tablet by mouth Daily., Disp: 90 tablet, Rfl: 4    Probiotic capsule, Take 1 capsule by mouth., Disp: , Rfl:     Semaglutide, 2 MG/DOSE, (Ozempic, 2 MG/DOSE,) 8 MG/3ML solution pen-injector, Inject 2 mg under the skin into the appropriate area as directed 1 (One) Time Per Week. 2 mg weekly subcutaneously  Indications: Type 2 Diabetes (Patient taking differently: Inject 2 mg under the skin into the appropriate area as directed 1 (One) Time Per Week. 2 mg weekly subcutaneously), Disp: 9 mL, Rfl: 2    solifenacin (VESICARE) 10 MG tablet, , Disp: , Rfl:     Tafluprost, PF, (ZIOPTAN) 0.0015 % solution ophthalmic solution, 1 drop Every Night., Disp: , Rfl:       Objective   Vital Signs:  /78 (BP Location: Left arm, Patient Position: Sitting, Cuff Size: Small Adult)   Pulse 75   Resp 18   Ht 170.2 cm (67\")   Wt 65.8 kg (145 lb)   SpO2 98%   BMI 22.71 kg/m²   Estimated body mass index is 22.71 kg/m² as calculated from the following:    Height as of this encounter: 170.2 cm (67\").    Weight as of this encounter: 65.8 kg (145 lb).       BMI is within normal parameters. No other follow-up for BMI required.      Physical Exam  Vitals and nursing note reviewed.   Constitutional:       General: She is not in acute distress.     Appearance: Normal appearance.   Cardiovascular:      Rate and Rhythm: Normal rate and regular rhythm.      Heart sounds: Normal heart sounds. No murmur " heard.  Pulmonary:      Effort: Pulmonary effort is normal.      Breath sounds: Normal breath sounds.   Neurological:      Mental Status: She is alert.        Result Review :  The following data was reviewed by: Adin Bragg MD on 12/14/2023:  Common labs          1/12/2023    11:09 5/9/2023    09:47 9/5/2023    08:54   Common Labs   Glucose 138  123  139    BUN 16  15  15    Creatinine 0.67  0.66  0.59    Sodium 141  142  139    Potassium 4.0  4.4  4.1    Chloride 105  106  103    Calcium 9.3  9.5  8.7    Total Protein 6.9  6.8  6.6    Albumin 4.1     3.7  4.3  4.0    Total Bilirubin 0.5  0.5  0.4    Alkaline Phosphatase 82  83  94    AST (SGOT) 25  27  28    ALT (SGPT) 16  22  23    WBC 5.55      Hemoglobin 13.4      Hematocrit 40.4      Platelets 275      Total Cholesterol  169  159    Triglycerides  70  61    HDL Cholesterol  61  60    LDL Cholesterol   95  87    Hemoglobin A1C  7.30  7.3    Microalbumin, Urine  16.9       TSH          1/3/2023    09:32 5/9/2023    09:47 9/5/2023    08:54   TSH   TSH 3.210  2.890  2.510                       Assessment and Plan   Diagnoses and all orders for this visit:    1. Primary hypertension (Primary)    2. Mixed hyperlipidemia  -     pravastatin (PRAVACHOL) 20 MG tablet; Take 1 tablet by mouth Daily.  Dispense: 90 tablet; Refill: 4    3. Type 2 diabetes mellitus without complication, with long-term current use of insulin    4. Acquired hypothyroidism    5. Chronic cough  -     azelastine (ASTELIN) 0.1 % nasal spray; 2 sprays into the nostril(s) as directed by provider 2 (Two) Times a Day. Use in each nostril as directed  Dispense: 2 each; Refill: 5        Clinically stable chronic conditions as above.  Continue all medications as above.  Labs reviewed as above.  Endocrine already has labs ordered for her including CMP, A1c, lipid panel, TSH and free T4.  I can use these labs for future monitoring.           Follow Up   Return in about 6 months (around 6/14/2024) for  Medicare Wellness.  Patient was given instructions and counseling regarding her condition or for health maintenance advice. Please see specific information pulled into the AVS if appropriate.

## 2023-12-14 NOTE — PATIENT INSTRUCTIONS
"MyChart/Telephone call/Lab results Tips:    MyChart and telephone calls are a useful part of our patient care program and is a way for us to communicate lab results and for you to request refills.  Not all medical questions are appropriate for MyChart such as new medical issues that require taking a history, performing an exam, getting labs/studies or researching medical questions needed to be addressed in the office.  Similarly, telephone calls should not be used for new problems requiring an evaluation as well.    Examples of medical issues that are APPROPRIATE for MyChart and telephone calls:  -Follow-up on problems we have already addressed in a visit such as home testing results, blood pressure readings, glucose readings  -Questions that can be answered with a simple \"yes\" or \"no\"    Communication that is NOT APPROPRIATE for MyChart:  -New problems, serious problems or urgent problems.  Urgent matters should be addressed by phone for advice, in the office, urgent care or the ER.  -Requesting Covid or bacterial infection treatments: These types of problems require an evaluation.    -[employer] messages are not email.  Staff will check messages each weekday.  We strive for a 48-hour turnaround on messaging.    -[employer] is not for private issues.  Messages are received first by our office staff.    Please allow up to 7 business days for lab results to be sent through [employer] to you before contacting your provider.  Sometimes we are waiting for results to get back from the lab and also your provider needs time to analyze them thoroughly before making recommendations.  While the internet has great resources, it is not a substitute for interpreting lab results.    We also ask that you not send [employer] messages and telephone calls regarding the same issue simultaneously.  This slows down the process of returning your call/message and confuses staff.    Be mindful that MyChart messages and telephone calls become part of " your permanent medical record.    Your provider cannot access your [employer].  It is a service which communicates with the EHR (Electronic Health Record).  Sometimes there are errors in "[employer] that staff and providers cannot see and these errors are not part of your EHR.  Vaccines and screening reminders have been frequently incorrect in [employer].    Per Dr. Bragg, when sending messages via "[employer]", please be as concise and accurate as possible.  Much of our time each day is utilized looking up information for patients only to find out that it was another practice or pharmacy responsible for the issue.  I will be the first to admit that I am not fond of "[employer]  Much information is inaccurate or difficult/time consuming to look up in our system.  When using the messaging, please use it for short and simple questions.  Anything further than that should go through the phone system or better yet addressed in a visit.      We appreciate your respect for the limitations and boundaries of "[employer] and the telephone answering service.    Thank you,  Dr. Bragg

## 2024-01-08 ENCOUNTER — LAB (OUTPATIENT)
Dept: ENDOCRINOLOGY | Age: 84
End: 2024-01-08
Payer: MEDICARE

## 2024-01-08 DIAGNOSIS — E11.9 TYPE 2 DIABETES MELLITUS WITHOUT COMPLICATION, WITH LONG-TERM CURRENT USE OF INSULIN: Chronic | ICD-10-CM

## 2024-01-08 DIAGNOSIS — I10 PRIMARY HYPERTENSION: Chronic | ICD-10-CM

## 2024-01-08 DIAGNOSIS — E78.2 MIXED HYPERLIPIDEMIA: Chronic | ICD-10-CM

## 2024-01-08 DIAGNOSIS — Z79.4 TYPE 2 DIABETES MELLITUS WITHOUT COMPLICATION, WITH LONG-TERM CURRENT USE OF INSULIN: Chronic | ICD-10-CM

## 2024-01-08 DIAGNOSIS — E03.9 ACQUIRED HYPOTHYROIDISM: Chronic | ICD-10-CM

## 2024-01-09 LAB
ALBUMIN SERPL-MCNC: 4.3 G/DL (ref 3.5–5.2)
ALBUMIN/CREAT UR: 54 MG/G CREAT (ref 0–29)
ALBUMIN/GLOB SERPL: 1.7 G/DL
ALP SERPL-CCNC: 108 U/L (ref 39–117)
ALT SERPL-CCNC: 18 U/L (ref 1–33)
AST SERPL-CCNC: 26 U/L (ref 1–32)
BILIRUB SERPL-MCNC: 0.4 MG/DL (ref 0–1.2)
BUN SERPL-MCNC: 17 MG/DL (ref 8–23)
BUN/CREAT SERPL: 21.5 (ref 7–25)
CALCIUM SERPL-MCNC: 9.4 MG/DL (ref 8.6–10.5)
CHLORIDE SERPL-SCNC: 104 MMOL/L (ref 98–107)
CHOLEST SERPL-MCNC: 169 MG/DL (ref 0–200)
CO2 SERPL-SCNC: 27.2 MMOL/L (ref 22–29)
CREAT SERPL-MCNC: 0.79 MG/DL (ref 0.57–1)
CREAT UR-MCNC: 134 MG/DL
EGFRCR SERPLBLD CKD-EPI 2021: 74.8 ML/MIN/1.73
GLOBULIN SER CALC-MCNC: 2.6 GM/DL
GLUCOSE SERPL-MCNC: 234 MG/DL (ref 65–99)
HBA1C MFR BLD: 8.6 % (ref 4.8–5.6)
HDLC SERPL-MCNC: 58 MG/DL (ref 40–60)
IMP & REVIEW OF LAB RESULTS: NORMAL
LDLC SERPL CALC-MCNC: 86 MG/DL (ref 0–100)
MICROALBUMIN UR-MCNC: 72 UG/ML
POTASSIUM SERPL-SCNC: 5 MMOL/L (ref 3.5–5.2)
PROT SERPL-MCNC: 6.9 G/DL (ref 6–8.5)
SODIUM SERPL-SCNC: 140 MMOL/L (ref 136–145)
T4 FREE SERPL-MCNC: 1.29 NG/DL (ref 0.93–1.7)
TRIGL SERPL-MCNC: 143 MG/DL (ref 0–150)
TSH SERPL DL<=0.005 MIU/L-ACNC: 4.01 UIU/ML (ref 0.27–4.2)
VLDLC SERPL CALC-MCNC: 25 MG/DL (ref 5–40)

## 2024-01-16 ENCOUNTER — OFFICE VISIT (OUTPATIENT)
Dept: ENDOCRINOLOGY | Age: 84
End: 2024-01-16
Payer: MEDICARE

## 2024-01-16 VITALS
DIASTOLIC BLOOD PRESSURE: 68 MMHG | HEART RATE: 75 BPM | BODY MASS INDEX: 22.54 KG/M2 | OXYGEN SATURATION: 96 % | HEIGHT: 67 IN | WEIGHT: 143.6 LBS | SYSTOLIC BLOOD PRESSURE: 128 MMHG

## 2024-01-16 DIAGNOSIS — I10 ESSENTIAL (PRIMARY) HYPERTENSION: ICD-10-CM

## 2024-01-16 DIAGNOSIS — Z87.39 HISTORY OF RHEUMATOID ARTHRITIS: ICD-10-CM

## 2024-01-16 DIAGNOSIS — R80.9 TYPE 2 DIABETES MELLITUS WITH MICROALBUMINURIA, WITH LONG-TERM CURRENT USE OF INSULIN: Primary | ICD-10-CM

## 2024-01-16 DIAGNOSIS — E11.29 TYPE 2 DIABETES MELLITUS WITH MICROALBUMINURIA, WITH LONG-TERM CURRENT USE OF INSULIN: Primary | ICD-10-CM

## 2024-01-16 DIAGNOSIS — E78.5 HYPERLIPIDEMIA, UNSPECIFIED HYPERLIPIDEMIA TYPE: Chronic | ICD-10-CM

## 2024-01-16 DIAGNOSIS — Z79.4 TYPE 2 DIABETES MELLITUS WITH MICROALBUMINURIA, WITH LONG-TERM CURRENT USE OF INSULIN: Primary | ICD-10-CM

## 2024-01-16 DIAGNOSIS — D47.2 MONOCLONAL GAMMOPATHY OF UNKNOWN SIGNIFICANCE (MGUS): Chronic | ICD-10-CM

## 2024-01-16 DIAGNOSIS — E03.9 PRIMARY HYPOTHYROIDISM: ICD-10-CM

## 2024-01-16 DIAGNOSIS — M85.80 OSTEOPENIA, UNSPECIFIED LOCATION: ICD-10-CM

## 2024-01-16 PROCEDURE — 3074F SYST BP LT 130 MM HG: CPT | Performed by: INTERNAL MEDICINE

## 2024-01-16 PROCEDURE — 3078F DIAST BP <80 MM HG: CPT | Performed by: INTERNAL MEDICINE

## 2024-01-16 PROCEDURE — 99214 OFFICE O/P EST MOD 30 MIN: CPT | Performed by: INTERNAL MEDICINE

## 2024-01-16 NOTE — PROGRESS NOTES
Subjective   Lissa Ortiz is a 83 y.o. female.     History of Present Illness     She is on Ozempic 2 mg weekly, Actos 15 mg  per day, glipizide XL 2.5 mg every morning and Toujeo 8 units every morning.  She was off Ozempic with 2 and half months because of the supply problems and restarted 2 weeks ago.  Janumet was discontinued because of diarrhea.  Jardiance was too expensive.  She checks her blood sugar once a day.  Blood sugars were higher when she was off Ozempic.  She denies hypoglycemia.  She admits to dietary noncompliance and has gained 3 pounds since Sept. 2023.  Hemoglobin A1c done in January 2024 is higher at 8.6%.     She denies eye complaints.  Her last eye examination was in 11/23.  She has glaucoma but no retinopathy.  She has restless leg at night and her feet feels tight and numb  which is improved by gabapentin 300 mg  at bedtime prescribed by neurologist RONALD Paz.  Urine microalbumin is elevated in January 2022.  Urine microalbumin is elevated in January 2024     She has hyperlipidemia and is on pravastatin 20 mg/day.  She denies myalgia.   She has not used Crestor or Lipitor before.  Lipid panel done in January 2024 are as follows: Cholesterol 169.  HDL 58.  LDL 86.  Triglycerides 143.     She has hypothyroidism and is on levothyroxine 25 mcg/day.  She has no history of head or neck radiation therapy.  She has no history of thyroid surgery.  TSH done in January 2024 is normal at 4.01.      She has hypertension and is on amlodipine 2.5 mg once a day.  She denies history of heart attack or stroke.  She denies chest pain or shortness of breath.     She has rheumatoid arthritis and is on methotrexate and folic acid prescribed by Dr. Chow.  She is not on oral steroids.     She has osteopenia on bone density done in November 2022.  Her 10-year risk of major osteoporotic fracture is elevated at 28% and for hip fracture is 18%.  She is on a multivitamin 1 tablet/day and gummy calcium 2  "tablets/day.  She walks 1/2 miles per day.  She was started on alendronate 70 mg weekly by Dr. Chow in September 2023.     She has monoclonal gammopathy of unknown significance and is being followed by Dr. ROMEL Thompson.    She walks her dog at least a mile a day and uses an elliptical machine 3 times a week.  She denies alcohol or tobacco use.  The following portions of the patient's history were reviewed and updated as appropriate: allergies, current medications, past family history, past medical history, past social history, past surgical history, and problem list.    Review of Systems   Eyes:  Negative for visual disturbance.   Respiratory:  Negative for shortness of breath and wheezing.    Cardiovascular:  Negative for chest pain and palpitations.   Gastrointestinal: Negative.    Genitourinary: Negative.    Musculoskeletal:  Negative for myalgias.   Neurological:  Positive for numbness.     Vitals:    01/16/24 1030   BP: 128/68   BP Location: Left arm   Patient Position: Sitting   Pulse: 75   SpO2: 96%   Weight: 65.1 kg (143 lb 9.6 oz)   Height: 170.2 cm (67.01\")      Objective   Physical Exam  Constitutional:       General: She is not in acute distress.     Appearance: Normal appearance. She is not ill-appearing or toxic-appearing.   Eyes:      General: No scleral icterus.        Right eye: No discharge.         Left eye: No discharge.   Neck:      Vascular: No carotid bruit.   Cardiovascular:      Rate and Rhythm: Normal rate and regular rhythm.      Heart sounds: No murmur heard.     No friction rub.   Pulmonary:      Breath sounds: Normal breath sounds. No rales.   Chest:      Chest wall: No tenderness.   Abdominal:      General: Bowel sounds are normal.      Palpations: Abdomen is soft.      Tenderness: There is no right CVA tenderness or left CVA tenderness.   Musculoskeletal:      Right lower leg: No edema.      Left lower leg: No edema.      Comments: Feet warm.  Palpable dorsalis pedis pulses bilaterally.  " No plantar ulcers.   Lymphadenopathy:      Cervical: No cervical adenopathy.   Neurological:      Mental Status: She is alert and oriented to person, place, and time.      Comments: Light touch sensation decreased on distal lower extremities.       Lab on 01/08/2024   Component Date Value Ref Range Status    Free T4 01/08/2024 1.29  0.93 - 1.70 ng/dL Final    Results may be falsely increased if patient taking Biotin.    TSH 01/08/2024 4.010  0.270 - 4.200 uIU/mL Final    Total Cholesterol 01/08/2024 169  0 - 200 mg/dL Final    Comment: Cholesterol Reference Ranges  (U.S. Department of Health and Human Services ATP III  Classifications)  Desirable          <200 mg/dL  Borderline High    200-239 mg/dL  High Risk          >240 mg/dL  Triglyceride Reference Ranges  (U.S. Department of Health and Human Services ATP III  Classifications)  Normal           <150 mg/dL  Borderline High  150-199 mg/dL  High             200-499 mg/dL  Very High        >500 mg/dL  HDL Reference Ranges  (U.S. Department of Health and Human Services ATP III  Classifications)  Low     <40 mg/dl (major risk factor for CHD)  High    >60 mg/dl ('negative' risk factor for CHD)  LDL Reference Ranges  (U.S. Department of Health and Human Services ATP III  Classifications)  Optimal          <100 mg/dL  Near Optimal     100-129 mg/dL  Borderline High  130-159 mg/dL  High             160-189 mg/dL  Very High        >189 mg/dL      Triglycerides 01/08/2024 143  0 - 150 mg/dL Final    HDL Cholesterol 01/08/2024 58  40 - 60 mg/dL Final    VLDL Cholesterol Akbar 01/08/2024 25  5 - 40 mg/dL Final    LDL Chol Calc (NIH) 01/08/2024 86  0 - 100 mg/dL Final    Creatinine, Urine 01/08/2024 134.0  Not Estab. mg/dL Final    Microalbumin, Urine 01/08/2024 72.0  Not Estab. ug/mL Final    Microalbumin/Creatinine Ratio 01/08/2024 54 (H)  0 - 29 mg/g creat Final    Comment:                        Normal:                0 -  29                         Moderately increased: 30  - 300                         Severely increased:       >300      Glucose 01/08/2024 234 (H)  65 - 99 mg/dL Final    BUN 01/08/2024 17  8 - 23 mg/dL Final    Creatinine 01/08/2024 0.79  0.57 - 1.00 mg/dL Final    EGFR Result 01/08/2024 74.8  >60.0 mL/min/1.73 Final    Comment: GFR Normal >60  Chronic Kidney Disease <60  Kidney Failure <15  The GFR formula is only valid for adults with stable renal  function between ages 18 and 70.      BUN/Creatinine Ratio 01/08/2024 21.5  7.0 - 25.0 Final    Sodium 01/08/2024 140  136 - 145 mmol/L Final    Potassium 01/08/2024 5.0  3.5 - 5.2 mmol/L Final    Comment: Slight hemolysis detected by analyzer. Result may be  falsely elevated.      Chloride 01/08/2024 104  98 - 107 mmol/L Final    Total CO2 01/08/2024 27.2  22.0 - 29.0 mmol/L Final    Calcium 01/08/2024 9.4  8.6 - 10.5 mg/dL Final    Total Protein 01/08/2024 6.9  6.0 - 8.5 g/dL Final    Albumin 01/08/2024 4.3  3.5 - 5.2 g/dL Final    Globulin 01/08/2024 2.6  gm/dL Final    A/G Ratio 01/08/2024 1.7  g/dL Final    Total Bilirubin 01/08/2024 0.4  0.0 - 1.2 mg/dL Final    Alkaline Phosphatase 01/08/2024 108  39 - 117 U/L Final    AST (SGOT) 01/08/2024 26  1 - 32 U/L Final    Comment: Slight hemolysis detected by analyzer. Result may be  falsely elevated.      ALT (SGPT) 01/08/2024 18  1 - 33 U/L Final    Hemoglobin A1C 01/08/2024 8.60 (H)  4.80 - 5.60 % Final    Comment: Hemoglobin A1C Ranges:  Increased Risk for Diabetes  5.7% to 6.4%  Diabetes                     >= 6.5%  Diabetic Goal                < 7.0%      Interpretation 01/08/2024 Note   Final    Supplemental report is available.     Assessment & Plan   Diagnoses and all orders for this visit:    1. Type 2 diabetes mellitus with microalbuminuria, with long-term current use of insulin (Primary)  -     Microalbumin / Creatinine Urine Ratio - Urine, Clean Catch; Future  -     Urinalysis With Microscopic If Indicated (No Culture) - Urine, Clean Catch; Future  -      Comprehensive Metabolic Panel; Future  -     Hemoglobin A1c; Future  -     Lipid Panel; Future    2. Hyperlipidemia, unspecified hyperlipidemia type  -     Lipid Panel; Future    3. Primary hypothyroidism  -     TSH; Future    4. Essential (primary) hypertension  -     Comprehensive Metabolic Panel; Future    5. History of rheumatoid arthritis    6. Osteopenia, unspecified location  -     Vitamin D,25-Hydroxy; Future    7. Monoclonal gammopathy of unknown significance (MGUS)  -     Comprehensive Metabolic Panel; Future    Other orders  -     dapagliflozin (Farxiga) 5 MG tablet tablet; By mouth take one tab daily in AM.  Dispense: 90 tablet; Refill: 2      Patient has type 2 diabetes mellitus with microalbuminuria and peripheral neuropathy.  Discontinue Toujeo and switch to Farxiga 5 mg/day.  Stay well-hydrated and wash/wipe after urination.  Stay well-hydrated.  Continue Actos 15 mg/day, glipizide XL 2.5 mg every morning and Ozempic 2 mg weekly.  If she develops hypoglycemia on the combination, she may discontinue glipizide XL and let us know.    Continue pravastatin 20 mg a day.    Continue levothyroxine 25 mcg/day.    Continue amlodipine 2.5 mg once a day.  Suggest switching to an ACE inhibitor if okay with Dr. Bragg.    Follow-up with Dr. Chow and Dr. Kiran Thompson.    RTC 4 mos.    Copy of my note sent to Dr. Bragg, Dr. Chow, Dr. ROMEL Thompson, and RONALD Pulido.

## 2024-01-16 NOTE — LETTER
January 16, 2024     Adin Bragg MD  [address]  [address]  [facility] [address]    Patient: Lissa Ortiz   YOB: 1940   Date of Visit: 1/16/2024     Dear Adin Bragg MD:       Thank you for referring Lissa Ortiz to me for evaluation. Below are the relevant portions of my assessment and plan of care.    If you have questions, please do not hesitate to call me. I look forward to following Lissa along with you.         Sincerely,        Matt Pimentel MD        CC: MD Kiran Washburn MD Jillian Ann Miles, PA-C Yson, Matt RUTH MD  01/16/24 [id number]  Sign when Signing Visit  Subjective  Lissa Ortiz is a 83 y.o. female.     History of Present Illness     She is on Ozempic 2 mg weekly, Actos 15 mg  per day, glipizide XL 2.5 mg every morning and Toujeo 8 units every morning.  She was off Ozempic with 2 and half months because of the supply problems and restarted 2 weeks ago.  Janumet was discontinued because of diarrhea.  Jardiance was too expensive.  She checks her blood sugar once a day.  Blood sugars were higher when she was off Ozempic.  She denies hypoglycemia.  She admits to dietary noncompliance and has gained 3 pounds since Sept. 2023.  Hemoglobin A1c done in January 2024 is higher at 8.6%.     She denies eye complaints.  Her last eye examination was in 11/23.  She has glaucoma but no retinopathy.  She has restless leg at night and her feet feels tight and numb  which is improved by gabapentin 300 mg  at bedtime prescribed by neurologist RONALD Paz.  Urine microalbumin is elevated in January 2022.  Urine microalbumin is elevated in January 2024     She has hyperlipidemia and is on pravastatin 20 mg/day.  She denies myalgia.   She has not used Crestor or Lipitor before.  Lipid panel done in January 2024 are as follows: Cholesterol 169.  HDL 58.  LDL 86.  Triglycerides 143.     She has hypothyroidism and is on levothyroxine 25 mcg/day.  She has no history  "of head or neck radiation therapy.  She has no history of thyroid surgery.  TSH done in January 2024 is normal at 4.01.      She has hypertension and is on amlodipine 2.5 mg once a day.  She denies history of heart attack or stroke.  She denies chest pain or shortness of breath.     She has rheumatoid arthritis and is on methotrexate and folic acid prescribed by Dr. Chow.  She is not on oral steroids.     She has osteopenia on bone density done in November 2022.  Her 10-year risk of major osteoporotic fracture is elevated at 28% and for hip fracture is 18%.  She is on a multivitamin 1 tablet/day and gummy calcium 2 tablets/day.  She walks 1/2 miles per day.  She was started on alendronate 70 mg weekly by Dr. Chow in September 2023.     She has monoclonal gammopathy of unknown significance and is being followed by Dr. ROMEL Thompson.    She walks her dog at least a mile a day and uses an elliptical machine 3 times a week.  She denies alcohol or tobacco use.  The following portions of the patient's history were reviewed and updated as appropriate: allergies, current medications, past family history, past medical history, past social history, past surgical history, and problem list.    Review of Systems   Eyes:  Negative for visual disturbance.   Respiratory:  Negative for shortness of breath and wheezing.    Cardiovascular:  Negative for chest pain and palpitations.   Gastrointestinal: Negative.    Genitourinary: Negative.    Musculoskeletal:  Negative for myalgias.   Neurological:  Positive for numbness.     Vitals:    01/16/24 1030   BP: 128/68   BP Location: Left arm   Patient Position: Sitting   Pulse: 75   SpO2: 96%   Weight: 65.1 kg (143 lb 9.6 oz)   Height: 170.2 cm (67.01\")      Objective  Physical Exam  Constitutional:       General: She is not in acute distress.     Appearance: Normal appearance. She is not ill-appearing or toxic-appearing.   Eyes:      General: No scleral icterus.        Right eye: No " discharge.         Left eye: No discharge.   Neck:      Vascular: No carotid bruit.   Cardiovascular:      Rate and Rhythm: Normal rate and regular rhythm.      Heart sounds: No murmur heard.     No friction rub.   Pulmonary:      Breath sounds: Normal breath sounds. No rales.   Chest:      Chest wall: No tenderness.   Abdominal:      General: Bowel sounds are normal.      Palpations: Abdomen is soft.      Tenderness: There is no right CVA tenderness or left CVA tenderness.   Musculoskeletal:      Right lower leg: No edema.      Left lower leg: No edema.      Comments: Feet warm.  Palpable dorsalis pedis pulses bilaterally.  No plantar ulcers.   Lymphadenopathy:      Cervical: No cervical adenopathy.   Neurological:      Mental Status: She is alert and oriented to person, place, and time.      Comments: Light touch sensation decreased on distal lower extremities.       Lab on 01/08/2024   Component Date Value Ref Range Status   • Free T4 01/08/2024 1.29  0.93 - 1.70 ng/dL Final    Results may be falsely increased if patient taking Biotin.   • TSH 01/08/2024 4.010  0.270 - 4.200 uIU/mL Final   • Total Cholesterol 01/08/2024 169  0 - 200 mg/dL Final    Comment: Cholesterol Reference Ranges  (U.S. Department of Health and Human Services ATP III  Classifications)  Desirable          <200 mg/dL  Borderline High    200-239 mg/dL  High Risk          >240 mg/dL  Triglyceride Reference Ranges  (U.S. Department of Health and Human Services ATP III  Classifications)  Normal           <150 mg/dL  Borderline High  150-199 mg/dL  High             200-499 mg/dL  Very High        >500 mg/dL  HDL Reference Ranges  (U.S. Department of Health and Human Services ATP III  Classifications)  Low     <40 mg/dl (major risk factor for CHD)  High    >60 mg/dl ('negative' risk factor for CHD)  LDL Reference Ranges  (U.S. Department of Health and Human Services ATP III  Classifications)  Optimal          <100 mg/dL  Near Optimal     100-129  mg/dL  Borderline High  130-159 mg/dL  High             160-189 mg/dL  Very High        >189 mg/dL     • Triglycerides 01/08/2024 143  0 - 150 mg/dL Final   • HDL Cholesterol 01/08/2024 58  40 - 60 mg/dL Final   • VLDL Cholesterol Akbar 01/08/2024 25  5 - 40 mg/dL Final   • LDL Chol Calc ([facility]) 01/08/2024 86  0 - 100 mg/dL Final   • Creatinine, Urine 01/08/2024 134.0  Not Estab. mg/dL Final   • Microalbumin, Urine 01/08/2024 72.0  Not Estab. ug/mL Final   • Microalbumin/Creatinine Ratio 01/08/2024 54 (H)  0 - 29 mg/g creat Final    Comment:                        Normal:                0 -  29                         Moderately increased: 30 - 300                         Severely increased:       >300     • Glucose 01/08/2024 234 (H)  65 - 99 mg/dL Final   • BUN 01/08/2024 17  8 - 23 mg/dL Final   • Creatinine 01/08/2024 0.79  0.57 - 1.00 mg/dL Final   • EGFR Result 01/08/2024 74.8  >60.0 mL/min/1.73 Final    Comment: GFR Normal >60  Chronic Kidney Disease <60  Kidney Failure <15  The GFR formula is only valid for adults with stable renal  function between ages 18 and 70.     • BUN/Creatinine Ratio 01/08/2024 21.5  7.0 - 25.0 Final   • Sodium 01/08/2024 140  136 - 145 mmol/L Final   • Potassium 01/08/2024 5.0  3.5 - 5.2 mmol/L Final    Comment: Slight hemolysis detected by analyzer. Result may be  falsely elevated.     • Chloride 01/08/2024 104  98 - 107 mmol/L Final   • Total CO2 01/08/2024 27.2  22.0 - 29.0 mmol/L Final   • Calcium 01/08/2024 9.4  8.6 - 10.5 mg/dL Final   • Total Protein 01/08/2024 6.9  6.0 - 8.5 g/dL Final   • Albumin 01/08/2024 4.3  3.5 - 5.2 g/dL Final   • Globulin 01/08/2024 2.6  gm/dL Final   • A/G Ratio 01/08/2024 1.7  g/dL Final   • Total Bilirubin 01/08/2024 0.4  0.0 - 1.2 mg/dL Final   • Alkaline Phosphatase 01/08/2024 108  39 - 117 U/L Final   • AST (SGOT) 01/08/2024 26  1 - 32 U/L Final    Comment: Slight hemolysis detected by analyzer. Result may be  falsely elevated.     • ALT (SGPT)  01/08/2024 18  1 - 33 U/L Final   • Hemoglobin A1C 01/08/2024 8.60 (H)  4.80 - 5.60 % Final    Comment: Hemoglobin A1C Ranges:  Increased Risk for Diabetes  5.7% to 6.4%  Diabetes                     >= 6.5%  Diabetic Goal                < 7.0%     • Interpretation 01/08/2024 Note   Final    Supplemental report is available.     Assessment & Plan  Diagnoses and all orders for this visit:    1. Type 2 diabetes mellitus with microalbuminuria, with long-term current use of insulin (Primary)  -     Microalbumin / Creatinine Urine Ratio - Urine, Clean Catch; Future  -     Urinalysis With Microscopic If Indicated (No Culture) - Urine, Clean Catch; Future  -     Comprehensive Metabolic Panel; Future  -     Hemoglobin A1c; Future  -     Lipid Panel; Future    2. Hyperlipidemia, unspecified hyperlipidemia type  -     Lipid Panel; Future    3. Primary hypothyroidism  -     TSH; Future    4. Essential (primary) hypertension  -     Comprehensive Metabolic Panel; Future    5. History of rheumatoid arthritis    6. Osteopenia, unspecified location  -     Vitamin D,25-Hydroxy; Future    7. Monoclonal gammopathy of unknown significance (MGUS)  -     Comprehensive Metabolic Panel; Future    Other orders  -     dapagliflozin (Farxiga) 5 MG tablet tablet; By mouth take one tab daily in AM.  Dispense: 90 tablet; Refill: 2      Patient has type 2 diabetes mellitus with microalbuminuria and peripheral neuropathy.  Discontinue Toujeo and switch to Farxiga 5 mg/day.  Stay well-hydrated and wash/wipe after urination.  Stay well-hydrated.  Continue Actos 15 mg/day, glipizide XL 2.5 mg every morning and Ozempic 2 mg weekly.  If she develops hypoglycemia on the combination, she may discontinue glipizide XL and let us know.    Continue pravastatin 20 mg a day.    Continue levothyroxine 25 mcg/day.    Continue amlodipine 2.5 mg once a day.  Suggest switching to an ACE inhibitor if okay with Dr. Bragg.    Follow-up with Dr. Chow and Dr. Beck  Jay.    RTC 4 mos.    Copy of my note sent to Dr. Bragg, Dr. Chow, Dr. ROMEL Thompson, and RONALD Pulido.

## 2024-01-17 ENCOUNTER — PATIENT MESSAGE (OUTPATIENT)
Dept: ENDOCRINOLOGY | Age: 84
End: 2024-01-17
Payer: MEDICARE

## 2024-01-17 DIAGNOSIS — Z79.4 TYPE 2 DIABETES MELLITUS WITHOUT COMPLICATION, WITH LONG-TERM CURRENT USE OF INSULIN: Chronic | ICD-10-CM

## 2024-01-17 DIAGNOSIS — E11.9 TYPE 2 DIABETES MELLITUS WITHOUT COMPLICATION, WITH LONG-TERM CURRENT USE OF INSULIN: Chronic | ICD-10-CM

## 2024-01-19 RX ORDER — GLIPIZIDE 5 MG/1
TABLET, FILM COATED, EXTENDED RELEASE ORAL
Qty: 90 TABLET | Refills: 1 | Status: SHIPPED | OUTPATIENT
Start: 2024-01-19

## 2024-01-19 NOTE — TELEPHONE ENCOUNTER
From: Lissa Ortiz  To: Matt Pimentel  Sent: 1/17/2024 2:39 PM EST  Subject: Farxiga    I want to correct my last message as I had misspelled a few things  I had 3 UTIs not IUDs and the med for overactive bladder [id number] - sorry for the confusion

## 2024-01-20 DIAGNOSIS — E11.9 TYPE 2 DIABETES MELLITUS WITHOUT COMPLICATION, WITH LONG-TERM CURRENT USE OF INSULIN: Chronic | ICD-10-CM

## 2024-01-20 DIAGNOSIS — Z79.4 TYPE 2 DIABETES MELLITUS WITHOUT COMPLICATION, WITH LONG-TERM CURRENT USE OF INSULIN: Chronic | ICD-10-CM

## 2024-01-22 DIAGNOSIS — Z79.4 TYPE 2 DIABETES MELLITUS WITHOUT COMPLICATION, WITH LONG-TERM CURRENT USE OF INSULIN: Chronic | ICD-10-CM

## 2024-01-22 DIAGNOSIS — E11.9 TYPE 2 DIABETES MELLITUS WITHOUT COMPLICATION, WITH LONG-TERM CURRENT USE OF INSULIN: Chronic | ICD-10-CM

## 2024-01-22 RX ORDER — PIOGLITAZONEHYDROCHLORIDE 15 MG/1
15 TABLET ORAL DAILY
Qty: 90 TABLET | Refills: 1 | Status: SHIPPED | OUTPATIENT
Start: 2024-01-22

## 2024-01-22 RX ORDER — GLIPIZIDE 2.5 MG/1
2.5 TABLET, EXTENDED RELEASE ORAL DAILY
Qty: 90 TABLET | Refills: 1 | OUTPATIENT
Start: 2024-01-22

## 2024-01-22 NOTE — TELEPHONE ENCOUNTER
Rx Refill Note  Requested Prescriptions     Pending Prescriptions Disp Refills    pioglitazone (ACTOS) 15 MG tablet [Pharmacy Med Name: PIOGLITAZONE HCL 15 MG TABLET] 90 tablet 1     Sig: TAKE 1 TABLET BY MOUTH DAILY      Last office visit with prescribing clinician: 9/18/2023   Last telemedicine visit with prescribing clinician: Visit date not found   Next office visit with prescribing clinician: Visit date not found                         Would you like a call back once the refill request has been completed: [] Yes [] No    If the office needs to give you a call back, can they leave a voicemail: [] Yes [] No    Yudi Gerber  01/22/24, 07:44 EST

## 2024-01-22 NOTE — TELEPHONE ENCOUNTER
Rx Refill Note  Requested Prescriptions     Pending Prescriptions Disp Refills    glipizide (GLUCOTROL XL) 2.5 MG 24 hr tablet [Pharmacy Med Name: glipiZIDE XL 2.5 MG TABLET] 90 tablet 1     Sig: TAKE 1 TABLET BY MOUTH DAILY      Last office visit with prescribing clinician: 9/18/2023   Last telemedicine visit with prescribing clinician: Visit date not found   Next office visit with prescribing clinician: Visit date not found                         Would you like a call back once the refill request has been completed: [] Yes [] No    If the office needs to give you a call back, can they leave a voicemail: [] Yes [] No    Yudi Gerber  01/22/24, 11:01 EST

## 2024-01-25 DIAGNOSIS — G25.81 RESTLESS LEG SYNDROME: ICD-10-CM

## 2024-01-28 RX ORDER — GABAPENTIN 300 MG/1
CAPSULE ORAL
Qty: 90 CAPSULE | Refills: 0 | Status: SHIPPED | OUTPATIENT
Start: 2024-01-28

## 2024-02-13 ENCOUNTER — LAB (OUTPATIENT)
Dept: LAB | Facility: HOSPITAL | Age: 84
End: 2024-02-13
Payer: MEDICARE

## 2024-02-13 DIAGNOSIS — D47.2 MONOCLONAL GAMMOPATHY: Primary | ICD-10-CM

## 2024-02-13 LAB
ALBUMIN SERPL-MCNC: 3.8 G/DL (ref 3.5–5.2)
ALBUMIN/GLOB SERPL: 1.4 G/DL
ALP SERPL-CCNC: 85 U/L (ref 39–117)
ALT SERPL W P-5'-P-CCNC: 12 U/L (ref 1–33)
ANION GAP SERPL CALCULATED.3IONS-SCNC: 7.2 MMOL/L (ref 5–15)
AST SERPL-CCNC: 29 U/L (ref 1–32)
BASOPHILS # BLD AUTO: 0.06 10*3/MM3 (ref 0–0.2)
BASOPHILS NFR BLD AUTO: 1.2 % (ref 0–1.5)
BILIRUB SERPL-MCNC: 0.5 MG/DL (ref 0–1.2)
BUN SERPL-MCNC: 13 MG/DL (ref 8–23)
BUN/CREAT SERPL: 20 (ref 7–25)
CALCIUM SPEC-SCNC: 8.7 MG/DL (ref 8.6–10.5)
CHLORIDE SERPL-SCNC: 105 MMOL/L (ref 98–107)
CO2 SERPL-SCNC: 26.8 MMOL/L (ref 22–29)
CREAT SERPL-MCNC: 0.65 MG/DL (ref 0.57–1)
DEPRECATED RDW RBC AUTO: 52.5 FL (ref 37–54)
EGFRCR SERPLBLD CKD-EPI 2021: 88 ML/MIN/1.73
EOSINOPHIL # BLD AUTO: 0.46 10*3/MM3 (ref 0–0.4)
EOSINOPHIL NFR BLD AUTO: 9 % (ref 0.3–6.2)
ERYTHROCYTE [DISTWIDTH] IN BLOOD BY AUTOMATED COUNT: 15 % (ref 12.3–15.4)
GLOBULIN UR ELPH-MCNC: 2.8 GM/DL
GLUCOSE SERPL-MCNC: 192 MG/DL (ref 65–99)
HCT VFR BLD AUTO: 40.4 % (ref 34–46.6)
HGB BLD-MCNC: 13.3 G/DL (ref 12–15.9)
IMM GRANULOCYTES # BLD AUTO: 0.02 10*3/MM3 (ref 0–0.05)
IMM GRANULOCYTES NFR BLD AUTO: 0.4 % (ref 0–0.5)
LYMPHOCYTES # BLD AUTO: 1.07 10*3/MM3 (ref 0.7–3.1)
LYMPHOCYTES NFR BLD AUTO: 20.9 % (ref 19.6–45.3)
MCH RBC QN AUTO: 32 PG (ref 26.6–33)
MCHC RBC AUTO-ENTMCNC: 32.9 G/DL (ref 31.5–35.7)
MCV RBC AUTO: 97.1 FL (ref 79–97)
MONOCYTES # BLD AUTO: 0.56 10*3/MM3 (ref 0.1–0.9)
MONOCYTES NFR BLD AUTO: 10.9 % (ref 5–12)
NEUTROPHILS NFR BLD AUTO: 2.95 10*3/MM3 (ref 1.7–7)
NEUTROPHILS NFR BLD AUTO: 57.6 % (ref 42.7–76)
NRBC BLD AUTO-RTO: 0 /100 WBC (ref 0–0.2)
PLATELET # BLD AUTO: 249 10*3/MM3 (ref 140–450)
PMV BLD AUTO: 9.5 FL (ref 6–12)
POTASSIUM SERPL-SCNC: 4 MMOL/L (ref 3.5–5.2)
PROT SERPL-MCNC: 6.6 G/DL (ref 6–8.5)
RBC # BLD AUTO: 4.16 10*6/MM3 (ref 3.77–5.28)
SODIUM SERPL-SCNC: 139 MMOL/L (ref 136–145)
WBC NRBC COR # BLD AUTO: 5.12 10*3/MM3 (ref 3.4–10.8)

## 2024-02-13 PROCEDURE — 85025 COMPLETE CBC W/AUTO DIFF WBC: CPT

## 2024-02-13 PROCEDURE — 80053 COMPREHEN METABOLIC PANEL: CPT

## 2024-02-13 PROCEDURE — 36415 COLL VENOUS BLD VENIPUNCTURE: CPT

## 2024-02-14 LAB
ALBUMIN SERPL ELPH-MCNC: 3.4 G/DL (ref 2.9–4.4)
ALBUMIN/GLOB SERPL: 1.2 {RATIO} (ref 0.7–1.7)
ALPHA1 GLOB SERPL ELPH-MCNC: 0.2 G/DL (ref 0–0.4)
ALPHA2 GLOB SERPL ELPH-MCNC: 0.7 G/DL (ref 0.4–1)
B-GLOBULIN SERPL ELPH-MCNC: 1 G/DL (ref 0.7–1.3)
GAMMA GLOB SERPL ELPH-MCNC: 1.2 G/DL (ref 0.4–1.8)
GLOBULIN SER-MCNC: 3 G/DL (ref 2.2–3.9)
IGA SERPL-MCNC: 406 MG/DL (ref 64–422)
IGG SERPL-MCNC: 1077 MG/DL (ref 586–1602)
IGM SERPL-MCNC: 48 MG/DL (ref 26–217)
INTERPRETATION SERPL IEP-IMP: ABNORMAL
KAPPA LC FREE SER-MCNC: 31.5 MG/L (ref 3.3–19.4)
KAPPA LC FREE/LAMBDA FREE SER: 1.49 {RATIO} (ref 0.26–1.65)
LABORATORY COMMENT REPORT: ABNORMAL
LAMBDA LC FREE SERPL-MCNC: 21.1 MG/L (ref 5.7–26.3)
M PROTEIN SERPL ELPH-MCNC: 0.2 G/DL
PROT SERPL-MCNC: 6.4 G/DL (ref 6–8.5)

## 2024-02-20 ENCOUNTER — OFFICE VISIT (OUTPATIENT)
Dept: ONCOLOGY | Facility: CLINIC | Age: 84
End: 2024-02-20
Payer: MEDICARE

## 2024-02-20 VITALS
TEMPERATURE: 96.8 F | DIASTOLIC BLOOD PRESSURE: 78 MMHG | HEIGHT: 67 IN | WEIGHT: 142 LBS | HEART RATE: 76 BPM | BODY MASS INDEX: 22.29 KG/M2 | SYSTOLIC BLOOD PRESSURE: 136 MMHG

## 2024-02-20 DIAGNOSIS — D47.2 MGUS (MONOCLONAL GAMMOPATHY OF UNKNOWN SIGNIFICANCE): Primary | ICD-10-CM

## 2024-02-20 NOTE — PROGRESS NOTES
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 provider found  RECORDS OBTAINED:  Records of the patients history including those from the electronic medical record were reviewed and summarized in detail.    HISTORY OF PRESENT ILLNESS:    The patient is a 83 y.o. year old female with past medical history significant for DM2 (over 40 years), inclusion body myositis (diagnosed 10 yrs ago, on methotrexate), hypothyroidism, & peripheral neuropathy who had presented to her neurologist,  with signs/symptoms of bilateral feet neuropathy. During this work up, SPEP/VIGNESH from October 2021 showed presence of IgA kappa M-protein, 0.2 gm/dl.   Patient c/o b/l feet neuropathy, along with generalized weakness, which she attributes to inclusion body myositis.   Denies any specific bone pain. On labs, no cytopenias. Renal function & calcium levels within normal limits.   No fever, chills, chest pain, cough or weight loss. No palpable lymphadenopathy. Has generalized weakness secondary to inclusion body myositis. Denies smoking, alcohol or drug abuse.   No family history of blood or bone marrow disorder.     February 2022: SPEP/VIGNESH shows IgA kappa M protein, 0.3 mg/dl.  Normal FLC ratio  July 2022: SPEP/VIGNESH does not show any M protein.  Normal FLC ratio.  CBC and CMP unremarkable.  January 2023: SPEP/VIGNESH shows faint IgA kappa M protein.  Not quantifiable.  Normal FLC ratio.  CBC and CMP unremarkable as well.    INTERIM HISTORY:  Patient is an 83-year-old female with the above-mentioned history who returns for 1 year follow-up on 2/20/2024 for lab review and evaluation.  She continues to struggle with neuropathy symptoms, and continues on gabapentin 300 mg 3 times daily.  She states she takes Tylenol PM at night which also helps her symptoms.  She is followed closely by  rheumatology, Dr. Chow, and continues on methotrexate.    She denies any new problems or concerns today.      Past Medical History:   Diagnosis Date    Acquired hypothyroidism 05/19/2021    Allergic rhinitis     Arthritis     Cancer     skin leg    Cataract     Diabetes mellitus     type 2    Hypertension     Long sleeper     post anesthesia    Type 2 diabetes mellitus 1980b     Past Surgical History:   Procedure Laterality Date    COLONOSCOPY N/A 02/13/2018    Procedure: COLONOSCOPY to cecum and TI with hot snare polypectomy;  Surgeon: Rowan Smith MD;  Location:  [facility];  Service:     COLONOSCOPY  05/03/2023    Sarah    COLONOSCOPY N/A 05/03/2023    Procedure: COLONOSCOPY TO CECUM AND TERMINAL ILEUM;  Surgeon: Rowan Smith MD;  Location:  [facility];  Service: Gastroenterology;  Laterality: N/A;  HISTORY OF COLON POLYPS  HEMORRHOIDS    EYE SURGERY      tearduct sgy    EYE SURGERY      MARY KAY CATARACTS    HYSTERECTOMY      TONSILLECTOMY         MEDICATIONS    Current Outpatient Medications:     amLODIPine (NORVASC) 2.5 MG tablet, TAKE ONE TABLET BY MOUTH DAILY, Disp: 90 tablet, Rfl: 4    azelastine (ASTELIN) 0.1 % nasal spray, 2 sprays into the nostril(s) as directed by provider 2 (Two) Times a Day. Use in each nostril as directed, Disp: 2 each, Rfl: 5    BD Pen Needle Vicenta 2nd Gen 32G X 4 MM misc, USE TO INJECT INSULIN ONCE DAILY, Disp: 100 each, Rfl: 2    Blood Glucose Monitoring Suppl (TRUE METRIX AIR GLUCOSE METER) device, 1 Device Daily As Needed (check bs's twice daily)., Disp: 1 Device, Rfl: 0    Calcium Citrate (CITRACAL PO), Take  by mouth. 2 TABS DAILY, Disp: , Rfl:     dorzolamide-timolol (COSOPT) 22.3-6.8 MG/ML ophthalmic solution, Apply  to eye(s) as directed by provider Every 12 (Twelve) Hours., Disp: , Rfl:     folic acid (FOLVITE) 1 MG tablet, Take 1 tablet by mouth Daily., Disp: , Rfl:     gabapentin (NEURONTIN) 300 MG capsule, TAKE ONE CAPSULE BY MOUTH THREE TIMES A DAY,  Disp: 90 capsule, Rfl: 0    glipizide (GLUCOTROL XL) 5 MG ER tablet, 1 tablet every morning with meal, Disp: 90 tablet, Rfl: 1    Insulin Glargine, 2 Unit Dial, (Toujeo Gomez SoloStar) 300 UNIT/ML solution pen-injector injection, Inject 8 Units under the skin into the appropriate area as directed Daily., Disp: 6 mL, Rfl: 0    levothyroxine (SYNTHROID, LEVOTHROID) 25 MCG tablet, Take 1 tablet by mouth Daily., Disp: 90 tablet, Rfl: 1    Methotrexate Sodium 50 MG/2ML injection, Injects 0.7 ml sq once a week, Disp: , Rfl:     Multiple Vitamin (MULTI VITAMIN PO), Take 1 tablet by mouth Daily., Disp: , Rfl:     pioglitazone (ACTOS) 15 MG tablet, TAKE 1 TABLET BY MOUTH DAILY, Disp: 90 tablet, Rfl: 1    pravastatin (PRAVACHOL) 20 MG tablet, Take 1 tablet by mouth Daily., Disp: 90 tablet, Rfl: 4    Probiotic capsule, Take 1 capsule by mouth., Disp: , Rfl:     Semaglutide, 2 MG/DOSE, (Ozempic, 2 MG/DOSE,) 8 MG/3ML solution pen-injector, Inject 2 mg under the skin into the appropriate area as directed 1 (One) Time Per Week. 2 mg weekly subcutaneously  Indications: Type 2 Diabetes (Patient taking differently: Inject 2 mg under the skin into the appropriate area as directed 1 (One) Time Per Week. 2 mg weekly subcutaneously), Disp: 9 mL, Rfl: 2    Tafluprost, PF, (ZIOPTAN) 0.0015 % solution ophthalmic solution, 1 drop Every Night., Disp: , Rfl:     ALLERGIES:     Allergies   Allergen Reactions    Bactrim [Sulfamethoxazole-Trimethoprim] Nausea And Vomiting    Sulfamethoxazole Nausea Only    Trimethoprim Nausea Only       SOCIAL HISTORY:       Social History     Socioeconomic History    Marital status:     Number of children: 3   Tobacco Use    Smoking status: Never    Smokeless tobacco: Never   Vaping Use    Vaping Use: Never used   Substance and Sexual Activity    Alcohol use: Not Currently     Comment: Occasional glass of wine one per month    Drug use: No    Sexual activity: Not Currently     Birth control/protection:  "Tubal ligation, Hysterectomy         FAMILY HISTORY:  Family History   Problem Relation Age of Onset    Diabetes Mother     Stroke Mother     Coronary artery disease Father         MI    Diabetes Father     Stroke Father     Hypertension Father     Thyroid cancer Daughter        REVIEW OF SYSTEMS:  ROS as per HPI       Vitals:    02/20/24 1548   BP: 136/78   Pulse: 76   Temp: 96.8 °F (36 °C)   TempSrc: Temporal   Weight: 64.4 kg (142 lb)   Height: 170.2 cm (67\")   PainSc: 0-No pain           1/19/2023     1:25 PM   Current Status   ECOG score 0   Physical Exam  Vitals reviewed.   Constitutional:       General: She is not in acute distress.     Appearance: Normal appearance. She is well-developed.   HENT:      Head: Normocephalic and atraumatic.   Eyes:      Pupils: Pupils are equal, round, and reactive to light.   Cardiovascular:      Rate and Rhythm: Normal rate and regular rhythm.      Heart sounds: Normal heart sounds. No murmur heard.  Pulmonary:      Effort: Pulmonary effort is normal. No respiratory distress.      Breath sounds: Normal breath sounds. No wheezing, rhonchi or rales.   Abdominal:      General: Bowel sounds are normal. There is no distension.      Palpations: Abdomen is soft.   Musculoskeletal:         General: Normal range of motion.      Cervical back: Normal range of motion.   Skin:     General: Skin is warm and dry.      Findings: No rash.   Neurological:      Mental Status: She is alert and oriented to person, place, and time.            RECENT LABS:        No visits with results within 7 Day(s) from this visit.   Latest known visit with results is:   Lab on 02/13/2024   Component Date Value Ref Range Status    Glucose 02/13/2024 192 (H)  65 - 99 mg/dL Final    BUN 02/13/2024 13  8 - 23 mg/dL Final    Creatinine 02/13/2024 0.65  0.57 - 1.00 mg/dL Final    Sodium 02/13/2024 139  136 - 145 mmol/L Final    Potassium 02/13/2024 4.0  3.5 - 5.2 mmol/L Final    Chloride 02/13/2024 105  98 - 107 " mmol/L Final    CO2 02/13/2024 26.8  22.0 - 29.0 mmol/L Final    Calcium 02/13/2024 8.7  8.6 - 10.5 mg/dL Final    Total Protein 02/13/2024 6.6  6.0 - 8.5 g/dL Final    Albumin 02/13/2024 3.8  3.5 - 5.2 g/dL Final    ALT (SGPT) 02/13/2024 12  1 - 33 U/L Final    AST (SGOT) 02/13/2024 29  1 - 32 U/L Final    Alkaline Phosphatase 02/13/2024 85  39 - 117 U/L Final    Total Bilirubin 02/13/2024 0.5  0.0 - 1.2 mg/dL Final    Globulin 02/13/2024 2.8  gm/dL Final    A/G Ratio 02/13/2024 1.4  g/dL Final    BUN/Creatinine Ratio 02/13/2024 20.0  7.0 - 25.0 Final    Anion Gap 02/13/2024 7.2  5.0 - 15.0 mmol/L Final    eGFR 02/13/2024 88.0  >60.0 mL/min/1.73 Final    IgG 02/13/2024 1077  586 - 1602 mg/dL Final    IgA 02/13/2024 406  64 - 422 mg/dL Final    IgM 02/13/2024 48  26 - 217 mg/dL Final    Total Protein 02/13/2024 6.4  6.0 - 8.5 g/dL Final    Albumin 02/13/2024 3.4  2.9 - 4.4 g/dL Final    Alpha-1-Globulin 02/13/2024 0.2  0.0 - 0.4 g/dL Final    Alpha-2-Globulin 02/13/2024 0.7  0.4 - 1.0 g/dL Final    Beta Globulin 02/13/2024 1.0  0.7 - 1.3 g/dL Final    Gamma Globulin 02/13/2024 1.2  0.4 - 1.8 g/dL Final    M-Camilo 02/13/2024 0.2 (H)  Not Observed g/dL Final    Globulin 02/13/2024 3.0  2.2 - 3.9 g/dL Final    A/G Ratio 02/13/2024 1.2  0.7 - 1.7 Final    Immunofixation Reflex, Serum 02/13/2024 Comment (A)   Final    Immunofixation shows IgA monoclonal protein with kappa light chain  specificity.    Please note 02/13/2024 Comment   Final    Protein electrophoresis scan will follow via computer, mail, or   delivery.    Free Light Chain, Achille 02/13/2024 31.5 (H)  3.3 - 19.4 mg/L Final    Free Lambda Light Chains 02/13/2024 21.1  5.7 - 26.3 mg/L Final    Kappa/Lambda Ratio 02/13/2024 1.49  0.26 - 1.65 Final    WBC 02/13/2024 5.12  3.40 - 10.80 10*3/mm3 Final    RBC 02/13/2024 4.16  3.77 - 5.28 10*6/mm3 Final    Hemoglobin 02/13/2024 13.3  12.0 - 15.9 g/dL Final    Hematocrit 02/13/2024 40.4  34.0 - 46.6 % Final     "MCV 02/13/2024 97.1 (H)  79.0 - 97.0 fL Final    MCH 02/13/2024 32.0  26.6 - 33.0 pg Final    MCHC 02/13/2024 32.9  31.5 - 35.7 g/dL Final    RDW 02/13/2024 15.0  12.3 - 15.4 % Final    RDW-SD 02/13/2024 52.5  37.0 - 54.0 fl Final    MPV 02/13/2024 9.5  6.0 - 12.0 fL Final    Platelets 02/13/2024 249  140 - 450 10*3/mm3 Final    Neutrophil % 02/13/2024 57.6  42.7 - 76.0 % Final    Lymphocyte % 02/13/2024 20.9  19.6 - 45.3 % Final    Monocyte % 02/13/2024 10.9  5.0 - 12.0 % Final    Eosinophil % 02/13/2024 9.0 (H)  0.3 - 6.2 % Final    Basophil % 02/13/2024 1.2  0.0 - 1.5 % Final    Immature Grans % 02/13/2024 0.4  0.0 - 0.5 % Final    Neutrophils, Absolute 02/13/2024 2.95  1.70 - 7.00 10*3/mm3 Final    Lymphocytes, Absolute 02/13/2024 1.07  0.70 - 3.10 10*3/mm3 Final    Monocytes, Absolute 02/13/2024 0.56  0.10 - 0.90 10*3/mm3 Final    Eosinophils, Absolute 02/13/2024 0.46 (H)  0.00 - 0.40 10*3/mm3 Final    Basophils, Absolute 02/13/2024 0.06  0.00 - 0.20 10*3/mm3 Final    Immature Grans, Absolute 02/13/2024 0.02  0.00 - 0.05 10*3/mm3 Final    nRBC 02/13/2024 0.0  0.0 - 0.2 /100 WBC Final         ASSESSMENT:   is a pleasant 81 y/o WF with past medical history significant for DM2 (over 40 years), inclusion body myositis (diagnosed 10 yrs ago, on methotrexate), hypothyroidism, & peripheral neuropathy comes to this clinic for monoclonal gammopathy evaluation.     # Monoclonal gammopathy:  Patient had presented to her neurologist,  with signs/symptoms of bilateral feet neuropathy. During this work up SPEP/VIGNESH was performed which showed presence of IgA kappa M-protein, 0.2 gm/dl.   Patient c/o b/l feet neuropathy, along with generalized weakness, which she attributes to inclusion body myositis.   Denies any specific bone pain. On labs, no cytopenias. Renal function & calcium levels within normal limits. No B-symptoms.   Informed patient that with low level of IgA M-protein & no \"CRAB\" features, this is " likely MGUS, low-intermediate risk. Informed patient that MGUS is a common finding in elderly population, however,  there is an 1% per year risk of progression to multiple myeloma.   A repeat SPEP/VIGNESH from Feb 2022 show stable IgA kappa M-protein level of 0.3 gm/dl. No symptoms concerning for progression.   However repeat SPEP/VIGNESH from July 2022 does not show any M protein or abnormal FLC ratio.  Other labs unremarkable as well.  Informed patient this this could be seen with transient inflammatory conditions that could have led to abnormal M protein.  January 2023: Repeat labs again show faint levels of IgA kappa monoclonal protein.  Rest of the labs unremarkable.  This is unlikely contributing to her peripheral neuropathy.  Will plan to monitor and repeat SPEP/VIGNESH & FLC in 12 months time.  Her peripheral neuropathy is unlikely related to monoclonal gammopathy. Suspect diabetic neuropathy. Additional work up being performed by neurology.   2/20/2024 neuropathy symptoms unchanged.  SPEP/VIGNESH stable, M protein of 0.2 g/dL.  Will continue to closely monitor.    # Inclusion body myositis: On MTX 7.5 mg weekly. Follows up with   # DM2: On insulin, januvia & glipizide.   # Hypothyrodism: On synthroid  # peripheral neuropathy: Is on gabapentin 300 mg 3 times daily.    PLAN:   - IgA kappa MGUS. Unlikely attributing to peripheral neuropathy. Repeat labs from February 2024 continues to show a very faint level of IgA kappa M protein.  Essentially unchanged.  -Continue active surveillance with repeat labs in 12 months.  -Continue close follow-up with her PCP and other specialist.  -Call/ return sooner should the patient develop any new concerns or problems.      Orders Placed This Encounter   Procedures    Comprehensive Metabolic Panel     Standing Status:   Future     Standing Expiration Date:   4/25/2025     Order Specific Question:   Release to patient     Answer:   Routine Release [[id number]]    VIGNESH, PE & Free LT  Chains, Ser     Standing Status:   Future     Standing Expiration Date:   2/20/2025     Order Specific Question:   Release to patient     Answer:   Routine Release [[id number]]    CBC & Differential     Standing Status:   Future     Standing Expiration Date:   4/25/2025     Order Specific Question:   Manual Differential     Answer:   No     Order Specific Question:   Release to patient     Answer:   Routine Release [[id number]]

## 2024-03-11 DIAGNOSIS — G25.81 RESTLESS LEG SYNDROME: ICD-10-CM

## 2024-03-11 RX ORDER — GABAPENTIN 300 MG/1
CAPSULE ORAL
Qty: 90 CAPSULE | Refills: 2 | Status: SHIPPED | OUTPATIENT
Start: 2024-03-11

## 2024-03-13 ENCOUNTER — TRANSCRIBE ORDERS (OUTPATIENT)
Dept: CARDIAC REHAB | Facility: HOSPITAL | Age: 84
End: 2024-03-13
Payer: MEDICARE

## 2024-03-13 DIAGNOSIS — J98.4 RESTRICTIVE LUNG DISEASE: Primary | ICD-10-CM

## 2024-04-05 ENCOUNTER — OFFICE VISIT (OUTPATIENT)
Dept: CARDIAC REHAB | Facility: HOSPITAL | Age: 84
End: 2024-04-05
Payer: MEDICARE

## 2024-04-05 VITALS
OXYGEN SATURATION: 99 % | BODY MASS INDEX: 22.27 KG/M2 | DIASTOLIC BLOOD PRESSURE: 80 MMHG | SYSTOLIC BLOOD PRESSURE: 138 MMHG | HEIGHT: 67 IN | HEART RATE: 77 BPM | WEIGHT: 141.9 LBS

## 2024-04-05 DIAGNOSIS — J98.4 RESTRICTIVE LUNG DISEASE: Primary | ICD-10-CM

## 2024-04-05 PROCEDURE — G0238 OTH RESP PROC, INDIV: HCPCS

## 2024-04-05 NOTE — PROGRESS NOTES
"Pulmonary Rehab Initial Assessment      Name: Lissa Ortiz  :1940 Allergies:Bactrim [sulfamethoxazole-trimethoprim], Sulfamethoxazole, and Trimethoprim   MRN: [id number] 83 y.o. Physician: Adin Bragg MD   Primary Diagnosis:    Diagnosis Plan   1. Restrictive lung disease         Event Date: 2024 Specialist: Darci Andrews MD   Secondary Diagnosis:COPD  Note Author: Mari Mata RN     Cardiovascular History: HTN, hyperlipidemia, peripheral venous insufficiency      EXERCISE AT HOME  yes  60min walking the dog  7 days per week          Ambulatory Status:Independent  Ambulatory Fall Risk Assessed on Initial Visit: yes 6 Minute Walk Pre- Pulmonary Rehab:  Distance:1209 ft      RPE:2        RPD: 2              MPH: 2.3  Max. HR: 102        SPO2:99    MET: 2.8  Resting BP: 138/80     Peak BP: 136/80  Recovery BP: 120/70  Comments: Pt tolerated exercise well. No complaints noted. Sp02 was 96-99% with exercise.       NUTRITION  Lipids:yes If yes, labs as follows;  Total: No components found for: \"CHOLESTEROL\"  HDL:   HDL Cholesterol   Date Value Ref Range Status   2024 58 40 - 60 mg/dL Final    Lipids continued:  LDL:  LDL Chol Calc (NIH)   Date Value Ref Range Status   2024 86 0 - 100 mg/dL Final     Triglyceride: No components found for: \"TRIGLYCERIDE\"   Weight Management:                 Weight: 141.9lbs  Height: 67inches                                   BMI: Body mass index is 22.22 kg/m².  Waist Circumference: n/a inches   Alcohol Use: 1 glasses of wine per month(s) Diabetes:Yes,  Monitors BS at home- yes, Frequency: 1 time daily, Random BS: 77    Last HGBA1C with date if applicable:No components found for: \"A1C\"         SOCIAL HISTORY  Social History     Socioeconomic History    Marital status:     Number of children: 3   Tobacco Use    Smoking status: Never    Smokeless tobacco: Never   Vaping Use    Vaping status: Never Used   Substance and Sexual Activity    Alcohol " use: Not Currently     Comment: Occasional glass of wine one per month    Drug use: No    Sexual activity: Not Currently     Birth control/protection: Tubal ligation, Hysterectomy    Learning Barriers:Ready to Learn  Family Support:no  Living Arrangement: lives alone Tobacco Adjunct:no  Do you live with a smoker: no     PSYCHOSOCIAL  Clinical Depression: no    Stress: no     Assess presence or absence of depression using a valid screening tool: yes      Assessment: Lung clear bilat. Regular heart rate and rhythm noted.            Are you being hurt, hit, or freightened by anyone at home or in your life? no    Are you being neglected by a caregiver? No Shoulder flexibility/Range of motion: Excellent     Recommended arm activity: Any    Chair sit and reach within: 1 inches   Leg flexibility: Above average    Leg Strength/Balance/Five times sit to stand: 15 seconds.   Pt did not use upper body strength    Recommended stretching: Chair   Balance: Average Assessment: Pt is alert and oriented x3    Family attends IA: no      COMORBIDITIES  Sleep Apnea: no If yes, Choose: N/A    Cancer: no per patient-pt's hx lists skin cancer    Stroke: no   Pneumonia: no If yes, how many times n/a    Osteoporosis: no    GI Problems: no Frequent colds/allergies: yes allergies    Other illnesses, surgeries, or comments Vit D deficiency, RA, inclusion body myositis, hypothyroidism, allergic rhinitis, MGUS   PAIN:  Are you having pain? no  If yes where is pain? N/a If yes, pain scale:  n/a      PULMONARY:  Do you use a nebulizer?: no   If yes, n/a    Do you use oxygen at home?: no  If yes, amount n/a    With rest: no  With activity: no Do you have a daily cough?: no  If yes, choose:  n/a    Do you every notice yourself wheezing?: no  If yes, when: n/a Other pulmonary/breathing problems?: yes mouth breather   OTHER:  Do you have physical limitations?: yes  If yes, muscle weakness from myositis     Do you need assistance with ADLs?: no  If  yes, n/a Do you climb stairs at home?:yes  If yes, how many times 1 time weekly    Have you ever attended a pulmonary rehab?: no  If yes, n/a [facility] Dyspnea Scale: 0 - 4:  2 = walks slower than people of the same age on the level because of breathlessness, or has to stop for breath when walking at own pace on the level     Patient Goals: Pt would like to be able to dress with less effort. Pt would like to be able to ride a bike again.      DISCHARGE PLANNING:  Do you have any home exercise equipment?: yes  Elliptical, bands     What are you plans for continuing exercise after completion of pulmonary rehab? Pt plans to exercise at home.      EDUCATION:  Pursed - lip breathing, Diaphragmatic breathing, Relaxation techniques, and Program information folder       PRE-PROGRAM ASSESSMENT:  PFT Date: 3-    FEV1/FVC: 70 FEV1: 44    FVC: 46 DLCO: n/a     Time of arrival: 10:10    Time of departure: 11:20           4/5/2024  10:15 EDT  Mari Mata RN

## 2024-04-09 ENCOUNTER — TREATMENT (OUTPATIENT)
Dept: CARDIAC REHAB | Facility: HOSPITAL | Age: 84
End: 2024-04-09
Payer: MEDICARE

## 2024-04-09 DIAGNOSIS — J98.4 RESTRICTIVE LUNG DISEASE: Primary | ICD-10-CM

## 2024-04-09 PROCEDURE — G0238 OTH RESP PROC, INDIV: HCPCS

## 2024-04-11 ENCOUNTER — APPOINTMENT (OUTPATIENT)
Dept: CARDIAC REHAB | Facility: HOSPITAL | Age: 84
End: 2024-04-11
Payer: MEDICARE

## 2024-04-16 ENCOUNTER — APPOINTMENT (OUTPATIENT)
Dept: CARDIAC REHAB | Facility: HOSPITAL | Age: 84
End: 2024-04-16
Payer: MEDICARE

## 2024-04-18 ENCOUNTER — APPOINTMENT (OUTPATIENT)
Dept: CARDIAC REHAB | Facility: HOSPITAL | Age: 84
End: 2024-04-18
Payer: MEDICARE

## 2024-04-22 RX ORDER — LEVOTHYROXINE SODIUM 0.03 MG/1
25 TABLET ORAL DAILY
Qty: 90 TABLET | Refills: 1 | Status: SHIPPED | OUTPATIENT
Start: 2024-04-22

## 2024-04-22 NOTE — TELEPHONE ENCOUNTER
Rx Refill Note  Requested Prescriptions     Pending Prescriptions Disp Refills    levothyroxine (SYNTHROID, LEVOTHROID) 25 MCG tablet [Pharmacy Med Name: LEVOTHYROXINE 25 MCG TABLET] 90 tablet 1     Sig: TAKE 1 TABLET BY MOUTH DAILY      Last office visit with prescribing clinician: 9/18/2023   Last telemedicine visit with prescribing clinician: Visit date not found   Next office visit with prescribing clinician: Visit date not found                         Would you like a call back once the refill request has been completed: [] Yes [] No    If the office needs to give you a call back, can they leave a voicemail: [] Yes [] No    Yudi Gerber  04/22/24, 07:55 EDT

## 2024-04-23 ENCOUNTER — APPOINTMENT (OUTPATIENT)
Dept: CARDIAC REHAB | Facility: HOSPITAL | Age: 84
End: 2024-04-23
Payer: MEDICARE

## 2024-04-25 ENCOUNTER — TELEPHONE (OUTPATIENT)
Dept: NEUROLOGY | Facility: CLINIC | Age: 84
End: 2024-04-25

## 2024-04-25 ENCOUNTER — APPOINTMENT (OUTPATIENT)
Dept: CARDIAC REHAB | Facility: HOSPITAL | Age: 84
End: 2024-04-25
Payer: MEDICARE

## 2024-04-25 NOTE — TELEPHONE ENCOUNTER
The [facility] received a fax that requires your attention. The document has been indexed to the patient’s chart for your review.      Reason for sending: FOR REVIEW    Documents Description: LAB RESUTLS 04/18/24    Name of Sender: RHEUMATOLOGY  [facility]     Date Indexed: 04/25/24     Notes (if needed): FOR REVIEW

## 2024-04-30 ENCOUNTER — APPOINTMENT (OUTPATIENT)
Dept: CARDIAC REHAB | Facility: HOSPITAL | Age: 84
End: 2024-04-30
Payer: MEDICARE

## 2024-05-24 ENCOUNTER — LAB (OUTPATIENT)
Dept: ENDOCRINOLOGY | Age: 84
End: 2024-05-24
Payer: MEDICARE

## 2024-05-24 DIAGNOSIS — D47.2 MONOCLONAL GAMMOPATHY OF UNKNOWN SIGNIFICANCE (MGUS): Chronic | ICD-10-CM

## 2024-05-24 DIAGNOSIS — E11.29 TYPE 2 DIABETES MELLITUS WITH MICROALBUMINURIA, WITH LONG-TERM CURRENT USE OF INSULIN: ICD-10-CM

## 2024-05-24 DIAGNOSIS — R80.9 TYPE 2 DIABETES MELLITUS WITH MICROALBUMINURIA, WITH LONG-TERM CURRENT USE OF INSULIN: ICD-10-CM

## 2024-05-24 DIAGNOSIS — I10 ESSENTIAL (PRIMARY) HYPERTENSION: ICD-10-CM

## 2024-05-24 DIAGNOSIS — E03.9 PRIMARY HYPOTHYROIDISM: ICD-10-CM

## 2024-05-24 DIAGNOSIS — M85.80 OSTEOPENIA, UNSPECIFIED LOCATION: ICD-10-CM

## 2024-05-24 DIAGNOSIS — E78.5 HYPERLIPIDEMIA, UNSPECIFIED HYPERLIPIDEMIA TYPE: Chronic | ICD-10-CM

## 2024-05-24 DIAGNOSIS — Z79.4 TYPE 2 DIABETES MELLITUS WITH MICROALBUMINURIA, WITH LONG-TERM CURRENT USE OF INSULIN: ICD-10-CM

## 2024-05-25 LAB
25(OH)D3+25(OH)D2 SERPL-MCNC: 47.3 NG/ML (ref 30–100)
ALBUMIN SERPL-MCNC: 4.3 G/DL (ref 3.5–5.2)
ALBUMIN/CREAT UR: 18 MG/G CREAT (ref 0–29)
ALBUMIN/GLOB SERPL: 1.7 G/DL
ALP SERPL-CCNC: 91 U/L (ref 39–117)
ALT SERPL-CCNC: 23 U/L (ref 1–33)
APPEARANCE UR: CLEAR
AST SERPL-CCNC: 29 U/L (ref 1–32)
BACTERIA #/AREA URNS HPF: ABNORMAL /HPF
BILIRUB SERPL-MCNC: 0.5 MG/DL (ref 0–1.2)
BILIRUB UR QL STRIP: NEGATIVE
BUN SERPL-MCNC: 16 MG/DL (ref 8–23)
BUN/CREAT SERPL: 27.6 (ref 7–25)
CALCIUM SERPL-MCNC: 9.4 MG/DL (ref 8.6–10.5)
CASTS URNS MICRO: ABNORMAL
CHLORIDE SERPL-SCNC: 105 MMOL/L (ref 98–107)
CHOLEST SERPL-MCNC: 161 MG/DL (ref 0–200)
CO2 SERPL-SCNC: 27.5 MMOL/L (ref 22–29)
COLOR UR: YELLOW
CREAT SERPL-MCNC: 0.58 MG/DL (ref 0.57–1)
CREAT UR-MCNC: 67.2 MG/DL
CRYSTALS URNS MICRO: ABNORMAL
EGFRCR SERPLBLD CKD-EPI 2021: 90.5 ML/MIN/1.73
EPI CELLS #/AREA URNS HPF: ABNORMAL /HPF
GLOBULIN SER CALC-MCNC: 2.6 GM/DL
GLUCOSE SERPL-MCNC: 120 MG/DL (ref 65–99)
GLUCOSE UR QL STRIP: NEGATIVE
HBA1C MFR BLD: 6.7 % (ref 4.8–5.6)
HDLC SERPL-MCNC: 61 MG/DL (ref 40–60)
HGB UR QL STRIP: NEGATIVE
IMP & REVIEW OF LAB RESULTS: NORMAL
KETONES UR QL STRIP: NEGATIVE
LDLC SERPL CALC-MCNC: 87 MG/DL (ref 0–100)
LEUKOCYTE ESTERASE UR QL STRIP: ABNORMAL
MICROALBUMIN UR-MCNC: 12 UG/ML
NITRITE UR QL STRIP: NEGATIVE
PH UR STRIP: 6.5 [PH] (ref 5–8)
POTASSIUM SERPL-SCNC: 4.8 MMOL/L (ref 3.5–5.2)
PROT SERPL-MCNC: 6.9 G/DL (ref 6–8.5)
PROT UR QL STRIP: NEGATIVE
RBC #/AREA URNS HPF: ABNORMAL /HPF
SODIUM SERPL-SCNC: 141 MMOL/L (ref 136–145)
SP GR UR STRIP: 1.02 (ref 1–1.03)
TRIGL SERPL-MCNC: 65 MG/DL (ref 0–150)
TSH SERPL DL<=0.005 MIU/L-ACNC: 2.2 UIU/ML (ref 0.27–4.2)
UROBILINOGEN UR STRIP-MCNC: ABNORMAL MG/DL
VLDLC SERPL CALC-MCNC: 13 MG/DL (ref 5–40)
WBC #/AREA URNS HPF: ABNORMAL /HPF

## 2024-06-03 ENCOUNTER — OFFICE VISIT (OUTPATIENT)
Dept: ENDOCRINOLOGY | Age: 84
End: 2024-06-03
Payer: MEDICARE

## 2024-06-03 VITALS
SYSTOLIC BLOOD PRESSURE: 126 MMHG | TEMPERATURE: 94.6 F | DIASTOLIC BLOOD PRESSURE: 72 MMHG | OXYGEN SATURATION: 96 % | WEIGHT: 139.2 LBS | BODY MASS INDEX: 21.85 KG/M2 | HEIGHT: 67 IN | HEART RATE: 70 BPM

## 2024-06-03 DIAGNOSIS — E11.29 TYPE 2 DIABETES MELLITUS WITH MICROALBUMINURIA, WITH LONG-TERM CURRENT USE OF INSULIN: Primary | ICD-10-CM

## 2024-06-03 DIAGNOSIS — M85.852 OSTEOPENIA OF LEFT HIP: Chronic | ICD-10-CM

## 2024-06-03 DIAGNOSIS — Z79.4 TYPE 2 DIABETES MELLITUS WITH MICROALBUMINURIA, WITH LONG-TERM CURRENT USE OF INSULIN: Primary | ICD-10-CM

## 2024-06-03 DIAGNOSIS — M06.9 RHEUMATOID ARTHRITIS INVOLVING MULTIPLE SITES, UNSPECIFIED WHETHER RHEUMATOID FACTOR PRESENT: ICD-10-CM

## 2024-06-03 DIAGNOSIS — I10 ESSENTIAL HYPERTENSION: ICD-10-CM

## 2024-06-03 DIAGNOSIS — D47.2 MONOCLONAL GAMMOPATHY OF UNKNOWN SIGNIFICANCE (MGUS): Chronic | ICD-10-CM

## 2024-06-03 DIAGNOSIS — E03.9 PRIMARY HYPOTHYROIDISM: ICD-10-CM

## 2024-06-03 DIAGNOSIS — E78.5 HYPERLIPIDEMIA, UNSPECIFIED HYPERLIPIDEMIA TYPE: Chronic | ICD-10-CM

## 2024-06-03 DIAGNOSIS — R80.9 TYPE 2 DIABETES MELLITUS WITH MICROALBUMINURIA, WITH LONG-TERM CURRENT USE OF INSULIN: Primary | ICD-10-CM

## 2024-06-03 PROCEDURE — 3078F DIAST BP <80 MM HG: CPT | Performed by: INTERNAL MEDICINE

## 2024-06-03 PROCEDURE — 99214 OFFICE O/P EST MOD 30 MIN: CPT | Performed by: INTERNAL MEDICINE

## 2024-06-03 PROCEDURE — 3074F SYST BP LT 130 MM HG: CPT | Performed by: INTERNAL MEDICINE

## 2024-06-03 RX ORDER — SOLIFENACIN SUCCINATE 10 MG/1
10 TABLET, FILM COATED ORAL DAILY
COMMUNITY

## 2024-06-03 NOTE — PROGRESS NOTES
Subjective   Lissa Ortiz is a 83 y.o. female.     History of Present Illness     She is on Ozempic 2 mg weekly, Actos 15 mg  per day, glipizide XL 5 mg every morning and Toujeo 8 units every morning.  She did not start Farxiga because she has UTI once a month.  Janumet was discontinued because of diarrhea.  Jardiance was too expensive.  She checks her blood sugar once a day.  FBS .  She denies hypoglycemia.  She has loss 4 pounds since January 2024.  Hemoglobin A1c done in May 2024 is 6.7%.     She denies eye complaints.  Her last eye examination was in 11/23.  She has glaucoma but no retinopathy.  She has restless leg at night and her feet feels tight and numb  which is improved by gabapentin 300 mg 2 capsules at bedtime prescribed by neurologist RONALD Paz.  Urine microalbumin is elevated in January 2022.  Urine microalbumin is normal in January 2024.     She has hyperlipidemia and is on pravastatin 20 mg/day.  She has not used Crestor or Lipitor before.  Lipid panel done in May 2024 are as follows: Cholesterol 161.  HDL 61.  LDL 87.  Triglycerides 65.     She has hypothyroidism and is on levothyroxine 25 mcg/day.  She has no history of head or neck radiation therapy.  She has no history of thyroid surgery.  TSH done in May 2024 is normal at 2.2.     She has hypertension and is on amlodipine 2.5 mg once a day.  She denies history of heart attack or stroke.  She denies chest pain or shortness of breath.     She has rheumatoid arthritis and is on methotrexate and folic acid prescribed by Dr. Chow.  She is not on oral steroids.     She has osteopenia on bone density done in November 2022.  Her 10-year risk of major osteoporotic fracture is elevated at 28% and for hip fracture is 18%.  She is on a multivitamin 1 tablet/day and gummy calcium 2 tablets/day.  She walks 1/2 miles every other day.  She was started on alendronate 70 mg weekly by Dr. Chow in September 2023.  She went off alendronate in March  "2024 because of diarrhea.     She has monoclonal gammopathy of unknown significance and is being followed by Dr. ROMEL Thompson.    The following portions of the patient's history were reviewed and updated as appropriate: allergies, current medications, past family history, past medical history, past social history, past surgical history, and problem list.    Review of Systems  Vitals:    06/03/24 1344   BP: 126/72   Pulse: 70   Temp: 94.6 °F (34.8 °C)   TempSrc: Temporal   SpO2: 96%   Weight: 63.1 kg (139 lb 3.2 oz)   Height: 170.2 cm (67.01\")      Objective   Physical Exam  Eyes:      General: No scleral icterus.        Right eye: No discharge.         Left eye: No discharge.      Extraocular Movements: Extraocular movements intact.      Conjunctiva/sclera: Conjunctivae normal.   Neck:      Vascular: No carotid bruit.   Cardiovascular:      Rate and Rhythm: Normal rate and regular rhythm.      Heart sounds: Normal heart sounds. No murmur heard.  Pulmonary:      Effort: No respiratory distress.      Breath sounds: No rales.   Abdominal:      Palpations: Abdomen is soft.      Tenderness: There is no right CVA tenderness or left CVA tenderness.   Musculoskeletal:      Right lower leg: No edema.      Left lower leg: No edema.      Comments: Feet warm.  No cyanosis or pedal edema.   Lymphadenopathy:      Cervical: No cervical adenopathy.   Neurological:      Mental Status: She is oriented to person, place, and time.      Comments: Intact light touch in both lower extremities.       Lab on 05/24/2024   Component Date Value Ref Range Status    TSH 05/24/2024 2.200  0.270 - 4.200 uIU/mL Final    25 Hydroxy, Vitamin D 05/24/2024 47.3  30.0 - 100.0 ng/mL Final    Comment: Reference Range for Total Vitamin D 25(OH)  Deficiency <20.0 ng/mL  Insufficiency 21-29 ng/mL  Sufficiency  ng/mL  Toxicity >100 ng/ml      Total Cholesterol 05/24/2024 161  0 - 200 mg/dL Final    Comment: Cholesterol Reference Ranges  (U.S. Department of " Health and Human Services ATP III  Classifications)  Desirable          <200 mg/dL  Borderline High    200-239 mg/dL  High Risk          >240 mg/dL  Triglyceride Reference Ranges  (U.S. Department of Health and Human Services ATP III  Classifications)  Normal           <150 mg/dL  Borderline High  150-199 mg/dL  High             200-499 mg/dL  Very High        >500 mg/dL  HDL Reference Ranges  (U.S. Department of Health and Human Services ATP III  Classifications)  Low     <40 mg/dl (major risk factor for CHD)  High    >60 mg/dl ('negative' risk factor for CHD)  LDL Reference Ranges  (U.S. Department of Health and Human Services ATP III  Classifications)  Optimal          <100 mg/dL  Near Optimal     100-129 mg/dL  Borderline High  130-159 mg/dL  High             160-189 mg/dL  Very High        >189 mg/dL      Triglycerides 05/24/2024 65  0 - 150 mg/dL Final    HDL Cholesterol 05/24/2024 61 (H)  40 - 60 mg/dL Final    VLDL Cholesterol Akbar 05/24/2024 13  5 - 40 mg/dL Final    LDL Chol Calc (NIH) 05/24/2024 87  0 - 100 mg/dL Final    Hemoglobin A1C 05/24/2024 6.70 (H)  4.80 - 5.60 % Final    Comment: Hemoglobin A1C Ranges:  Increased Risk for Diabetes  5.7% to 6.4%  Diabetes                     >= 6.5%  Diabetic Goal                < 7.0%      Glucose 05/24/2024 120 (H)  65 - 99 mg/dL Final    BUN 05/24/2024 16  8 - 23 mg/dL Final    Creatinine 05/24/2024 0.58  0.57 - 1.00 mg/dL Final    EGFR Result 05/24/2024 90.5  >60.0 mL/min/1.73 Final    Comment: GFR Normal >60  Chronic Kidney Disease <60  Kidney Failure <15  The GFR formula is only valid for adults with stable renal  function between ages 18 and 70.      BUN/Creatinine Ratio 05/24/2024 27.6 (H)  7.0 - 25.0 Final    Sodium 05/24/2024 141  136 - 145 mmol/L Final    Potassium 05/24/2024 4.8  3.5 - 5.2 mmol/L Final    Chloride 05/24/2024 105  98 - 107 mmol/L Final    Total CO2 05/24/2024 27.5  22.0 - 29.0 mmol/L Final    Calcium 05/24/2024 9.4  8.6 - 10.5 mg/dL  Final    Total Protein 05/24/2024 6.9  6.0 - 8.5 g/dL Final    Albumin 05/24/2024 4.3  3.5 - 5.2 g/dL Final    Globulin 05/24/2024 2.6  gm/dL Final    A/G Ratio 05/24/2024 1.7  g/dL Final    Total Bilirubin 05/24/2024 0.5  0.0 - 1.2 mg/dL Final    Alkaline Phosphatase 05/24/2024 91  39 - 117 U/L Final    AST (SGOT) 05/24/2024 29  1 - 32 U/L Final    ALT (SGPT) 05/24/2024 23  1 - 33 U/L Final    Specific Gravity, UA 05/24/2024 1.019  1.005 - 1.030 Final    pH, UA 05/24/2024 6.5  5.0 - 8.0 Final    Color, UA 05/24/2024 Yellow   Final    REFERENCE RANGE: Yellow, Straw    Appearance, UA 05/24/2024 Clear  Clear Final    Leukocytes, UA 05/24/2024 See below: (A)  Negative Final    Small (1+)    Protein 05/24/2024 Negative  Negative Final    Glucose, UA 05/24/2024 Negative  Negative Final    Ketones 05/24/2024 Negative  Negative Final    Blood, UA 05/24/2024 Negative  Negative Final    Bilirubin, UA 05/24/2024 Negative  Negative Final    Urobilinogen, UA 05/24/2024 Comment   Final    Comment: 0.2 E.U./dL  REFERENCE RANGE: 0.2 - 1.0 E.U./dL      Nitrite, UA 05/24/2024 Negative  Negative Final    Creatinine, Urine 05/24/2024 67.2  Not Estab. mg/dL Final    Microalbumin, Urine 05/24/2024 12.0  Not Estab. ug/mL Final    Microalbumin/Creatinine Ratio 05/24/2024 18  0 - 29 mg/g creat Final    Comment:                        Normal:                0 -  29                         Moderately increased: 30 - 300                         Severely increased:       >300      WBC, UA 05/24/2024 6-10 (A)  /HPF Final    REFERENCE RANGE: None Seen, 0-2    RBC, UA 05/24/2024 Comment  /HPF Final    Comment: None Seen  REFERENCE RANGE: None Seen, 0-2      Epithelial Cells (non renal) 05/24/2024 0-2  /HPF Final    REFERENCE RANGE: None Seen, 0-2    Cast Type 05/24/2024 Comment   Final    HYALINE CASTS, UA            None Seen        /LPF       None Seen  N    Crystal Type 05/24/2024 Comment   Final    CALCIUM OXALATE CRYSTALS     Small/1+          /HPF       None Seen  N    Bacteria, UA 05/24/2024 Comment  None Seen /HPF Final    None Seen    Interpretation 05/24/2024 Note   Final    Supplemental report is available.     Assessment & Plan   Diagnoses and all orders for this visit:    1. Type 2 diabetes mellitus with microalbuminuria, with long-term current use of insulin (Primary)    2. Hyperlipidemia, unspecified hyperlipidemia type    3. Primary hypothyroidism    4. Essential hypertension    5. Rheumatoid arthritis involving multiple sites, unspecified whether rheumatoid factor present    6. Osteopenia of left hip    7. Monoclonal gammopathy of unknown significance (MGUS)      Continue Ozempic 2 mg weekly, Actos 15 mg/day, glipizide XL 5 mg every morning, and Toujeo 8 units every morning.  Follow-up with neurologist.    Continue pravastatin 20 mg/day.    Continue levothyroxine 25 mcg/day.    Continue amlodipine 2.5 mg/day.    Will defer treatment of rheumatoid arthritis and osteopenia to Dr. Chow.    Follow-up with Dr. Thompson as scheduled.    Copy my note sent to Dr. Bragg, Dr. Chow, and Dr. ROMEL Thompson.    RTC 4 mos.

## 2024-07-03 ENCOUNTER — OFFICE VISIT (OUTPATIENT)
Dept: INTERNAL MEDICINE | Facility: CLINIC | Age: 84
End: 2024-07-03
Payer: MEDICARE

## 2024-07-03 VITALS
OXYGEN SATURATION: 99 % | HEIGHT: 67 IN | BODY MASS INDEX: 21.97 KG/M2 | SYSTOLIC BLOOD PRESSURE: 134 MMHG | WEIGHT: 140 LBS | HEART RATE: 77 BPM | DIASTOLIC BLOOD PRESSURE: 80 MMHG | RESPIRATION RATE: 19 BRPM

## 2024-07-03 DIAGNOSIS — Z79.4 TYPE 2 DIABETES MELLITUS WITH MICROALBUMINURIA, WITH LONG-TERM CURRENT USE OF INSULIN: Chronic | ICD-10-CM

## 2024-07-03 DIAGNOSIS — I10 ESSENTIAL (PRIMARY) HYPERTENSION: Chronic | ICD-10-CM

## 2024-07-03 DIAGNOSIS — E78.2 MIXED HYPERLIPIDEMIA: Chronic | ICD-10-CM

## 2024-07-03 DIAGNOSIS — E03.9 PRIMARY HYPOTHYROIDISM: Chronic | ICD-10-CM

## 2024-07-03 DIAGNOSIS — E11.29 TYPE 2 DIABETES MELLITUS WITH MICROALBUMINURIA, WITH LONG-TERM CURRENT USE OF INSULIN: Chronic | ICD-10-CM

## 2024-07-03 DIAGNOSIS — R80.9 TYPE 2 DIABETES MELLITUS WITH MICROALBUMINURIA, WITH LONG-TERM CURRENT USE OF INSULIN: Chronic | ICD-10-CM

## 2024-07-03 DIAGNOSIS — Z00.00 MEDICARE ANNUAL WELLNESS VISIT, SUBSEQUENT: Primary | ICD-10-CM

## 2024-07-03 PROCEDURE — 1160F RVW MEDS BY RX/DR IN RCRD: CPT | Performed by: FAMILY MEDICINE

## 2024-07-03 PROCEDURE — 3075F SYST BP GE 130 - 139MM HG: CPT | Performed by: FAMILY MEDICINE

## 2024-07-03 PROCEDURE — 1159F MED LIST DOCD IN RCRD: CPT | Performed by: FAMILY MEDICINE

## 2024-07-03 PROCEDURE — 99214 OFFICE O/P EST MOD 30 MIN: CPT | Performed by: FAMILY MEDICINE

## 2024-07-03 PROCEDURE — 3079F DIAST BP 80-89 MM HG: CPT | Performed by: FAMILY MEDICINE

## 2024-07-03 PROCEDURE — 1170F FXNL STATUS ASSESSED: CPT | Performed by: FAMILY MEDICINE

## 2024-07-03 PROCEDURE — 1126F AMNT PAIN NOTED NONE PRSNT: CPT | Performed by: FAMILY MEDICINE

## 2024-07-03 PROCEDURE — G0439 PPPS, SUBSEQ VISIT: HCPCS | Performed by: FAMILY MEDICINE

## 2024-07-03 NOTE — PATIENT INSTRUCTIONS
"MyChart Tips:    [employer] can be a useful part of our patient care program and is a way for us to communicate lab results and for you to request refills.  Here is some information regarding the best way to use [employer] for communication.       Examples of medical issues that are APPROPRIATE for [employer]:  -Follow-up on problems we have already addressed in a visit such as home testing results, blood pressure readings, glucose readings  -Questions that can be answered with a simple \"yes\" or \"no\"  -Please keep communication concise and to the point.  All other types of communication should be held for an office visit.    Communication that is NOT APPROPRIATE for [employer]:  -New problems, serious problems or urgent problems.  Urgent matters should be addressed by phone for advice, in the office, urgent care or the ER.  -Requesting Paxlovid or Antibiotics: These types of problems require an evaluation unless your provider approved this previously.    -[employer] messages are not email.  Staff will check messages each weekday.  We strive for a 48-hour turnaround on messaging.    -[employer] is not for private issues.  Messages are received first by our office staff.    Please be mindful that sometimes it may take longer to receive a response on [employer].  Many times of the year we have high volumes of messages coming into physician inboxes.      Please also be mindful that [employer] messages become part of your permanent medical record.    We appreciate your respect for the limitations and boundaries of [employer].      Lab Results:  Please allow up to 7 business days for lab results to be sent through [employer] to you before contacting your provider.  Sometimes we are waiting for results to get back from the lab and also your provider needs time to analyze them thoroughly before making recommendations.  Also, providers may be out of the office on vacation.      While the internet has great resources, it is not a substitute for " interpreting lab results.

## 2024-07-03 NOTE — PROGRESS NOTES
The ABCs of the Annual Wellness Visit  Subsequent Medicare Wellness Visit    Subjective    Lissa Ortiz is a 83 y.o. female who presents for a Subsequent Medicare Wellness Visit.    The following portions of the patient's history were reviewed and   updated as appropriate: allergies, current medications, past family history, past medical history, past social history, past surgical history, and problem list.    Compared to one year ago, the patient feels her physical   health is the same.    Compared to one year ago, the patient feels her mental   health is the same.    Recent Hospitalizations:  She was not admitted to the hospital during the last year.       Current Medical Providers:  Patient Care Team:  Adin Bragg MD as PCP - General (Family Medicine)  Kyle Chow MD as Consulting Physician (Rheumatology)  Tony Luciano MD as Referring Physician (Neurology)  Kiran Thompson MD as Consulting Physician (Hematology and Oncology)  Matt Pimentel MD as Consulting Physician (Endocrinology)  Cristina Espinal MD (Dermatology)  Genaro Cooney Jr., MD as Consulting Physician (Urology)  Darci Andrews MD as Consulting Physician (Pulmonary Disease)    Outpatient Medications Prior to Visit   Medication Sig Dispense Refill    amLODIPine (NORVASC) 2.5 MG tablet TAKE ONE TABLET BY MOUTH DAILY 90 tablet 4    azelastine (ASTELIN) 0.1 % nasal spray 2 sprays into the nostril(s) as directed by provider 2 (Two) Times a Day. Use in each nostril as directed 2 each 5    BD Pen Needle Vicenta 2nd Gen 32G X 4 MM misc USE TO INJECT INSULIN ONCE DAILY 100 each 2    Blood Glucose Monitoring Suppl (TRUE METRIX AIR GLUCOSE METER) device 1 Device Daily As Needed (check bs's twice daily). 1 Device 0    Calcium Citrate (CITRACAL PO) Take  by mouth. 2 TABS DAILY      dorzolamide-timolol (COSOPT) 22.3-6.8 MG/ML ophthalmic solution Apply  to eye(s) as directed by provider Every 12 (Twelve) Hours.      folic acid (FOLVITE) 1 MG  tablet Take 1 tablet by mouth Daily.      gabapentin (NEURONTIN) 300 MG capsule TAKE ONE CAPSULE BY MOUTH THREE TIMES A DAY 90 capsule 2    glipizide (GLUCOTROL XL) 5 MG ER tablet 1 tablet every morning with meal 90 tablet 1    Insulin Glargine, 2 Unit Dial, (Toujeo Max SoloStar) 300 UNIT/ML solution pen-injector injection Inject 8 Units under the skin into the appropriate area as directed Daily. 6 mL 0    levothyroxine (SYNTHROID, LEVOTHROID) 25 MCG tablet TAKE 1 TABLET BY MOUTH DAILY 90 tablet 1    Methotrexate Sodium 50 MG/2ML injection Injects 0.7 ml sq once a week      Mirabegron ER (Myrbetriq) 50 MG tablet sustained-release 24 hour 24 hr tablet Take 50 mg by mouth Daily.      Multiple Vitamin (MULTI VITAMIN PO) Take 1 tablet by mouth Daily.      pioglitazone (ACTOS) 15 MG tablet TAKE 1 TABLET BY MOUTH DAILY 90 tablet 1    pravastatin (PRAVACHOL) 20 MG tablet Take 1 tablet by mouth Daily. 90 tablet 4    Probiotic capsule Take 1 capsule by mouth.      Semaglutide, 2 MG/DOSE, (Ozempic, 2 MG/DOSE,) 8 MG/3ML solution pen-injector Inject 2 mg under the skin into the appropriate area as directed 1 (One) Time Per Week. 2 mg weekly subcutaneously  Indications: Type 2 Diabetes (Patient taking differently: Inject 2 mg under the skin into the appropriate area as directed 1 (One) Time Per Week. 2 mg weekly subcutaneously) 9 mL 2    solifenacin (VESICARE) 10 MG tablet Take 1 tablet by mouth Daily.      Tafluprost, PF, (ZIOPTAN) 0.0015 % solution ophthalmic solution 1 drop Every Night.       No facility-administered medications prior to visit.       No opioid medication identified on active medication list. I have reviewed chart for other potential  high risk medication/s and harmful drug interactions in the elderly.        Aspirin is not on active medication list.  Aspirin use is not indicated based on review of current medical condition/s. Risk of harm outweighs potential benefits.  .    Patient Active Problem List  "  Diagnosis    Essential (primary) hypertension    Mixed anxiety depressive disorder    Glaucoma    Hyperlipidemia    Insomnia    Restless legs syndrome    Rheumatoid arthritis    Vitamin D deficiency    Routine health maintenance    Midline low back pain without sciatica    Encounter for immunization    Neck pain on right side    Microalbuminuria    Encounter for screening colonoscopy    Tubular adenoma of colon    Diabetic peripheral neuropathy    Primary hypothyroidism    Inclusion body myositis    Peripheral venous insufficiency    Encounter for gynecological examination    Health care maintenance    Osteopenia of left hip    History of adenomatous polyp of colon    Monoclonal gammopathy of unknown significance (MGUS)    Type 2 diabetes mellitus with microalbuminuria, with long-term current use of insulin     Advance Care Planning   Advance Care Planning     Advance Directive is on file.  ACP discussion was held with the patient during this visit. Patient has an advance directive in EMR which is still valid.      Objective    Vitals:    07/03/24 1245   BP: 134/80   BP Location: Left arm   Patient Position: Sitting   Cuff Size: Small Adult   Pulse: 77   Resp: 19   SpO2: 99%   Weight: 63.5 kg (140 lb)   Height: 170.2 cm (67\")       Estimated body mass index is 21.93 kg/m² as calculated from the following:    Height as of this encounter: 170.2 cm (67\").    Weight as of this encounter: 63.5 kg (140 lb).    BMI is within normal parameters. No other follow-up for BMI required.      Does the patient have evidence of cognitive impairment? No    Lab Results   Component Value Date    CHLPL 161 05/24/2024    TRIG 65 05/24/2024    HDL 61 (H) 05/24/2024    LDL 87 05/24/2024    VLDL 13 05/24/2024    HGBA1C 6.70 (H) 05/24/2024        HEALTH RISK ASSESSMENT    Smoking Status:  Social History     Tobacco Use   Smoking Status Never   Smokeless Tobacco Never     Alcohol Consumption:  Social History     Substance and Sexual " Activity   Alcohol Use Not Currently    Comment: Occasional glass of wine one per month     Fall Risk Screen:    ANTELMO Fall Risk Assessment was completed, and patient is at LOW risk for falls.Assessment completed on:7/3/2024    Depression Screenin/3/2024    12:46 PM   PHQ-2/PHQ-9 Depression Screening   Little Interest or Pleasure in Doing Things 0-->not at all   Feeling Down, Depressed or Hopeless 0-->not at all   PHQ-9: Brief Depression Severity Measure Score 0       Health Habits and Functional and Cognitive Screenin/3/2024    12:46 PM   Functional & Cognitive Status   Do you have difficulty preparing food and eating? No   Do you have difficulty bathing yourself, getting dressed or grooming yourself? Yes   Do you have difficulty using the toilet? No   Do you have difficulty moving around from place to place? No   Do you have trouble with steps or getting out of a bed or a chair? No   Current Diet Well Balanced Diet   Dental Exam Up to date   Eye Exam Up to date   Exercise (times per week) 7 times per week   Current Exercises Include Walking   Do you need help using the phone?  No   Are you deaf or do you have serious difficulty hearing?  Yes   Do you need help to go to places out of walking distance? No   Do you need help shopping? No   Do you need help preparing meals?  No   Do you need help with housework?  No   Do you need help with laundry? No   Do you need help taking your medications? No   Do you need help managing money? No   Do you ever drive or ride in a car without wearing a seat belt? No   Have you felt unusual stress, anger or loneliness in the last month? No   Who do you live with? Alone   If you need help, do you have trouble finding someone available to you? Yes   Have you been bothered in the last four weeks by sexual problems? No   Do you have difficulty concentrating, remembering or making decisions? No       Age-appropriate Screening Schedule:  Refer to the list below for  future screening recommendations based on patient's age, sex and/or medical conditions. Orders for these recommended tests are listed in the plan section. The patient has been provided with a written plan.    Health Maintenance   Topic Date Due    INFLUENZA VACCINE  08/01/2024    DXA SCAN  11/15/2024    DIABETIC EYE EXAM  11/20/2024    HEMOGLOBIN A1C  11/24/2024    LIPID PANEL  05/24/2025    URINE MICROALBUMIN  05/24/2025    DIABETIC FOOT EXAM  06/03/2025    ANNUAL WELLNESS VISIT  07/03/2025    TDAP/TD VACCINES (4 - Tdap) 03/13/2028    COLORECTAL CANCER SCREENING  05/03/2028    COVID-19 Vaccine  Completed    RSV Vaccine - Adults  Completed    Pneumococcal Vaccine 65+  Completed    ZOSTER VACCINE  Addressed    PAP SMEAR  Discontinued                  CMS Preventative Services Quick Reference  Risk Factors Identified During Encounter  Immunizations Discussed/Encouraged: COVID19  Polypharmacy: Medication List reviewed and Medications are appropriate for patient  The above risks/problems have been discussed with the patient.  Pertinent information has been shared with the patient in the After Visit Summary.  An After Visit Summary and PPPS were made available to the patient.    Follow Up:   Next Medicare Wellness visit to be scheduled in 1 year.       Additional E&M Note during same encounter follows:  Patient has multiple medical problems which are significant and separately identifiable that require additional work above and beyond the Medicare Wellness Visit.      Chief Complaint  Medicare Wellness-subsequent    Subjective        HPI  Lissa TRACE Ortiz is also being seen today for 6-month follow-up for chronic medical conditions.    Hypertension - stable today in the office.  Patient taking medication as prescribed.    Patient is taking amlodipine.  Denies side effects of the medication.  Kidney function and liver function all look great from her labs with endocrine.    HLD-  Patient taking pravastatin as prescribed.   Trying to adhere to a balanced diet.  Denies side effects such as myalgias.  She had her lipids checked with endocrinology recently and I reviewed the results with her.  Her cholesterol looks excellent.    I reviewed the note from Dr. Pimentel from 6/3/2024.  He sees her for her diabetes and hypothyroidism.  I reviewed the labs as below.  Recommended to continue the Ozempic at an increased dose, Actos, glipizide, Toujeo.  Levothyroxine is being continued for the hypothyroidism.  She is tolerating the medications well.      Current Outpatient Medications:     amLODIPine (NORVASC) 2.5 MG tablet, TAKE ONE TABLET BY MOUTH DAILY, Disp: 90 tablet, Rfl: 4    azelastine (ASTELIN) 0.1 % nasal spray, 2 sprays into the nostril(s) as directed by provider 2 (Two) Times a Day. Use in each nostril as directed, Disp: 2 each, Rfl: 5    BD Pen Needle Vicenta 2nd Gen 32G X 4 MM misc, USE TO INJECT INSULIN ONCE DAILY, Disp: 100 each, Rfl: 2    Blood Glucose Monitoring Suppl (TRUE METRIX AIR GLUCOSE METER) device, 1 Device Daily As Needed (check bs's twice daily)., Disp: 1 Device, Rfl: 0    Calcium Citrate (CITRACAL PO), Take  by mouth. 2 TABS DAILY, Disp: , Rfl:     dorzolamide-timolol (COSOPT) 22.3-6.8 MG/ML ophthalmic solution, Apply  to eye(s) as directed by provider Every 12 (Twelve) Hours., Disp: , Rfl:     folic acid (FOLVITE) 1 MG tablet, Take 1 tablet by mouth Daily., Disp: , Rfl:     gabapentin (NEURONTIN) 300 MG capsule, TAKE ONE CAPSULE BY MOUTH THREE TIMES A DAY, Disp: 90 capsule, Rfl: 2    glipizide (GLUCOTROL XL) 5 MG ER tablet, 1 tablet every morning with meal, Disp: 90 tablet, Rfl: 1    Insulin Glargine, 2 Unit Dial, (Toujeo Max SoloStar) 300 UNIT/ML solution pen-injector injection, Inject 8 Units under the skin into the appropriate area as directed Daily., Disp: 6 mL, Rfl: 0    levothyroxine (SYNTHROID, LEVOTHROID) 25 MCG tablet, TAKE 1 TABLET BY MOUTH DAILY, Disp: 90 tablet, Rfl: 1    Methotrexate Sodium 50 MG/2ML injection,  "Injects 0.7 ml sq once a week, Disp: , Rfl:     Mirabegron ER (Myrbetriq) 50 MG tablet sustained-release 24 hour 24 hr tablet, Take 50 mg by mouth Daily., Disp: , Rfl:     Multiple Vitamin (MULTI VITAMIN PO), Take 1 tablet by mouth Daily., Disp: , Rfl:     pioglitazone (ACTOS) 15 MG tablet, TAKE 1 TABLET BY MOUTH DAILY, Disp: 90 tablet, Rfl: 1    pravastatin (PRAVACHOL) 20 MG tablet, Take 1 tablet by mouth Daily., Disp: 90 tablet, Rfl: 4    Probiotic capsule, Take 1 capsule by mouth., Disp: , Rfl:     Semaglutide, 2 MG/DOSE, (Ozempic, 2 MG/DOSE,) 8 MG/3ML solution pen-injector, Inject 2 mg under the skin into the appropriate area as directed 1 (One) Time Per Week. 2 mg weekly subcutaneously  Indications: Type 2 Diabetes (Patient taking differently: Inject 2 mg under the skin into the appropriate area as directed 1 (One) Time Per Week. 2 mg weekly subcutaneously), Disp: 9 mL, Rfl: 2    solifenacin (VESICARE) 10 MG tablet, Take 1 tablet by mouth Daily., Disp: , Rfl:     Tafluprost, PF, (ZIOPTAN) 0.0015 % solution ophthalmic solution, 1 drop Every Night., Disp: , Rfl:            Objective   Vital Signs:  /80 (BP Location: Left arm, Patient Position: Sitting, Cuff Size: Small Adult)   Pulse 77   Resp 19   Ht 170.2 cm (67\")   Wt 63.5 kg (140 lb)   SpO2 99%   BMI 21.93 kg/m²     Physical Exam  Vitals and nursing note reviewed.   Constitutional:       General: She is not in acute distress.     Appearance: Normal appearance.   Cardiovascular:      Rate and Rhythm: Normal rate and regular rhythm.      Heart sounds: Normal heart sounds. No murmur heard.  Pulmonary:      Effort: Pulmonary effort is normal.      Breath sounds: Normal breath sounds.   Neurological:      Mental Status: She is alert.          The following data was reviewed by: Adin Bragg MD on 06/09/2023:  Common labs          1/8/2024    08:56 2/13/2024    11:41 5/24/2024    09:00   Common Labs   Glucose 234  192  120    BUN 17  13  16  "   Creatinine 0.79  0.65  0.58    Sodium 140  139  141    Potassium 5.0  4.0  4.8    Chloride 104  105  105    Calcium 9.4  8.7  9.4    Total Protein 6.9  6.4  6.9    Albumin 4.3  3.8     3.4  4.3    Total Bilirubin 0.4  0.5  0.5    Alkaline Phosphatase 108  85  91    AST (SGOT) 26  29  29    ALT (SGPT) 18  12  23    WBC  5.12     Hemoglobin  13.3     Hematocrit  40.4     Platelets  249     Total Cholesterol 169   161    Triglycerides 143   65    HDL Cholesterol 58   61    LDL Cholesterol  86   87    Hemoglobin A1C 8.60   6.70    Microalbumin, Urine 72.0   12.0      Also reviewed note from endocrinology Christine 3, 2024             Assessment and Plan   Diagnoses and all orders for this visit:    1. Medicare annual wellness visit, subsequent (Primary)    2. Essential (primary) hypertension    3. Mixed hyperlipidemia    4. Type 2 diabetes mellitus with microalbuminuria, with long-term current use of insulin    5. Primary hypothyroidism        Clinically stable chronic conditions as above.  Continue all medications as above.  Labs and data reviewed as above.           Follow Up   Return if symptoms worsen or fail to improve.  Patient was given instructions and counseling regarding her condition or for health maintenance advice. Please see specific information pulled into the AVS if appropriate.

## 2024-07-18 RX ORDER — GLIPIZIDE 5 MG/1
TABLET, FILM COATED, EXTENDED RELEASE ORAL
Qty: 90 TABLET | Refills: 1 | Status: SHIPPED | OUTPATIENT
Start: 2024-07-18

## 2024-07-18 NOTE — TELEPHONE ENCOUNTER
Rx Refill Note  Requested Prescriptions     Pending Prescriptions Disp Refills    glipizide (GLUCOTROL XL) 5 MG ER tablet [Pharmacy Med Name: glipiZIDE XL 5 MG TABLET] 90 tablet 1     Sig: TAKE 1 TABLET BY MOUTH EVERY MORNING WITH A MEAL      Last office visit with prescribing clinician: 6/3/2024   Last telemedicine visit with prescribing clinician: Visit date not found   Next office visit with prescribing clinician: 10/3/2024                         Would you like a call back once the refill request has been completed: [] Yes [] No    If the office needs to give you a call back, can they leave a voicemail: [] Yes [] No    Betzaida Gerber MA  07/18/24, 11:33 EDT

## 2024-07-22 DIAGNOSIS — E11.9 TYPE 2 DIABETES MELLITUS WITHOUT COMPLICATION, WITH LONG-TERM CURRENT USE OF INSULIN: Chronic | ICD-10-CM

## 2024-07-22 DIAGNOSIS — Z79.4 TYPE 2 DIABETES MELLITUS WITHOUT COMPLICATION, WITH LONG-TERM CURRENT USE OF INSULIN: Chronic | ICD-10-CM

## 2024-07-22 RX ORDER — PIOGLITAZONEHYDROCHLORIDE 15 MG/1
15 TABLET ORAL DAILY
Qty: 90 TABLET | Refills: 1 | Status: SHIPPED | OUTPATIENT
Start: 2024-07-22

## 2024-07-22 NOTE — TELEPHONE ENCOUNTER
Rx Refill Note  Requested Prescriptions     Pending Prescriptions Disp Refills    pioglitazone (ACTOS) 15 MG tablet 90 tablet 1     Sig: Take 1 tablet by mouth Daily.      Last office visit with prescribing clinician: 6/3/2024   Last telemedicine visit with prescribing clinician: Visit date not found   Next office visit with prescribing clinician: 10/3/2024                         Would you like a call back once the refill request has been completed: [] Yes [] No    If the office needs to give you a call back, can they leave a voicemail: [] Yes [] No    Nan Ware MA  07/22/24, 14:58 EDT

## 2024-07-23 DIAGNOSIS — G25.81 RESTLESS LEG SYNDROME: ICD-10-CM

## 2024-07-23 NOTE — TELEPHONE ENCOUNTER
Patient called asking when this medication will be filled. Let her know we will have  sign and send to pharmacy

## 2024-07-23 NOTE — TELEPHONE ENCOUNTER
Called and spoke with pt to let her know that the rx was sent to the pharmacy yesterday. Pt voiced understanding and had no further questions.

## 2024-07-24 RX ORDER — GABAPENTIN 300 MG/1
300 CAPSULE ORAL 3 TIMES DAILY
Qty: 90 CAPSULE | Refills: 1 | Status: SHIPPED | OUTPATIENT
Start: 2024-07-24

## 2024-08-22 DIAGNOSIS — E11.9 TYPE 2 DIABETES MELLITUS WITHOUT COMPLICATION, WITH LONG-TERM CURRENT USE OF INSULIN: ICD-10-CM

## 2024-08-22 DIAGNOSIS — Z79.4 TYPE 2 DIABETES MELLITUS WITHOUT COMPLICATION, WITH LONG-TERM CURRENT USE OF INSULIN: ICD-10-CM

## 2024-08-22 RX ORDER — SEMAGLUTIDE 2.68 MG/ML
INJECTION, SOLUTION SUBCUTANEOUS
Qty: 3 ML | Refills: 5 | Status: SHIPPED | OUTPATIENT
Start: 2024-08-22

## 2024-08-22 NOTE — TELEPHONE ENCOUNTER
Rx Refill Note  Requested Prescriptions     Pending Prescriptions Disp Refills    Semaglutide, 2 MG/DOSE, (Ozempic, 2 MG/DOSE,) 8 MG/3ML solution pen-injector [Pharmacy Med Name: OZEMPIC 2 MG/DOSE (8 MG/3 ML)] 3 mL 1     Sig: DIAL AND INJECT UNDER THE SKIN 2 MG WEEKLY      Last office visit with prescribing clinician: 6/3/2024   Last telemedicine visit with prescribing clinician: Visit date not found   Next office visit with prescribing clinician: 10/3/2024                         Would you like a call back once the refill request has been completed: [] Yes [] No    If the office needs to give you a call back, can they leave a voicemail: [] Yes [] No    Betzaida Gerber MA  08/22/24, 09:29 EDT

## 2024-09-06 NOTE — PROGRESS NOTES
"CC: Follow-up    HPI:  Lissa Ortiz is a  83 y.o. right-handed female here for follow-up regarding neuropathy.  Last time I saw her we had made recommendation to possibly increase her gabapentin to include a daytime dose and made referral to physical therapy.  She says she \"quit physical therapy because it was not really helping.  According to the notes she made good excellent' and was discharged with no further recs.  Her prior medical history is reviewed and admendements made as necessary:     She has rheumatoid arthritis on long-term DMARD therapy with methotrexate, type 2 diabetes, hypertension, hyperlipidemia, hypothyroidism, cataracts.  [facility] and she carries the diagnosis of inclusion body myositis.  Mostly her complaints are of muscle weakness and not being able to play tennis anymore.  She does have discomforting paresthesias just of feet and toes.  Gabapentin helps this.  She also is describing sensation to move legs that occurs both at night and during the day.  Gabapentin resolves this    Work-up to date has included an EMG which was performed on 10/4/2021 and revealed mild evidence of peripheral neuropathy.  Some irritability is seen in the left tibialis anterior without any other features of a lumbosacral radiculopathy.  Specifically there was no evidence of a myopathy on this study.  Laboratory studies for treatable causes of neuropathy included the following:     RPR nonreactive  Cryoglobulins negative  Copper normal at 140  Heavy metals including lead arsenic and mercury all within normal limits  Sed rate normal at 5  Vitamin B12 and folate normal at greater than 2000 and greater than 20 respectively  CK level normal at 82  Protein electrophoresis did reveal an M spike, immunofixation showed presence of IgA kappa M-protein, 0.2 gm/dl     Since last seen she has not been able to get her methotrexate injectable in about 3 months.  She has not noticed a difference " "in her symptoms.  She feels as though a strong wind will knock her over\".  No falls.  She was helping her son who had had a stroke this spring.  For several weeks she had burning of bilateral thighs.  She was describing this at her rheumatology appointment last.  Symptoms are resolved      Social history:    Family history:      Pain Scale:        ROS:  Review of Systems   Constitutional:  Positive for fatigue.   Musculoskeletal:  Positive for gait problem (balance problems, without falls since LOV).   Neurological:  Negative for numbness (feet and toes-same).       Reviewed ROS conducted by MA and gisele        Physical Exam:  Vitals:    09/10/24 1012   BP: 110/68   Pulse: 68   SpO2: 99%   Weight: 63.5 kg (140 lb)   Height: 170.2 cm (67\")      Orthostatic BP:    Body mass index is 21.93 kg/m².        General: pt well appearing, no distress  HEENT: Normocephalic, conjunctiva normal, external canals normal, no nasal discharge, moist mucous membranes  Neck: No lymphadenopathy, thyroid not enlarged, no JVD  CV: Regular rate and rhythm, no murmurs negative no bruits auscultated at neck, equal pulses  Pulmonary: Normal respiratory effort, clear to auscultation bilaterally  Extremities: no edema, bruising, or skin lesions  Pysch: good eye contact, cooperative, full affect, euthymic mood, good attention, good insight  Mental: alert, conversant, AOx3, provides history, 3/3 recall.  Language fluent, names, repeats.  CN: CN II-XII intact, PERRL, EOMi, no gaze palsy or nystagmus, intact facial sensation, no facial assymetry, intact hearing, symmetric palate elevation, tongue midline, good SCM strength  Motor: no abnormal movements, no pronator drift, normal  bulk and tone, 4+/5 strength b/l right iliopsoas and tibialis, otherwise 5 out of 5 UE & LE  Sensory: loss to crude touch, temp, and no vibratory sense of b/l LE  Reflexes: 2+ UE, trace at patellas  Coordination: no ataxia on finger-nose  Gait: normal gait, no ataxia, " slight sway with romberg        Results:      Lab Results   Component Value Date    GLUCOSE 139 (H) 09/05/2023    BUN 15 09/05/2023    CREATININE 0.59 09/05/2023    EGFRIFNONA 91 02/17/2022    EGFRIFAFRI 95 01/10/2022    BCR 25 09/05/2023    CO2 24 09/05/2023    CALCIUM 8.7 09/05/2023    PROTENTOTREF 6.6 09/05/2023    ALBUMIN 4.0 09/05/2023    LABIL2 1.5 09/05/2023    AST 28 09/05/2023    ALT 23 09/05/2023       Lab Results   Component Value Date    WBC 5.55 01/12/2023    HGB 13.4 01/12/2023    HCT 40.4 01/12/2023    MCV 96.0 01/12/2023     01/12/2023         .  Lab Results   Component Value Date    RPR Non-Reactive 10/04/2021         Lab Results   Component Value Date    TSH 2.510 09/05/2023    N2JFEWH 7.4 11/05/2020         Lab Results   Component Value Date    CQABVHOY97 >2,000 (H) 10/04/2021         Lab Results   Component Value Date    FOLATE >20.00 10/04/2021         Lab Results   Component Value Date    HGBA1C 7.3 (H) 09/05/2023         Lab Results   Component Value Date    GLUCOSE 139 (H) 09/05/2023    BUN 15 09/05/2023    CREATININE 0.59 09/05/2023    EGFRIFNONA 91 02/17/2022    EGFRIFAFRI 95 01/10/2022    BCR 25 09/05/2023    K 4.1 09/05/2023    CO2 24 09/05/2023    CALCIUM 8.7 09/05/2023    PROTENTOTREF 6.6 09/05/2023    ALBUMIN 4.0 09/05/2023    LABIL2 1.5 09/05/2023    AST 28 09/05/2023    ALT 23 09/05/2023         Diagnosis:  1.  Polyneuropathy  2   MGUS  3   RA  4   Inclusion body myositis     Impression: 82-year-old right-handed female with past medical history as above and also hypertension, hyperlipidemia, hypothyroidism, diabetes, rheumatoid arthritis on methotrexate, mixed anxiety and depression, and inclusion body myositis. She is here for follow-up.  Her neuropathy is slowly progressive.   She is describing sxs c/w PLMD but tolerable.      She did well with PT.  Her issues seemed most to do with  balance more  than myositis.  She was actually improved of strength at last visit.  She  describes painful burning of b/l LE a few months back.  She is no longer able to get her methotrexate but no change and hasn't been very noticeable to her.  There is subtle weakness of R IP and TA.  Reviewed her old MRI L spine.  Will repeat this and possibly EMG given her sensory sxs seem out of proportion to mild PN     Plan:  MRI  L spine  EMG possibly  Gabpentin refilled    F/u in 4-6 mos    I spent at least 60 minutes interviewing, examining, and counseling patient.  I independently reviewed documentation, laboratory and diagnostic findings, external documentation where applicable, and formulated treatment plan which was discussed with the patient.

## 2024-09-10 ENCOUNTER — OFFICE VISIT (OUTPATIENT)
Dept: NEUROLOGY | Facility: CLINIC | Age: 84
End: 2024-09-10
Payer: MEDICARE

## 2024-09-10 VITALS
HEIGHT: 67 IN | OXYGEN SATURATION: 99 % | WEIGHT: 140 LBS | DIASTOLIC BLOOD PRESSURE: 68 MMHG | SYSTOLIC BLOOD PRESSURE: 110 MMHG | HEART RATE: 68 BPM | BODY MASS INDEX: 21.97 KG/M2

## 2024-09-10 DIAGNOSIS — G62.9 POLYNEUROPATHY: Primary | ICD-10-CM

## 2024-09-10 DIAGNOSIS — D47.2 MGUS (MONOCLONAL GAMMOPATHY OF UNKNOWN SIGNIFICANCE): ICD-10-CM

## 2024-09-10 DIAGNOSIS — G62.9 PERIPHERAL POLYNEUROPATHY: Primary | ICD-10-CM

## 2024-09-10 DIAGNOSIS — G72.41 INCLUSION BODY MYOSITIS: ICD-10-CM

## 2024-09-10 DIAGNOSIS — G25.81 RESTLESS LEG SYNDROME: ICD-10-CM

## 2024-09-10 DIAGNOSIS — M54.16 LUMBAR RADICULOPATHY: ICD-10-CM

## 2024-09-10 RX ORDER — GABAPENTIN 300 MG/1
300 CAPSULE ORAL 3 TIMES DAILY
Qty: 90 CAPSULE | Refills: 1 | Status: SHIPPED | OUTPATIENT
Start: 2024-09-10

## 2024-09-10 NOTE — LETTER
"September 10, 2024       No Recipients    Patient: Lissa Ortiz   YOB: 1940   Date of Visit: 9/10/2024     Dear Adin Bragg MD:       Thank you for referring Lissa Ortiz to me for evaluation. Below are the relevant portions of my assessment and plan of care.    If you have questions, please do not hesitate to call me. I look forward to following Lissa along with you.         Sincerely,        RONALD Tolbert        CC:   No Recipients    Jennifer Malik PA  09/10/24 1257  Sign when Signing Visit  CC: Follow-up    HPI:  Lissa Ortiz is a  83 y.o. right-handed female here for follow-up regarding neuropathy.  Last time I saw her we had made recommendation to possibly increase her gabapentin to include a daytime dose and made referral to physical therapy.  She says she \"quit physical therapy because it was not really helping.  According to the notes she made good excellent' and was discharged with no further recs.  Her prior medical history is reviewed and admendements made as necessary:     She has rheumatoid arthritis on long-term DMARD therapy with methotrexate, type 2 diabetes, hypertension, hyperlipidemia, hypothyroidism, cataracts.  [facility] and she carries the diagnosis of inclusion body myositis.  Mostly her complaints are of muscle weakness and not being able to play tennis anymore.  She does have discomforting paresthesias just of feet and toes.  Gabapentin helps this.  She also is describing sensation to move legs that occurs both at night and during the day.  Gabapentin resolves this    Work-up to date has included an EMG which was performed on 10/4/2021 and revealed mild evidence of peripheral neuropathy.  Some irritability is seen in the left tibialis anterior without any other features of a lumbosacral radiculopathy.  Specifically there was no evidence of a myopathy on this study.  Laboratory studies for treatable causes of neuropathy included the " "following:     RPR nonreactive  Cryoglobulins negative  Copper normal at 140  Heavy metals including lead arsenic and mercury all within normal limits  Sed rate normal at 5  Vitamin B12 and folate normal at greater than 2000 and greater than 20 respectively  CK level normal at 82  Protein electrophoresis did reveal an M spike, immunofixation showed presence of IgA kappa M-protein, 0.2 gm/dl     Since last seen she has not been able to get her methotrexate injectable in about 3 months.  She has not noticed a difference in her symptoms.  She feels as though a strong wind will knock her over\".  No falls.  She was helping her son who had had a stroke this spring.  For several weeks she had burning of bilateral thighs.  She was describing this at her rheumatology appointment last.  Symptoms are resolved      Social history:    Family history:      Pain Scale:        ROS:  Review of Systems   Constitutional:  Positive for fatigue.   Musculoskeletal:  Positive for gait problem (balance problems, without falls since LOV).   Neurological:  Negative for numbness (feet and toes-same).       Reviewed ROS conducted by MA and gisele        Physical Exam:  Vitals:    09/10/24 1012   BP: 110/68   Pulse: 68   SpO2: 99%   Weight: 63.5 kg (140 lb)   Height: 170.2 cm (67\")      Orthostatic BP:    Body mass index is 21.93 kg/m².        General: pt well appearing, no distress  HEENT: Normocephalic, conjunctiva normal, external canals normal, no nasal discharge, moist mucous membranes  Neck: No lymphadenopathy, thyroid not enlarged, no JVD  CV: Regular rate and rhythm, no murmurs negative no bruits auscultated at neck, equal pulses  Pulmonary: Normal respiratory effort, clear to auscultation bilaterally  Extremities: no edema, bruising, or skin lesions  Pysch: good eye contact, cooperative, full affect, euthymic mood, good attention, good insight  Mental: alert, conversant, AOx3, provides history, 3/3 recall.  Language fluent, names, " repeats.  CN: CN II-XII intact, PERRL, EOMi, no gaze palsy or nystagmus, intact facial sensation, no facial assymetry, intact hearing, symmetric palate elevation, tongue midline, good SCM strength  Motor: no abnormal movements, no pronator drift, normal  bulk and tone, 4+/5 strength b/l right iliopsoas and tibialis, otherwise 5 out of 5 UE & LE  Sensory: loss to crude touch, temp, and no vibratory sense of b/l LE  Reflexes: 2+ UE, trace at patellas  Coordination: no ataxia on finger-nose  Gait: normal gait, no ataxia, slight sway with romberg        Results:      Lab Results   Component Value Date    GLUCOSE 139 (H) 09/05/2023    BUN 15 09/05/2023    CREATININE 0.59 09/05/2023    EGFRIFNONA 91 02/17/2022    EGFRIFAFRI 95 01/10/2022    BCR 25 09/05/2023    CO2 24 09/05/2023    CALCIUM 8.7 09/05/2023    PROTENTOTREF 6.6 09/05/2023    ALBUMIN 4.0 09/05/2023    LABIL2 1.5 09/05/2023    AST 28 09/05/2023    ALT 23 09/05/2023       Lab Results   Component Value Date    WBC 5.55 01/12/2023    HGB 13.4 01/12/2023    HCT 40.4 01/12/2023    MCV 96.0 01/12/2023     01/12/2023         .  Lab Results   Component Value Date    RPR Non-Reactive 10/04/2021         Lab Results   Component Value Date    TSH 2.510 09/05/2023    D9GCCNP 7.4 11/05/2020         Lab Results   Component Value Date    BDFPMFZU03 >2,000 (H) 10/04/2021         Lab Results   Component Value Date    FOLATE >20.00 10/04/2021         Lab Results   Component Value Date    HGBA1C 7.3 (H) 09/05/2023         Lab Results   Component Value Date    GLUCOSE 139 (H) 09/05/2023    BUN 15 09/05/2023    CREATININE 0.59 09/05/2023    EGFRIFNONA 91 02/17/2022    EGFRIFAFRI 95 01/10/2022    BCR 25 09/05/2023    K 4.1 09/05/2023    CO2 24 09/05/2023    CALCIUM 8.7 09/05/2023    PROTENTOTREF 6.6 09/05/2023    ALBUMIN 4.0 09/05/2023    LABIL2 1.5 09/05/2023    AST 28 09/05/2023    ALT 23 09/05/2023         Diagnosis:  1.  Polyneuropathy  2   MGUS  3   RA  4   Inclusion body  myositis     Impression: 82-year-old right-handed female with past medical history as above and also hypertension, hyperlipidemia, hypothyroidism, diabetes, rheumatoid arthritis on methotrexate, mixed anxiety and depression, and inclusion body myositis. She is here for follow-up.  Her neuropathy is slowly progressive.   She is describing sxs c/w PLMD but tolerable.      She did well with PT.  Her issues seemed most to do with  balance more  than myositis.  She was actually improved of strength at last visit.  She describes painful burning of b/l LE a few months back.  She is no longer able to get her methotrexate but no change and hasn't been very noticeable to her.  There is subtle weakness of R IP and TA.  Reviewed her old MRI L spine.  Will repeat this and possibly EMG given her sensory sxs seem out of proportion to mild PN     Plan:  MRI  L spine  EMG possibly  Gabpentin refilled    F/u in 4-6 mos    I spent at least 60 minutes interviewing, examining, and counseling patient.  I independently reviewed documentation, laboratory and diagnostic findings, external documentation where applicable, and formulated treatment plan which was discussed with the patient.

## 2024-09-17 ENCOUNTER — TELEPHONE (OUTPATIENT)
Dept: ENDOCRINOLOGY | Age: 84
End: 2024-09-17

## 2024-09-17 DIAGNOSIS — R80.9 TYPE 2 DIABETES MELLITUS WITH MICROALBUMINURIA, WITH LONG-TERM CURRENT USE OF INSULIN: Primary | ICD-10-CM

## 2024-09-17 DIAGNOSIS — E78.5 HYPERLIPIDEMIA, UNSPECIFIED HYPERLIPIDEMIA TYPE: ICD-10-CM

## 2024-09-17 DIAGNOSIS — E03.9 PRIMARY HYPOTHYROIDISM: ICD-10-CM

## 2024-09-17 DIAGNOSIS — E11.29 TYPE 2 DIABETES MELLITUS WITH MICROALBUMINURIA, WITH LONG-TERM CURRENT USE OF INSULIN: Primary | ICD-10-CM

## 2024-09-17 DIAGNOSIS — Z79.4 TYPE 2 DIABETES MELLITUS WITH MICROALBUMINURIA, WITH LONG-TERM CURRENT USE OF INSULIN: Primary | ICD-10-CM

## 2024-09-17 DIAGNOSIS — M85.852 OSTEOPENIA OF LEFT HIP: ICD-10-CM

## 2024-09-26 ENCOUNTER — LAB (OUTPATIENT)
Dept: ENDOCRINOLOGY | Age: 84
End: 2024-09-26
Payer: MEDICARE

## 2024-09-26 DIAGNOSIS — M85.852 OSTEOPENIA OF LEFT HIP: ICD-10-CM

## 2024-09-26 DIAGNOSIS — Z79.4 TYPE 2 DIABETES MELLITUS WITH MICROALBUMINURIA, WITH LONG-TERM CURRENT USE OF INSULIN: ICD-10-CM

## 2024-09-26 DIAGNOSIS — E11.29 TYPE 2 DIABETES MELLITUS WITH MICROALBUMINURIA, WITH LONG-TERM CURRENT USE OF INSULIN: ICD-10-CM

## 2024-09-26 DIAGNOSIS — E78.5 HYPERLIPIDEMIA, UNSPECIFIED HYPERLIPIDEMIA TYPE: ICD-10-CM

## 2024-09-26 DIAGNOSIS — E03.9 PRIMARY HYPOTHYROIDISM: ICD-10-CM

## 2024-09-26 DIAGNOSIS — R80.9 TYPE 2 DIABETES MELLITUS WITH MICROALBUMINURIA, WITH LONG-TERM CURRENT USE OF INSULIN: ICD-10-CM

## 2024-09-26 LAB
25(OH)D3+25(OH)D2 SERPL-MCNC: 56 NG/ML (ref 30–100)
ALBUMIN SERPL-MCNC: 4.2 G/DL (ref 3.5–5.2)
ALBUMIN/GLOB SERPL: 1.7 G/DL
ALP SERPL-CCNC: 100 U/L (ref 39–117)
ALT SERPL-CCNC: 17 U/L (ref 1–33)
AST SERPL-CCNC: 26 U/L (ref 1–32)
BILIRUB SERPL-MCNC: 0.6 MG/DL (ref 0–1.2)
BUN SERPL-MCNC: 13 MG/DL (ref 8–23)
BUN/CREAT SERPL: 19.1 (ref 7–25)
CALCIUM SERPL-MCNC: 8.8 MG/DL (ref 8.6–10.5)
CHLORIDE SERPL-SCNC: 104 MMOL/L (ref 98–107)
CHOLEST SERPL-MCNC: 148 MG/DL (ref 0–200)
CO2 SERPL-SCNC: 26.7 MMOL/L (ref 22–29)
CREAT SERPL-MCNC: 0.68 MG/DL (ref 0.57–1)
EGFRCR SERPLBLD CKD-EPI 2021: 87.1 ML/MIN/1.73
GLOBULIN SER CALC-MCNC: 2.5 GM/DL
GLUCOSE SERPL-MCNC: 106 MG/DL (ref 65–99)
HBA1C MFR BLD: 6.7 % (ref 4.8–5.6)
HDLC SERPL-MCNC: 53 MG/DL (ref 40–60)
IMP & REVIEW OF LAB RESULTS: NORMAL
LDLC SERPL CALC-MCNC: 81 MG/DL (ref 0–100)
POTASSIUM SERPL-SCNC: 4.5 MMOL/L (ref 3.5–5.2)
PROT SERPL-MCNC: 6.7 G/DL (ref 6–8.5)
SODIUM SERPL-SCNC: 140 MMOL/L (ref 136–145)
TRIGL SERPL-MCNC: 72 MG/DL (ref 0–150)
TSH SERPL DL<=0.005 MIU/L-ACNC: 2.79 UIU/ML (ref 0.27–4.2)
VLDLC SERPL CALC-MCNC: 14 MG/DL (ref 5–40)

## 2024-10-03 ENCOUNTER — TELEPHONE (OUTPATIENT)
Dept: INTERNAL MEDICINE | Facility: CLINIC | Age: 84
End: 2024-10-03
Payer: MEDICARE

## 2024-10-03 ENCOUNTER — OFFICE VISIT (OUTPATIENT)
Dept: ENDOCRINOLOGY | Age: 84
End: 2024-10-03
Payer: MEDICARE

## 2024-10-03 VITALS
HEART RATE: 73 BPM | WEIGHT: 138.2 LBS | SYSTOLIC BLOOD PRESSURE: 112 MMHG | DIASTOLIC BLOOD PRESSURE: 66 MMHG | OXYGEN SATURATION: 99 % | HEIGHT: 67 IN | BODY MASS INDEX: 21.69 KG/M2 | TEMPERATURE: 95.9 F

## 2024-10-03 DIAGNOSIS — R80.9 TYPE 2 DIABETES MELLITUS WITH MICROALBUMINURIA, WITH LONG-TERM CURRENT USE OF INSULIN: Primary | Chronic | ICD-10-CM

## 2024-10-03 DIAGNOSIS — Z87.39 HISTORY OF RHEUMATOID ARTHRITIS: ICD-10-CM

## 2024-10-03 DIAGNOSIS — D47.2 MONOCLONAL GAMMOPATHY OF UNKNOWN SIGNIFICANCE (MGUS): Chronic | ICD-10-CM

## 2024-10-03 DIAGNOSIS — Z79.4 TYPE 2 DIABETES MELLITUS WITH MICROALBUMINURIA, WITH LONG-TERM CURRENT USE OF INSULIN: Primary | Chronic | ICD-10-CM

## 2024-10-03 DIAGNOSIS — E11.29 TYPE 2 DIABETES MELLITUS WITH MICROALBUMINURIA, WITH LONG-TERM CURRENT USE OF INSULIN: Primary | Chronic | ICD-10-CM

## 2024-10-03 DIAGNOSIS — E03.9 PRIMARY HYPOTHYROIDISM: Chronic | ICD-10-CM

## 2024-10-03 DIAGNOSIS — I10 ESSENTIAL (PRIMARY) HYPERTENSION: Chronic | ICD-10-CM

## 2024-10-03 DIAGNOSIS — M85.852 OSTEOPENIA OF LEFT HIP: Primary | Chronic | ICD-10-CM

## 2024-10-03 DIAGNOSIS — M85.852 OSTEOPENIA OF LEFT HIP: Chronic | ICD-10-CM

## 2024-10-03 DIAGNOSIS — E78.2 MIXED HYPERLIPIDEMIA: Chronic | ICD-10-CM

## 2024-10-03 PROCEDURE — 3074F SYST BP LT 130 MM HG: CPT | Performed by: INTERNAL MEDICINE

## 2024-10-03 PROCEDURE — 99214 OFFICE O/P EST MOD 30 MIN: CPT | Performed by: INTERNAL MEDICINE

## 2024-10-03 PROCEDURE — 3078F DIAST BP <80 MM HG: CPT | Performed by: INTERNAL MEDICINE

## 2024-10-03 RX ORDER — SEMAGLUTIDE 1.34 MG/ML
1 INJECTION, SOLUTION SUBCUTANEOUS WEEKLY
Qty: 3 ML | Refills: 5 | Status: SHIPPED | OUTPATIENT
Start: 2024-10-03

## 2024-10-03 NOTE — PROGRESS NOTES
Subjective   Lissa Ortiz is a 83 y.o. female.     History of Present Illness     She is on Ozempic 2 mg weekly, Actos 15 mg  per day, glipizide XL 5 mg every morning and Toujeo 8 units every morning.  She does not take Toujeo 2-3 times a week because of low fasting glucose.  She has been having more diarrhea on Ozempic 2 mg.  She did not start Farxiga because she has UTI once a month.  Janumet was discontinued because of diarrhea.  Jardiance was too expensive.  She checks her blood sugar once a day.  FBS .  She has no significant weight change since June 2024.  Hemoglobin A1c done in September 2024 is 6.7%     She denies eye complaints.  Her last eye examination was in 9/24.  She has glaucoma but no retinopathy.  She has restless leg at night and her feet feels tight and numb  which is improved by gabapentin 300 mg 2 capsules at bedtime prescribed by neurologist RONALD Malik.  Urine microalbumin is elevated in January 2022.  Urine microalbumin is normal in January 2024.     She has hyperlipidemia and is on pravastatin 20 mg/day.  She has not used Crestor or Lipitor before.  Lipid panel done in September 2024 as follows: Cholesterol 148.  HDL 53.  LDL 81.  Triglycerides 72.     She has hypothyroidism and is on levothyroxine 25 mcg/day.  She has no history of head or neck radiation therapy.  She has no history of thyroid surgery.  TSH done in September 2024 is normal at 2.79.     She has hypertension and is on amlodipine 2.5 mg once a day.  She denies history of heart attack or stroke.  She denies chest pain or shortness of breath.     She has rheumatoid arthritis and is on methotrexate and folic acid prescribed by Dr. Chow.  She is off methotrexate for 3 months without increase in arthritic pain.  She is not on oral steroids.     She has osteopenia on bone density done in November 2022.  Her 10-year risk of major osteoporotic fracture is elevated at 28% and for hip fracture is 18%.  She is on a  "multivitamin 1 tablet/day and gummy calcium 2 tablets/day.  She walks 1/2 miles every other day.  She was started on alendronate 70 mg weekly by Dr. Chow in September 2023.  She went off alendronate in March 2024 because of diarrhea.     She has monoclonal gammopathy of unknown significance and is being followed by Dr. ROMEL Thompson.    The following portions of the patient's history were reviewed and updated as appropriate: allergies, current medications, past family history, past medical history, past social history, past surgical history, and problem list.    Review of Systems   Respiratory:  Negative for shortness of breath.    Cardiovascular:  Negative for chest pain and palpitations.   Gastrointestinal:  Positive for diarrhea. Negative for anal bleeding, blood in stool and constipation.   Genitourinary: Negative.    Musculoskeletal:  Negative for myalgias.   Neurological:  Positive for numbness.     Vitals:    10/03/24 1222   BP: 112/66   Pulse: 73   Temp: 95.9 °F (35.5 °C)   TempSrc: Temporal   SpO2: 99%   Weight: 62.7 kg (138 lb 3.2 oz)   Height: 170.2 cm (67.01\")      Objective   Physical Exam  Constitutional:       General: She is not in acute distress.     Appearance: Normal appearance. She is not ill-appearing, toxic-appearing or diaphoretic.   HENT:      Mouth/Throat:      Pharynx: No oropharyngeal exudate or posterior oropharyngeal erythema.   Eyes:      General: No scleral icterus.        Right eye: No discharge.         Left eye: No discharge.      Extraocular Movements: Extraocular movements intact.      Conjunctiva/sclera: Conjunctivae normal.   Neck:      Vascular: No carotid bruit.   Cardiovascular:      Rate and Rhythm: Normal rate and regular rhythm.      Heart sounds: Normal heart sounds. No murmur heard.     No gallop.   Pulmonary:      Breath sounds: Normal breath sounds. No rales.   Chest:      Chest wall: No tenderness.   Abdominal:      General: Bowel sounds are normal.      Palpations: " Abdomen is soft.      Tenderness: There is no right CVA tenderness or left CVA tenderness.   Musculoskeletal:      Right lower leg: No edema.      Left lower leg: No edema.   Lymphadenopathy:      Cervical: No cervical adenopathy.   Neurological:      Mental Status: She is alert and oriented to person, place, and time.       Lab on 09/26/2024   Component Date Value Ref Range Status    25 Hydroxy, Vitamin D 09/26/2024 56.0  30.0 - 100.0 ng/ml Final    Comment: Reference Range for Total Vitamin D 25(OH)  Deficiency <20.0 ng/mL  Insufficiency 21-29 ng/mL  Sufficiency  ng/mL  Toxicity >100 ng/ml      TSH 09/26/2024 2.790  0.270 - 4.200 uIU/mL Final    Total Cholesterol 09/26/2024 148  0 - 200 mg/dL Final    Comment: Cholesterol Reference Ranges  (U.S. Department of Health and Human Services ATP III  Classifications)  Desirable          <200 mg/dL  Borderline High    200-239 mg/dL  High Risk          >240 mg/dL  Triglyceride Reference Ranges  (U.S. Department of Health and Human Services ATP III  Classifications)  Normal           <150 mg/dL  Borderline High  150-199 mg/dL  High             200-499 mg/dL  Very High        >500 mg/dL  HDL Reference Ranges  (U.S. Department of Health and Human Services ATP III  Classifications)  Low     <40 mg/dl (major risk factor for CHD)  High    >60 mg/dl ('negative' risk factor for CHD)  LDL Reference Ranges  (U.S. Department of Health and Human Services ATP III  Classifications)  Optimal          <100 mg/dL  Near Optimal     100-129 mg/dL  Borderline High  130-159 mg/dL  High             160-189 mg/dL  Very High        >189 mg/dL      Triglycerides 09/26/2024 72  0 - 150 mg/dL Final    HDL Cholesterol 09/26/2024 53  40 - 60 mg/dL Final    VLDL Cholesterol Akbar 09/26/2024 14  5 - 40 mg/dL Final    LDL Chol Calc (NIH) 09/26/2024 81  0 - 100 mg/dL Final    Hemoglobin A1C 09/26/2024 6.70 (H)  4.80 - 5.60 % Final    Comment: Hemoglobin A1C Ranges:  Increased Risk for Diabetes  5.7%  to 6.4%  Diabetes                     >= 6.5%  Diabetic Goal                < 7.0%      Glucose 09/26/2024 106 (H)  65 - 99 mg/dL Final    BUN 09/26/2024 13  8 - 23 mg/dL Final    Creatinine 09/26/2024 0.68  0.57 - 1.00 mg/dL Final    EGFR Result 09/26/2024 87.1  >60.0 mL/min/1.73 Final    Comment: GFR Normal >60  Chronic Kidney Disease <60  Kidney Failure <15  The GFR formula is only valid for adults with stable renal  function between ages 18 and 70.      BUN/Creatinine Ratio 09/26/2024 19.1  7.0 - 25.0 Final    Sodium 09/26/2024 140  136 - 145 mmol/L Final    Potassium 09/26/2024 4.5  3.5 - 5.2 mmol/L Final    Chloride 09/26/2024 104  98 - 107 mmol/L Final    Total CO2 09/26/2024 26.7  22.0 - 29.0 mmol/L Final    Calcium 09/26/2024 8.8  8.6 - 10.5 mg/dL Final    Total Protein 09/26/2024 6.7  6.0 - 8.5 g/dL Final    Albumin 09/26/2024 4.2  3.5 - 5.2 g/dL Final    Globulin 09/26/2024 2.5  gm/dL Final    A/G Ratio 09/26/2024 1.7  g/dL Final    Total Bilirubin 09/26/2024 0.6  0.0 - 1.2 mg/dL Final    Alkaline Phosphatase 09/26/2024 100  39 - 117 U/L Final    AST (SGOT) 09/26/2024 26  1 - 32 U/L Final    ALT (SGPT) 09/26/2024 17  1 - 33 U/L Final    Interpretation 09/26/2024 Note   Final    Supplemental report is available.     Assessment & Plan   Diagnoses and all orders for this visit:    1. Type 2 diabetes mellitus with microalbuminuria, with long-term current use of insulin (Primary)  -     Comprehensive Metabolic Panel; Future  -     Lipid Panel; Future  -     Hemoglobin A1c; Future  -     TSH Rfx On Abnormal To Free T4; Future    2. Mixed hyperlipidemia  -     Comprehensive Metabolic Panel; Future  -     Lipid Panel; Future    3. Primary hypothyroidism  -     TSH Rfx On Abnormal To Free T4; Future    4. Essential (primary) hypertension    5. History of rheumatoid arthritis    6. Osteopenia of left hip  -     Vitamin D,25-Hydroxy; Future    7. Monoclonal gammopathy of unknown significance (MGUS)    Other  orders  -     Semaglutide, 1 MG/DOSE, (Ozempic, 1 MG/DOSE,) 4 MG/3ML solution pen-injector; Inject 1 mg under the skin into the appropriate area as directed 1 (One) Time Per Week.  Dispense: 3 mL; Refill: 5      Decrease Ozempic to 1 mg weekly.  Continue glipizide XL 5 mg every morning, Actos 15 mg/day.  Hold Toujeo while she is on Ozempic 2 mg.    Continue pravastatin 20 mg/day.    Continue levothyroxine 25 mcg/day.    Continue multivitamins 1 tablet/day and gummy calcium 2 tablets/day.  Suggest follow-up bone density at [facility].  Will defer treatment of osteopenia to Dr. Chow.    Follow-up with Dr. ROMEL Thompson as scheduled.    RTC 4 mos.    Copy of my note sent to Dr. Chow and Dr. Thompson

## 2024-10-03 NOTE — TELEPHONE ENCOUNTER
Spoke with Pt regarding Dexa Scan Order.  She is going to have it done at [facility] on [facility].

## 2024-10-29 RX ORDER — LEVOTHYROXINE SODIUM 25 UG/1
25 TABLET ORAL DAILY
Qty: 90 TABLET | Refills: 0 | Status: SHIPPED | OUTPATIENT
Start: 2024-10-29

## 2024-10-29 NOTE — TELEPHONE ENCOUNTER
Rx Refill Note  Requested Prescriptions     Pending Prescriptions Disp Refills    levothyroxine (SYNTHROID, LEVOTHROID) 25 MCG tablet 90 tablet 1     Sig: Take 1 tablet by mouth Daily.      Last office visit with prescribing clinician: Visit date not found   Last telemedicine visit with prescribing clinician: Visit date not found   Next office visit with prescribing clinician: 1/3/2025                         Would you like a call back once the refill request has been completed: [] Yes [] No    If the office needs to give you a call back, can they leave a voicemail: [] Yes [] No    All Apodaca MA  10/29/24, 11:07 EDT

## 2024-10-29 NOTE — TELEPHONE ENCOUNTER
"    Caller: Lissa Ortiz \"Godwin\"    Relationship: Self    Best call back number: [phone]    Requested Prescriptions:   Requested Prescriptions     Pending Prescriptions Disp Refills    levothyroxine (SYNTHROID, LEVOTHROID) 25 MCG tablet 90 tablet 1     Sig: Take 1 tablet by mouth Daily.        Pharmacy where request should be sent: [facility] [id number] [facility] [id number] [facility] & [facility] [phone] [facility] [phone]      Last office visit with prescribing clinician: Visit date not found   Last telemedicine visit with prescribing clinician: Visit date not found   Next office visit with prescribing clinician: 1/3/2025     Additional details provided by patient: PATIENT IS OUT OF THIS MEDICATION.     Does the patient have less than a 3 day supply:  [x] Yes  [] No    Would you like a call back once the refill request has been completed: [] Yes [x] No    If the office needs to give you a call back, can they leave a voicemail: [] Yes [x] No    Екатерина Crane Rep   10/29/24 08:46 EDT       "

## 2024-11-08 ENCOUNTER — HOSPITAL ENCOUNTER (OUTPATIENT)
Facility: HOSPITAL | Age: 84
Discharge: HOME | End: 2024-11-08
Payer: MEDICARE

## 2024-11-08 DIAGNOSIS — G62.9 POLYNEUROPATHY: ICD-10-CM

## 2024-11-08 DIAGNOSIS — M54.16 LUMBAR RADICULOPATHY: ICD-10-CM

## 2024-11-08 PROCEDURE — 0 GADOBENATE DIMEGLUMINE 529 MG/ML SOLUTION: Performed by: PHYSICIAN ASSISTANT

## 2024-11-08 PROCEDURE — 72158 MRI LUMBAR SPINE W/O & W/DYE: CPT

## 2024-11-08 PROCEDURE — A9577 INJ MULTIHANCE: HCPCS | Performed by: PHYSICIAN ASSISTANT

## 2024-11-08 RX ADMIN — GADOBENATE DIMEGLUMINE 13 ML: 529 INJECTION, SOLUTION INTRAVENOUS at 16:26

## 2024-11-14 ENCOUNTER — TELEPHONE (OUTPATIENT)
Dept: NEUROLOGY | Facility: CLINIC | Age: 84
End: 2024-11-14
Payer: MEDICARE

## 2024-11-14 NOTE — TELEPHONE ENCOUNTER
Patient returned call.  Discussed imaging results.  She voiced understanding and would like to know what the next step is.     Thank you!

## 2024-11-14 NOTE — TELEPHONE ENCOUNTER
----- Message from Jennifer Malik sent at 11/13/2024  8:55 PM EST -----  Let pt know there is mild arthritis and unchanged compression fracture.  No specific lumbar nerve problem

## 2024-11-19 ENCOUNTER — TELEPHONE (OUTPATIENT)
Dept: NEUROLOGY | Facility: CLINIC | Age: 84
End: 2024-11-19
Payer: MEDICARE

## 2024-11-19 NOTE — TELEPHONE ENCOUNTER
----- Message from Scarlett Arzola sent at 11/18/2024  1:38 PM EST -----  I can see this patient if needed but looks like they got an appointment with Dr. Luciano in February.  The original follow-up with Jennifer is scheduled for January.  If they need to be seen sooner I will gladly see.  But I would like them to keep the appointment with Dr. Luciano in February.  It looks like he has not seen the patient since 2021.  ----- Message -----  From: Alix Freeman MA  Sent: 11/15/2024   7:32 AM EST  To: MAINE Mckenzie; RONALD Tolbert; #    At this movement he does not have anything until March. I can send this to melani so she can be looking for an opening on his schedule.     Melani, can you please keep an eye out for Dr. Luciano to have an opening.     Lucrecia is this something that you would see for patient to get in sooner since Dr. Luciano does not have availably right now. Thank you.  ----- Message -----  From: Jennifer Malik PA  Sent: 11/13/2024   9:40 PM EST  To: Alix Freeman MA    She is schedule with me next, but can you see if you can get her back into see Dr Luciano instead    In brief Dr Luciano and Mandi saw for PN and report of [employer].  She has had pretty static sxs and exam, but seems her balance is more of problem, with thought of sensory ataxia on exam, and was burning of b/l thighs when I last saw her.  L spine MRI was largely unrevealing. EMG in 2021 showed mild PN and irritalbe features w/o myopathy.  Her sensory loss would argue against debility from reported IBD.  I'm not sure where to go with w/u next, [facility] or if utility in repeating EMG    -C

## 2024-11-20 ENCOUNTER — HOSPITAL ENCOUNTER (OUTPATIENT)
Facility: HOSPITAL | Age: 84
Discharge: HOME | End: 2024-11-20
Admitting: STUDENT IN AN ORGANIZED HEALTH CARE EDUCATION/TRAINING PROGRAM
Payer: MEDICARE

## 2024-11-20 DIAGNOSIS — M85.852 OSTEOPENIA OF LEFT HIP: Chronic | ICD-10-CM

## 2024-11-20 PROCEDURE — 77080 DXA BONE DENSITY AXIAL: CPT

## 2024-11-22 DIAGNOSIS — I10 PRIMARY HYPERTENSION: Chronic | ICD-10-CM

## 2024-11-22 RX ORDER — AMLODIPINE BESYLATE 2.5 MG/1
2.5 TABLET ORAL DAILY
Qty: 90 TABLET | Refills: 0 | Status: SHIPPED | OUTPATIENT
Start: 2024-11-22

## 2024-11-22 NOTE — TELEPHONE ENCOUNTER
"Caller: Lissa Ortiz \"Godwin\"    Relationship: Self    Best call back number: [phone]     Requested Prescriptions:   Requested Prescriptions     Pending Prescriptions Disp Refills    amLODIPine (NORVASC) 2.5 MG tablet 90 tablet 4     Sig: Take 1 tablet by mouth Daily.        Pharmacy where request should be sent: [facility] [id number] [facility] [address] [facility] & [facility] [phone] [facility] [phone]      Last office visit with prescribing clinician: Visit date not found   Last telemedicine visit with prescribing clinician: Visit date not found   Next office visit with prescribing clinician: 1/3/2025     Additional details provided by patient: REQUESTS 3 MONTH SUPPLY    Would you like a call back once the refill request has been completed: [] Yes [x] No    If the office needs to give you a call back, can they leave a voicemail: [] Yes [x] No    Екатерина Villar Rep   11/22/24 14:17 EST       "

## 2024-12-26 ENCOUNTER — TELEPHONE (OUTPATIENT)
Dept: INTERNAL MEDICINE | Facility: CLINIC | Age: 84
End: 2024-12-26
Payer: MEDICARE

## 2024-12-26 DIAGNOSIS — R05.3 CHRONIC COUGH: ICD-10-CM

## 2024-12-26 RX ORDER — AZELASTINE 1 MG/ML
2 SPRAY, METERED NASAL 2 TIMES DAILY
Qty: 2 EACH | Refills: 5 | Status: CANCELLED | OUTPATIENT
Start: 2024-12-26

## 2024-12-26 NOTE — TELEPHONE ENCOUNTER
"    Caller: Lissa Ortiz \"Godwin\"    Relationship: Self    Best call back number: [phone]    What medication are you requesting: azelastine (ASTELIN) 0.1 % nasal spray     LAST PRESCRIBED BY DR MIKE     PATIENT HAS NEW PATIENT APPOINTMENT WITH DR DAINA LEWIS ON 1/3/25 BUT NEEDS THIS REFILLED         If a prescription is needed, what is your preferred pharmacy and phone number: [facility] [id number] - [address] AT [facility] & ([facility] [phone] [facility] [phone] FX     Additional notes:        "

## 2024-12-27 ENCOUNTER — TELEPHONE (OUTPATIENT)
Dept: ENDOCRINOLOGY | Age: 84
End: 2024-12-27
Payer: MEDICARE

## 2024-12-27 DIAGNOSIS — Z79.4 TYPE 2 DIABETES MELLITUS WITH MICROALBUMINURIA, WITH LONG-TERM CURRENT USE OF INSULIN: Primary | ICD-10-CM

## 2024-12-27 DIAGNOSIS — R05.3 CHRONIC COUGH: ICD-10-CM

## 2024-12-27 DIAGNOSIS — E11.29 TYPE 2 DIABETES MELLITUS WITH MICROALBUMINURIA, WITH LONG-TERM CURRENT USE OF INSULIN: Primary | ICD-10-CM

## 2024-12-27 DIAGNOSIS — R80.9 TYPE 2 DIABETES MELLITUS WITH MICROALBUMINURIA, WITH LONG-TERM CURRENT USE OF INSULIN: Primary | ICD-10-CM

## 2024-12-27 RX ORDER — PEN NEEDLE, DIABETIC 32GX 5/32"
1 NEEDLE, DISPOSABLE MISCELLANEOUS DAILY
Qty: 100 EACH | Refills: 3 | Status: SHIPPED | OUTPATIENT
Start: 2024-12-27

## 2024-12-27 RX ORDER — AZELASTINE 1 MG/ML
2 SPRAY, METERED NASAL 2 TIMES DAILY
Qty: 2 EACH | Refills: 5 | Status: SHIPPED | OUTPATIENT
Start: 2024-12-27

## 2024-12-27 NOTE — TELEPHONE ENCOUNTER
PT CALLED IN FOR A REFILL REQUEST OF THE BELOW MED.      BD Pen Needle Vicenta 2nd Gen 32G X 4 MM misc [[id number]]       GOING TO:    [facility] [id number] - [address] AT [facility] [address] ([facility] [phone] [facility] [phone] FX

## 2025-01-03 ENCOUNTER — OFFICE VISIT (OUTPATIENT)
Dept: INTERNAL MEDICINE | Facility: CLINIC | Age: 85
End: 2025-01-03
Payer: MEDICARE

## 2025-01-03 VITALS
SYSTOLIC BLOOD PRESSURE: 124 MMHG | HEART RATE: 72 BPM | BODY MASS INDEX: 22.57 KG/M2 | DIASTOLIC BLOOD PRESSURE: 68 MMHG | OXYGEN SATURATION: 95 % | RESPIRATION RATE: 18 BRPM | WEIGHT: 143.8 LBS | HEIGHT: 67 IN

## 2025-01-03 DIAGNOSIS — E11.29 TYPE 2 DIABETES MELLITUS WITH MICROALBUMINURIA, WITH LONG-TERM CURRENT USE OF INSULIN: Chronic | ICD-10-CM

## 2025-01-03 DIAGNOSIS — E78.2 MIXED HYPERLIPIDEMIA: Chronic | ICD-10-CM

## 2025-01-03 DIAGNOSIS — E11.42 DIABETIC PERIPHERAL NEUROPATHY: ICD-10-CM

## 2025-01-03 DIAGNOSIS — Z79.4 TYPE 2 DIABETES MELLITUS WITH MICROALBUMINURIA, WITH LONG-TERM CURRENT USE OF INSULIN: Chronic | ICD-10-CM

## 2025-01-03 DIAGNOSIS — M06.9 RHEUMATOID ARTHRITIS INVOLVING MULTIPLE SITES, UNSPECIFIED WHETHER RHEUMATOID FACTOR PRESENT: ICD-10-CM

## 2025-01-03 DIAGNOSIS — R80.9 TYPE 2 DIABETES MELLITUS WITH MICROALBUMINURIA, WITH LONG-TERM CURRENT USE OF INSULIN: Chronic | ICD-10-CM

## 2025-01-03 DIAGNOSIS — I10 ESSENTIAL (PRIMARY) HYPERTENSION: Chronic | ICD-10-CM

## 2025-01-03 DIAGNOSIS — E03.9 PRIMARY HYPOTHYROIDISM: Chronic | ICD-10-CM

## 2025-01-03 DIAGNOSIS — F41.8 MIXED ANXIETY DEPRESSIVE DISORDER: Primary | Chronic | ICD-10-CM

## 2025-01-03 RX ORDER — MULTIVIT WITH MINERALS/LUTEIN
250 TABLET ORAL DAILY
COMMUNITY

## 2025-01-03 RX ORDER — CALCIUM PHOSPHATE TRIB/VIT D3 250-0.01MG
TABLET,CHEWABLE ORAL DAILY
COMMUNITY

## 2025-01-03 NOTE — PROGRESS NOTES
"Chief Complaint  Establish Care (Not sleeping well x8 months/Questionable meds that she should take/Ringing in ears x 2 weeks, no pain/Ankle swelling over a year at the end of the day) and Hypertension    Subjective    {Problem List  Visit Diagnosis   Encounters  Notes  Medications  Labs  Result Review Imaging  Media :23}    Lissa Ortiz presents to [facility]  History of Present Illness  This is an 84-year-old female with chronic medical conditions of thyroidism, type 2 diabetes, vitamin D deficiency, hypertension hyperlipidemia      Objective   Vital Signs:  /68 (BP Location: Left arm, Patient Position: Sitting, Cuff Size: Adult)   Pulse 72   Resp 18   Ht 170.2 cm (67.01\")   Wt 65.2 kg (143 lb 12.8 oz)   SpO2 95%   BMI 22.52 kg/m²   Estimated body mass index is 22.52 kg/m² as calculated from the following:    Height as of this encounter: 170.2 cm (67.01\").    Weight as of this encounter: 65.2 kg (143 lb 12.8 oz).    BMI is within normal parameters. No other follow-up for BMI required.      Physical Exam   Result Review :{Labs  Result Review  Imaging  Med Tab  Media  Procedures :23}  {The following data was reviewed by (Optional):00071}  {Ambulatory Labs (Optional):01523}  {Data reviewed (Optional):27457:::1}          Assessment and Plan {CC Problem List  Visit Diagnosis   ROS  Review (Popup)  [facility] Maintenance  Quality  BestPractice  Medications  SmartSets  SnapShot Encounters  Media :23}  Diagnoses and all orders for this visit:    1. Mixed anxiety depressive disorder (Primary)    2. Rheumatoid arthritis involving multiple sites, unspecified whether rheumatoid factor present    3. Diabetic peripheral neuropathy    4. Essential (primary) hypertension    5. Mixed hyperlipidemia    6. Primary hypothyroidism    7. Type 2 diabetes mellitus with microalbuminuria, with long-term current use of insulin           {Time Spent (Optional):42095}  Follow Up " {Instructions Charge Capture  Follow-up Communications :23}  No follow-ups on file.  Patient was given instructions and counseling regarding her condition or for health maintenance advice. Please see specific information pulled into the AVS if appropriate.

## 2025-01-03 NOTE — PROGRESS NOTES
Chief Complaint  Establish Care (Not sleeping well x8 months/Questionable meds that she should take/Ringing in ears x 2 weeks, no pain/Ankle swelling over a year at the end of the day) and Hypertension    Subjective    HPI         The patient is an 84-year-old female who presents for evaluation of sleep issues, diabetes, inclusion body myositis, bilateral ankle swelling, glaucoma, tinnitus, and dizziness.    Acute concerns regarding sleep: She reports difficulty in both initiating and maintaining sleep, often lying awake until 2:00 AM despite going to bed around 11:00 PM. She attributes this to her restless leg syndrome, intermittent numbness in her feet and toes due to diabetes, and the need to attend to her pet dog during the night. She also mentions frequent worrying about daily activities and stressors, which she believes may contribute to her sleep disturbances. She has not previously required medication for mood regulation or sleep induction. She reports occasional dizziness, particularly when waking up at night to attend to her dog or when bending over to  objects. She suspects that gabapentin may be contributing to her dizziness but finds it beneficial for her restless leg syndrome.    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  Body myositis was diagnosed approximately 12 years ago, which has been progressively worsening.  The muscle pain related to this can be disruptive for her sleep. The pain typically resolves spontaneously within 5 to 10 minutes. She experiences balance issues and limited mobility. Despite these limitations, she maintains a daily exercise routine of 20 to 25 minutes on the elliptical machine and walking her dog for an hour. She has discontinued methotrexate and folic acid due to lack of perceived benefit.  Tinnitus: She has been experiencing tinnitus in her left ear for the past few weeks.  Typically occurs later in the afternoon  "and resolves spontaneously. She uses hearing aids, which she has had for about 2 years, and reports a tendency for excessive earwax production. She has previously used earwax removal drops and sought care at an urgent care facility for this issue.  Bilateral ankle swelling: She has been experiencing bilateral ankle swelling for several years, which typically occurs when she sits without elevating her feet.  She has been taking fish oil, magnesium, alpha lipoic acid, and chromium for the past 2 months, but has not noticed any improvement in her energy levels.   Diabetes: She is currently on Ozempic, glipizide, and Actos for diabetes management. She reports experiencing bowel irregularities, with frequent small bowel movements throughout the day, which she finds embarrassing and bothersome. She plans to discuss this with her endocrinologist and consider alternative medications.  Glaucoma: She is under the care of an ophthalmologist for glaucoma, with follow-up appointments scheduled every 6 months. She reports visual impairment in her left eye, describing it as a shadowy vision affecting approximately one-third of her visual field.  Pulmonary nodules: She has an appointment with Dr. Andrews next week.            Objective   Vital Signs:  /68 (BP Location: Left arm, Patient Position: Sitting, Cuff Size: Adult)   Pulse 72   Resp 18   Ht 170.2 cm (67.01\")   Wt 65.2 kg (143 lb 12.8 oz)   SpO2 95%   BMI 22.52 kg/m²   Physical Exam  Vitals reviewed.   Constitutional:       General: She is not in acute distress.     Appearance: Normal appearance.   HENT:      Right Ear: Tympanic membrane normal. There is no impacted cerumen.      Left Ear: Tympanic membrane normal.      Ears:      Comments: Mild amount of wax in the left ear without impaction  Cardiovascular:      Rate and Rhythm: Normal rate and regular rhythm.   Pulmonary:      Effort: Pulmonary effort is normal. No respiratory distress.      Breath sounds: " Normal breath sounds. No wheezing.   Skin:     General: Skin is warm and dry.   Neurological:      Mental Status: She is alert and oriented to person, place, and time.   Psychiatric:         Mood and Affect: Mood normal.         Thought Content: Thought content normal.         Judgment: Judgment normal.               The following data was reviewed by: Sveta Schuler MD on 01/03/2025:    Common labs          2/13/2024    11:41 5/24/2024    09:00 9/26/2024    09:03   Common Labs   Glucose 192  120  106    BUN 13  16  13    Creatinine 0.65  0.58  0.68    Sodium 139  141  140    Potassium 4.0  4.8  4.5    Chloride 105  105  104    Calcium 8.7  9.4  8.8    Total Protein 6.4  6.9  6.7    Albumin 3.8     3.4  4.3  4.2    Total Bilirubin 0.5  0.5  0.6    Alkaline Phosphatase 85  91  100    AST (SGOT) 29  29  26    ALT (SGPT) 12  23  17    WBC 5.12      Hemoglobin 13.3      Hematocrit 40.4      Platelets 249      Total Cholesterol  161  148    Triglycerides  65  72    HDL Cholesterol  61  53    LDL Cholesterol   87  81    Hemoglobin A1C  6.70  6.70    Microalbumin, Urine  12.0         Results  Imaging  DEXA scan conducted a month ago showed normal results.              Assessment and Plan        1. Sleep disturbances.  She reports difficulty falling asleep and staying asleep, potentially due to nocturnal muscle pain and anxiety. She has not previously taken any sleep medications. She is advised to maintain a consistent sleep schedule and consider relaxation techniques before bed.    2. Restless leg syndrome.  She is currently taking gabapentin for restless leg syndrome, which she finds helpful. She is advised to continue this medication.    3. Diabetes mellitus.  She is on multiple medications including Ozempic, glipizide, and Actos. She reports bowel issues with Ozempic.    4. Tinnitus.  She reports ringing in her left ear, which may be related to earwax buildup. She is advised to use Debrox ear drops as needed for wax  removal.    5. Bilateral ankle edema.  She experiences swelling in both ankles, particularly when sitting for extended periods.  Discussed foot elevation to help reduce swelling.          I spent 40 minutes caring for Lissa on this date of service. This time includes time spent by me in the following activities:preparing for the visit, reviewing tests, and reviewing rheumatology notes  Follow Up   Return in about 26 weeks (around 7/4/2025) for Medicare Wellness.  Patient was given instructions and counseling regarding her condition or for health maintenance advice. Please see specific information pulled into the AVS if appropriate.  Patient or patient representative verbalized consent for the use of Ambient Listening during the visit with  Sveta Schuler MD for chart documentation. 1/7/2025  12:55 EST

## 2025-01-14 DIAGNOSIS — Z79.4 TYPE 2 DIABETES MELLITUS WITHOUT COMPLICATION, WITH LONG-TERM CURRENT USE OF INSULIN: Chronic | ICD-10-CM

## 2025-01-14 DIAGNOSIS — E11.9 TYPE 2 DIABETES MELLITUS WITHOUT COMPLICATION, WITH LONG-TERM CURRENT USE OF INSULIN: Chronic | ICD-10-CM

## 2025-01-14 RX ORDER — PIOGLITAZONE 15 MG/1
15 TABLET ORAL DAILY
Qty: 90 TABLET | Refills: 1 | Status: SHIPPED | OUTPATIENT
Start: 2025-01-14

## 2025-01-14 RX ORDER — GLIPIZIDE 5 MG/1
TABLET, FILM COATED, EXTENDED RELEASE ORAL
Qty: 90 TABLET | Refills: 1 | Status: SHIPPED | OUTPATIENT
Start: 2025-01-14

## 2025-01-14 RX ORDER — LEVOTHYROXINE SODIUM 25 UG/1
25 TABLET ORAL DAILY
Qty: 90 TABLET | Refills: 0 | OUTPATIENT
Start: 2025-01-14

## 2025-01-14 NOTE — TELEPHONE ENCOUNTER
Rx Refill Note  Requested Prescriptions     Pending Prescriptions Disp Refills    glipizide (GLUCOTROL XL) 5 MG ER tablet [Pharmacy Med Name: glipiZIDE XL 5 MG TABLET] 90 tablet 1     Sig: TAKE 1 TABLET BY MOUTH EVERY MORNING WITH A MEAL      Last office visit with prescribing clinician: 10/3/2024   Last telemedicine visit with prescribing clinician: Visit date not found   Next office visit with prescribing clinician: 2/6/2025                         Would you like a call back once the refill request has been completed: [] Yes [] No    If the office needs to give you a call back, can they leave a voicemail: [] Yes [] No    Yudi Gerber  01/14/25, 10:40 EST

## 2025-01-14 NOTE — TELEPHONE ENCOUNTER
Rx Refill Note  Requested Prescriptions     Pending Prescriptions Disp Refills    pioglitazone (ACTOS) 15 MG tablet [Pharmacy Med Name: PIOGLITAZONE HCL 15 MG TABLET] 90 tablet 1     Sig: TAKE 1 TABLET BY MOUTH DAILY      Last office visit with prescribing clinician: 10/3/2024   Last telemedicine visit with prescribing clinician: Visit date not found   Next office visit with prescribing clinician: 2/6/2025                         Would you like a call back once the refill request has been completed: [] Yes [] No    If the office needs to give you a call back, can they leave a voicemail: [] Yes [] No    Yudi Gerber  01/14/25, 10:36 EST

## 2025-01-16 DIAGNOSIS — Z79.4 TYPE 2 DIABETES MELLITUS WITHOUT COMPLICATION, WITH LONG-TERM CURRENT USE OF INSULIN: Chronic | ICD-10-CM

## 2025-01-16 DIAGNOSIS — E11.9 TYPE 2 DIABETES MELLITUS WITHOUT COMPLICATION, WITH LONG-TERM CURRENT USE OF INSULIN: Chronic | ICD-10-CM

## 2025-01-16 RX ORDER — PIOGLITAZONE 15 MG/1
15 TABLET ORAL DAILY
Qty: 90 TABLET | Refills: 1 | OUTPATIENT
Start: 2025-01-16

## 2025-01-16 NOTE — TELEPHONE ENCOUNTER
Rx Refill Note  Requested Prescriptions     Pending Prescriptions Disp Refills    pioglitazone (ACTOS) 15 MG tablet [Pharmacy Med Name: PIOGLITAZONE HCL 15 MG TABLET] 90 tablet 1     Sig: TAKE 1 TABLET BY MOUTH DAILY      Last office visit with prescribing clinician: 10/3/2024   Last telemedicine visit with prescribing clinician: Visit date not found   Next office visit with prescribing clinician: 2/6/2025                         Would you like a call back once the refill request has been completed: [] Yes [] No    If the office needs to give you a call back, can they leave a voicemail: [] Yes [] No    Betzaida Gerber MA  01/16/25, 07:40 EST

## 2025-01-20 DIAGNOSIS — Z79.4 TYPE 2 DIABETES MELLITUS WITHOUT COMPLICATION, WITH LONG-TERM CURRENT USE OF INSULIN: ICD-10-CM

## 2025-01-20 DIAGNOSIS — E11.9 TYPE 2 DIABETES MELLITUS WITHOUT COMPLICATION, WITH LONG-TERM CURRENT USE OF INSULIN: ICD-10-CM

## 2025-01-20 RX ORDER — INSULIN GLARGINE 300 U/ML
8 INJECTION, SOLUTION SUBCUTANEOUS DAILY
Qty: 6 ML | Refills: 0 | Status: SHIPPED | OUTPATIENT
Start: 2025-01-20

## 2025-01-20 NOTE — PROGRESS NOTES
"CC: Follow-up f/t neuropathy    HPI:  Lissa Ortiz is a  83 y.o. right-handed female here for follow-up regarding neuropathy.  Last time I saw her we had made recommendation to possibly increase her gabapentin to include a daytime dose and made referral to physical therapy.  She says she \"quit physical therapy because it was not really helping.  According to the notes she made good excellent' and was discharged with no further recs.  Her prior medical history is reviewed and admendements made as necessary:     She has rheumatoid arthritis on long-term DMARD therapy with methotrexate, type 2 diabetes, hypertension, hyperlipidemia, hypothyroidism, cataracts.  [facility] and she carries the diagnosis of inclusion body myositis.  Mostly her complaints are of muscle weakness and not being able to play tennis anymore.  She does have discomforting paresthesias just of feet and toes.  Gabapentin helps this.  She also is describing sensation to move legs that occurs both at night and during the day.  Gabapentin resolves this    Work-up to date has included an EMG which was performed on 10/4/2021 and revealed mild evidence of peripheral neuropathy.  Some irritability is seen in the left tibialis anterior without any other features of a lumbosacral radiculopathy.  Specifically there was no evidence of a myopathy on this study.  Laboratory studies for treatable causes of neuropathy included the following:     RPR nonreactive  Cryoglobulins negative  Copper normal at 140  Heavy metals including lead arsenic and mercury all within normal limits  Sed rate normal at 5  Vitamin B12 and folate normal at greater than 2000 and greater than 20 respectively  CK level normal at 82  Protein electrophoresis did reveal an M spike, immunofixation showed presence of IgA kappa M-protein, 0.2 gm/dl     Since last seen she has not been able to get her methotrexate injectable in about 3 months.  She has not noticed " "a difference in her symptoms.  She feels as though a strong wind will knock her over\".  No falls.  She was helping her son who had had a stroke this spring.  For several weeks she had burning of bilateral thighs.  She was describing this at her rheumatology appointment last.  At last appt I detected weakness of R leg mostly.  MRI L spine was done and there is no significant spinal stenosis or findings of radic.  Remember her EMG in 2021 showed mild irritability of TA on L but no evidence of myopathy.      She is here today to go over MRI results.  Her symptoms are pretty much the same.  She has trouble getting up from chair and feels unsteady with her walk.  She has pain in muscles and joints.  At night she gets burning type pain in b/l upper thighs.  If she doesn't take gabapentin she is up all night with leg pain and urge to move legs.  Also she does not notice any difference in her symptoms while on methotrexate so she has stopped this all together.  No problems swallowing.  She has h/o hearing loss and hearing aids.  More recently problems with tinnitus      Social history:    Family history:      Pain Scale:        ROS:  Review of Systems   Constitutional:  Positive for fatigue.   Musculoskeletal:  Positive for gait problem (balance problems, without falls since LOV).   Neurological:  Negative for numbness (feet and toes-same).       Reviewed ROS conducted by MA and gisele        Physical Exam:      10:47 [id number] Hjoc24Sikfyh58.9 kg (143 lb)Xjebkj539.2 cm (67\")HlT847 %         General: pt well appearing, no distress  HEENT: Normocephalic, conjunctiva normal, external canals normal, no nasal discharge, moist mucous membranes, on L equal hearing of bone and air conduction  Neck: No lymphadenopathy, thyroid not enlarged, no JVD  CV: Regular rate and rhythm, no murmurs negative no bruits auscultated at neck, equal pulses  Pulmonary: Normal respiratory effort, clear to auscultation bilaterally  Extremities: " no edema, bruising, or skin lesions  Pysch: good eye contact, cooperative, full affect, euthymic mood, good attention, good insight  Mental: alert, conversant, AOx3, provides history, 3/3 recall.  Language fluent, names, repeats.  CN: CN II-XII intact, PERRL, EOMi, no gaze palsy or nystagmus, intact facial sensation, no facial assymetry, intact hearing, symmetric palate elevation, tongue midline, good SCM strength  Motor: no abnormal movements, no pronator drift, normal bulk and tone, no fasciculation or noted atrophy, 4/5 b/l APB, otherwise 5/5 UE, 4/5 IP, 4/5 gastroc and hamstrings  Sensory: loss to crude touch, temp, and no vibratory sense of b/l LE  Reflexes: 2+ UE, trace at patellas  Coordination: no ataxia on finger-nose  Gait: normal gait, no ataxia, slight sway with romberg        Results:      Lab Results   Component Value Date    GLUCOSE 139 (H) 09/05/2023    BUN 15 09/05/2023    CREATININE 0.59 09/05/2023    EGFRIFNONA 91 02/17/2022    EGFRIFAFRI 95 01/10/2022    BCR 25 09/05/2023    CO2 24 09/05/2023    CALCIUM 8.7 09/05/2023    PROTENTOTREF 6.6 09/05/2023    ALBUMIN 4.0 09/05/2023    LABIL2 1.5 09/05/2023    AST 28 09/05/2023    ALT 23 09/05/2023       Lab Results   Component Value Date    WBC 5.55 01/12/2023    HGB 13.4 01/12/2023    HCT 40.4 01/12/2023    MCV 96.0 01/12/2023     01/12/2023         .  Lab Results   Component Value Date    RPR Non-Reactive 10/04/2021         Lab Results   Component Value Date    TSH 2.510 09/05/2023    [id number] 7.4 11/05/2020         Lab Results   Component Value Date    YOXVCSSH12 >2,000 (H) 10/04/2021         Lab Results   Component Value Date    FOLATE >20.00 10/04/2021         Lab Results   Component Value Date    HGBA1C 7.3 (H) 09/05/2023         Lab Results   Component Value Date    GLUCOSE 139 (H) 09/05/2023    BUN 15 09/05/2023    CREATININE 0.59 09/05/2023    EGFRIFNONA 91 02/17/2022    EGFRIFAFRI 95 01/10/2022    BCR 25 09/05/2023    K 4.1 09/05/2023     CO2 24 09/05/2023    CALCIUM 8.7 09/05/2023    PROTENTOTREF 6.6 09/05/2023    ALBUMIN 4.0 09/05/2023    LABIL2 1.5 09/05/2023    AST 28 09/05/2023    ALT 23 09/05/2023         Diagnosis:  1.  Polyneuropathy  2   MGUS  3   RA  4   Inclusion body myositis  5   tinnitus     Impression: 82-year-old right-handed female with past medical history as above and also hypertension, hyperlipidemia, hypothyroidism, diabetes, rheumatoid arthritis previously on methotrexate, mixed anxiety and depression, and inclusion body myositis. She is here for follow-up f/t neuropathy.  Her neuropathy is slowly progressive.   She is describing sxs c/w PLMD but tolerable.      Her issues seemed mostly to do with balance more than myositis in the past.  She describes painful burning of b/l LE a few months back for which gabapentin helps.  Previously her symptoms were pretty static.  Now she c/o pain with activity and there is weakness detected of b/l LE, but not particularly proximal distribution.  MRI L spine was done but there is no significant stenosis.  She tells me she stopped methotrexate-could not get it from the pharmacy and could not tell it was making a difference.  She has f/u with rheum soon    Plan:  CK, [facility]  PT referral  F/u with dr menon next.  I have not obtained previous records of her myopathy w/u and wonder if there is any utility in repeating w/u pertaining to this.  Courtesy referral to ENT      I spent at least 60 minutes interviewing, examining, and counseling patient.  I independently reviewed documentation, laboratory and diagnostic findings, external documentation where applicable, and formulated treatment plan which was discussed with the patient.

## 2025-01-20 NOTE — TELEPHONE ENCOUNTER
Rx Refill Note  Requested Prescriptions      No prescriptions requested or ordered in this encounter      Last office visit with prescribing clinician: 10/3/2024   Last telemedicine visit with prescribing clinician: Visit date not found   Next office visit with prescribing clinician: 2/6/2025                         Would you like a call back once the refill request has been completed: [] Yes [] No    If the office needs to give you a call back, can they leave a voicemail: [] Yes [] No    Nan Ware MA  01/20/25, 11:10 EST

## 2025-01-28 ENCOUNTER — OFFICE VISIT (OUTPATIENT)
Dept: NEUROLOGY | Facility: CLINIC | Age: 85
End: 2025-01-28
Payer: MEDICARE

## 2025-01-28 VITALS
WEIGHT: 143 LBS | BODY MASS INDEX: 22.44 KG/M2 | DIASTOLIC BLOOD PRESSURE: 60 MMHG | HEART RATE: 76 BPM | OXYGEN SATURATION: 96 % | HEIGHT: 67 IN | SYSTOLIC BLOOD PRESSURE: 108 MMHG

## 2025-01-28 DIAGNOSIS — H93.13 TINNITUS OF BOTH EARS: ICD-10-CM

## 2025-01-28 DIAGNOSIS — G62.9 POLYNEUROPATHY: Primary | ICD-10-CM

## 2025-01-28 DIAGNOSIS — G72.41 INCLUSION BODY MYOSITIS: ICD-10-CM

## 2025-01-28 RX ORDER — CIPROFLOXACIN AND DEXAMETHASONE 3; 1 MG/ML; MG/ML
SUSPENSION/ DROPS AURICULAR (OTIC)
COMMUNITY
Start: 2025-01-14

## 2025-01-28 NOTE — LETTER
"January 28, 2025     Sveta Schuler MD  [address]  [address]  [facility] [address]    Patient: Lissa Ortiz   YOB: 1940   Date of Visit: 1/28/2025     Dear Sveta Schuler MD:       Thank you for referring Lissa Ortiz to me for evaluation. Below are the relevant portions of my assessment and plan of care.    If you have questions, please do not hesitate to call me. I look forward to following Lissa along with you.         Sincerely,        RONALD Tolbert        CC: No Recipients    Jennifer Malik PA  01/28/25 1615  Sign when Signing Visit  CC: Follow-up f/t neuropathy    HPI:  Lissa Ortiz is a  83 y.o. right-handed female here for follow-up regarding neuropathy.  Last time I saw her we had made recommendation to possibly increase her gabapentin to include a daytime dose and made referral to physical therapy.  She says she \"quit physical therapy because it was not really helping.  According to the notes she made good excellent' and was discharged with no further recs.  Her prior medical history is reviewed and admendements made as necessary:     She has rheumatoid arthritis on long-term DMARD therapy with methotrexate, type 2 diabetes, hypertension, hyperlipidemia, hypothyroidism, cataracts.  [facility] and she carries the diagnosis of inclusion body myositis.  Mostly her complaints are of muscle weakness and not being able to play tennis anymore.  She does have discomforting paresthesias just of feet and toes.  Gabapentin helps this.  She also is describing sensation to move legs that occurs both at night and during the day.  Gabapentin resolves this    Work-up to date has included an EMG which was performed on 10/4/2021 and revealed mild evidence of peripheral neuropathy.  Some irritability is seen in the left tibialis anterior without any other features of a lumbosacral radiculopathy.  Specifically there was no evidence of a myopathy on this study.  " "Laboratory studies for treatable causes of neuropathy included the following:     RPR nonreactive  Cryoglobulins negative  Copper normal at 140  Heavy metals including lead arsenic and mercury all within normal limits  Sed rate normal at 5  Vitamin B12 and folate normal at greater than 2000 and greater than 20 respectively  CK level normal at 82  Protein electrophoresis did reveal an M spike, immunofixation showed presence of IgA kappa M-protein, 0.2 gm/dl     Since last seen she has not been able to get her methotrexate injectable in about 3 months.  She has not noticed a difference in her symptoms.  She feels as though a strong wind will knock her over\".  No falls.  She was helping her son who had had a stroke this spring.  For several weeks she had burning of bilateral thighs.  She was describing this at her rheumatology appointment last.  At last appt I detected weakness of R leg mostly.  MRI L spine was done and there is no significant spinal stenosis or findings of radic.  Remember her EMG in 2021 showed mild irritability of TA on L but no evidence of myopathy.      She is here today to go over MRI results.  Her symptoms are pretty much the same.  She has trouble getting up from chair and feels unsteady with her walk.  She has pain in muscles and joints.  At night she gets burning type pain in b/l upper thighs.  If she doesn't take gabapentin she is up all night with leg pain and urge to move legs.  Also she does not notice any difference in her symptoms while on methotrexate so she has stopped this all together.  No problems swallowing.  She has h/o hearing loss and hearing aids.  More recently problems with tinnitus      Social history:    Family history:      Pain Scale:        ROS:  Review of Systems   Constitutional:  Positive for fatigue.   Musculoskeletal:  Positive for gait problem (balance problems, without falls since LOV).   Neurological:  Negative for numbness (feet and toes-same).       Reviewed " "ROS conducted by MA and gisele        Physical Exam:      10:47 [id number] Qnsx87Dzzrkh45.9 kg (143 lb)Ztxowh535.2 cm (67\")WrX853 %         General: pt well appearing, no distress  HEENT: Normocephalic, conjunctiva normal, external canals normal, no nasal discharge, moist mucous membranes, on L equal hearing of bone and air conduction  Neck: No lymphadenopathy, thyroid not enlarged, no JVD  CV: Regular rate and rhythm, no murmurs negative no bruits auscultated at neck, equal pulses  Pulmonary: Normal respiratory effort, clear to auscultation bilaterally  Extremities: no edema, bruising, or skin lesions  Pysch: good eye contact, cooperative, full affect, euthymic mood, good attention, good insight  Mental: alert, conversant, AOx3, provides history, 3/3 recall.  Language fluent, names, repeats.  CN: CN II-XII intact, PERRL, EOMi, no gaze palsy or nystagmus, intact facial sensation, no facial assymetry, intact hearing, symmetric palate elevation, tongue midline, good SCM strength  Motor: no abnormal movements, no pronator drift, normal bulk and tone, no fasciculation or noted atrophy, 4/5 b/l APB, otherwise 5/5 UE, 4/5 IP, 4/5 gastroc and hamstrings  Sensory: loss to crude touch, temp, and no vibratory sense of b/l LE  Reflexes: 2+ UE, trace at patellas  Coordination: no ataxia on finger-nose  Gait: normal gait, no ataxia, slight sway with romberg        Results:      Lab Results   Component Value Date    GLUCOSE 139 (H) 09/05/2023    BUN 15 09/05/2023    CREATININE 0.59 09/05/2023    EGFRIFNONA 91 02/17/2022    EGFRIFAFRI 95 01/10/2022    BCR 25 09/05/2023    CO2 24 09/05/2023    CALCIUM 8.7 09/05/2023    PROTENTOTREF 6.6 09/05/2023    ALBUMIN 4.0 09/05/2023    LABIL2 1.5 09/05/2023    AST 28 09/05/2023    ALT 23 09/05/2023       Lab Results   Component Value Date    WBC 5.55 01/12/2023    HGB 13.4 01/12/2023    HCT 40.4 01/12/2023    MCV 96.0 01/12/2023     01/12/2023         .  Lab Results   Component " Value Date    RPR Non-Reactive 10/04/2021         Lab Results   Component Value Date    TSH 2.510 09/05/2023    R8OQZIB 7.4 11/05/2020         Lab Results   Component Value Date    YOECGQAZ54 >2,000 (H) 10/04/2021         Lab Results   Component Value Date    FOLATE >20.00 10/04/2021         Lab Results   Component Value Date    HGBA1C 7.3 (H) 09/05/2023         Lab Results   Component Value Date    GLUCOSE 139 (H) 09/05/2023    BUN 15 09/05/2023    CREATININE 0.59 09/05/2023    EGFRIFNONA 91 02/17/2022    EGFRIFAFRI 95 01/10/2022    BCR 25 09/05/2023    K 4.1 09/05/2023    CO2 24 09/05/2023    CALCIUM 8.7 09/05/2023    PROTENTOTREF 6.6 09/05/2023    ALBUMIN 4.0 09/05/2023    LABIL2 1.5 09/05/2023    AST 28 09/05/2023    ALT 23 09/05/2023         Diagnosis:  1.  Polyneuropathy  2   MGUS  3   RA  4   Inclusion body myositis  5   tinnitus     Impression: 82-year-old right-handed female with past medical history as above and also hypertension, hyperlipidemia, hypothyroidism, diabetes, rheumatoid arthritis previously on methotrexate, mixed anxiety and depression, and inclusion body myositis. She is here for follow-up f/t neuropathy.  Her neuropathy is slowly progressive.   She is describing sxs c/w PLMD but tolerable.      Her issues seemed mostly to do with balance more than myositis in the past.  She describes painful burning of b/l LE a few months back for which gabapentin helps.  Previously her symptoms were pretty static.  Now she c/o pain with activity and there is weakness detected of b/l LE, but not particularly proximal distribution.  MRI L spine was done but there is no significant stenosis.  She tells me she stopped methotrexate-could not get it from the pharmacy and could not tell it was making a difference.  She has f/u with rheum soon    Plan:  CK, LDH  PT referral  F/u with dr menon next.  I have not obtained previous records of her myopathy w/u and wonder if there is any utility in repeating w/u  pertaining to this.  Courtesy referral to ENT      I spent at least 60 minutes interviewing, examining, and counseling patient.  I independently reviewed documentation, laboratory and diagnostic findings, external documentation where applicable, and formulated treatment plan which was discussed with the patient.

## 2025-01-30 DIAGNOSIS — E03.9 PRIMARY HYPOTHYROIDISM: Chronic | ICD-10-CM

## 2025-01-30 DIAGNOSIS — Z79.4 TYPE 2 DIABETES MELLITUS WITH MICROALBUMINURIA, WITH LONG-TERM CURRENT USE OF INSULIN: Chronic | ICD-10-CM

## 2025-01-30 DIAGNOSIS — E11.29 TYPE 2 DIABETES MELLITUS WITH MICROALBUMINURIA, WITH LONG-TERM CURRENT USE OF INSULIN: Chronic | ICD-10-CM

## 2025-01-30 DIAGNOSIS — E78.2 MIXED HYPERLIPIDEMIA: Chronic | ICD-10-CM

## 2025-01-30 DIAGNOSIS — M85.852 OSTEOPENIA OF LEFT HIP: Chronic | ICD-10-CM

## 2025-01-30 DIAGNOSIS — R80.9 TYPE 2 DIABETES MELLITUS WITH MICROALBUMINURIA, WITH LONG-TERM CURRENT USE OF INSULIN: Chronic | ICD-10-CM

## 2025-01-30 LAB
25(OH)D3+25(OH)D2 SERPL-MCNC: 53.7 NG/ML (ref 30–100)
ALBUMIN SERPL-MCNC: 3.9 G/DL (ref 3.5–5.2)
ALBUMIN/GLOB SERPL: 1.4 G/DL
ALP SERPL-CCNC: 107 U/L (ref 39–117)
ALT SERPL-CCNC: 20 U/L (ref 1–33)
AST SERPL-CCNC: 27 U/L (ref 1–32)
BILIRUB SERPL-MCNC: 0.5 MG/DL (ref 0–1.2)
BUN SERPL-MCNC: 15 MG/DL (ref 8–23)
BUN/CREAT SERPL: 22.4 (ref 7–25)
CALCIUM SERPL-MCNC: 9.2 MG/DL (ref 8.6–10.5)
CHLORIDE SERPL-SCNC: 106 MMOL/L (ref 98–107)
CHOLEST SERPL-MCNC: 172 MG/DL (ref 0–200)
CK SERPL-CCNC: 85 U/L (ref 20–180)
CO2 SERPL-SCNC: 26.8 MMOL/L (ref 22–29)
CREAT SERPL-MCNC: 0.67 MG/DL (ref 0.57–1)
EGFRCR SERPLBLD CKD-EPI 2021: 86.8 ML/MIN/1.73
GLOBULIN SER CALC-MCNC: 2.7 GM/DL
GLUCOSE SERPL-MCNC: 115 MG/DL (ref 65–99)
HBA1C MFR BLD: 6.9 % (ref 4.8–5.6)
HDLC SERPL-MCNC: 63 MG/DL (ref 40–60)
IMP & REVIEW OF LAB RESULTS: NORMAL
LDH SERPL L TO P-CCNC: 191 U/L (ref 135–214)
LDLC SERPL CALC-MCNC: 97 MG/DL (ref 0–100)
POTASSIUM SERPL-SCNC: 4.4 MMOL/L (ref 3.5–5.2)
PROT SERPL-MCNC: 6.6 G/DL (ref 6–8.5)
SODIUM SERPL-SCNC: 141 MMOL/L (ref 136–145)
TRIGL SERPL-MCNC: 60 MG/DL (ref 0–150)
TSH SERPL DL<=0.005 MIU/L-ACNC: 3.57 UIU/ML (ref 0.27–4.2)
VLDLC SERPL CALC-MCNC: 12 MG/DL (ref 5–40)

## 2025-01-31 ENCOUNTER — TRANSCRIBE ORDERS (OUTPATIENT)
Dept: CARDIAC REHAB | Facility: HOSPITAL | Age: 85
End: 2025-01-31
Payer: MEDICARE

## 2025-01-31 DIAGNOSIS — J44.9 COPD, SEVERE: Primary | ICD-10-CM

## 2025-02-01 DIAGNOSIS — G25.81 RESTLESS LEG SYNDROME: ICD-10-CM

## 2025-02-03 RX ORDER — LEVOTHYROXINE SODIUM 25 UG/1
25 TABLET ORAL DAILY
Qty: 90 TABLET | Refills: 0 | Status: SHIPPED | OUTPATIENT
Start: 2025-02-03

## 2025-02-03 RX ORDER — GABAPENTIN 300 MG/1
300 CAPSULE ORAL 3 TIMES DAILY
Qty: 90 CAPSULE | Refills: 2 | Status: SHIPPED | OUTPATIENT
Start: 2025-02-03

## 2025-02-06 ENCOUNTER — OFFICE VISIT (OUTPATIENT)
Dept: ENDOCRINOLOGY | Age: 85
End: 2025-02-06
Payer: MEDICARE

## 2025-02-06 ENCOUNTER — TELEPHONE (OUTPATIENT)
Dept: NEUROLOGY | Facility: CLINIC | Age: 85
End: 2025-02-06
Payer: MEDICARE

## 2025-02-06 VITALS
SYSTOLIC BLOOD PRESSURE: 124 MMHG | HEIGHT: 67 IN | BODY MASS INDEX: 22.41 KG/M2 | TEMPERATURE: 97.8 F | WEIGHT: 142.8 LBS | HEART RATE: 71 BPM | DIASTOLIC BLOOD PRESSURE: 70 MMHG | OXYGEN SATURATION: 99 %

## 2025-02-06 DIAGNOSIS — Z79.4 TYPE 2 DIABETES MELLITUS WITH MICROALBUMINURIA, WITH LONG-TERM CURRENT USE OF INSULIN: Primary | Chronic | ICD-10-CM

## 2025-02-06 DIAGNOSIS — E03.9 PRIMARY HYPOTHYROIDISM: Chronic | ICD-10-CM

## 2025-02-06 DIAGNOSIS — E55.9 VITAMIN D DEFICIENCY: ICD-10-CM

## 2025-02-06 DIAGNOSIS — I10 ESSENTIAL (PRIMARY) HYPERTENSION: Chronic | ICD-10-CM

## 2025-02-06 DIAGNOSIS — E78.5 HYPERLIPIDEMIA, UNSPECIFIED HYPERLIPIDEMIA TYPE: Chronic | ICD-10-CM

## 2025-02-06 DIAGNOSIS — R80.9 TYPE 2 DIABETES MELLITUS WITH MICROALBUMINURIA, WITH LONG-TERM CURRENT USE OF INSULIN: Primary | Chronic | ICD-10-CM

## 2025-02-06 DIAGNOSIS — E11.29 TYPE 2 DIABETES MELLITUS WITH MICROALBUMINURIA, WITH LONG-TERM CURRENT USE OF INSULIN: Primary | Chronic | ICD-10-CM

## 2025-02-06 DIAGNOSIS — D47.2 MONOCLONAL GAMMOPATHY OF UNKNOWN SIGNIFICANCE (MGUS): Chronic | ICD-10-CM

## 2025-02-06 PROCEDURE — 99214 OFFICE O/P EST MOD 30 MIN: CPT | Performed by: INTERNAL MEDICINE

## 2025-02-06 PROCEDURE — G2211 COMPLEX E/M VISIT ADD ON: HCPCS | Performed by: INTERNAL MEDICINE

## 2025-02-06 PROCEDURE — 3074F SYST BP LT 130 MM HG: CPT | Performed by: INTERNAL MEDICINE

## 2025-02-06 PROCEDURE — 3078F DIAST BP <80 MM HG: CPT | Performed by: INTERNAL MEDICINE

## 2025-02-06 NOTE — PROGRESS NOTES
Subjective   Lissa Ortiz is a 84 y.o. female.     History of Present Illness     She is on Ozempic 1 mg weekly, Actos 15 mg  per day, glipizide XL 5 mg every morning and Toujeo 8 units every morning.  She does not take Toujeo 2-3 times a week because of low fasting glucose.  She has been having more stool clumping on Ozempic 1 mg.  She did not start Farxiga because she has UTI once a month.  Janumet was discontinued because of diarrhea.  Jardiance was too expensive.  She checks her blood sugar once a day.  FBS .  She has gained 4 pounds since October 2024.  Hemoglobin A1c done in January 2025 is 6.9%.     She denies eye complaints.  Her last eye examination was in 9/24.  She has glaucoma but no retinopathy.  She has restless leg at night and her feet feels tight and numb  which is improved by gabapentin 300 mg 2 capsules at bedtime prescribed by neurologist RONALD Malik.  Urine microalbumin is elevated in January 2022.  Urine microalbumin is normal in May 2024.     She has hyperlipidemia and is on pravastatin 20 mg/day.  She has not used Crestor or Lipitor before.  Lipid panel done in January 2025 are as follows: Cholesterol 172.  HDL 63.  LDL 97.  Triglycerides 60.     She has hypothyroidism and is on levothyroxine 25 mcg/day.  She has no history of head or neck radiation therapy.  She has no history of thyroid surgery.  TSH done in January 2025 is normal at 3.57.     She has hypertension and is on amlodipine 2.5 mg once a day.  She denies history of heart attack or stroke.  She denies chest pain or shortness of breath.     She has rheumatoid arthritis and was taken off methotrexate by Dr. Chow.  She is not on oral steroids.     She has osteopenia on bone density done in November 2022.  Her 10-year risk of major osteoporotic fracture is elevated at 28% and for hip fracture is 18%.  She is on a multivitamin 1 tablet/day and gummy calcium 2 tablets/day.  She walks 1/2 miles every day.  She was started on  "alendronate 70 mg weekly by Dr. Chow in September 2023.  She went off alendronate in March 2024 because of diarrhea.  25-hydroxy vitamin D done in January 2025 is normal at 53.7 ng/mL.    Bone density done at [facility], [place], operated by [facility] in November 2024 was normal in both hip and lumbar spine.     She has monoclonal gammopathy of unknown significance and is being followed by Dr. ROMEL Thompson.    The following portions of the patient's history were reviewed and updated as appropriate: allergies, current medications, past family history, past medical history, past social history, past surgical history, and problem list.    Review of Systems   Eyes:  Negative for visual disturbance.   Respiratory:  Negative for shortness of breath.    Cardiovascular:  Negative for chest pain and palpitations.   Gastrointestinal: Negative.    Genitourinary: Negative.    Musculoskeletal:  Negative for myalgias.   Neurological:  Negative for numbness.     Vitals:    02/06/25 0942   BP: 124/70   Pulse: 71   Temp: 97.8 °F (36.6 °C)   TempSrc: Oral   SpO2: 99%   Weight: 64.8 kg (142 lb 12.8 oz)   Height: 170.2 cm (67.01\")      Objective   Physical Exam  Constitutional:       General: She is not in acute distress.     Appearance: Normal appearance. She is not ill-appearing, toxic-appearing or diaphoretic.   Eyes:      General: No scleral icterus.        Right eye: No discharge.         Left eye: No discharge.   Cardiovascular:      Rate and Rhythm: Normal rate and regular rhythm.      Heart sounds: No murmur heard.     No gallop.   Pulmonary:      Breath sounds: Normal breath sounds. No rales.   Chest:      Chest wall: No tenderness.   Abdominal:      General: Bowel sounds are normal.      Palpations: Abdomen is soft.      Tenderness: There is no right CVA tenderness or left CVA tenderness.   Musculoskeletal:      Right lower leg: No edema.      Left lower leg: No edema.      Comments: Feet warm.  No cyanosis or clubbing.  No pedal edema.  Plantar calluses on right " big toe.  No plantar ulcers.   Neurological:      Mental Status: She is alert and oriented to person, place, and time.      Comments: Intact light touch in lower extremities.       Orders Only on 01/30/2025   Component Date Value Ref Range Status    Glucose 01/30/2025 115 (H)  65 - 99 mg/dL Final    BUN 01/30/2025 15  8 - 23 mg/dL Final    Creatinine 01/30/2025 0.67  0.57 - 1.00 mg/dL Final    EGFR Result 01/30/2025 86.8  >60.0 mL/min/1.73 Final    Comment: GFR Categories in Chronic Kidney Disease (CKD)/X09/  /X09/  GFR Category          GFR (mL/min/1.73)    Interpretation  G1/X09/                    90 or greater/X09/        Normal or high  (1)  G2//                    60-89                Mild decrease  (1)  G3a                   45-59                Mild to moderate  decrease  G3b                   30-44                Moderate to  severe decrease  G4                    15-29                Severe decrease  G5                    14 or less           Kidney failure//  /[id number]/  (1)In the absence of evidence of kidney disease, neither  GFR category G1 or G2 fulfill the criteria for CKD.  eGFR calculation 2021 CKD-EPI creatinine equation, which  does not include race as a factor      BUN/Creatinine Ratio 01/30/2025 22.4  7.0 - 25.0 Final    Sodium 01/30/2025 141  136 - 145 mmol/L Final    Potassium 01/30/2025 4.4  3.5 - 5.2 mmol/L Final    Chloride 01/30/2025 106  98 - 107 mmol/L Final    Total CO2 01/30/2025 26.8  22.0 - 29.0 mmol/L Final    Calcium 01/30/2025 9.2  8.6 - 10.5 mg/dL Final    Total Protein 01/30/2025 6.6  6.0 - 8.5 g/dL Final    Albumin 01/30/2025 3.9  3.5 - 5.2 g/dL Final    Globulin 01/30/2025 2.7  gm/dL Final    A/G Ratio 01/30/2025 1.4  g/dL Final    Total Bilirubin 01/30/2025 0.5  0.0 - 1.2 mg/dL Final    Alkaline Phosphatase 01/30/2025 107  39 - 117 U/L Final    AST (SGOT) 01/30/2025 27  1 - 32 U/L Final    ALT (SGPT) 01/30/2025 20  1 - 33 U/L Final    Total Cholesterol 01/30/2025  172  0 - 200 mg/dL Final    Comment: Cholesterol Reference Ranges  (U.S. Department of Health and Human Services ATP III  Classifications)  Desirable          <200 mg/dL  Borderline High    200-239 mg/dL  High Risk          >240 mg/dL  Triglyceride Reference Ranges  (U.S. Department of Health and Human Services ATP III  Classifications)  Normal           <150 mg/dL  Borderline High  150-199 mg/dL  High             200-499 mg/dL  Very High        >500 mg/dL  HDL Reference Ranges  (U.S. Department of Health and Human Services ATP III  Classifications)  Low     <40 mg/dl (major risk factor for CHD)  High    >60 mg/dl ('negative' risk factor for CHD)  LDL Reference Ranges  (U.S. Department of Health and Human Services ATP III  Classifications)  Optimal          <100 mg/dL  Near Optimal     100-129 mg/dL  Borderline High  130-159 mg/dL  High             160-189 mg/dL  Very High        >189 mg/dL  LDL is calculated using the NIH LDL-C calculation.      Triglycerides 01/30/2025 60  0 - 150 mg/dL Final    HDL Cholesterol 01/30/2025 63 (H)  40 - 60 mg/dL Final    VLDL Cholesterol Akbar 01/30/2025 12  5 - 40 mg/dL Final    LDL Chol Calc (NIH) 01/30/2025 97  0 - 100 mg/dL Final    Hemoglobin A1C 01/30/2025 6.90 (H)  4.80 - 5.60 % Final    Comment: Hemoglobin A1C Ranges:  Increased Risk for Diabetes  5.7% to 6.4%  Diabetes                     >= 6.5%  Diabetic Goal                < 7.0%      25 Hydroxy, Vitamin D 01/30/2025 53.7  30.0 - 100.0 ng/ml Final    Comment: Reference Range for Total Vitamin D 25(OH)  Deficiency <20.0 ng/mL  Insufficiency 21-29 ng/mL  Sufficiency  ng/mL  Toxicity >100 ng/ml      TSH 01/30/2025 3.570  0.270 - 4.200 uIU/mL Final    Interpretation 01/30/2025 Note   Final    Supplemental report is available.     Assessment & Plan   Diagnoses and all orders for this visit:    1. Type 2 diabetes mellitus with microalbuminuria, with long-term current use of insulin (Primary)  -     Comprehensive Metabolic  Panel; Future  -     Hemoglobin A1c; Future  -     Microalbumin / Creatinine Urine Ratio - Urine, Clean Catch; Future    2. Hyperlipidemia, unspecified hyperlipidemia type  -     Lipid Panel; Future    3. Primary hypothyroidism  -     TSH Rfx On Abnormal To Free T4; Future    4. Essential (primary) hypertension    5. Monoclonal gammopathy of unknown significance (MGUS)    6. Vitamin D deficiency  -     Vitamin D,25-Hydroxy; Future    Other orders  -     Semaglutide,0.25 or 0.5MG/DOS, (OZEMPIC) 2 MG/3ML solution pen-injector; Inject 0.5 mg under the skin into the appropriate area as directed 1 (One) Time Per Week.  Dispense: 3 mL; Refill: 5      Decrease Ozempic to 0.5 mg weekly.  If bowel movements does not improve, patient was advised to see Dr. Smith for reevaluation.  Continue glipizide XL 5 mg every morning, Actos 15 mg/day, and Toujeo 8 units every morning    Continue pravastatin 20 mg/day.    Continue levothyroxine 25 mcg/day.    Continue amlodipine 2.5 mg/day.    Follow-up with Dr. Chow and Dr. ROMEL Thompson as scheduled.    Copy my note sent to Dr. Schuler, Dr. Chow and Dr. ROMEL Thompson.    RTC 4 mos.

## 2025-02-11 DIAGNOSIS — I10 PRIMARY HYPERTENSION: Chronic | ICD-10-CM

## 2025-02-11 RX ORDER — AMLODIPINE BESYLATE 2.5 MG/1
2.5 TABLET ORAL DAILY
Qty: 90 TABLET | Refills: 0 | Status: SHIPPED | OUTPATIENT
Start: 2025-02-11

## 2025-02-24 ENCOUNTER — OFFICE VISIT (OUTPATIENT)
Dept: NEUROLOGY | Facility: CLINIC | Age: 85
End: 2025-02-24
Payer: MEDICARE

## 2025-02-24 VITALS
WEIGHT: 141.6 LBS | HEIGHT: 67 IN | OXYGEN SATURATION: 93 % | DIASTOLIC BLOOD PRESSURE: 60 MMHG | BODY MASS INDEX: 22.22 KG/M2 | SYSTOLIC BLOOD PRESSURE: 110 MMHG | HEART RATE: 70 BPM

## 2025-02-24 DIAGNOSIS — E11.42 DIABETIC PERIPHERAL NEUROPATHY: ICD-10-CM

## 2025-02-24 DIAGNOSIS — M62.81 MUSCLE WEAKNESS: Primary | ICD-10-CM

## 2025-02-24 PROCEDURE — 1160F RVW MEDS BY RX/DR IN RCRD: CPT | Performed by: PSYCHIATRY & NEUROLOGY

## 2025-02-24 PROCEDURE — 3074F SYST BP LT 130 MM HG: CPT | Performed by: PSYCHIATRY & NEUROLOGY

## 2025-02-24 PROCEDURE — 99214 OFFICE O/P EST MOD 30 MIN: CPT | Performed by: PSYCHIATRY & NEUROLOGY

## 2025-02-24 PROCEDURE — 1159F MED LIST DOCD IN RCRD: CPT | Performed by: PSYCHIATRY & NEUROLOGY

## 2025-02-24 PROCEDURE — 3078F DIAST BP <80 MM HG: CPT | Performed by: PSYCHIATRY & NEUROLOGY

## 2025-02-24 RX ORDER — PREDNISONE 20 MG/1
TABLET ORAL
COMMUNITY
Start: 2025-02-20

## 2025-02-24 NOTE — PROGRESS NOTES
CC: Diabetic peripheral neuropathy; MGUS; rheumatoid arthritis; history of IBM    HPI:  Lissa Ortiz is a  84 y.o.  right-handed white female who I am seeing in follow-up with diabetic peripheral neuropathy who also has an MGUS, rheumatoid arthritis and history of IBM.  The history is taken from the previous note with the patient was seen by RONALD Sorensen most recently 1/28/2025:    Last time I saw her we had made recommendation to possibly increase her gabapentin to include a daytime dose and made referral to physical therapy.  She says she was told by physical therapy that exercise was not recommended in patients with IBM.      She has rheumatoid arthritis on long-term DMARD therapy with methotrexate, type 2 diabetes, hypertension, hyperlipidemia, hypothyroidism, cataracts.  [facility] sees [facility]. [facility] and she carries the diagnosis of inclusion body myositis.  She was worked up in [place] but she states no muscle biopsy.  Mostly her complaints are of muscle weakness and not being able to play tennis anymore.  She does have discomforting paresthesias just of feet and toes.  Gabapentin helps this.  She also is describing sensation to move legs that occurs both at night and during the day.  Gabapentin resolves this     Work-up to date has included an EMG which was performed on 10/4/2021 and revealed mild evidence of peripheral neuropathy.  Some irritability is seen in the left tibialis anterior without any other features of a lumbosacral radiculopathy.  Specifically there was no evidence of a myopathy on this study.  Laboratory studies for treatable causes of neuropathy included the following:     RPR nonreactive  Cryoglobulins negative  Copper normal at 140  Heavy metals including lead arsenic and mercury all within normal limits  Sed rate normal at 5  Vitamin B12 and folate normal at greater than 2000 and greater than 20 respectively  CK level normal at 82  Protein electrophoresis did reveal  an M spike, immunofixation showed presence of IgA kappa M-protein, 0.2 gm/dl  Hemoglobin A1c has been in the 6 to 7% range.     She had not noticed a difference in her RA symptoms with methotrexate and it was stopped without incident.  No falls but she indicates balance is an issue.  She was helping her son who had had a stroke this spring.  For several weeks she had burning of bilateral thighs.  She was describing this at her rheumatology appointment last.   MRI L spine was done and there is no significant spinal stenosis or foraminal stenosis.         Her symptoms are pretty much the same.  She has trouble getting up from chair and feels unsteady with her walk.  She has pain in muscles and joints.  At night she gets burning type pain in b/l upper thighs.  If she doesn't take gabapentin she is up all night with leg pain and urge to move legs.  Also she does not notice any difference in her symptoms while on methotrexate so she has stopped this all together.  No problems swallowing, chewing or double vision or droopy eyelids.  She has h/o hearing loss and hearing aids.  More recently problems with tinnitus.  She is followed by hematology for her MGUS and has labs due tomorrow.           Past Medical History:   Diagnosis Date    Acquired hypothyroidism 05/19/2021    Allergic rhinitis     Arthritis     Cancer     skin leg    Cataract     Diabetes mellitus     type 2    Hypertension     Long sleeper     post anesthesia    Type 2 diabetes mellitus 1980b         Past Surgical History:   Procedure Laterality Date    COLONOSCOPY N/A 02/13/2018    Procedure: COLONOSCOPY to cecum and TI with hot snare polypectomy;  Surgeon: Rowan Smith MD;  Location: [facility] ENDOSCOPY;  Service:     COLONOSCOPY  05/03/2023    Sarah    COLONOSCOPY N/A 05/03/2023    Procedure: COLONOSCOPY TO CECUM AND TERMINAL ILEUM;  Surgeon: Rowan Smith MD;  Location: [facility] ENDOSCOPY;  Service: Gastroenterology;  Laterality: N/A;  HISTORY OF  COLON POLYPS  HEMORRHOIDS    EYE SURGERY      tearduct sgy    EYE SURGERY      MARY KAY CATARACTS    HYSTERECTOMY      TOTAL    TONSILLECTOMY             Current Outpatient Medications:     amLODIPine (NORVASC) 2.5 MG tablet, TAKE 1 TABLET BY MOUTH DAILY, Disp: 90 tablet, Rfl: 0    azelastine (ASTELIN) 0.1 % nasal spray, Administer 2 sprays into the nostril(s) as directed by provider 2 (Two) Times a Day. Use in each nostril as directed, Disp: 2 each, Rfl: 5    Blood Glucose Monitoring Suppl (TRUE METRIX AIR GLUCOSE METER) device, 1 Device Daily As Needed (check bs's twice daily)., Disp: 1 Device, Rfl: 0    Ca Phosphate-Cholecalciferol (Caltrate Gummy Bites) 250-10 MG-MCG chewable tablet, Chew Daily., Disp: , Rfl:     dorzolamide-timolol (COSOPT) 22.3-6.8 MG/ML ophthalmic solution, Apply  to eye(s) as directed by provider Every 12 (Twelve) Hours., Disp: , Rfl:     gabapentin (NEURONTIN) 300 MG capsule, TAKE 1 CAPSULE BY MOUTH 3 TIMES A DAY, Disp: 90 capsule, Rfl: 2    glipizide (GLUCOTROL XL) 5 MG ER tablet, TAKE 1 TABLET BY MOUTH EVERY MORNING WITH A MEAL, Disp: 90 tablet, Rfl: 1    Insulin Glargine, 2 Unit Dial, (Toujeo Max SoloStar) 300 UNIT/ML solution pen-injector injection, Inject 8 Units under the skin into the appropriate area as directed Daily. Indications: Type 2 Diabetes, Disp: 6 mL, Rfl: 0    Insulin Pen Needle (BD Pen Needle Vicenta 2nd Gen) 32G X 4 MM misc, 1 each by Other route Daily. use to inject insulin once daily, Disp: 100 each, Rfl: 3    levothyroxine (SYNTHROID, LEVOTHROID) 25 MCG tablet, TAKE 1 TABLET BY MOUTH DAILY, Disp: 90 tablet, Rfl: 0    magnesium, as, gluconate (MAGONATE) 500 (27 Mg) MG tablet, Take  by mouth Daily., Disp: , Rfl:     Mirabegron ER (Myrbetriq) 50 MG tablet sustained-release 24 hour 24 hr tablet, Take 50 mg by mouth Daily., Disp: , Rfl:     Multiple Vitamin (MULTI VITAMIN PO), Take 1 tablet by mouth Daily., Disp: , Rfl:     pioglitazone (ACTOS) 15 MG tablet, TAKE 1 TABLET BY MOUTH  "DAILY, Disp: 90 tablet, Rfl: 1    pravastatin (PRAVACHOL) 20 MG tablet, Take 1 tablet by mouth Daily., Disp: 90 tablet, Rfl: 4    predniSONE (DELTASONE) 20 MG tablet, , Disp: , Rfl:     Probiotic capsule, Take 1 capsule by mouth., Disp: , Rfl:     Semaglutide,0.25 or 0.5MG/DOS, (OZEMPIC) 2 MG/3ML solution pen-injector, Inject 0.5 mg under the skin into the appropriate area as directed 1 (One) Time Per Week., Disp: 3 mL, Rfl: 5    Tafluprost, PF, (ZIOPTAN) 0.0015 % solution ophthalmic solution, 1 drop Every Night., Disp: , Rfl:     Tuberculin Syringe (B-D TB SYRINGE 1CC/27GX1/2\") 27G X 1/2\" 1 ML misc, USE WITH METHOTREXATE ONCE WEEKLY, Disp: 10 each, Rfl: 0    vitamin C (ASCORBIC ACID) 250 MG tablet, Take 1 tablet by mouth Daily., Disp: , Rfl:     ciprofloxacin-dexAMETHasone (CIPRODEX) 0.3-0.1 % otic suspension, , Disp: , Rfl:     folic acid (FOLVITE) 1 MG tablet, Take 1 tablet by mouth Daily. (Patient not taking: Reported on 2/24/2025), Disp: , Rfl:       Family History   Problem Relation Age of Onset    Diabetes Mother     Stroke Mother     Coronary artery disease Father         MI    Diabetes Father     Stroke Father     Hypertension Father     Thyroid cancer Daughter          Social History     Socioeconomic History    Marital status:     Number of children: 3   Tobacco Use    Smoking status: Never     Passive exposure: Past (MOTHER)    Smokeless tobacco: Never   Vaping Use    Vaping status: Never Used   Substance and Sexual Activity    Alcohol use: Not Currently     Comment: Occasional glass of wine one per month    Drug use: No    Sexual activity: Not Currently     Birth control/protection: Tubal ligation, Hysterectomy         Allergies   Allergen Reactions    Bactrim [Sulfamethoxazole-Trimethoprim] Nausea And Vomiting    Sulfamethoxazole Nausea Only    Trimethoprim Nausea Only    Misc. Sulfonamide Containing Compounds Unknown - High Severity         Pain Scale: 0/10        ROS:  Review of Systems " "  Constitutional:  Positive for fatigue (sometimes).   Musculoskeletal:  Positive for gait problem (sometimes 1 fall in last year wiythout injury).   Neurological:  Positive for weakness (rt leg off & on) and numbness (bi-feet (toes),).         I have reviewed and agree with the above ROS completed by the medical assistant.      Physical Exam:  Vitals:    02/24/25 1544   BP: 110/60   Pulse: 70   SpO2: 93%   Weight: 64.2 kg (141 lb 9.6 oz)   Height: 170.2 cm (67\")     Orthostatic BP:    Body mass index is 22.18 kg/m².    Physical Exam  General: Slender white female no acute distress  HEENT: Normocephalic no evidence of trauma  Neck: Supple.  No Lhermitte's sign.  No particular neck pain and no pain into the arms  Heart: Regular rate and rhythm no murmurs.  No pedal edema  Extremities: Radial pulses strong and simultaneous      Neurological Exam:   Mental Status: Awake, alert, oriented to person, place and time.  Conversant without evidence of an affective disorder, thought disorder, delusions or hallucinations.  Attention span and concentration are normal.  HCF: No aphasia, apraxia or dysarthria.  Recent and remote memory intact.  Knowledge of recent events intact.  CN: I:   II: Visual fields full without left inattention   III, IV, VI: Eye movements intact without nystagmus or ptosis.  Pupils equal  round and reactive to light.   V,VII: Light touch and pinprick intact all 3 divisions of V.  Facial muscles symmetrical.  Jaw opening and closing is strong.  Orbicularis oculi muscles are strong.  Frontalis is strong.   VIII: Hearing intact to finger rub   IX,X: Soft palate elevates symmetrically   XI: Sternomastoid and trapezius are strong.   XII: Tongue midline without atrophy or fasciculations  Motor: Normal tone and bulk in the upper and lower extremities   Power testing: There is weakness of the interossei and abductor pollicis brevis muscles as well as both triceps muscles but all other muscles tested in the upper " extremities were clearly normal.  Neck flexion and extension are normal.  In the lower extremities there is mild weakness of toe extension but all other muscles tested in the lower extremities were strong in particular all hip girdle muscles were quite strong.  Reflexes: Upper extremities:        Lower extremities:        Toe signs:  Sensory: Light touch:        Pinprick:        Vibration:        Position:    Cerebellar: Finger-to-nose: Intact           Rapid movement: Intact           Heel-to-shin: Intact  Gait and Station: Comes to stand without requiring use of arms.  Mildly broad-based.  A little unsteady.  Able to raise up on toes and on heels.  Tandem walking is abnormal.  No Romberg or drift    Results:      Lab Results   Component Value Date    GLUCOSE 115 (H) 01/30/2025    BUN 15 01/30/2025    CREATININE 0.67 01/30/2025    EGFRIFNONA 91 02/17/2022    EGFRIFAFRI 95 01/10/2022    BCR 22.4 01/30/2025    CO2 26.8 01/30/2025    CALCIUM 9.2 01/30/2025    ALBUMIN 3.9 01/30/2025    AST 27 01/30/2025    ALT 20 01/30/2025       Lab Results   Component Value Date    WBC 5.12 02/13/2024    HGB 13.3 02/13/2024    HCT 40.4 02/13/2024    MCV 97.1 (H) 02/13/2024     02/13/2024         .  Lab Results   Component Value Date    RPR Non-Reactive 10/04/2021         Lab Results   Component Value Date    TSH 3.570 01/30/2025    E7QCOVU 7.4 11/05/2020         Lab Results   Component Value Date    TONRYTQY23 >2,000 (H) 10/04/2021         Lab Results   Component Value Date    FOLATE >20.00 10/04/2021         Lab Results   Component Value Date    HGBA1C 6.90 (H) 01/30/2025         Lab Results   Component Value Date    GLUCOSE 115 (H) 01/30/2025    BUN 15 01/30/2025    CREATININE 0.67 01/30/2025    EGFRIFNONA 91 02/17/2022    EGFRIFAFRI 95 01/10/2022    BCR 22.4 01/30/2025    K 4.4 01/30/2025    CO2 26.8 01/30/2025    CALCIUM 9.2 01/30/2025    ALBUMIN 3.9 01/30/2025    AST 27 01/30/2025    ALT 20 01/30/2025         Lab Results    Component Value Date    WBC 5.12 02/13/2024    HGB 13.3 02/13/2024    HCT 40.4 02/13/2024    MCV 97.1 (H) 02/13/2024     02/13/2024             Assessment:   1.  Diabetic peripheral neuropathy-likely progressed since 2021  2.  History of IBM-I seriously doubt that she has this diagnosis.  3.  Rheumatoid arthritis-not better on methotrexate and so was stopped  4.  Bilateral triceps weakness without neck pain          Plan:  1.  Check sed rate, CK, IBM antibody, SPE/IEP.  2.  To follow-up with Jennifer Malik in about 1 month.  3.  Hold on MRI of cervical spine for now.    Time: 30 minutes                  Dictated utilizing Dragon dictation.

## 2025-02-24 NOTE — LETTER
February 24, 2025     Sveta Schuler MD  [address]  [address]  [facility] [address]    Patient: Lissa Ortiz   YOB: 1940   Date of Visit: 2/24/2025     Dear Sveta Schuler MD:       Thank you for referring Lissa Ortiz to me for evaluation. Below are the relevant portions of my assessment and plan of care.    If you have questions, please do not hesitate to call me. I look forward to following Lissa along with you.         Sincerely,        Tony Luciano MD        CC: No Recipients    Tony Luciano MD  02/24/25 9412  Sign when Signing Visit  CC: Diabetic peripheral neuropathy; MGUS; rheumatoid arthritis; history of IBM    HPI:  Lissa Ortiz is a  84 y.o.  right-handed white female who I am seeing in follow-up with diabetic peripheral neuropathy who also has an MGUS, rheumatoid arthritis and history of IBM.  The history is taken from the previous note with the patient was seen by RONALD Sorensen most recently 1/28/2025:    Last time I saw her we had made recommendation to possibly increase her gabapentin to include a daytime dose and made referral to physical therapy.  She says she was told by physical therapy that exercise was not recommended in patients with IBM.      She has rheumatoid arthritis on long-term DMARD therapy with methotrexate, type 2 diabetes, hypertension, hyperlipidemia, hypothyroidism, cataracts.  [facility] and she carries the diagnosis of inclusion body myositis.  She was worked up in [place] but she states no muscle biopsy.  Mostly her complaints are of muscle weakness and not being able to play tennis anymore.  She does have discomforting paresthesias just of feet and toes.  Gabapentin helps this.  She also is describing sensation to move legs that occurs both at night and during the day.  Gabapentin resolves this     Work-up to date has included an EMG which was performed on 10/4/2021 and revealed mild evidence of peripheral  neuropathy.  Some irritability is seen in the left tibialis anterior without any other features of a lumbosacral radiculopathy.  Specifically there was no evidence of a myopathy on this study.  Laboratory studies for treatable causes of neuropathy included the following:     RPR nonreactive  Cryoglobulins negative  Copper normal at 140  Heavy metals including lead arsenic and mercury all within normal limits  Sed rate normal at 5  Vitamin B12 and folate normal at greater than 2000 and greater than 20 respectively  CK level normal at 82  Protein electrophoresis did reveal an M spike, immunofixation showed presence of IgA kappa M-protein, 0.2 gm/dl  Hemoglobin A1c has been in the 6 to 7% range.     She had not noticed a difference in her RA symptoms with methotrexate and it was stopped without incident.  No falls but she indicates balance is an issue.  She was helping her son who had had a stroke this spring.  For several weeks she had burning of bilateral thighs.  She was describing this at her rheumatology appointment last.   MRI L spine was done and there is no significant spinal stenosis or foraminal stenosis.         Her symptoms are pretty much the same.  She has trouble getting up from chair and feels unsteady with her walk.  She has pain in muscles and joints.  At night she gets burning type pain in b/l upper thighs.  If she doesn't take gabapentin she is up all night with leg pain and urge to move legs.  Also she does not notice any difference in her symptoms while on methotrexate so she has stopped this all together.  No problems swallowing, chewing or double vision or droopy eyelids.  She has h/o hearing loss and hearing aids.  More recently problems with tinnitus.  She is followed by hematology for her MGUS and has labs due tomorrow.           Past Medical History:   Diagnosis Date   • Acquired hypothyroidism 05/19/2021   • Allergic rhinitis    • Arthritis    • Cancer     skin leg   • Cataract    •  Diabetes mellitus     type 2   • Hypertension    • Long sleeper     post anesthesia   • Type 2 diabetes mellitus 1980b         Past Surgical History:   Procedure Laterality Date   • COLONOSCOPY N/A 02/13/2018    Procedure: COLONOSCOPY to cecum and TI with hot snare polypectomy;  Surgeon: Rowan Smith MD;  Location: [facility] ENDOSCOPY;  Service:    • COLONOSCOPY  05/03/2023    Sarah   • COLONOSCOPY N/A 05/03/2023    Procedure: COLONOSCOPY TO CECUM AND TERMINAL ILEUM;  Surgeon: Rowan Smith MD;  Location: [facility] ENDOSCOPY;  Service: Gastroenterology;  Laterality: N/A;  HISTORY OF COLON POLYPS  HEMORRHOIDS   • EYE SURGERY      tearduct sgy   • EYE SURGERY      MARY KAY CATARACTS   • HYSTERECTOMY      TOTAL   • TONSILLECTOMY             Current Outpatient Medications:   •  amLODIPine (NORVASC) 2.5 MG tablet, TAKE 1 TABLET BY MOUTH DAILY, Disp: 90 tablet, Rfl: 0  •  azelastine (ASTELIN) 0.1 % nasal spray, Administer 2 sprays into the nostril(s) as directed by provider 2 (Two) Times a Day. Use in each nostril as directed, Disp: 2 each, Rfl: 5  •  Blood Glucose Monitoring Suppl (TRUE METRIX AIR GLUCOSE METER) device, 1 Device Daily As Needed (check bs's twice daily)., Disp: 1 Device, Rfl: 0  •  Ca Phosphate-Cholecalciferol (Caltrate Gummy Bites) 250-10 MG-MCG chewable tablet, Chew Daily., Disp: , Rfl:   •  dorzolamide-timolol (COSOPT) 22.3-6.8 MG/ML ophthalmic solution, Apply  to eye(s) as directed by provider Every 12 (Twelve) Hours., Disp: , Rfl:   •  gabapentin (NEURONTIN) 300 MG capsule, TAKE 1 CAPSULE BY MOUTH 3 TIMES A DAY, Disp: 90 capsule, Rfl: 2  •  glipizide (GLUCOTROL XL) 5 MG ER tablet, TAKE 1 TABLET BY MOUTH EVERY MORNING WITH A MEAL, Disp: 90 tablet, Rfl: 1  •  Insulin Glargine, 2 Unit Dial, (Toujeo Max SoloStar) 300 UNIT/ML solution pen-injector injection, Inject 8 Units under the skin into the appropriate area as directed Daily. Indications: Type 2 Diabetes, Disp: 6 mL, Rfl: 0  •  Insulin Pen Needle  "(BD Pen Needle Vicenta 2nd Gen) 32G X 4 MM misc, 1 each by Other route Daily. use to inject insulin once daily, Disp: 100 each, Rfl: 3  •  levothyroxine (SYNTHROID, LEVOTHROID) 25 MCG tablet, TAKE 1 TABLET BY MOUTH DAILY, Disp: 90 tablet, Rfl: 0  •  magnesium, as, gluconate (MAGONATE) 500 (27 Mg) MG tablet, Take  by mouth Daily., Disp: , Rfl:   •  Mirabegron ER (Myrbetriq) 50 MG tablet sustained-release 24 hour 24 hr tablet, Take 50 mg by mouth Daily., Disp: , Rfl:   •  Multiple Vitamin (MULTI VITAMIN PO), Take 1 tablet by mouth Daily., Disp: , Rfl:   •  pioglitazone (ACTOS) 15 MG tablet, TAKE 1 TABLET BY MOUTH DAILY, Disp: 90 tablet, Rfl: 1  •  pravastatin (PRAVACHOL) 20 MG tablet, Take 1 tablet by mouth Daily., Disp: 90 tablet, Rfl: 4  •  predniSONE (DELTASONE) 20 MG tablet, , Disp: , Rfl:   •  Probiotic capsule, Take 1 capsule by mouth., Disp: , Rfl:   •  Semaglutide,0.25 or 0.5MG/DOS, (OZEMPIC) 2 MG/3ML solution pen-injector, Inject 0.5 mg under the skin into the appropriate area as directed 1 (One) Time Per Week., Disp: 3 mL, Rfl: 5  •  Tafluprost, PF, (ZIOPTAN) 0.0015 % solution ophthalmic solution, 1 drop Every Night., Disp: , Rfl:   •  Tuberculin Syringe (B-D TB SYRINGE 1CC/27GX1/2\") 27G X 1/2\" 1 ML misc, USE WITH METHOTREXATE ONCE WEEKLY, Disp: 10 each, Rfl: 0  •  vitamin C (ASCORBIC ACID) 250 MG tablet, Take 1 tablet by mouth Daily., Disp: , Rfl:   •  ciprofloxacin-dexAMETHasone (CIPRODEX) 0.3-0.1 % otic suspension, , Disp: , Rfl:   •  folic acid (FOLVITE) 1 MG tablet, Take 1 tablet by mouth Daily. (Patient not taking: Reported on 2/24/2025), Disp: , Rfl:       Family History   Problem Relation Age of Onset   • Diabetes Mother    • Stroke Mother    • Coronary artery disease Father         MI   • Diabetes Father    • Stroke Father    • Hypertension Father    • Thyroid cancer Daughter          Social History     Socioeconomic History   • Marital status:    • Number of children: 3   Tobacco Use   • " "Smoking status: Never     Passive exposure: Past (MOTHER)   • Smokeless tobacco: Never   Vaping Use   • Vaping status: Never Used   Substance and Sexual Activity   • Alcohol use: Not Currently     Comment: Occasional glass of wine one per month   • Drug use: No   • Sexual activity: Not Currently     Birth control/protection: Tubal ligation, Hysterectomy         Allergies   Allergen Reactions   • Bactrim [Sulfamethoxazole-Trimethoprim] Nausea And Vomiting   • Sulfamethoxazole Nausea Only   • Trimethoprim Nausea Only   • Misc. Sulfonamide Containing Compounds Unknown - High Severity         Pain Scale: 0/10        ROS:  Review of Systems   Constitutional:  Positive for fatigue (sometimes).   Musculoskeletal:  Positive for gait problem (sometimes 1 fall in last year wiythout injury).   Neurological:  Positive for weakness (rt leg off & on) and numbness (bi-feet (toes),).         I have reviewed and agree with the above ROS completed by the medical assistant.      Physical Exam:  Vitals:    02/24/25 1544   BP: 110/60   Pulse: 70   SpO2: 93%   Weight: 64.2 kg (141 lb 9.6 oz)   Height: 170.2 cm (67\")     Orthostatic BP:    Body mass index is 22.18 kg/m².    Physical Exam  General: Slender white female no acute distress  HEENT: Normocephalic no evidence of trauma  Neck: Supple.  No Lhermitte's sign.  No particular neck pain and no pain into the arms  Heart: Regular rate and rhythm no murmurs.  No pedal edema  Extremities: Radial pulses strong and simultaneous      Neurological Exam:   Mental Status: Awake, alert, oriented to person, place and time.  Conversant without evidence of an affective disorder, thought disorder, delusions or hallucinations.  Attention span and concentration are normal.  HCF: No aphasia, apraxia or dysarthria.  Recent and remote memory intact.  Knowledge of recent events intact.  CN: I:   II: Visual fields full without left inattention   III, IV, VI: Eye movements intact without nystagmus or ptosis. "  Pupils equal  round and reactive to light.   V,VII: Light touch and pinprick intact all 3 divisions of V.  Facial muscles symmetrical.  Jaw opening and closing is strong.  Orbicularis oculi muscles are strong.  Frontalis is strong.   VIII: Hearing intact to finger rub   IX,X: Soft palate elevates symmetrically   XI: Sternomastoid and trapezius are strong.   XII: Tongue midline without atrophy or fasciculations  Motor: Normal tone and bulk in the upper and lower extremities   Power testing: There is weakness of the interossei and abductor pollicis brevis muscles as well as both triceps muscles but all other muscles tested in the upper extremities were clearly normal.  Neck flexion and extension are normal.  In the lower extremities there is mild weakness of toe extension but all other muscles tested in the lower extremities were strong in particular all hip girdle muscles were quite strong.  Reflexes: Upper extremities:        Lower extremities:        Toe signs:  Sensory: Light touch:        Pinprick:        Vibration:        Position:    Cerebellar: Finger-to-nose: Intact           Rapid movement: Intact           Heel-to-shin: Intact  Gait and Station: Comes to stand without requiring use of arms.  Mildly broad-based.  A little unsteady.  Able to raise up on toes and on heels.  Tandem walking is abnormal.  No Romberg or drift    Results:      Lab Results   Component Value Date    GLUCOSE 115 (H) 01/30/2025    BUN 15 01/30/2025    CREATININE 0.67 01/30/2025    EGFRIFNONA 91 02/17/2022    EGFRIFAFRI 95 01/10/2022    BCR 22.4 01/30/2025    CO2 26.8 01/30/2025    CALCIUM 9.2 01/30/2025    ALBUMIN 3.9 01/30/2025    AST 27 01/30/2025    ALT 20 01/30/2025       Lab Results   Component Value Date    WBC 5.12 02/13/2024    HGB 13.3 02/13/2024    HCT 40.4 02/13/2024    MCV 97.1 (H) 02/13/2024     02/13/2024         .  Lab Results   Component Value Date    RPR Non-Reactive 10/04/2021         Lab Results   Component  Value Date    TSH 3.570 01/30/2025    N2SNIHP 7.4 11/05/2020         Lab Results   Component Value Date    MTVEYBAO35 >2,000 (H) 10/04/2021         Lab Results   Component Value Date    FOLATE >20.00 10/04/2021         Lab Results   Component Value Date    HGBA1C 6.90 (H) 01/30/2025         Lab Results   Component Value Date    GLUCOSE 115 (H) 01/30/2025    BUN 15 01/30/2025    CREATININE 0.67 01/30/2025    EGFRIFNONA 91 02/17/2022    EGFRIFAFRI 95 01/10/2022    BCR 22.4 01/30/2025    K 4.4 01/30/2025    CO2 26.8 01/30/2025    CALCIUM 9.2 01/30/2025    ALBUMIN 3.9 01/30/2025    AST 27 01/30/2025    ALT 20 01/30/2025         Lab Results   Component Value Date    WBC 5.12 02/13/2024    HGB 13.3 02/13/2024    HCT 40.4 02/13/2024    MCV 97.1 (H) 02/13/2024     02/13/2024             Assessment:   1.  Diabetic peripheral neuropathy-likely progressed since 2021  2.  History of IBM-I seriously doubt that she has this diagnosis.  3.  Rheumatoid arthritis-not better on methotrexate and so was stopped  4.  Bilateral triceps weakness without neck pain          Plan:  1.  Check sed rate, CK, IBM antibody, SPE/IEP.  2.  To follow-up with Jennifer Malik in about 1 month.  3.  Hold on MRI of cervical spine for now.    Time: 30 minutes                  Dictated utilizing Dragon dictation.

## 2025-02-25 ENCOUNTER — LAB (OUTPATIENT)
Dept: LAB | Facility: HOSPITAL | Age: 85
End: 2025-02-25
Payer: MEDICARE

## 2025-02-25 ENCOUNTER — TELEPHONE (OUTPATIENT)
Dept: ENDOCRINOLOGY | Age: 85
End: 2025-02-25

## 2025-02-25 DIAGNOSIS — D47.2 MGUS (MONOCLONAL GAMMOPATHY OF UNKNOWN SIGNIFICANCE): ICD-10-CM

## 2025-02-25 DIAGNOSIS — M62.81 MUSCLE WEAKNESS: ICD-10-CM

## 2025-02-25 LAB
ALBUMIN SERPL-MCNC: 3.9 G/DL (ref 3.5–5.2)
ALBUMIN/GLOB SERPL: 1.4 G/DL
ALP SERPL-CCNC: 89 U/L (ref 39–117)
ALT SERPL W P-5'-P-CCNC: 18 U/L (ref 1–33)
ANION GAP SERPL CALCULATED.3IONS-SCNC: 9.8 MMOL/L (ref 5–15)
AST SERPL-CCNC: 25 U/L (ref 1–32)
BASOPHILS # BLD AUTO: 0.02 10*3/MM3 (ref 0–0.2)
BASOPHILS NFR BLD AUTO: 0.2 % (ref 0–1.5)
BILIRUB SERPL-MCNC: 0.7 MG/DL (ref 0–1.2)
BUN SERPL-MCNC: 20 MG/DL (ref 8–23)
BUN/CREAT SERPL: 29 (ref 7–25)
CALCIUM SPEC-SCNC: 9 MG/DL (ref 8.6–10.5)
CHLORIDE SERPL-SCNC: 100 MMOL/L (ref 98–107)
CO2 SERPL-SCNC: 27.2 MMOL/L (ref 22–29)
CREAT SERPL-MCNC: 0.69 MG/DL (ref 0.57–1)
DEPRECATED RDW RBC AUTO: 45.3 FL (ref 37–54)
EGFRCR SERPLBLD CKD-EPI 2021: 86.2 ML/MIN/1.73
EOSINOPHIL # BLD AUTO: 0.04 10*3/MM3 (ref 0–0.4)
EOSINOPHIL NFR BLD AUTO: 0.5 % (ref 0.3–6.2)
ERYTHROCYTE [DISTWIDTH] IN BLOOD BY AUTOMATED COUNT: 13.2 % (ref 12.3–15.4)
GLOBULIN UR ELPH-MCNC: 2.8 GM/DL
GLUCOSE SERPL-MCNC: 233 MG/DL (ref 65–99)
HCT VFR BLD AUTO: 40.1 % (ref 34–46.6)
HGB BLD-MCNC: 13.1 G/DL (ref 12–15.9)
IMM GRANULOCYTES # BLD AUTO: 0.03 10*3/MM3 (ref 0–0.05)
IMM GRANULOCYTES NFR BLD AUTO: 0.4 % (ref 0–0.5)
LYMPHOCYTES # BLD AUTO: 1.71 10*3/MM3 (ref 0.7–3.1)
LYMPHOCYTES NFR BLD AUTO: 20.9 % (ref 19.6–45.3)
MCH RBC QN AUTO: 30.1 PG (ref 26.6–33)
MCHC RBC AUTO-ENTMCNC: 32.7 G/DL (ref 31.5–35.7)
MCV RBC AUTO: 92.2 FL (ref 79–97)
MONOCYTES # BLD AUTO: 0.84 10*3/MM3 (ref 0.1–0.9)
MONOCYTES NFR BLD AUTO: 10.3 % (ref 5–12)
NEUTROPHILS NFR BLD AUTO: 5.55 10*3/MM3 (ref 1.7–7)
NEUTROPHILS NFR BLD AUTO: 67.7 % (ref 42.7–76)
NRBC BLD AUTO-RTO: 0 /100 WBC (ref 0–0.2)
PLATELET # BLD AUTO: 256 10*3/MM3 (ref 140–450)
PMV BLD AUTO: 10 FL (ref 6–12)
POTASSIUM SERPL-SCNC: 3.6 MMOL/L (ref 3.5–5.2)
PROT SERPL-MCNC: 6.7 G/DL (ref 6–8.5)
RBC # BLD AUTO: 4.35 10*6/MM3 (ref 3.77–5.28)
SODIUM SERPL-SCNC: 137 MMOL/L (ref 136–145)
WBC NRBC COR # BLD AUTO: 8.19 10*3/MM3 (ref 3.4–10.8)

## 2025-02-25 PROCEDURE — 36415 COLL VENOUS BLD VENIPUNCTURE: CPT

## 2025-02-25 PROCEDURE — 80053 COMPREHEN METABOLIC PANEL: CPT

## 2025-02-25 PROCEDURE — 85025 COMPLETE CBC W/AUTO DIFF WBC: CPT

## 2025-02-25 NOTE — TELEPHONE ENCOUNTER
"    Hub staff attempted to follow warm transfer process and was unsuccessful     Caller: Lissa Ortiz \"Godwin\"    Relationship to patient: Self    Best call back number: [phone]    Patient is needing: PT RETURNING [facility] CALL REGARDING APPT. PLEASE ADVISE.       "

## 2025-02-26 LAB
CK SERPL-CCNC: 85 U/L (ref 26–161)
RHEUMATOID FACT SERPL-ACNC: 24.8 IU/ML

## 2025-02-28 ENCOUNTER — TELEPHONE (OUTPATIENT)
Dept: ENDOCRINOLOGY | Age: 85
End: 2025-02-28
Payer: MEDICARE

## 2025-02-28 LAB
ALBUMIN SERPL ELPH-MCNC: 3.5 G/DL (ref 2.9–4.4)
ALBUMIN/GLOB SERPL: 1.1 {RATIO} (ref 0.7–1.7)
ALPHA1 GLOB SERPL ELPH-MCNC: 0.2 G/DL (ref 0–0.4)
ALPHA2 GLOB SERPL ELPH-MCNC: 0.7 G/DL (ref 0.4–1)
B-GLOBULIN SERPL ELPH-MCNC: 1 G/DL (ref 0.7–1.3)
GAMMA GLOB SERPL ELPH-MCNC: 1.3 G/DL (ref 0.4–1.8)
GLOBULIN SER-MCNC: 3.2 G/DL (ref 2.2–3.9)
IGA SERPL-MCNC: 399 MG/DL (ref 64–422)
IGG SERPL-MCNC: 1265 MG/DL (ref 586–1602)
IGM SERPL-MCNC: 57 MG/DL (ref 26–217)
INTERPRETATION SERPL IEP-IMP: ABNORMAL
KAPPA LC FREE SER-MCNC: 22 MG/L (ref 3.3–19.4)
KAPPA LC FREE/LAMBDA FREE SER: 1.22 {RATIO} (ref 0.26–1.65)
LABORATORY COMMENT REPORT: ABNORMAL
LAMBDA LC FREE SERPL-MCNC: 18 MG/L (ref 5.7–26.3)
M PROTEIN SERPL ELPH-MCNC: 0.2 G/DL
PROT SERPL-MCNC: 6.7 G/DL (ref 6–8.5)

## 2025-02-28 NOTE — TELEPHONE ENCOUNTER
Got paged for critical blood glucose, patient started taking prednisone for ear infection now blood sugar is 519 called and spoke with patient  Regular regimen Ozempic, Toujeo 8 units daily and glipizide 5 mg daily she states that she is having constipation with Ozempic and recurrent UTI she will stop taking Ozempic  The dose of Toujeo is 8 units a day, fasting blood sugars are okay this morning fasting blood sugar was in the 70s  Prednisone can cause postprandial hyperglycemia  Advised to increase the dose of the glipizide to 2 tablets daily stay well-hydrated cut back on carbs  If has nausea, vomiting or abdominal pain needs to go to the ER  Call office if he still has persistent hyperglycemia she may need to start taking fast acting insulin.  She started taking prednisone last week and have to take it for 1 more week

## 2025-03-03 ENCOUNTER — TELEPHONE (OUTPATIENT)
Dept: NEUROLOGY | Facility: CLINIC | Age: 85
End: 2025-03-03

## 2025-03-03 NOTE — TELEPHONE ENCOUNTER
"Caller: Lissa Ortiz \"Godwin\"    Relationship: Self    Best call back number: [phone]    Caller requesting test results: PATIENT    What test was performed: LAB TESTS    When was the test performed: 2/25/25    Where was the test performed[facility]    Additional notes: PATIENT WOULD LIKE TO KNOW IF THE TEST RESULTS FOR HER LAB WORK ARE AVAILABLE AND IF YOU CAN PLEASE CALL HER WITH THAT INFO.      "

## 2025-03-07 ENCOUNTER — OFFICE VISIT (OUTPATIENT)
Dept: CARDIAC REHAB | Facility: HOSPITAL | Age: 85
End: 2025-03-07
Payer: MEDICARE

## 2025-03-07 VITALS
DIASTOLIC BLOOD PRESSURE: 70 MMHG | HEIGHT: 67 IN | HEART RATE: 78 BPM | OXYGEN SATURATION: 99 % | WEIGHT: 142.1 LBS | BODY MASS INDEX: 22.3 KG/M2 | SYSTOLIC BLOOD PRESSURE: 140 MMHG

## 2025-03-07 DIAGNOSIS — J44.9 COPD, SEVERE: Primary | ICD-10-CM

## 2025-03-07 PROCEDURE — 94625 PHY/QHP OP PULM RHB W/O MNTR: CPT

## 2025-03-07 NOTE — PROGRESS NOTES
"Pulmonary Rehab Initial Assessment      Name: Lissa Ortiz  :1940 Allergies:Bactrim [sulfamethoxazole-trimethoprim], Sulfamethoxazole, Trimethoprim, and Misc. sulfonamide containing compounds   MRN: [id number] 84 y.o. Physician: Sveta Schuler MD   Primary Diagnosis:    Diagnosis Plan   1. COPD, severe         Event Date: 3-7-2025 Specialist: Darci Andrews MD   Secondary Diagnosis: Asthma  Note Author: Mari Mata RN     Cardiovascular History: HTN, hyperlipidemia      EXERCISE AT HOME  yes  20min  7 days per week          Ambulatory Status:Independent  Ambulatory Fall Risk Assessed on Initial Visit: yes- pt reports falling often at home and on her walks with the dog. 6 Minute Walk Pre- Pulmonary Rehab:  Distance:1061ft      RPE:2        RPD: 2              MPH: 2.0  Max. HR: 101        SPO2:92    MET: 2.5  Resting BP: 140/70     Peak BP: 140/80  Recovery BP: 138/76  Comments: Pt tolerated exercise well. No complaints noted. Sp02 was 91-97% with exercise.       NUTRITION  Lipids:yes If yes, labs as follows;  Total: No components found for: \"CHOLESTEROL\"  HDL:   HDL Cholesterol   Date Value Ref Range Status   2025 63 (H) 40 - 60 mg/dL Final    Lipids continued:  LDL:  LDL Chol Calc (NIH)   Date Value Ref Range Status   2025 97 0 - 100 mg/dL Final     Triglyceride: No components found for: \"TRIGLYCERIDE\"   Weight Management:                 Weight: 142.1 lbs  Height: 67 inches                                   BMI: Body mass index is 22.26 kg/m².  Waist Circumference: n/a inches   Alcohol Use: social drinker Diabetes:Yes,  Monitors BS at home- yes, Frequency: 3 times daily, Random BS: 56    Last HGBA1C with date if applicable:No components found for: \"A1C\"         SOCIAL HISTORY  Social History     Socioeconomic History    Marital status:     Number of children: 3   Tobacco Use    Smoking status: Never     Passive exposure: Past (MOTHER)    Smokeless tobacco: Never   Vaping Use    " Vaping status: Never Used   Substance and Sexual Activity    Alcohol use: Not Currently     Comment: Occasional glass of wine one per month    Drug use: No    Sexual activity: Not Currently     Birth control/protection: Tubal ligation, Hysterectomy    Learning Barriers:Ready to Learn  Family Support:yes  Living Arrangement: lives alone Tobacco Adjunct:no  Do you live with a smoker: no     PSYCHOSOCIAL  Clinical Depression: no    Stress: no     Assess presence or absence of depression using a valid screening tool: no      Assessment: Pt is alert and oriented x3           Are you being hurt, hit, or freightened by anyone at home or in your life? no    Are you being neglected by a caregiver? No Shoulder flexibility/Range of motion: Excellent     Recommended arm activity: Any    Chair sit and reach within: 0 inches   Leg flexibility: Excellent    Leg Strength/Balance/Five times sit to stand: 16 seconds.   Pt used upper body support    Recommended stretching: Chair   Balance: Balance Dysfunction Assessment: Lungs clear bilat. Regular heart rate and rhythm noted.     Family attends [place]: no      COMORBIDITIES  Sleep Apnea: no If yes, Choose: N/A    Cancer: yes skin    Stroke: no   Pneumonia: no If yes, how many times n/a    Osteoporosis: no    GI Problems: yes side effect from Ozempic Frequent colds/allergies: yes allergies    Other illnesses, surgeries, or comments Vit D deficiency, hypothyroidism, MGUS, inclusion body myositis, RA, RLS, peripheral neuropathy    PAIN:  Are you having pain? no  If yes where is pain? N/a If yes, pain scale:  n/a      PULMONARY:  Do you use a nebulizer?: no   If yes, n/a    Do you use oxygen at home?: no  If yes, amount n/a    With rest: no  With activity: no Do you have a daily cough?: no  If yes, choose:  n/a    Do you every notice yourself wheezing?: yes  If yes, when: with cold air Other pulmonary/breathing problems?: no   OTHER:  Do you have physical limitations?: no  If yes, n/a    Do  you need assistance with ADLs?: no  If yes, n/a Do you climb stairs at home?:yes  If yes, how many times occasionally    Have you ever attended a pulmonary rehab?: yes  If yes, April 2024 [facility] Dyspnea Scale: 0 - 4:  2 = walks slower than people of the same age on the level because of breathlessness, or has to stop for breath when walking at own pace on the level     Patient Goals: Pt would like to be able to move easier, She would like to breathe better. Pt would like to climb stairs with less SOA.  She would like to do more around the house with less SOA.     DISCHARGE PLANNING:  Do you have any home exercise equipment?: yes  Elliptical     What are you plans for continuing exercise after completion of pulmonary rehab? Pt plans to continue her exercise at home     EDUCATION:  Pursed - lip breathing, Diaphragmatic breathing, Relaxation techniques, and Program information folder       PRE-PROGRAM ASSESSMENT:  PFT Date: 1-    FEV1/FVC: 66 FEV1: 36    FVC: 40 DLCO: 71     Time of arrival: 9:00    Time of departure: 10:15           3/7/2025  09:10 PAUL Mata RN

## 2025-03-10 ENCOUNTER — TELEPHONE (OUTPATIENT)
Dept: NEUROLOGY | Facility: CLINIC | Age: 85
End: 2025-03-10

## 2025-03-10 NOTE — TELEPHONE ENCOUNTER
"  Caller: Lissa Ortiz \"Godwin\"    Relationship: Self    Best call back number: [phone] (home)     Caller requesting test results: SELF    What test was performed: BLOOD WORK - MYOSITIS TESTING    When was the test performed: TWO WEEKS AGO     Where was the test performed:     Additional noteS: SHE STATED SHE CAN'T GET THE RESULT SON RUY TO SHOW AND ASKS FOR FAITH TO CALL AND GO OVER THEM.   "

## 2025-03-11 ENCOUNTER — TREATMENT (OUTPATIENT)
Dept: CARDIAC REHAB | Facility: HOSPITAL | Age: 85
End: 2025-03-11
Payer: MEDICARE

## 2025-03-11 DIAGNOSIS — J44.9 COPD, SEVERE: Primary | ICD-10-CM

## 2025-03-11 PROCEDURE — 94625 PHY/QHP OP PULM RHB W/O MNTR: CPT

## 2025-03-13 ENCOUNTER — TREATMENT (OUTPATIENT)
Dept: CARDIAC REHAB | Facility: HOSPITAL | Age: 85
End: 2025-03-13
Payer: MEDICARE

## 2025-03-13 DIAGNOSIS — J44.9 COPD, SEVERE: Primary | ICD-10-CM

## 2025-03-13 PROCEDURE — 94625 PHY/QHP OP PULM RHB W/O MNTR: CPT

## 2025-03-18 ENCOUNTER — TREATMENT (OUTPATIENT)
Dept: CARDIAC REHAB | Facility: HOSPITAL | Age: 85
End: 2025-03-18
Payer: MEDICARE

## 2025-03-18 DIAGNOSIS — J44.9 COPD, SEVERE: Primary | ICD-10-CM

## 2025-03-18 PROCEDURE — 94625 PHY/QHP OP PULM RHB W/O MNTR: CPT

## 2025-03-19 LAB — REF LAB TEST RESULTS: NORMAL

## 2025-03-20 ENCOUNTER — TREATMENT (OUTPATIENT)
Dept: CARDIAC REHAB | Facility: HOSPITAL | Age: 85
End: 2025-03-20
Payer: MEDICARE

## 2025-03-20 ENCOUNTER — TELEPHONE (OUTPATIENT)
Dept: NEUROLOGY | Facility: CLINIC | Age: 85
End: 2025-03-20
Payer: MEDICARE

## 2025-03-20 ENCOUNTER — OFFICE VISIT (OUTPATIENT)
Dept: ONCOLOGY | Facility: CLINIC | Age: 85
End: 2025-03-20
Payer: MEDICARE

## 2025-03-20 VITALS
BODY MASS INDEX: 22.11 KG/M2 | HEIGHT: 67 IN | OXYGEN SATURATION: 97 % | TEMPERATURE: 97.2 F | WEIGHT: 140.9 LBS | SYSTOLIC BLOOD PRESSURE: 153 MMHG | HEART RATE: 76 BPM | DIASTOLIC BLOOD PRESSURE: 77 MMHG

## 2025-03-20 DIAGNOSIS — J44.9 COPD, SEVERE: Primary | ICD-10-CM

## 2025-03-20 DIAGNOSIS — D47.2 MGUS (MONOCLONAL GAMMOPATHY OF UNKNOWN SIGNIFICANCE): ICD-10-CM

## 2025-03-20 PROCEDURE — 94625 PHY/QHP OP PULM RHB W/O MNTR: CPT

## 2025-03-20 NOTE — TELEPHONE ENCOUNTER
Spoke to patient at , patient requested results for myositis to be relayed via phone call, please advise.

## 2025-03-20 NOTE — PROGRESS NOTES
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 provider found  RECORDS OBTAINED:  Records of the patients history including those from the electronic medical record were reviewed and summarized in detail.    HISTORY OF PRESENT ILLNESS:    The patient is a 84 y.o. year old female with past medical history significant for DM2 (over 40 years), inclusion body myositis (diagnosed 10 yrs ago, on methotrexate), hypothyroidism, & peripheral neuropathy who had presented to her neurologist,  with signs/symptoms of bilateral feet neuropathy. During this work up, SPEP/VIGNESH from October 2021 showed presence of IgA kappa M-protein, 0.2 gm/dl.   Patient c/o b/l feet neuropathy, along with generalized weakness, which she attributes to inclusion body myositis.   Denies any specific bone pain. On labs, no cytopenias. Renal function & calcium levels within normal limits.   No fever, chills, chest pain, cough or weight loss. No palpable lymphadenopathy. Has generalized weakness secondary to inclusion body myositis. Denies smoking, alcohol or drug abuse.   No family history of blood or bone marrow disorder.     February 2022: SPEP/VIGNESH shows IgA kappa M protein, 0.3 mg/dl.  Normal FLC ratio  July 2022: SPEP/VIGNESH does not show any M protein.  Normal FLC ratio.  CBC and CMP unremarkable.  January 2023: SPEP/VIGNESH shows faint IgA kappa M protein.  Not quantifiable.  Normal FLC ratio.  CBC and CMP unremarkable as well.    INTERIM HISTORY:  Patient is an 83-year-old female with the above-mentioned history who returns for 1 year follow-up on 3/20/2025 for lab review and evaluation.  She continues to struggle with neuropathy symptoms, and continues on gabapentin 300 mg, which she takes at bedtime.  She states she takes Tylenol PM at night which also helps her symptoms.  She is  followed closely by rheumatology, Dr. Chow, however stopped taking methotrexate in 2024.  Not on any active treatment for methotrexate or inclusion body myositis.  She maintains close follow-up with Dr. Luciano, neurology for neuropathy.    She denies any new problems or concerns today.      Past Medical History:   Diagnosis Date    Acquired hypothyroidism 05/19/2021    Allergic rhinitis     Arthritis     Cancer     skin leg    Cataract     Diabetes mellitus     type 2    Hypertension     Long sleeper     post anesthesia    Type 2 diabetes mellitus 1980b     Past Surgical History:   Procedure Laterality Date    COLONOSCOPY N/A 02/13/2018    Procedure: COLONOSCOPY to cecum and TI with hot snare polypectomy;  Surgeon: Rowan Smith MD;  Location:  [facility];  Service:     COLONOSCOPY  05/03/2023    Sarah    COLONOSCOPY N/A 05/03/2023    Procedure: COLONOSCOPY TO CECUM AND TERMINAL ILEUM;  Surgeon: Rowan Smith MD;  Location: [facility];  Service: Gastroenterology;  Laterality: N/A;  HISTORY OF COLON POLYPS  HEMORRHOIDS    EYE SURGERY      tearduct sgy    EYE SURGERY      MARY KAY CATARACTS    HYSTERECTOMY      TOTAL    TONSILLECTOMY         MEDICATIONS    Current Outpatient Medications:     amLODIPine (NORVASC) 2.5 MG tablet, TAKE 1 TABLET BY MOUTH DAILY, Disp: 90 tablet, Rfl: 0    azelastine (ASTELIN) 0.1 % nasal spray, Administer 2 sprays into the nostril(s) as directed by provider 2 (Two) Times a Day. Use in each nostril as directed, Disp: 2 each, Rfl: 5    Blood Glucose Monitoring Suppl (TRUE METRIX AIR GLUCOSE METER) device, 1 Device Daily As Needed (check bs's twice daily)., Disp: 1 Device, Rfl: 0    Ca Phosphate-Cholecalciferol (Caltrate Gummy Bites) 250-10 MG-MCG chewable tablet, Chew Daily., Disp: , Rfl:     dorzolamide-timolol (COSOPT) 22.3-6.8 MG/ML ophthalmic solution, Apply  to eye(s) as directed by provider Every 12 (Twelve) Hours., Disp: , Rfl:     gabapentin (NEURONTIN) 300 MG capsule,  "TAKE 1 CAPSULE BY MOUTH 3 TIMES A DAY, Disp: 90 capsule, Rfl: 2    glipizide (GLUCOTROL XL) 5 MG ER tablet, TAKE 1 TABLET BY MOUTH EVERY MORNING WITH A MEAL, Disp: 90 tablet, Rfl: 1    Insulin Glargine, 2 Unit Dial, (Toujeo Gomez SoloStar) 300 UNIT/ML solution pen-injector injection, Inject 8 Units under the skin into the appropriate area as directed Daily. Indications: Type 2 Diabetes, Disp: 6 mL, Rfl: 0    Insulin Pen Needle (BD Pen Needle Vicenta 2nd Gen) 32G X 4 MM misc, 1 each by Other route Daily. use to inject insulin once daily, Disp: 100 each, Rfl: 3    levothyroxine (SYNTHROID, LEVOTHROID) 25 MCG tablet, TAKE 1 TABLET BY MOUTH DAILY, Disp: 90 tablet, Rfl: 0    Mirabegron ER (Myrbetriq) 50 MG tablet sustained-release 24 hour 24 hr tablet, Take 50 mg by mouth Daily., Disp: , Rfl:     Multiple Vitamin (MULTI VITAMIN PO), Take 1 tablet by mouth Daily., Disp: , Rfl:     pioglitazone (ACTOS) 15 MG tablet, TAKE 1 TABLET BY MOUTH DAILY, Disp: 90 tablet, Rfl: 1    pravastatin (PRAVACHOL) 20 MG tablet, Take 1 tablet by mouth Daily., Disp: 90 tablet, Rfl: 4    Probiotic capsule, Take 1 capsule by mouth., Disp: , Rfl:     Semaglutide,0.25 or 0.5MG/DOS, (OZEMPIC) 2 MG/3ML solution pen-injector, Inject 0.5 mg under the skin into the appropriate area as directed 1 (One) Time Per Week., Disp: 3 mL, Rfl: 5    Tafluprost, PF, (ZIOPTAN) 0.0015 % solution ophthalmic solution, 1 drop Every Night., Disp: , Rfl:     Tuberculin Syringe (B-D TB SYRINGE 1CC/27GX1/2\") 27G X 1/2\" 1 ML misc, USE WITH METHOTREXATE ONCE WEEKLY, Disp: 10 each, Rfl: 0    vitamin C (ASCORBIC ACID) 250 MG tablet, Take 1 tablet by mouth Daily., Disp: , Rfl:     ciprofloxacin-dexAMETHasone (CIPRODEX) 0.3-0.1 % otic suspension, , Disp: , Rfl:     folic acid (FOLVITE) 1 MG tablet, Take 1 tablet by mouth Daily., Disp: , Rfl:     magnesium, as, gluconate (MAGONATE) 500 (27 Mg) MG tablet, Take  by mouth Daily., Disp: , Rfl:     predniSONE (DELTASONE) 20 MG tablet, , " "Disp: , Rfl:     ALLERGIES:     Allergies   Allergen Reactions    Bactrim [Sulfamethoxazole-Trimethoprim] Nausea And Vomiting    Sulfamethoxazole Nausea Only    Trimethoprim Nausea Only    Misc. Sulfonamide Containing Compounds Unknown - High Severity       SOCIAL HISTORY:       Social History     Socioeconomic History    Marital status:     Number of children: 3   Tobacco Use    Smoking status: Never     Passive exposure: Past (MOTHER)    Smokeless tobacco: Never   Vaping Use    Vaping status: Never Used   Substance and Sexual Activity    Alcohol use: Not Currently     Comment: Occasional glass of wine one per month    Drug use: No    Sexual activity: Not Currently     Birth control/protection: Tubal ligation, Hysterectomy         FAMILY HISTORY:  Family History   Problem Relation Age of Onset    Diabetes Mother     Stroke Mother     Coronary artery disease Father         MI    Diabetes Father     Stroke Father     Hypertension Father     Thyroid cancer Daughter        REVIEW OF SYSTEMS:  ROS as per HPI       Vitals:    03/20/25 1249   BP: 153/77   Pulse: 76   Temp: 97.2 °F (36.2 °C)   TempSrc: Oral   SpO2: 97%   Weight: 63.9 kg (140 lb 14.4 oz)   Height: 170.2 cm (67.01\")   PainSc: 0-No pain             3/20/2025    12:49 PM   Current Status   ECOG score 0   Physical Exam  Vitals reviewed.   Constitutional:       General: She is not in acute distress.     Appearance: Normal appearance. She is well-developed.   HENT:      Head: Normocephalic and atraumatic.   Eyes:      Pupils: Pupils are equal, round, and reactive to light.   Cardiovascular:      Rate and Rhythm: Normal rate and regular rhythm.      Heart sounds: Normal heart sounds. No murmur heard.  Pulmonary:      Effort: Pulmonary effort is normal. No respiratory distress.      Breath sounds: Normal breath sounds. No wheezing, rhonchi or rales.   Abdominal:      General: Bowel sounds are normal. There is no distension.      Palpations: Abdomen is soft. " "  Musculoskeletal:         General: Normal range of motion.      Cervical back: Normal range of motion.   Skin:     General: Skin is warm and dry.      Findings: No rash.   Neurological:      Mental Status: She is alert and oriented to person, place, and time.       RECENT LABS:        No visits with results within 7 Day(s) from this visit.   Latest known visit with results is:   Admission on 02/26/2025, Discharged on 02/26/2025   Component Date Value Ref Range Status    Color 02/26/2025 Yellow   Final    Clarity, UA 02/26/2025 Cloudy (A)   Final    Glucose, UA 02/26/2025 1000 mg/dL (A)  mg/dL Final    Bilirubin 02/26/2025 Negative   Final    Ketones, UA 02/26/2025 Negative   Final    Specific Gravity  02/26/2025 1.020  1.005 - 1.030 Final    Blood, UA 02/26/2025 Large (A)   Final    pH, Urine 02/26/2025 6.0  5.0 - 8.0 Final    Protein, POC 02/26/2025 30 mg/dL (A)  mg/dL Final    Urobilinogen, UA 02/26/2025 0.2 E.U./dL   Final    Nitrite, UA 02/26/2025 Positive (A)   Final    Leukocytes 02/26/2025 Small (1+) (A)   Final     ASSESSMENT:   is a pleasant 81 y/o WF with past medical history significant for DM2 (over 40 years), inclusion body myositis (diagnosed 10 yrs ago, on methotrexate), hypothyroidism, & peripheral neuropathy comes to this clinic for monoclonal gammopathy evaluation.     # Monoclonal gammopathy:  Patient had presented to her neurologist,  with signs/symptoms of bilateral feet neuropathy. During this work up SPEP/VIGNESH was performed which showed presence of IgA kappa M-protein, 0.2 gm/dl.   Patient c/o b/l feet neuropathy, along with generalized weakness, which she attributes to inclusion body myositis.   Denies any specific bone pain. On labs, no cytopenias. Renal function & calcium levels within normal limits. No B-symptoms.   Informed patient that with low level of IgA M-protein & no \"CRAB\" features, this is likely MGUS, low-intermediate risk. Informed patient that MGUS is a common " finding in elderly population, however,  there is an 1% per year risk of progression to multiple myeloma.   A repeat SPEP/VIGNESH from Feb 2022 show stable IgA kappa M-protein level of 0.3 gm/dl. No symptoms concerning for progression.   However repeat SPEP/VIGNESH from July 2022 does not show any M protein or abnormal FLC ratio.  Other labs unremarkable as well.  Informed patient this this could be seen with transient inflammatory conditions that could have led to abnormal M protein.  January 2023: Repeat labs again show faint levels of IgA kappa monoclonal protein.  Rest of the labs unremarkable.    Her peripheral neuropathy is unlikely related to monoclonal gammopathy. Suspect diabetic neuropathy. Additional work up being performed by neurology.   3/20/2025: Clinically stable.  Neuropathy symptoms unchanged.  SPEP/VIGNESH stable, M protein of 0.2 g/dL.  Will continue to closely monitor and repeat labs in 1 years time.    # Inclusion body myositis: On MTX 7.5 mg weekly. Follows up with   # DM2: On insulin, januvia & glipizide.   # Hypothyrodism: On synthroid  # peripheral neuropathy: Is on gabapentin 300 mg 3 times daily.    PLAN:   - IgA kappa MGUS. Unlikely attributing to peripheral neuropathy. Repeat labs from 2025 continues to show a very low level of IgA kappa M protein-which has been stable.  Essentially unchanged.  -Continue active surveillance with repeat labs in 12 months.  -Continue close follow-up with her PCP and other specialist.  -Call/ return sooner should the patient develop any new concerns or problems.    Orders Placed This Encounter   Procedures    VIGNESH, PE & Free LT Chains, Ser     Standing Status:   Future     Number of Occurrences:   1     Expected Date:   2/25/2025     Expiration Date:   4/26/2026     Release to patient:   Routine Release [[id number]]    Comprehensive Metabolic Panel     Standing Status:   Future     Number of Occurrences:   1     Expected Date:   2/25/2025     Expiration Date:    4/26/2026     Release to patient:   Routine Release [[id number]]    Comprehensive Metabolic Panel     Standing Status:   Future     Expected Date:   3/19/2026     Expiration Date:   6/20/2026     Release to patient:   Routine Release [[id number]]    Immunofixation (VIGNESH), Protein Electrophoresis (PE), and Quantitative Free Kappa and Lambda Light Chains (FLC), Serum     Standing Status:   Future     Expected Date:   3/19/2026     Expiration Date:   6/20/2026     Release to patient:   Routine Release [[id number]]    CBC & Differential     Standing Status:   Future     Number of Occurrences:   1     Expected Date:   2/25/2025     Expiration Date:   4/26/2026     Manual Differential:   No     Release to patient:   Routine Release [[id number]]    CBC & Differential     Standing Status:   Future     Expected Date:   3/19/2026     Expiration Date:   6/20/2026     Manual Differential:   No     Release to patient:   Routine Release [[id number]]   Total time spent during this patient encounter is 31 minutes. The total time spent with the patient includes: preparing to see the patient by reviewing of tests, prior notes or other relevant information, performing appropriate independent examination & evaluation, counseling, ordering of medications, tests or procedures, documenting clinic information in the electronic medical records or other health records, independently interpreting results of tests and communicating the results to the patient/family or caregiver.

## 2025-03-25 ENCOUNTER — TREATMENT (OUTPATIENT)
Dept: CARDIAC REHAB | Facility: HOSPITAL | Age: 85
End: 2025-03-25
Payer: MEDICARE

## 2025-03-25 DIAGNOSIS — E78.2 MIXED HYPERLIPIDEMIA: Chronic | ICD-10-CM

## 2025-03-25 DIAGNOSIS — J44.9 COPD, SEVERE: Primary | ICD-10-CM

## 2025-03-25 PROCEDURE — 94625 PHY/QHP OP PULM RHB W/O MNTR: CPT

## 2025-03-25 RX ORDER — PRAVASTATIN SODIUM 20 MG
20 TABLET ORAL DAILY
Qty: 90 TABLET | Refills: 4 | Status: SHIPPED | OUTPATIENT
Start: 2025-03-25 | End: 2025-03-25 | Stop reason: SDUPTHER

## 2025-03-25 RX ORDER — PRAVASTATIN SODIUM 20 MG
20 TABLET ORAL DAILY
Qty: 90 TABLET | Refills: 1 | Status: SHIPPED | OUTPATIENT
Start: 2025-03-25

## 2025-03-25 NOTE — TELEPHONE ENCOUNTER
"    Caller: Lissa Ortiz \"Godwin\"    Relationship: Self    Best call back number: [phone]     Requested Prescriptions:   Requested Prescriptions     Pending Prescriptions Disp Refills    pravastatin (PRAVACHOL) 20 MG tablet 90 tablet 4     Sig: Take 1 tablet by mouth Daily.        Pharmacy where request should be sent: [facility] [id number] [address] AT [facility] [phone] [facility] [phone]      Last office visit with prescribing clinician: 1/3/2025   Last telemedicine visit with prescribing clinician: Visit date not found   Next office visit with prescribing clinician: 7/7/2025     Additional details provided by patient: NO MEDICATION LEFT/REQUESTING 3 MONTH SUPPLY     Does the patient have less than a 3 day supply:  [x] Yes  [] No    Would you like a call back once the refill request has been completed: [] Yes [x] No    If the office needs to give you a call back, can they leave a voicemail: [] Yes [x] No    Екатерина Santillan Rep   03/25/25 12:29 EDT         "

## 2025-03-27 ENCOUNTER — TREATMENT (OUTPATIENT)
Dept: CARDIAC REHAB | Facility: HOSPITAL | Age: 85
End: 2025-03-27
Payer: MEDICARE

## 2025-03-27 DIAGNOSIS — J44.9 COPD, SEVERE: Primary | ICD-10-CM

## 2025-03-27 PROCEDURE — 94625 PHY/QHP OP PULM RHB W/O MNTR: CPT

## 2025-04-01 ENCOUNTER — TREATMENT (OUTPATIENT)
Dept: CARDIAC REHAB | Facility: HOSPITAL | Age: 85
End: 2025-04-01
Payer: MEDICARE

## 2025-04-01 DIAGNOSIS — J44.9 COPD, SEVERE: Primary | ICD-10-CM

## 2025-04-01 PROCEDURE — 94625 PHY/QHP OP PULM RHB W/O MNTR: CPT

## 2025-04-03 ENCOUNTER — TREATMENT (OUTPATIENT)
Dept: CARDIAC REHAB | Facility: HOSPITAL | Age: 85
End: 2025-04-03
Payer: MEDICARE

## 2025-04-03 DIAGNOSIS — J44.9 COPD, SEVERE: Primary | ICD-10-CM

## 2025-04-03 PROCEDURE — 94625 PHY/QHP OP PULM RHB W/O MNTR: CPT

## 2025-04-08 ENCOUNTER — TREATMENT (OUTPATIENT)
Dept: CARDIAC REHAB | Facility: HOSPITAL | Age: 85
End: 2025-04-08
Payer: MEDICARE

## 2025-04-08 DIAGNOSIS — J44.9 COPD, SEVERE: Primary | ICD-10-CM

## 2025-04-08 PROCEDURE — 94625 PHY/QHP OP PULM RHB W/O MNTR: CPT

## 2025-04-10 ENCOUNTER — TREATMENT (OUTPATIENT)
Dept: CARDIAC REHAB | Facility: HOSPITAL | Age: 85
End: 2025-04-10
Payer: MEDICARE

## 2025-04-10 DIAGNOSIS — J44.9 COPD, SEVERE: Primary | ICD-10-CM

## 2025-04-10 PROCEDURE — 94625 PHY/QHP OP PULM RHB W/O MNTR: CPT

## 2025-04-15 ENCOUNTER — TREATMENT (OUTPATIENT)
Dept: CARDIAC REHAB | Facility: HOSPITAL | Age: 85
End: 2025-04-15
Payer: MEDICARE

## 2025-04-15 DIAGNOSIS — J44.9 COPD, SEVERE: Primary | ICD-10-CM

## 2025-04-15 PROCEDURE — 94625 PHY/QHP OP PULM RHB W/O MNTR: CPT

## 2025-04-17 ENCOUNTER — TREATMENT (OUTPATIENT)
Dept: CARDIAC REHAB | Facility: HOSPITAL | Age: 85
End: 2025-04-17
Payer: MEDICARE

## 2025-04-17 DIAGNOSIS — J44.9 COPD, SEVERE: Primary | ICD-10-CM

## 2025-04-17 PROCEDURE — 94625 PHY/QHP OP PULM RHB W/O MNTR: CPT

## 2025-04-22 ENCOUNTER — TREATMENT (OUTPATIENT)
Dept: CARDIAC REHAB | Facility: HOSPITAL | Age: 85
End: 2025-04-22
Payer: MEDICARE

## 2025-04-22 DIAGNOSIS — J44.9 COPD, SEVERE: Primary | ICD-10-CM

## 2025-04-22 PROCEDURE — 94625 PHY/QHP OP PULM RHB W/O MNTR: CPT

## 2025-04-24 ENCOUNTER — TREATMENT (OUTPATIENT)
Dept: CARDIAC REHAB | Facility: HOSPITAL | Age: 85
End: 2025-04-24
Payer: MEDICARE

## 2025-04-24 DIAGNOSIS — J44.9 COPD, SEVERE: Primary | ICD-10-CM

## 2025-04-24 PROCEDURE — 94625 PHY/QHP OP PULM RHB W/O MNTR: CPT

## 2025-04-28 NOTE — PROGRESS NOTES
"CC: Follow-up f/t neuropathy    HPI:  Lissa Ortiz is a  83 y.o. right-handed female here for follow-up regarding neuropathy.  Reviewed prior documentation and made edits where needed:     She has rheumatoid arthritis and previously was on long-term DMARD therapy with methotrexate, type 2 diabetes, hypertension, hyperlipidemia, hypothyroidism, cataracts.  [facility] and she carries the diagnosis of inclusion body myositis.  Mostly her complaints are of muscle weakness and not being able to play tennis anymore.  She does have discomforting paresthesias just of feet and toes.  Gabapentin helps this.  This was increased to 600 mg at bedtime.  Other doses made her dizzy.  She also is describing sensation to move legs that occurs both at night and during the day.  Gabapentin helps     Work-up to date has included an EMG which was performed on 10/4/2021 and revealed mild evidence of peripheral neuropathy.  Some irritability is seen in the left tibialis anterior without any other features of a lumbosacral radiculopathy.  Specifically there was no evidence of a myopathy on this study.  Laboratory studies for treatable causes of neuropathy included the following:     RPR nonreactive  Cryoglobulins negative  Copper normal at 140  Heavy metals including lead arsenic and mercury all within normal limits  Sed rate normal at 5  Vitamin B12 and folate normal at greater than 2000 and greater than 20 respectively  CK level normal at 82  Protein electrophoresis did reveal an M spike, immunofixation showed presence of IgA kappa M-protein, 0.2 gm/dl     She was not able to get her methotrexate and noticed no difference in her symptoms.  She feels as though a strong wind will knock her over\".  No falls.  She was helping her son who had had a stroke last spring.  For several weeks she had burning of bilateral thighs.  At last appt I detected weakness of R leg mostly.  MRI L spine was done and there is no " "significant spinal stenosis or findings of radic.  Remember her EMG in 2021 showed mild irritability of TA on L but no evidence of myopathy.      Her symptoms are pretty much the same.  She has trouble getting up from chair and feels unsteady with her walk.  She has pain in muscles and joints.  At night she gets burning type pain in b/l upper thighs.  If she doesn't take gabapentin she is up all night with leg pain and urge to move legs.  Also she does not notice any difference in her symptoms while on methotrexate so she has stopped this all together.  No problems swallowing, double vision.  She has h/o hearing loss and hearing aids.  More recently problems with tinnitus.  A1C improved to 6.9 from 8.6.      She last saw Dr Luciano who detected mild tricep weakness.  No signs of myelopathy on exam.  She had negative IBM ab testing and also normal sed and CK.  She has breakthrough neuropathy symptoms more in the day-says feet feel swollen.  Night time is more bothersome.  She is in pulmonary rehab but not physical therapy.  She will visit her sister in [place] next week      Social history:    Family history:      Pain Scale:        ROS:  Review of Systems   Constitutional:  Positive for fatigue.   Musculoskeletal:  Positive for gait problem (balance problems, without falls since LOV).   Neurological:  Negative for numbness (feet and toes-same).       Reviewed ROS conducted by MA and gisele        Physical Exam:      4/29/2025     11:16 AM      /58   Heart Rate 81   Weight 64 kg (141 lb)   Height 170.2 cm (67.01\")   SpO2 94 %              General: pt well appearing, no distress  HEENT: Normocephalic, conjunctiva normal, external canals normal, no nasal discharge, moist mucous membranes, on L equal hearing of bone and air conduction  Neck: No lymphadenopathy, thyroid not enlarged, no JVD  CV: Regular rate and rhythm, no murmurs negative no bruits auscultated at neck, equal pulses  Pulmonary: Normal respiratory effort, " clear to auscultation bilaterally  Extremities: no edema, bruising, or skin lesions  Pysch: good eye contact, cooperative, full affect, euthymic mood, good attention, good insight  Mental: alert, conversant, AOx3, provides history, 3/3 recall.  Language fluent, names, repeats.  CN: CN II-XII intact, PERRL, EOMi, no gaze palsy or nystagmus, intact facial sensation, no facial assymetry, intact hearing, symmetric palate elevation, tongue midline, good SCM strength  Motor: no abnormal movements, no pronator drift, normal bulk and tone, no fasciculation or noted atrophy, on R 4+/5 IP otherwise 5/5 UE,  Sensory: loss to crude touch, temp, and no vibratory sense of b/l LE  Reflexes: 2+ UE, trace at patellas  Coordination: no ataxia on finger-nose  Gait: normal gait, no ataxia, slight sway with romberg        Results:      Lab Results   Component Value Date    GLUCOSE 139 (H) 09/05/2023    BUN 15 09/05/2023    CREATININE 0.59 09/05/2023    EGFRIFNONA 91 02/17/2022    EGFRIFAFRI 95 01/10/2022    BCR 25 09/05/2023    CO2 24 09/05/2023    CALCIUM 8.7 09/05/2023    PROTENTOTREF 6.6 09/05/2023    ALBUMIN 4.0 09/05/2023    LABIL2 1.5 09/05/2023    AST 28 09/05/2023    ALT 23 09/05/2023       Lab Results   Component Value Date    WBC 5.55 01/12/2023    HGB 13.4 01/12/2023    HCT 40.4 01/12/2023    MCV 96.0 01/12/2023     01/12/2023         .  Lab Results   Component Value Date    RPR Non-Reactive 10/04/2021         Lab Results   Component Value Date    TSH 2.510 09/05/2023    E3CYGHR 7.4 11/05/2020         Lab Results   Component Value Date    [id number] >2,000 (H) 10/04/2021         Lab Results   Component Value Date    FOLATE >20.00 10/04/2021         Lab Results   Component Value Date    HGBA1C 7.3 (H) 09/05/2023         Lab Results   Component Value Date    GLUCOSE 139 (H) 09/05/2023    BUN 15 09/05/2023    CREATININE 0.59 09/05/2023    EGFRIFNONA 91 02/17/2022    EGFRIFAFRI 95 01/10/2022    BCR 25 09/05/2023    K 4.1  09/05/2023    CO2 24 09/05/2023    CALCIUM 8.7 09/05/2023    PROTENTOTREF 6.6 09/05/2023    ALBUMIN 4.0 09/05/2023    LABIL2 1.5 09/05/2023    AST 28 09/05/2023    ALT 23 09/05/2023         Diagnosis:  1.  Polyneuropathy  2   MGUS  3   RA  4   Inclusion body myositis  5   tinnitus     Impression: 84-year-old right-handed female with past medical history as above and also hypertension, hyperlipidemia, hypothyroidism, diabetes, rheumatoid arthritis previously on methotrexate, mixed anxiety and depression, and thought of inclusion body myositis, but antibody negative. She is here for follow-up f/t neuropathy.  Her neuropathy is slowly progressive and felt likely diabetic in etiology.   She is describing sxs c/w PLMD but tolerable.  Exam is static, sxs more bothersome.  Modest increase in gabapentin today    F/u in 6 mos      I spent at least 60 minutes interviewing, examining, and counseling patient.  I independently reviewed documentation, laboratory and diagnostic findings, external documentation where applicable, and formulated treatment plan which was discussed with the patient.  Impression:

## 2025-04-29 ENCOUNTER — TREATMENT (OUTPATIENT)
Dept: CARDIAC REHAB | Facility: HOSPITAL | Age: 85
End: 2025-04-29
Payer: MEDICARE

## 2025-04-29 ENCOUNTER — OFFICE VISIT (OUTPATIENT)
Dept: NEUROLOGY | Facility: CLINIC | Age: 85
End: 2025-04-29
Payer: MEDICARE

## 2025-04-29 VITALS
DIASTOLIC BLOOD PRESSURE: 58 MMHG | HEIGHT: 67 IN | SYSTOLIC BLOOD PRESSURE: 118 MMHG | OXYGEN SATURATION: 94 % | WEIGHT: 141 LBS | BODY MASS INDEX: 22.13 KG/M2 | HEART RATE: 81 BPM

## 2025-04-29 DIAGNOSIS — M54.16 LUMBAR RADICULOPATHY: ICD-10-CM

## 2025-04-29 DIAGNOSIS — G62.9 POLYNEUROPATHY: Primary | ICD-10-CM

## 2025-04-29 DIAGNOSIS — Z87.39 HISTORY OF RHEUMATOID ARTHRITIS: ICD-10-CM

## 2025-04-29 DIAGNOSIS — G25.81 RESTLESS LEG SYNDROME: ICD-10-CM

## 2025-04-29 DIAGNOSIS — J44.9 COPD, SEVERE: Primary | ICD-10-CM

## 2025-04-29 PROCEDURE — 94625 PHY/QHP OP PULM RHB W/O MNTR: CPT

## 2025-04-29 RX ORDER — GABAPENTIN 100 MG/1
100 CAPSULE ORAL 2 TIMES DAILY
Qty: 60 CAPSULE | Refills: 1 | Status: SHIPPED | OUTPATIENT
Start: 2025-04-29

## 2025-04-29 RX ORDER — GABAPENTIN 100 MG/1
100 CAPSULE ORAL 2 TIMES DAILY
Qty: 60 CAPSULE | Refills: 0 | Status: SHIPPED | OUTPATIENT
Start: 2025-04-29 | End: 2026-04-29

## 2025-05-01 ENCOUNTER — TREATMENT (OUTPATIENT)
Dept: CARDIAC REHAB | Facility: HOSPITAL | Age: 85
End: 2025-05-01
Payer: MEDICARE

## 2025-05-01 DIAGNOSIS — J44.9 COPD, SEVERE: Primary | ICD-10-CM

## 2025-05-01 PROCEDURE — 94625 PHY/QHP OP PULM RHB W/O MNTR: CPT

## 2025-05-02 RX ORDER — LEVOTHYROXINE SODIUM 25 UG/1
25 TABLET ORAL DAILY
Qty: 90 TABLET | Refills: 3 | Status: SHIPPED | OUTPATIENT
Start: 2025-05-02

## 2025-05-02 NOTE — TELEPHONE ENCOUNTER
"  Caller: Lissa Ortiz \"Godwin\"    Relationship: Self    Best call back number: [phone]    Requested Prescriptions:   Requested Prescriptions     Pending Prescriptions Disp Refills    levothyroxine (SYNTHROID, LEVOTHROID) 25 MCG tablet 90 tablet 0     Sig: Take 1 tablet by mouth Daily.        Pharmacy where request should be sent: [facility] [id number] [address] [facility] [phone] [facility] [phone]      Last office visit with prescribing clinician: 1/3/2025   Last telemedicine visit with prescribing clinician: Visit date not found   Next office visit with prescribing clinician: 7/7/2025     Additional details provided by patient: PATIENT IS OUT OF THIS MEDICATION.     Does the patient have less than a 3 day supply:  [x] Yes  [] No    Would you like a call back once the refill request has been completed: [] Yes [x] No    If the office needs to give you a call back, can they leave a voicemail: [] Yes [x] No    Екатерина Crane Rep   05/02/25 15:11 EDT       "

## 2025-05-06 ENCOUNTER — TREATMENT (OUTPATIENT)
Dept: CARDIAC REHAB | Facility: HOSPITAL | Age: 85
End: 2025-05-06
Payer: MEDICARE

## 2025-05-06 DIAGNOSIS — J44.9 COPD, SEVERE: Primary | ICD-10-CM

## 2025-05-06 PROCEDURE — 94625 PHY/QHP OP PULM RHB W/O MNTR: CPT

## 2025-05-08 ENCOUNTER — TREATMENT (OUTPATIENT)
Dept: CARDIAC REHAB | Facility: HOSPITAL | Age: 85
End: 2025-05-08
Payer: MEDICARE

## 2025-05-08 DIAGNOSIS — J44.9 COPD, SEVERE: Primary | ICD-10-CM

## 2025-05-08 PROCEDURE — 94625 PHY/QHP OP PULM RHB W/O MNTR: CPT

## 2025-05-11 DIAGNOSIS — I10 PRIMARY HYPERTENSION: Chronic | ICD-10-CM

## 2025-05-12 RX ORDER — AMLODIPINE BESYLATE 2.5 MG/1
2.5 TABLET ORAL DAILY
Qty: 90 TABLET | Refills: 0 | Status: SHIPPED | OUTPATIENT
Start: 2025-05-12

## 2025-05-13 ENCOUNTER — TREATMENT (OUTPATIENT)
Dept: CARDIAC REHAB | Facility: HOSPITAL | Age: 85
End: 2025-05-13
Payer: MEDICARE

## 2025-05-13 DIAGNOSIS — J44.9 COPD, SEVERE: Primary | ICD-10-CM

## 2025-05-13 PROCEDURE — 94625 PHY/QHP OP PULM RHB W/O MNTR: CPT

## 2025-05-13 RX ORDER — LEVOTHYROXINE SODIUM 25 UG/1
25 TABLET ORAL DAILY
Qty: 90 TABLET | Refills: 3 | Status: SHIPPED | OUTPATIENT
Start: 2025-05-13

## 2025-05-15 ENCOUNTER — APPOINTMENT (OUTPATIENT)
Dept: CARDIAC REHAB | Facility: HOSPITAL | Age: 85
End: 2025-05-15
Payer: MEDICARE

## 2025-05-20 ENCOUNTER — APPOINTMENT (OUTPATIENT)
Dept: CARDIAC REHAB | Facility: HOSPITAL | Age: 85
End: 2025-05-20
Payer: MEDICARE

## 2025-05-22 ENCOUNTER — TREATMENT (OUTPATIENT)
Dept: CARDIAC REHAB | Facility: HOSPITAL | Age: 85
End: 2025-05-22
Payer: MEDICARE

## 2025-05-22 DIAGNOSIS — J44.9 COPD, SEVERE: Primary | ICD-10-CM

## 2025-05-22 PROCEDURE — 94625 PHY/QHP OP PULM RHB W/O MNTR: CPT

## 2025-05-27 ENCOUNTER — TREATMENT (OUTPATIENT)
Dept: CARDIAC REHAB | Facility: HOSPITAL | Age: 85
End: 2025-05-27
Payer: MEDICARE

## 2025-05-27 DIAGNOSIS — J44.9 COPD, SEVERE: Primary | ICD-10-CM

## 2025-05-27 PROCEDURE — 94625 PHY/QHP OP PULM RHB W/O MNTR: CPT

## 2025-05-29 ENCOUNTER — TREATMENT (OUTPATIENT)
Dept: CARDIAC REHAB | Facility: HOSPITAL | Age: 85
End: 2025-05-29
Payer: MEDICARE

## 2025-05-29 DIAGNOSIS — J44.9 COPD, SEVERE: Primary | ICD-10-CM

## 2025-05-29 PROCEDURE — 94625 PHY/QHP OP PULM RHB W/O MNTR: CPT

## 2025-06-03 ENCOUNTER — TREATMENT (OUTPATIENT)
Dept: CARDIAC REHAB | Facility: HOSPITAL | Age: 85
End: 2025-06-03
Payer: MEDICARE

## 2025-06-03 DIAGNOSIS — J44.9 COPD, SEVERE: Primary | ICD-10-CM

## 2025-06-03 PROCEDURE — 94625 PHY/QHP OP PULM RHB W/O MNTR: CPT

## 2025-06-05 ENCOUNTER — APPOINTMENT (OUTPATIENT)
Dept: CARDIAC REHAB | Facility: HOSPITAL | Age: 85
End: 2025-06-05
Payer: MEDICARE

## 2025-06-10 ENCOUNTER — APPOINTMENT (OUTPATIENT)
Dept: CARDIAC REHAB | Facility: HOSPITAL | Age: 85
End: 2025-06-10
Payer: MEDICARE

## 2025-06-12 ENCOUNTER — APPOINTMENT (OUTPATIENT)
Dept: CARDIAC REHAB | Facility: HOSPITAL | Age: 85
End: 2025-06-12
Payer: MEDICARE

## 2025-06-12 DIAGNOSIS — G25.81 RESTLESS LEG SYNDROME: ICD-10-CM

## 2025-06-13 RX ORDER — GABAPENTIN 300 MG/1
300 CAPSULE ORAL 3 TIMES DAILY
Qty: 90 CAPSULE | Refills: 2 | Status: SHIPPED | OUTPATIENT
Start: 2025-06-13

## 2025-06-17 DIAGNOSIS — R80.9 TYPE 2 DIABETES MELLITUS WITH MICROALBUMINURIA, WITH LONG-TERM CURRENT USE OF INSULIN: Chronic | ICD-10-CM

## 2025-06-17 DIAGNOSIS — E11.29 TYPE 2 DIABETES MELLITUS WITH MICROALBUMINURIA, WITH LONG-TERM CURRENT USE OF INSULIN: Chronic | ICD-10-CM

## 2025-06-17 DIAGNOSIS — E03.9 PRIMARY HYPOTHYROIDISM: Chronic | ICD-10-CM

## 2025-06-17 DIAGNOSIS — E78.5 HYPERLIPIDEMIA, UNSPECIFIED HYPERLIPIDEMIA TYPE: Chronic | ICD-10-CM

## 2025-06-17 DIAGNOSIS — Z79.4 TYPE 2 DIABETES MELLITUS WITH MICROALBUMINURIA, WITH LONG-TERM CURRENT USE OF INSULIN: Chronic | ICD-10-CM

## 2025-06-17 DIAGNOSIS — E55.9 VITAMIN D DEFICIENCY: ICD-10-CM

## 2025-06-18 LAB
25(OH)D3+25(OH)D2 SERPL-MCNC: 68.1 NG/ML (ref 30–100)
ALBUMIN SERPL-MCNC: 4.1 G/DL (ref 3.5–5.2)
ALBUMIN/CREAT UR: 51 MG/G CREAT (ref 0–29)
ALBUMIN/GLOB SERPL: 1.6 G/DL
ALP SERPL-CCNC: 96 U/L (ref 39–117)
ALT SERPL-CCNC: 16 U/L (ref 1–33)
AST SERPL-CCNC: 28 U/L (ref 1–32)
BILIRUB SERPL-MCNC: 0.6 MG/DL (ref 0–1.2)
BUN SERPL-MCNC: 16 MG/DL (ref 8–23)
BUN/CREAT SERPL: 23.5 (ref 7–25)
CALCIUM SERPL-MCNC: 9.3 MG/DL (ref 8.6–10.5)
CHLORIDE SERPL-SCNC: 104 MMOL/L (ref 98–107)
CHOLEST SERPL-MCNC: 171 MG/DL (ref 0–200)
CO2 SERPL-SCNC: 28.1 MMOL/L (ref 22–29)
CREAT SERPL-MCNC: 0.68 MG/DL (ref 0.57–1)
CREAT UR-MCNC: 123.1 MG/DL
EGFRCR SERPLBLD CKD-EPI 2021: 86.5 ML/MIN/1.73
GLOBULIN SER CALC-MCNC: 2.5 GM/DL
GLUCOSE SERPL-MCNC: 121 MG/DL (ref 65–99)
HBA1C MFR BLD: 7.7 % (ref 4.8–5.6)
HDLC SERPL-MCNC: 64 MG/DL (ref 40–60)
IMP & REVIEW OF LAB RESULTS: NORMAL
LDLC SERPL CALC-MCNC: 96 MG/DL (ref 0–100)
MICROALBUMIN UR-MCNC: 62.6 UG/ML
POTASSIUM SERPL-SCNC: 4.3 MMOL/L (ref 3.5–5.2)
PROT SERPL-MCNC: 6.6 G/DL (ref 6–8.5)
SODIUM SERPL-SCNC: 140 MMOL/L (ref 136–145)
TRIGL SERPL-MCNC: 58 MG/DL (ref 0–150)
TSH SERPL DL<=0.005 MIU/L-ACNC: 2.75 UIU/ML (ref 0.27–4.2)
VLDLC SERPL CALC-MCNC: 11 MG/DL (ref 5–40)

## 2025-07-03 RX ORDER — GLIPIZIDE 5 MG/1
TABLET, FILM COATED, EXTENDED RELEASE ORAL
Qty: 90 TABLET | Refills: 2 | Status: SHIPPED | OUTPATIENT
Start: 2025-07-03

## 2025-07-03 NOTE — TELEPHONE ENCOUNTER
Blood pressures under good control, recent blood testing was normal.   Rx Refill Note  Requested Prescriptions     Pending Prescriptions Disp Refills    glipizide (GLUCOTROL XL) 5 MG ER tablet [Pharmacy Med Name: glipiZIDE XL 5 MG TABLET] 90 tablet 1     Sig: TAKE 1 TABLET BY MOUTH EVERY MORNING WITH A MEAL      Last office visit with prescribing clinician: 2/6/2025   Last telemedicine visit with prescribing clinician: Visit date not found   Next office visit with prescribing clinician: 7/17/2025                         Would you like a call back once the refill request has been completed: [] Yes [] No    If the office needs to give you a call back, can they leave a voicemail: [] Yes [] No    Yudi Gerber  07/03/25, 09:43 EDT  Yudi Gerber  7/3/2025  09:43 EDT

## 2025-07-07 ENCOUNTER — OFFICE VISIT (OUTPATIENT)
Dept: INTERNAL MEDICINE | Facility: CLINIC | Age: 85
End: 2025-07-07
Payer: MEDICARE

## 2025-07-07 VITALS
RESPIRATION RATE: 18 BRPM | TEMPERATURE: 97.6 F | OXYGEN SATURATION: 96 % | HEIGHT: 67 IN | SYSTOLIC BLOOD PRESSURE: 110 MMHG | HEART RATE: 71 BPM | BODY MASS INDEX: 21.97 KG/M2 | DIASTOLIC BLOOD PRESSURE: 64 MMHG | WEIGHT: 140 LBS

## 2025-07-07 DIAGNOSIS — E78.2 MIXED HYPERLIPIDEMIA: ICD-10-CM

## 2025-07-07 DIAGNOSIS — E03.9 PRIMARY HYPOTHYROIDISM: ICD-10-CM

## 2025-07-07 DIAGNOSIS — I10 PRIMARY HYPERTENSION: Chronic | ICD-10-CM

## 2025-07-07 DIAGNOSIS — Z00.00 ENCOUNTER FOR SUBSEQUENT ANNUAL WELLNESS VISIT IN MEDICARE PATIENT: Primary | ICD-10-CM

## 2025-07-07 PROCEDURE — G0439 PPPS, SUBSEQ VISIT: HCPCS | Performed by: STUDENT IN AN ORGANIZED HEALTH CARE EDUCATION/TRAINING PROGRAM

## 2025-07-07 PROCEDURE — 99213 OFFICE O/P EST LOW 20 MIN: CPT | Performed by: STUDENT IN AN ORGANIZED HEALTH CARE EDUCATION/TRAINING PROGRAM

## 2025-07-07 PROCEDURE — 1159F MED LIST DOCD IN RCRD: CPT | Performed by: STUDENT IN AN ORGANIZED HEALTH CARE EDUCATION/TRAINING PROGRAM

## 2025-07-07 PROCEDURE — 3078F DIAST BP <80 MM HG: CPT | Performed by: STUDENT IN AN ORGANIZED HEALTH CARE EDUCATION/TRAINING PROGRAM

## 2025-07-07 PROCEDURE — 3074F SYST BP LT 130 MM HG: CPT | Performed by: STUDENT IN AN ORGANIZED HEALTH CARE EDUCATION/TRAINING PROGRAM

## 2025-07-07 PROCEDURE — 1160F RVW MEDS BY RX/DR IN RCRD: CPT | Performed by: STUDENT IN AN ORGANIZED HEALTH CARE EDUCATION/TRAINING PROGRAM

## 2025-07-07 PROCEDURE — 1126F AMNT PAIN NOTED NONE PRSNT: CPT | Performed by: STUDENT IN AN ORGANIZED HEALTH CARE EDUCATION/TRAINING PROGRAM

## 2025-07-07 RX ORDER — AMLODIPINE BESYLATE 2.5 MG/1
2.5 TABLET ORAL DAILY
Qty: 90 TABLET | Refills: 1 | Status: SHIPPED | OUTPATIENT
Start: 2025-08-11

## 2025-07-07 NOTE — PROGRESS NOTES
Subjective   The ABCs of the Annual Wellness Visit  Medicare Wellness Visit      Lissa Ortiz is a 84 y.o. patient who presents for a Medicare Wellness Visit.    The following portions of the patient's history were reviewed and   updated as appropriate: current medications, past medical history, and problem list.    Compared to one year ago, the patient's physical   health is the same.  Compared to one year ago, the patient's mental   health is the same.    Recent Hospitalizations:  She was not admitted to the hospital during the last year.     Current Medical Providers:  Patient Care Team:  Sveta Schuler MD as PCP - General (Internal Medicine)  Kyle Chow MD as Consulting Physician (Rheumatology)  Tony Luciano MD as Referring Physician (Neurology)  Kiran Thompson MD as Consulting Physician (Hematology and Oncology)  Matt Pimentel MD as Consulting Physician (Endocrinology)  Cristina Espinal MD (Dermatology)  Genaro Cooney Jr., MD as Consulting Physician (Urology)  Darci Andrews MD as Consulting Physician (Pulmonary Disease)  Loretta Madsen MD as Consulting Physician (Ophthalmology)  Cecilia Dennis MD as Consulting Physician (Obstetrics and Gynecology)    Outpatient Medications Prior to Visit   Medication Sig Dispense Refill    azelastine (ASTELIN) 0.1 % nasal spray Administer 2 sprays into the nostril(s) as directed by provider 2 (Two) Times a Day. Use in each nostril as directed 2 each 5    Blood Glucose Monitoring Suppl (TRUE METRIX AIR GLUCOSE METER) device 1 Device Daily As Needed (check bs's twice daily). 1 Device 0    Ca Phosphate-Cholecalciferol (Caltrate Gummy Bites) 250-10 MG-MCG chewable tablet Chew Daily.      dorzolamide-timolol (COSOPT) 22.3-6.8 MG/ML ophthalmic solution Apply  to eye(s) as directed by provider Every 12 (Twelve) Hours.      gabapentin (NEURONTIN) 100 MG capsule Take 1 capsule by mouth 2 (Two) Times a Day. TAKE ONE TABLET DURING THE DAY AND  "ONE TABLET AT NIGHT WITH YOUR EVENING GABAPENTIN FOR A SLOW TITRATION TO MOST EFFECTIVE DOSE 60 capsule 1    gabapentin (Neurontin) 100 MG capsule Take 1 capsule by mouth 2 (Two) Times a Day. Take one capsule by mouth twice daily with your 300mg capsule to titrate to lowest effective dose 60 capsule 0    gabapentin (NEURONTIN) 300 MG capsule TAKE 1 CAPSULE BY MOUTH 3 TIMES A DAY 90 capsule 2    glipizide (GLUCOTROL XL) 5 MG ER tablet TAKE 1 TABLET BY MOUTH EVERY MORNING WITH A MEAL 90 tablet 2    Insulin Glargine, 2 Unit Dial, (Toujeo Max SoloStar) 300 UNIT/ML solution pen-injector injection Inject 8 Units under the skin into the appropriate area as directed Daily. Indications: Type 2 Diabetes 6 mL 0    Insulin Pen Needle (BD Pen Needle Vicenta 2nd Gen) 32G X 4 MM misc 1 each by Other route Daily. use to inject insulin once daily 100 each 3    levothyroxine (SYNTHROID, LEVOTHROID) 25 MCG tablet Take 1 tablet by mouth Daily. 90 tablet 3    Mirabegron ER (Myrbetriq) 50 MG tablet sustained-release 24 hour 24 hr tablet Take 50 mg by mouth Daily.      Multiple Vitamin (MULTI VITAMIN PO) Take 1 tablet by mouth Daily.      pioglitazone (ACTOS) 15 MG tablet TAKE 1 TABLET BY MOUTH DAILY 90 tablet 1    pravastatin (PRAVACHOL) 20 MG tablet Take 1 tablet by mouth Daily. 90 tablet 1    Probiotic capsule Take 1 capsule by mouth.      Semaglutide,0.25 or 0.5MG/DOS, (OZEMPIC) 2 MG/3ML solution pen-injector Inject 0.5 mg under the skin into the appropriate area as directed 1 (One) Time Per Week. 3 mL 5    Tafluprost, PF, (ZIOPTAN) 0.0015 % solution ophthalmic solution 1 drop Every Night.      vitamin C (ASCORBIC ACID) 250 MG tablet Take 1 tablet by mouth Daily.      amLODIPine (NORVASC) 2.5 MG tablet TAKE 1 TABLET BY MOUTH DAILY 90 tablet 0    Tuberculin Syringe (B-D TB SYRINGE 1CC/27GX1/2\") 27G X 1/2\" 1 ML misc USE WITH METHOTREXATE ONCE WEEKLY 10 each 0     No facility-administered medications prior to visit.     No opioid medication " "identified on active medication list. I have reviewed chart for other potential  high risk medication/s and harmful drug interactions in the elderly.      Aspirin is not on active medication list.  Aspirin use is not indicated based on review of current medical condition/s. Risk of harm outweighs potential benefits.  .    Patient Active Problem List   Diagnosis    Essential (primary) hypertension    Mixed anxiety depressive disorder    Glaucoma    Hyperlipidemia    Insomnia    Restless legs syndrome    Rheumatoid arthritis    Vitamin D deficiency    Routine health maintenance    Midline low back pain without sciatica    Encounter for immunization    Neck pain on right side    Microalbuminuria    Encounter for screening colonoscopy    Tubular adenoma of colon    Diabetic peripheral neuropathy    Primary hypothyroidism    Inclusion body myositis    Peripheral venous insufficiency    Encounter for gynecological examination    Health care maintenance    Osteopenia of left hip    History of adenomatous polyp of colon    Monoclonal gammopathy of unknown significance (MGUS)    Type 2 diabetes mellitus with microalbuminuria, with long-term current use of insulin     Advance Care Planning Advance Directive is on file.  ACP discussion was held with the patient during this visit. Patient has an advance directive in EMR which is still valid.             Objective   Vitals:    07/07/25 1103   BP: 110/64   BP Location: Right arm   Patient Position: Sitting   Cuff Size: Adult   Pulse: 71   Resp: 18   Temp: 97.6 °F (36.4 °C)   TempSrc: Oral   SpO2: 96%   Weight: 63.5 kg (140 lb)   Height: 170.2 cm (67.01\")   PainSc: 0-No pain       Estimated body mass index is 21.92 kg/m² as calculated from the following:    Height as of this encounter: 170.2 cm (67.01\").    Weight as of this encounter: 63.5 kg (140 lb).    BMI is within normal parameters. No other follow-up for BMI required.           Does the patient have evidence of cognitive " impairment? No  Lab Results   Component Value Date    CHLPL 171 2025    TRIG 58 2025    HDL 64 (H) 2025    LDL 96 2025    VLDL 11 2025    HGBA1C 7.70 (H) 2025                                                                                                Health  Risk Assessment    Smoking Status:  Social History     Tobacco Use   Smoking Status Never    Passive exposure: Past (MOTHER)   Smokeless Tobacco Never     Alcohol Consumption:  Social History     Substance and Sexual Activity   Alcohol Use Not Currently    Comment: Occasional glass of wine one per month       Fall Risk Screen  STEADI Fall Risk Assessment was completed, and patient is at LOW risk for falls.Assessment completed on:2025    Depression Screening   Little interest or pleasure in doing things? Not at all   Feeling down, depressed, or hopeless? Not at all   PHQ-2 Total Score 0      Health Habits and Functional and Cognitive Screenin/30/2025    12:04 PM   Functional & Cognitive Status   Do you have difficulty preparing food and eating? No   Do you have difficulty bathing yourself, getting dressed or grooming yourself? No   Do you have difficulty using the toilet? No   Do you have difficulty moving around from place to place? No   Do you have trouble with steps or getting out of a bed or a chair? Yes   Current Diet Limited Junk Food   Dental Exam Up to date   Eye Exam Up to date   Exercise (times per week) 5 times per week   Current Exercises Include Stair Step Machine;Walking   Do you need help using the phone?  No   Are you deaf or do you have serious difficulty hearing?  Yes   Do you need help to go to places out of walking distance? No   Do you need help shopping? No   Do you need help preparing meals?  No   Do you need help with housework?  No   Do you need help with laundry? No   Do you need help taking your medications? No   Do you need help managing money? No   Do you ever drive or ride in a car  without wearing a seat belt? No   Have you felt unusual fatigue (could be tiredness), stress, anger or loneliness in the last month? No   Who do you live with? Alone   If you need help, do you have trouble finding someone available to you? No   Have you been bothered in the last four weeks by sexual problems? No   Do you have difficulty concentrating, remembering or making decisions? No         Pt has hearing aids    Age-appropriate Screening Schedule:  Refer to the list below for future screening recommendations based on patient's age, sex and/or medical conditions. Orders for these recommended tests are listed in the plan section. The patient has been provided with a written plan.    Health Maintenance List  Health Maintenance   Topic Date Due    DIABETIC FOOT EXAM  06/03/2025    INFLUENZA VACCINE  10/01/2025    DIABETIC EYE EXAM  10/01/2025    HEMOGLOBIN A1C  12/17/2025    LIPID PANEL  06/17/2026    URINE MICROALBUMIN-CREATININE RATIO (uACR)  06/17/2026    ANNUAL WELLNESS VISIT  07/07/2026    DXA SCAN  11/20/2026    TDAP/TD VACCINES (4 - Tdap) 03/13/2028    COLORECTAL CANCER SCREENING  05/03/2028    COVID-19 Vaccine  Completed    RSV Vaccine - Adults  Completed    Pneumococcal Vaccine 50+  Completed    ZOSTER VACCINE  Addressed    MAMMOGRAM  Discontinued    PAP SMEAR  Discontinued                                                                                                                                                CMS Preventative Services Quick Reference  Risk Factors Identified During Encounter  Immunizations Discussed/Encouraged: Influenza and COVID19    The above risks/problems have been discussed with the patient.  Pertinent information has been shared with the patient in the After Visit Summary.  An After Visit Summary and PPPS were made available to the patient.    Follow Up:   Next Medicare Wellness visit to be scheduled in 1 year.         Additional E&M Note during same encounter follows:  Patient  "has additional, significant, and separately identifiable condition(s)/problem(s) that require work above and beyond the Medicare Wellness Visit     Chief Complaint  Medicare Wellness-subsequent    Subjective   HPI    Hypertension: This is well-controlled on amlodipine reviewed labs ordered by endocrinology.    Hyperlipidemia: Taking pravastatin.  hypothyroidism: Taking levothyroxine; thyroid labs were also reviewed from 3 weeks ago        Objective   Vital Signs:  /64 (BP Location: Right arm, Patient Position: Sitting, Cuff Size: Adult)   Pulse 71   Temp 97.6 °F (36.4 °C) (Oral)   Resp 18   Ht 170.2 cm (67.01\")   Wt 63.5 kg (140 lb)   SpO2 96%   BMI 21.92 kg/m²   Physical Exam  Vitals reviewed.   Constitutional:       General: She is not in acute distress.     Appearance: Normal appearance.   Cardiovascular:      Rate and Rhythm: Normal rate.   Pulmonary:      Effort: No respiratory distress.   Neurological:      Mental Status: She is alert.   Psychiatric:         Mood and Affect: Mood normal.         Thought Content: Thought content normal.         The following data was reviewed by: Sveta Schuler MD on 07/07/2025:    Common labs          1/30/2025    09:05 2/25/2025    10:37 6/17/2025    08:56   Common Labs   Glucose 115  233  121    BUN 15  20  16.0    Creatinine 0.67  0.69  0.68    Sodium 141  137  140    Potassium 4.4  3.6  4.3    Chloride 106  100  104    Calcium 9.2  9.0  9.3    Albumin 3.9  3.5     3.9  4.1    Total Bilirubin 0.5  0.7  0.6    Alkaline Phosphatase 107  89  96    AST (SGOT) 27  25  28    ALT (SGPT) 20  18  16    WBC  8.19     Hemoglobin  13.1     Hematocrit  40.1     Platelets  256     Total Cholesterol 172   171    Triglycerides 60   58    HDL Cholesterol 63   64    LDL Cholesterol  97   96    Hemoglobin A1C 6.90   7.70    Microalbumin, Urine   62.6           Assessment and Plan      Encounter for subsequent annual wellness visit in Medicare patient         Primary " hypertension      Orders:    amLODIPine (NORVASC) 2.5 MG tablet; Take 1 tablet by mouth Daily.    Primary hypothyroidism         Mixed hyperlipidemia                    Follow Up   Return in about 6 months (around 1/7/2026).  Patient was given instructions and counseling regarding her condition or for health maintenance advice. Please see specific information pulled into the AVS if appropriate.

## 2025-07-12 DIAGNOSIS — E11.9 TYPE 2 DIABETES MELLITUS WITHOUT COMPLICATION, WITH LONG-TERM CURRENT USE OF INSULIN: Chronic | ICD-10-CM

## 2025-07-12 DIAGNOSIS — Z79.4 TYPE 2 DIABETES MELLITUS WITHOUT COMPLICATION, WITH LONG-TERM CURRENT USE OF INSULIN: Chronic | ICD-10-CM

## 2025-07-14 RX ORDER — PIOGLITAZONE 15 MG/1
15 TABLET ORAL DAILY
Qty: 90 TABLET | Refills: 0 | Status: SHIPPED | OUTPATIENT
Start: 2025-07-14

## 2025-07-14 NOTE — TELEPHONE ENCOUNTER
Rx Refill Note  Requested Prescriptions     Pending Prescriptions Disp Refills    pioglitazone (ACTOS) 15 MG tablet [Pharmacy Med Name: PIOGLITAZONE HCL 15 MG TABLET] 90 tablet 1     Sig: TAKE 1 TABLET BY MOUTH DAILY      Last office visit with prescribing clinician: 2/6/2025   Last telemedicine visit with prescribing clinician: Visit date not found   Next office visit with prescribing clinician: 7/17/2025                         Would you like a call back once the refill request has been completed: [] Yes [] No    If the office needs to give you a call back, can they leave a voicemail: [] Yes [] No  Betzaida Gerber MA  7/14/2025  07:49 EDT

## 2025-07-17 ENCOUNTER — OFFICE VISIT (OUTPATIENT)
Dept: ENDOCRINOLOGY | Age: 85
End: 2025-07-17
Payer: MEDICARE

## 2025-07-17 VITALS
WEIGHT: 139.8 LBS | HEART RATE: 72 BPM | HEIGHT: 67 IN | OXYGEN SATURATION: 100 % | DIASTOLIC BLOOD PRESSURE: 58 MMHG | BODY MASS INDEX: 21.94 KG/M2 | SYSTOLIC BLOOD PRESSURE: 108 MMHG | TEMPERATURE: 97.8 F

## 2025-07-17 DIAGNOSIS — Z87.39 HISTORY OF RHEUMATOID ARTHRITIS: ICD-10-CM

## 2025-07-17 DIAGNOSIS — E11.29 TYPE 2 DIABETES MELLITUS WITH MICROALBUMINURIA, WITH LONG-TERM CURRENT USE OF INSULIN: Primary | ICD-10-CM

## 2025-07-17 DIAGNOSIS — I10 ESSENTIAL (PRIMARY) HYPERTENSION: ICD-10-CM

## 2025-07-17 DIAGNOSIS — Z79.4 TYPE 2 DIABETES MELLITUS WITH MICROALBUMINURIA, WITH LONG-TERM CURRENT USE OF INSULIN: Primary | ICD-10-CM

## 2025-07-17 DIAGNOSIS — M85.852 OSTEOPENIA OF LEFT HIP: ICD-10-CM

## 2025-07-17 DIAGNOSIS — E78.5 HYPERLIPIDEMIA, UNSPECIFIED HYPERLIPIDEMIA TYPE: ICD-10-CM

## 2025-07-17 DIAGNOSIS — D47.2 MONOCLONAL GAMMOPATHY OF UNKNOWN SIGNIFICANCE (MGUS): ICD-10-CM

## 2025-07-17 DIAGNOSIS — E03.9 PRIMARY HYPOTHYROIDISM: ICD-10-CM

## 2025-07-17 DIAGNOSIS — R80.9 TYPE 2 DIABETES MELLITUS WITH MICROALBUMINURIA, WITH LONG-TERM CURRENT USE OF INSULIN: Primary | ICD-10-CM

## 2025-07-17 RX ORDER — GLIPIZIDE 2.5 MG/1
7.5 TABLET, EXTENDED RELEASE ORAL DAILY
Qty: 270 TABLET | Refills: 2 | Status: SHIPPED | OUTPATIENT
Start: 2025-07-17 | End: 2026-07-17

## 2025-07-17 NOTE — PROGRESS NOTES
Subjective   Lissa Ortiz is a 84 y.o. female.     History of Present Illness     She is on Ozempic 0.5 mg weekly, Actos 15 mg  per day, glipizide XL 5 mg every morning and Toujeo 8 units every morning as needed.  She does not take Toujeo 2-3 times a week because of low fasting glucose.  Diarrhea improved after she reduce her Ozempic dose to 0.5 mg weekly.  She did not start Farxiga because she has UTI once a month.  Janumet was discontinued because of diarrhea.  Jardiance was too expensive.  She checks her blood sugar once a day.  FBS .  She has lost 3 pounds since February 2025.  Hemoglobin A1c done in June 2025 was 7.7%.     She denies eye complaints.  Her last eye examination was in 3/25.  She has glaucoma but no retinopathy.  She has restless leg at night and her feet feels tight and numb.  She was told to have peripheral neuropathy and is on gabapentin 800 mg at bedtime prescribed by neurologist RONALD Malik.  Inclusion myositis was ruled out.  Urine microalbumin is elevated in January 2022.  Urine microalbumin is elevated at 51 mg/g creatinine in June 2025.     She has hyperlipidemia and is on pravastatin 20 mg/day.  She has not used Crestor or Lipitor before.  Lipid panel done in June 2025 are as follows: Cholesterol 171.  HDL 64.  LDL 96.  Triglycerides 58.     She has hypothyroidism and is on levothyroxine 25 mcg/day.  She has no history of head or neck radiation therapy.  She has no history of thyroid surgery.  TSH done in June 2025 is normal at 2.75.     She has hypertension and is on amlodipine 2.5 mg once a day.  She denies history of heart attack or stroke.  She denies chest pain or shortness of breath.     She has rheumatoid arthritis and was taken off methotrexate by Dr. Chow.  She is not on oral steroids.     She has osteopenia on bone density done in November 2022.  Her 10-year risk of major osteoporotic fracture is elevated at 28% and for hip fracture is 18%.  She is on a multivitamin  "1 tablet/day and gummy calcium 2 tablets/day.  She walks 1/2 miles every day.  She was started on alendronate 70 mg weekly by Dr. Chow in September 2023.  She went off alendronate in March 2024 because of diarrhea.       B[facility] density done at [facility] in November 2024 was normal in both hip and lumbar spine.  25 hydroxy vitamin D done in June 2025 is normal at 68.1 ng/mL.     She has monoclonal gammopathy of unknown significance and is being followed by Dr. ROMEL Thompson.    The following portions of the patient's history were reviewed and updated as appropriate: allergies, current medications, past family history, past medical history, past social history, past surgical history, and problem list.    Review of Systems   Eyes:  Negative for visual disturbance.   Respiratory:  Negative for shortness of breath and wheezing.    Cardiovascular:  Negative for chest pain and palpitations.   Gastrointestinal: Negative.    Endocrine: Negative for cold intolerance and heat intolerance.   Genitourinary: Negative.    Musculoskeletal:  Negative for myalgias.   Neurological:  Positive for numbness.     Vitals:    07/17/25 0933   BP: 108/58   Pulse: 72   Temp: 97.8 °F (36.6 °C)   TempSrc: Oral   SpO2: 100%   Weight: 63.4 kg (139 lb 12.8 oz)   Height: 170.2 cm (67.01\")      Objective   Physical Exam  Constitutional:       General: She is not in acute distress.     Appearance: Normal appearance. She is not ill-appearing, toxic-appearing or diaphoretic.   Eyes:      General: No scleral icterus.        Right eye: No discharge.         Left eye: No discharge.      Extraocular Movements: Extraocular movements intact.   Neck:      Vascular: No carotid bruit.   Cardiovascular:      Rate and Rhythm: Normal rate and regular rhythm.      Heart sounds: Normal heart sounds.   Pulmonary:      Breath sounds: No rales.   Chest:      Chest wall: No tenderness.   Abdominal:      General: Bowel sounds are normal.      Palpations: Abdomen is soft.      " Tenderness: There is no right CVA tenderness or left CVA tenderness.   Musculoskeletal:      Right lower leg: No edema.      Left lower leg: No edema.      Comments: Feet cool but not cyanotic.  No pedal edema.  No plantar ulcers.   Lymphadenopathy:      Cervical: No cervical adenopathy.   Neurological:      Mental Status: She is alert and oriented to person, place, and time.      Comments: Intact light touch in lower extremities.       Orders Only on 06/17/2025   Component Date Value Ref Range Status    Glucose 06/17/2025 121 (H)  65 - 99 mg/dL Final    BUN 06/17/2025 16.0  8.0 - 23.0 mg/dL Final    Creatinine 06/17/2025 0.68  0.57 - 1.00 mg/dL Final    EGFR Result 06/17/2025 86.5  >60.0 mL/min/1.73 Final    Comment: GFR Categories in Chronic Kidney Disease (CKD)  GFR Category          GFR (mL/min/1.73)    Interpretation  G1                    90 or greater        Normal or high  (1)  G2                    60-89                Mild decrease  (1)  G3a                   45-59                Mild to moderate  decrease  G3b                   30-44                Moderate to  severe decrease  G4                    15-29                Severe decrease  G5                    14 or less           Kidney failure  (1)In the absence of evidence of kidney disease, neither  GFR category G1 or G2 fulfill the criteria for CKD.  eGFR calculation 2021 CKD-EPI creatinine equation, which  does not include race as a factor      BUN/Creatinine Ratio 06/17/2025 23.5  7.0 - 25.0 Final    Sodium 06/17/2025 140  136 - 145 mmol/L Final    Potassium 06/17/2025 4.3  3.5 - 5.2 mmol/L Final    Chloride 06/17/2025 104  98 - 107 mmol/L Final    Total CO2 06/17/2025 28.1  22.0 - 29.0 mmol/L Final    Calcium 06/17/2025 9.3  8.6 - 10.5 mg/dL Final    Total Protein 06/17/2025 6.6  6.0 - 8.5 g/dL Final    Albumin 06/17/2025 4.1  3.5 - 5.2 g/dL Final    Globulin 06/17/2025 2.5  gm/dL Final    A/G Ratio 06/17/2025 1.6  g/dL Final    Total Bilirubin  06/17/2025 0.6  0.0 - 1.2 mg/dL Final    Alkaline Phosphatase 06/17/2025 96  39 - 117 U/L Final    AST (SGOT) 06/17/2025 28  1 - 32 U/L Final    ALT (SGPT) 06/17/2025 16  1 - 33 U/L Final    Hemoglobin A1C 06/17/2025 7.70 (H)  4.80 - 5.60 % Final    Comment: Hemoglobin A1C Ranges:  Increased Risk for Diabetes  5.7% to 6.4%  Diabetes                     >= 6.5%  Diabetic Goal                < 7.0%      Creatinine, Urine 06/17/2025 123.1  Not Estab. mg/dL Final    Microalbumin, Urine 06/17/2025 62.6  Not Estab. ug/mL Final    Microalbumin/Creatinine Ratio 06/17/2025 51 (H)  0 - 29 mg/g creat Final    Comment:                        Normal:                0 -  29                         Moderately increased: 30 - 300                         Severely increased:       >300      Total Cholesterol 06/17/2025 171  0 - 200 mg/dL Final    Comment: Cholesterol Reference Ranges  (U.S. Department of Health and Human Services ATP III  Classifications)  Desirable          <200 mg/dL  Borderline High    200-239 mg/dL  High Risk          >240 mg/dL  Triglyceride Reference Ranges  (U.S. Department of Health and Human Services ATP III  Classifications)  Normal           <150 mg/dL  Borderline High  150-199 mg/dL  High             200-499 mg/dL  Very High        >500 mg/dL  HDL Reference Ranges  (U.S. Department of Health and Human Services ATP III  Classifications)  Low     <40 mg/dl (major risk factor for CHD)  High    >60 mg/dl ('negative' risk factor for CHD)  LDL Reference Ranges  (U.S. Department of Health and Human Services ATP III  Classifications)  Optimal          <100 mg/dL  Near Optimal     100-129 mg/dL  Borderline High  130-159 mg/dL  High             160-189 mg/dL  Very High        >189 mg/dL  LDL is calculated using the NIH LDL-C calculation.      Triglycerides 06/17/2025 58  0 - 150 mg/dL Final    HDL Cholesterol 06/17/2025 64 (H)  40 - 60 mg/dL Final    VLDL Cholesterol Akbar 06/17/2025 11  5 - 40 mg/dL Final    LDL  Chol Calc ([facility]) 06/17/2025 96  0 - 100 mg/dL Final    TSH 06/17/2025 2.750  0.270 - 4.200 uIU/mL Final    25 Hydroxy, Vitamin D 06/17/2025 68.1  30.0 - 100.0 ng/ml Final    Comment: Reference Range for Total Vitamin D 25(OH)  Deficiency <20.0 ng/mL  Insufficiency 21-29 ng/mL  Sufficiency  ng/mL  Toxicity >100 ng/ml      Interpretation 06/17/2025 Note   Final    Supplemental report is available.     Assessment & Plan   Diagnoses and all orders for this visit:    1. Type 2 diabetes mellitus with microalbuminuria, with long-term current use of insulin (Primary)    2. Hyperlipidemia, unspecified hyperlipidemia type    3. Primary hypothyroidism    4. Essential (primary) hypertension    5. Osteopenia of left hip    6. Monoclonal gammopathy of unknown significance (MGUS)    7. History of rheumatoid arthritis    Other orders  -     glipizide (GLUCOTROL XL) 2.5 MG 24 hr tablet; Take 3 tablets by mouth Daily.  Dispense: 270 tablet; Refill: 2      Increase glipizide XL to 7.5 mL milligrams every morning  Discontinue Toujeo.  Continue Ozempic 0.5 mg weekly and Actos 15 mg/day.    Hold pravastatin 20 mg for 4 weeks and see if muscle weakness improves.  Continue regular exercise as tolerated.    Continue levothyroxine 25 mcg/day.    Continue amlodipine 2.5 mg once a day.  Will defer treatment of hypertension to Dr. Schuler.    Continue multivitamins 1 tablet daily and gummy calcium 2 tablets daily.  Follow-up bone density in November 2026.    Follow-up with Dr. ROMEL Thompson as scheduled.    Copy of my note sent to Dr. Schuler, RONALD Roberts, Dr. Chow, Dr. Tony Luciano and Dr. Kiran Thompson.    Flu vaccine this fall    RTC 4 mos.

## 2025-07-22 NOTE — TELEPHONE ENCOUNTER
PT STATES THAT SHE THINKS SHE HAS A UTI, HAS CONSTANT URINATION AND A BURNING SENSATION. I OFFERED CLIENT AN APPOINTMENT, BUT SHE DECLINED. WOULD LIKE TO KNOW IF A MEDICATION CAN BE ORDERED INSTEAD    [phone] C/B    used

## 2025-07-23 ENCOUNTER — TELEPHONE (OUTPATIENT)
Dept: INTERNAL MEDICINE | Facility: CLINIC | Age: 85
End: 2025-07-23

## 2025-07-24 DIAGNOSIS — R26.89 BALANCE PROBLEM: Primary | ICD-10-CM

## 2025-07-28 RX ORDER — SEMAGLUTIDE 0.68 MG/ML
0.5 INJECTION, SOLUTION SUBCUTANEOUS WEEKLY
Qty: 3 ML | Refills: 5 | Status: SHIPPED | OUTPATIENT
Start: 2025-07-28

## 2025-07-28 NOTE — TELEPHONE ENCOUNTER
Rx Refill Note  Requested Prescriptions     Pending Prescriptions Disp Refills    Ozempic, 0.25 or 0.5 MG/DOSE, 2 MG/3ML solution pen-injector [Pharmacy Med Name: OZEMPIC 0.25-0.5 MG/DOSE(2 MG/3 ML)] 3 mL 5     Sig: DIAL AND INJECT UNDER THE SKIN 0.5 MG WEEKLY      Last office visit with prescribing clinician: 7/17/2025   Last telemedicine visit with prescribing clinician: Visit date not found   Next office visit with prescribing clinician: 12/3/2025                         Would you like a call back once the refill request has been completed: [] Yes [] No    If the office needs to give you a call back, can they leave a voicemail: [] Yes [] No  Betzaida Gerber MA  7/28/2025  07:56 EDT

## 2025-08-07 DIAGNOSIS — I10 PRIMARY HYPERTENSION: Chronic | ICD-10-CM

## 2025-08-07 RX ORDER — AMLODIPINE BESYLATE 2.5 MG/1
2.5 TABLET ORAL DAILY
Qty: 90 TABLET | Refills: 1 | OUTPATIENT
Start: 2025-08-07

## 2025-08-20 ENCOUNTER — TREATMENT (OUTPATIENT)
Dept: PHYSICAL THERAPY | Facility: CLINIC | Age: 85
End: 2025-08-20
Payer: MEDICARE

## 2025-08-20 DIAGNOSIS — R26.89 BALANCE PROBLEM: Primary | ICD-10-CM

## 2025-08-20 DIAGNOSIS — R29.898 LEG WEAKNESS, BILATERAL: ICD-10-CM

## 2025-08-31 ENCOUNTER — TELEPHONE (OUTPATIENT)
Dept: URGENT CARE | Facility: CLINIC | Age: 85
End: 2025-08-31
Payer: MEDICARE

## 2025-08-31 DIAGNOSIS — N39.0 ACUTE LOWER UTI: Primary | ICD-10-CM

## 2025-08-31 RX ORDER — NITROFURANTOIN 25; 75 MG/1; MG/1
100 CAPSULE ORAL EVERY 12 HOURS SCHEDULED
Qty: 14 CAPSULE | Refills: 0 | Status: SHIPPED | OUTPATIENT
Start: 2025-08-31

## (undated) DEVICE — LN SMPL CO2 SHTRM SD STREAM W/M LUER

## (undated) DEVICE — ADAPT CLN BIOGUARD AIR/H2O DISP

## (undated) DEVICE — SNAR POLYP SENSATION STDOVL 27 240 BX40

## (undated) DEVICE — CANN O2 ETCO2 FITS ALL CONN CO2 SMPL A/ 7IN DISP LF

## (undated) DEVICE — TUBING, SUCTION, 1/4" X 10', STRAIGHT: Brand: MEDLINE

## (undated) DEVICE — KT ORCA ORCAPOD DISP STRL

## (undated) DEVICE — CANN NASL CO2 TRULINK W/O2 A/

## (undated) DEVICE — THE TORRENT IRRIGATION SCOPE CONNECTOR IS USED WITH THE TORRENT IRRIGATION TUBING TO PROVIDE IRRIGATION FLUIDS SUCH AS STERILE WATER DURING GASTROINTESTINAL ENDOSCOPIC PROCEDURES WHEN USED IN CONJUNCTION WITH AN IRRIGATION PUMP (OR ELECTROSURGICAL UNIT).: Brand: TORRENT

## (undated) DEVICE — THE SINGLE USE ETRAP – POLYP TRAP IS USED FOR SUCTION RETRIEVAL OF ENDOSCOPICALLY REMOVED POLYPS.: Brand: ETRAP

## (undated) DEVICE — Device: Brand: DEFENDO AIR/WATER/SUCTION AND BIOPSY VALVE

## (undated) DEVICE — SENSR O2 OXIMAX FNGR A/ 18IN NONSTR